# Patient Record
Sex: FEMALE | Race: WHITE | NOT HISPANIC OR LATINO | Employment: OTHER | URBAN - METROPOLITAN AREA
[De-identification: names, ages, dates, MRNs, and addresses within clinical notes are randomized per-mention and may not be internally consistent; named-entity substitution may affect disease eponyms.]

---

## 2017-05-10 ENCOUNTER — CONVERSION ENCOUNTER (OUTPATIENT)
Dept: GENERAL RADIOLOGY | Facility: HOSPITAL | Age: 42
End: 2017-05-10

## 2018-03-15 ENCOUNTER — OFFICE VISIT CONVERTED (OUTPATIENT)
Dept: NEUROSURGERY | Facility: CLINIC | Age: 43
End: 2018-03-15
Attending: PHYSICIAN ASSISTANT

## 2018-05-17 ENCOUNTER — OFFICE VISIT CONVERTED (OUTPATIENT)
Dept: NEUROSURGERY | Facility: CLINIC | Age: 43
End: 2018-05-17
Attending: PHYSICIAN ASSISTANT

## 2018-06-26 ENCOUNTER — OFFICE VISIT CONVERTED (OUTPATIENT)
Dept: ORTHOPEDIC SURGERY | Facility: CLINIC | Age: 43
End: 2018-06-26
Attending: ORTHOPAEDIC SURGERY

## 2018-08-30 ENCOUNTER — OFFICE VISIT CONVERTED (OUTPATIENT)
Dept: NEUROSURGERY | Facility: CLINIC | Age: 43
End: 2018-08-30
Attending: PHYSICIAN ASSISTANT

## 2018-09-18 ENCOUNTER — OFFICE VISIT CONVERTED (OUTPATIENT)
Dept: NEUROSURGERY | Facility: CLINIC | Age: 43
End: 2018-09-18
Attending: PHYSICIAN ASSISTANT

## 2018-12-11 ENCOUNTER — OFFICE VISIT CONVERTED (OUTPATIENT)
Dept: NEUROSURGERY | Facility: CLINIC | Age: 43
End: 2018-12-11
Attending: PHYSICIAN ASSISTANT

## 2019-01-31 ENCOUNTER — HOSPITAL ENCOUNTER (OUTPATIENT)
Dept: PHYSICAL THERAPY | Facility: CLINIC | Age: 44
Setting detail: RECURRING SERIES
Discharge: HOME | End: 2019-02-27
Attending: NEUROLOGICAL SURGERY

## 2020-03-24 ENCOUNTER — OFFICE VISIT (OUTPATIENT)
Dept: GASTROENTEROLOGY | Facility: CLINIC | Age: 45
End: 2020-03-24

## 2020-03-24 VITALS
DIASTOLIC BLOOD PRESSURE: 82 MMHG | WEIGHT: 196 LBS | BODY MASS INDEX: 30.76 KG/M2 | SYSTOLIC BLOOD PRESSURE: 126 MMHG | TEMPERATURE: 98.3 F | OXYGEN SATURATION: 92 % | HEART RATE: 78 BPM | HEIGHT: 67 IN

## 2020-03-24 DIAGNOSIS — Z94.89 HISTORY OF PANCREATIC ISLET CELL TRANSPLANTATION: ICD-10-CM

## 2020-03-24 DIAGNOSIS — K21.9 GASTROESOPHAGEAL REFLUX DISEASE, ESOPHAGITIS PRESENCE NOT SPECIFIED: ICD-10-CM

## 2020-03-24 DIAGNOSIS — K86.1 CHRONIC PANCREATITIS, UNSPECIFIED PANCREATITIS TYPE (HCC): ICD-10-CM

## 2020-03-24 DIAGNOSIS — K31.84 GASTROPARESIS: ICD-10-CM

## 2020-03-24 DIAGNOSIS — K50.919 CROHN'S DISEASE WITH COMPLICATION, UNSPECIFIED GASTROINTESTINAL TRACT LOCATION (HCC): Primary | ICD-10-CM

## 2020-03-24 PROCEDURE — 99214 OFFICE O/P EST MOD 30 MIN: CPT | Performed by: INTERNAL MEDICINE

## 2020-03-24 RX ORDER — BLOOD SUGAR DIAGNOSTIC
STRIP MISCELLANEOUS
COMMUNITY
Start: 2020-01-07 | End: 2021-08-24

## 2020-03-24 RX ORDER — ONDANSETRON 4 MG/1
4 TABLET, ORALLY DISINTEGRATING ORAL EVERY 8 HOURS PRN
COMMUNITY
Start: 2020-01-24 | End: 2022-04-26

## 2020-03-24 RX ORDER — FLURBIPROFEN SODIUM 0.3 MG/ML
SOLUTION/ DROPS OPHTHALMIC
COMMUNITY
Start: 2020-01-08 | End: 2021-08-24

## 2020-03-24 RX ORDER — FLUTICASONE PROPIONATE 50 MCG
SPRAY, SUSPENSION (ML) NASAL
COMMUNITY
Start: 2019-04-25 | End: 2022-04-26

## 2020-03-24 RX ORDER — PROMETHAZINE HYDROCHLORIDE 25 MG/1
TABLET ORAL
COMMUNITY
Start: 2019-04-16 | End: 2020-12-08

## 2020-03-24 RX ORDER — CETIRIZINE HYDROCHLORIDE 10 MG/1
TABLET ORAL
COMMUNITY
Start: 2019-04-25 | End: 2022-05-17 | Stop reason: SDUPTHER

## 2020-03-24 RX ORDER — CHLORTHALIDONE 25 MG/1
25 TABLET ORAL DAILY
COMMUNITY
Start: 2019-04-18

## 2020-03-24 RX ORDER — LISINOPRIL 10 MG/1
TABLET ORAL
COMMUNITY
Start: 2019-04-18

## 2020-03-24 RX ORDER — MISOPROSTOL 200 UG/1
TABLET ORAL
COMMUNITY
Start: 2020-01-22 | End: 2020-09-21

## 2020-03-24 RX ORDER — PANTOPRAZOLE SODIUM 40 MG/1
40 TABLET, DELAYED RELEASE ORAL 2 TIMES DAILY
Qty: 60 TABLET | Refills: 3 | Status: SHIPPED | OUTPATIENT
Start: 2020-03-24 | End: 2020-08-03 | Stop reason: SDUPTHER

## 2020-03-24 RX ORDER — IBUPROFEN 600 MG/1
TABLET ORAL
COMMUNITY
Start: 2020-01-07 | End: 2021-09-25

## 2020-03-24 RX ORDER — DOCUSATE SODIUM 100 MG/1
CAPSULE, LIQUID FILLED ORAL
COMMUNITY
Start: 2019-04-16

## 2020-03-24 RX ORDER — HYDROCORTISONE 10 MG/1
10 TABLET ORAL 2 TIMES DAILY
COMMUNITY
Start: 2020-01-08 | End: 2021-08-24

## 2020-03-24 RX ORDER — METOCLOPRAMIDE 10 MG/1
TABLET ORAL
COMMUNITY
Start: 2019-04-25 | End: 2020-09-01 | Stop reason: SDUPTHER

## 2020-03-24 RX ORDER — PRAVASTATIN SODIUM 40 MG
TABLET ORAL
COMMUNITY
Start: 2019-04-25 | End: 2022-04-26

## 2020-03-24 RX ORDER — LEVOTHYROXINE SODIUM 112 UG/1
112 TABLET ORAL DAILY
COMMUNITY
Start: 2019-05-01 | End: 2020-09-21 | Stop reason: DRUGHIGH

## 2020-03-24 RX ORDER — DULOXETINE 40 MG/1
40 CAPSULE, DELAYED RELEASE ORAL 2 TIMES DAILY
COMMUNITY
Start: 2020-01-24 | End: 2021-08-24

## 2020-03-24 RX ORDER — GABAPENTIN 300 MG/1
300 CAPSULE ORAL 3 TIMES DAILY
COMMUNITY
Start: 2020-01-24 | End: 2020-09-21

## 2020-03-24 RX ORDER — LANCETS 33 GAUGE
EACH MISCELLANEOUS
COMMUNITY
Start: 2020-02-09 | End: 2021-08-24

## 2020-03-24 RX ORDER — INSULIN GLARGINE 100 [IU]/ML
INJECTION, SOLUTION SUBCUTANEOUS
COMMUNITY
Start: 2020-03-10 | End: 2021-08-24

## 2020-03-24 RX ORDER — PANCRELIPASE 24000; 76000; 120000 [USP'U]/1; [USP'U]/1; [USP'U]/1
36000 CAPSULE, DELAYED RELEASE PELLETS ORAL 3 TIMES DAILY
COMMUNITY
Start: 2020-01-07 | End: 2020-03-24 | Stop reason: DRUGHIGH

## 2020-03-24 RX ORDER — DICYCLOMINE HYDROCHLORIDE 10 MG/1
CAPSULE ORAL
COMMUNITY
Start: 2020-01-07 | End: 2021-08-24 | Stop reason: SDUPTHER

## 2020-03-24 RX ORDER — PANTOPRAZOLE SODIUM 40 MG/1
TABLET, DELAYED RELEASE ORAL
COMMUNITY
Start: 2019-04-10 | End: 2020-03-24 | Stop reason: SDUPTHER

## 2020-03-24 NOTE — PATIENT INSTRUCTIONS
1. Continue Humira for Crohns disease. For further evaluation we will check your Humira levels as well as for any presence of antibody formation.    2. We will also check a CBC, CMP, and CRP.    3. Continue Creon 36,000 unit capsules 3 with meals and 2 with snacks. Refills sent to your pharmacy.     4. Continue pantoprazole 40 mg daily for GERD.     5. Plan for next office follow up in 2 months for reassessment of symptoms.

## 2020-03-24 NOTE — PROGRESS NOTES
Crohn's Disease and Med Management      HPI  Patient here for follow-up.  She has a history of Crohn's disease, gastroparesis, pancreas divisum and status post total pancreatectomy with islet cell transplant..  She most recently had an upper GI series showing stricturing in the proximal duodenum at the presumptive level of the duodenal jejunal junction.  There did appear to be gastric stasis associated with this.     She had a colonoscopy in April 2019 that showed a 3 mm ascending colon polyp and internal hemorrhoids but no evidence of active Crohn's disease.    She is here today for follow-up.  She is doing quite well at this point postoperatively.  She is about 3 months postop.  She is still taking insulin despite her islet cell transplant but hopes to be able to discontinue this soon.  She has not taken her Humira for about 6 months as this was on hold during her surgical timeframe.  She is having about 3-6 loose bowel movements a day.  No blood in the stool.  No weight loss.    She has had some abdominal pain which has lingered since her surgery but she considers it mild.  Pain is generally in the upper abdominal region and is nonradiating, dull, intermittent, and sometimes worse when she eats.  She has no associated nausea or vomiting.    Review of Systems   Constitutional: Negative for appetite change, chills, diaphoresis, fatigue, fever and unexpected weight change.   HENT: Negative for dental problem, ear pain, mouth sores, rhinorrhea, sore throat and voice change.    Eyes: Negative for pain, redness and visual disturbance.   Respiratory: Negative for cough, chest tightness and wheezing.    Cardiovascular: Negative for chest pain, palpitations and leg swelling.   Endocrine: Negative for cold intolerance, heat intolerance, polydipsia, polyphagia and polyuria.   Genitourinary: Negative for dysuria, frequency, hematuria and urgency.   Musculoskeletal: Negative for arthralgias, back pain, joint swelling,  myalgias and neck pain.   Skin: Negative for rash.   Allergic/Immunologic: Positive for immunocompromised state. Negative for environmental allergies and food allergies.   Neurological: Negative for dizziness, seizures, weakness, numbness and headaches.   Hematological: Bruises/bleeds easily.   Psychiatric/Behavioral: Positive for sleep disturbance. The patient is nervous/anxious.         I have reviewed and confirmed the accuracy of the ROS as documented by the APRN Andrea Han MD     Problem List:  There is no problem list on file for this patient.      Medical History:    Past Medical History:   Diagnosis Date   • Anemia    • Crohn's disease (CMS/HCC)    • Diabetes mellitus (CMS/HCC)    • GERD (gastroesophageal reflux disease)    • Hypertension         Social History:    Social History     Socioeconomic History   • Marital status: Legally      Spouse name: Not on file   • Number of children: Not on file   • Years of education: Not on file   • Highest education level: Not on file   Tobacco Use   • Smoking status: Never Smoker   • Smokeless tobacco: Never Used   Substance and Sexual Activity   • Alcohol use: Never     Frequency: Never   • Drug use: Never   • Sexual activity: Defer       Family History:   Family History   Problem Relation Age of Onset   • Cancer Mother    • Heart disease Father    • Colon cancer Maternal Grandfather    • Alcohol abuse Paternal Grandfather    • Heart disease Paternal Grandfather        Surgical History:   Past Surgical History:   Procedure Laterality Date   • ABDOMINAL SURGERY     • CHOLECYSTECTOMY     • COLONOSCOPY     • HYSTERECTOMY     • PANCREAS SURGERY  12/04/2019   • UPPER GASTROINTESTINAL ENDOSCOPY           Current Outpatient Medications:   •  ADMELOG 100 UNIT/ML injection, , Disp: , Rfl:   •  B-D UF III MINI PEN NEEDLES 31G X 5 MM misc, USE AS DIRECTED TO TEST BLOOD SUGAR 8 TIMES DAILY, Disp: , Rfl:   •  cetirizine (zyrTEC) 10 MG tablet, TAKE ONE TABLET BY  MOUTH ONCE DAILY AT BEDTIME, Disp: , Rfl:   •  chlorthalidone (HYGROTON) 25 MG tablet, Take 25 mg by mouth Daily., Disp: , Rfl:   •  dicyclomine (BENTYL) 10 MG capsule, TAKE ONE CAPSULE BY MOUTH QID, Disp: , Rfl:   •  docusate sodium (DOK) 100 MG capsule, TAKE ONE CAPSULE BY MOUTH TWICE DAILY, Disp: , Rfl:   •  DULoxetine HCl 40 MG capsule delayed-release particles, Take 40 mg by mouth 2 (Two) Times a Day., Disp: , Rfl:   •  fluticasone (FLONASE) 50 MCG/ACT nasal spray, instill 2 sprays in each nostril every day AS DIRECTED, Disp: , Rfl:   •  gabapentin (NEURONTIN) 300 MG capsule, Take 300 mg by mouth 3 (Three) Times a Day., Disp: , Rfl:   •  GLUCAGON EMERGENCY 1 MG injection, INJECT 1MG INTRAMUSCULARLY ONE TIME AS NEEDED FOR HYPOGLYCEMIA, Disp: , Rfl:   •  hydrocortisone (CORTEF) 10 MG tablet, Take 10 mg by mouth 2 (Two) Times a Day., Disp: , Rfl:   •  Insulin Glargine (BASAGLAR KWIKPEN) 100 UNIT/ML injection pen, Inject 10 units qam and 14 pm, Disp: , Rfl:   •  Lancets (ONETOUCH DELICA PLUS [id number]) misc, CHECK 6 TIMES DAILY WHEN SYMPTOMATIC OF LOW BLOOD SUGAR, Disp: , Rfl:   •  levothyroxine (SYNTHROID, LEVOTHROID) 112 MCG tablet, Take 112 mcg by mouth Daily., Disp: , Rfl:   •  linaclotide (Linzess) 145 MCG capsule capsule, TAKE ONE CAPSULE BY MOUTH IN THE MORNING 30 MINUTES BEFORE A MEAL, Disp: , Rfl:   •  lisinopril (PRINIVIL,ZESTRIL) 10 MG tablet, TAKE ONE TABLET BY MOUTH ONCE DAILY, Disp: , Rfl:   •  metoclopramide (REGLAN) 10 MG tablet, TAKE ONE TABLET BY MOUTH FOUR TIMES DAILY, Disp: , Rfl:   •  metoprolol tartrate (LOPRESSOR) 25 MG tablet, Take 25 mg by mouth 2 (Two) Times a Day., Disp: , Rfl:   •  ondansetron ODT (ZOFRAN-ODT) 4 MG disintegrating tablet, , Disp: , Rfl:   •  ONETOUCH VERIO test strip, USE TO CHECK BLOOD SUGAR EVERY 4 HOURS, Disp: , Rfl:   •  pantoprazole (PROTONIX) 40 MG EC tablet, TAKE ONE TABLET BY MOUTH TWICE DAILY, Disp: , Rfl:   •  pravastatin (PRAVACHOL) 40 MG tablet, TAKE ONE  TABLET BY MOUTH EVERY NIGHT AT BEDTIME, Disp: , Rfl:   •  promethazine (PHENERGAN) 25 MG tablet, TAKE ONE TABLET BY MOUTH THREE TIMES DAILY AS NEEDED FOR NAUSEA, Disp: , Rfl:   •  miSOPROStol (CYTOTEC) 200 MCG tablet, TK 1 T PO QID, Disp: , Rfl:     Allergies:   Allergies   Allergen Reactions   • Sulfa Antibiotics Hives   • Tramadol Anaphylaxis   • Codeine GI Intolerance   • Morphine GI Intolerance   • Vancomycin Hives        The following portions of the patient's history were reviewed and updated as appropriate: allergies, current medications, past family history, past medical history, past social history, past surgical history and problem list.    Vitals:    03/24/20 1424   BP: 126/82   Pulse: 78   Temp: 98.3 °F (36.8 °C)   SpO2: 92%         03/24/20  1424   Weight: 88.9 kg (196 lb)     Body mass index is 30.7 kg/m².      Physical Exam   Abdominal: Soft. Normal appearance and bowel sounds are normal. She exhibits no distension, no pulsatile midline mass and no mass. There is no tenderness. There is no rigidity, no rebound and no guarding.   Vitals reviewed.       Assessment/ Plan  Kayla was seen today for crohn's disease and med management.    Diagnoses and all orders for this visit:    Crohn's disease with complication, unspecified gastrointestinal tract location (CMS/HCC)  -     CBC & Differential  -     Comprehensive Metabolic Panel  -     C-reactive Protein  -     Adalimumab+Ab (Serial Monitor)    Chronic pancreatitis, unspecified pancreatitis type (CMS/HCC)  -     CBC & Differential  -     Comprehensive Metabolic Panel  -     C-reactive Protein  -     Adalimumab+Ab (Serial Monitor)    History of pancreatic islet cell transplantation  -     CBC & Differential  -     Comprehensive Metabolic Panel  -     C-reactive Protein  -     Adalimumab+Ab (Serial Monitor)    Gastroparesis  -     CBC & Differential  -     Comprehensive Metabolic Panel  -     C-reactive Protein  -     Adalimumab+Ab (Serial  Monitor)    Gastroesophageal reflux disease, esophagitis presence not specified         No follow-ups on file.    Documentation by Mariela WILLIS acting as a scribe in the following sections (ROS, Assessment, Plan) for the undersigned provider.     Discussion:  Patient has a history of stable Crohn's disease and is still having some ongoing chronic issues with this and diarrhea.  She has been off Humira so we will go ahead and check her Humira antibodies before reinitiating biologic dosing.  We will go and check a CBC, CMP, and C-reactive protein as well as the Humira antibodies.    Patient is status post islet cell transplant and she is awaiting return of the islet cell function.  We will go ahead and refill her Creon for pancreatic enzyme replacement.    She also has chronic, stable reflux and will need a refill on pantoprazole.  She did have a postsurgical anastomotic ulcer and will need high-dose proton pump inhibitors.    We will follow-up here in 2 months.

## 2020-03-30 LAB
ADALIMUMAB AB SERPL-MCNC: <25 NG/ML
ADALIMUMAB SERPL-MCNC: <0.6 UG/ML
ALBUMIN SERPL-MCNC: 3.8 G/DL (ref 3.5–5.2)
ALBUMIN/GLOB SERPL: 1 G/DL
ALP SERPL-CCNC: 181 U/L (ref 39–117)
ALT SERPL-CCNC: 35 U/L (ref 1–33)
AST SERPL-CCNC: 46 U/L (ref 1–32)
BASOPHILS # BLD AUTO: 0.03 10*3/MM3 (ref 0–0.2)
BASOPHILS NFR BLD AUTO: 0.3 % (ref 0–1.5)
BILIRUB SERPL-MCNC: 0.2 MG/DL (ref 0.2–1.2)
BUN SERPL-MCNC: 12 MG/DL (ref 6–20)
BUN/CREAT SERPL: 16.9 (ref 7–25)
CALCIUM SERPL-MCNC: 9.7 MG/DL (ref 8.6–10.5)
CHLORIDE SERPL-SCNC: 100 MMOL/L (ref 98–107)
CO2 SERPL-SCNC: 24.7 MMOL/L (ref 22–29)
CREAT SERPL-MCNC: 0.71 MG/DL (ref 0.57–1)
CRP SERPL-MCNC: 1.06 MG/DL (ref 0–0.5)
EOSINOPHIL # BLD AUTO: 0.32 10*3/MM3 (ref 0–0.4)
EOSINOPHIL NFR BLD AUTO: 3.6 % (ref 0.3–6.2)
ERYTHROCYTE [DISTWIDTH] IN BLOOD BY AUTOMATED COUNT: 20.7 % (ref 12.3–15.4)
GLOBULIN SER CALC-MCNC: 3.9 GM/DL
GLUCOSE SERPL-MCNC: 189 MG/DL (ref 65–99)
HCT VFR BLD AUTO: 31.8 % (ref 34–46.6)
HGB BLD-MCNC: 9.5 G/DL (ref 12–15.9)
IMM GRANULOCYTES # BLD AUTO: 0.03 10*3/MM3 (ref 0–0.05)
IMM GRANULOCYTES NFR BLD AUTO: 0.3 % (ref 0–0.5)
LYMPHOCYTES # BLD AUTO: 2.37 10*3/MM3 (ref 0.7–3.1)
LYMPHOCYTES NFR BLD AUTO: 26.8 % (ref 19.6–45.3)
Lab: NORMAL
MCH RBC QN AUTO: 23.1 PG (ref 26.6–33)
MCHC RBC AUTO-ENTMCNC: 29.9 G/DL (ref 31.5–35.7)
MCV RBC AUTO: 77.4 FL (ref 79–97)
MONOCYTES # BLD AUTO: 1.13 10*3/MM3 (ref 0.1–0.9)
MONOCYTES NFR BLD AUTO: 12.8 % (ref 5–12)
NEUTROPHILS # BLD AUTO: 4.97 10*3/MM3 (ref 1.7–7)
NEUTROPHILS NFR BLD AUTO: 56.2 % (ref 42.7–76)
NRBC BLD AUTO-RTO: 0.1 /100 WBC (ref 0–0.2)
PLATELET # BLD AUTO: 683 10*3/MM3 (ref 140–450)
POTASSIUM SERPL-SCNC: 4.8 MMOL/L (ref 3.5–5.2)
PROT SERPL-MCNC: 7.7 G/DL (ref 6–8.5)
RBC # BLD AUTO: 4.11 10*6/MM3 (ref 3.77–5.28)
SODIUM SERPL-SCNC: 139 MMOL/L (ref 136–145)
WBC # BLD AUTO: 8.85 10*3/MM3 (ref 3.4–10.8)

## 2020-03-31 DIAGNOSIS — Z79.899 HIGH RISK MEDICATION USE: ICD-10-CM

## 2020-03-31 DIAGNOSIS — K50.919 CROHN'S DISEASE WITH COMPLICATION, UNSPECIFIED GASTROINTESTINAL TRACT LOCATION (HCC): Primary | ICD-10-CM

## 2020-04-02 ENCOUNTER — TELEPHONE (OUTPATIENT)
Dept: GASTROENTEROLOGY | Facility: CLINIC | Age: 45
End: 2020-04-02

## 2020-04-02 NOTE — TELEPHONE ENCOUNTER
Spoke with pt, states that since Sunday been having nausea, vomiting, abdominal pain and spasms, chills. Pt states that she is just all around not feeling well. States that she believes she may be dyhydrated but is afraid to go to the ER due to the COVID-19.     Please Advise.

## 2020-04-02 NOTE — TELEPHONE ENCOUNTER
Reviewed with Dr. Han.    Contacted patient.    She is having chills but unsure if she is having a fever as she does not have a thermometer.    She is having persistent nausea and vomiting despite regular use of promethazine oral and suppositories.  She feels dehydrated.    She has abdominal pain and is overall feeling poorly.    She is unable to tolerate liquid intake without vomiting.    Offered broad-spectrum antibiotics and antiemetics however patient feels that she needs to go to the emergency room.    She is going to proceed to [facility] ER and contact us with an update.    Michael

## 2020-04-15 ENCOUNTER — HOSPITAL ENCOUNTER (OUTPATIENT)
Dept: LAB | Facility: HOSPITAL | Age: 45
Discharge: HOME | End: 2020-04-15

## 2020-04-15 LAB
25(OH)D3 SERPL-MCNC: 37.1 NG/ML (ref 30–100)
ALBUMIN SERPL-MCNC: 3.9 G/DL (ref 3.5–5)
ALBUMIN/GLOB SERPL: 1 {RATIO} (ref 1.4–2.6)
ALP SERPL-CCNC: 177 U/L (ref 42–98)
ALT SERPL-CCNC: 38 U/L (ref 10–40)
ANION GAP SERPL CALC-SCNC: 20 MMOL/L (ref 8–19)
AST SERPL-CCNC: 54 U/L (ref 15–50)
BILIRUB SERPL-MCNC: 0.18 MG/DL (ref 0.2–1.3)
BUN SERPL-MCNC: 15 MG/DL (ref 5–25)
BUN/CREAT SERPL: 16 {RATIO} (ref 6–20)
CALCIUM SERPL-MCNC: 9 MG/DL (ref 8.7–10.4)
CHLORIDE SERPL-SCNC: 100 MMOL/L (ref 99–111)
CONV CO2: 22 MMOL/L (ref 22–32)
CONV TOTAL PROTEIN: 8 G/DL (ref 6.3–8.2)
CREAT UR-MCNC: 0.95 MG/DL (ref 0.5–0.9)
GFR SERPLBLD BASED ON 1.73 SQ M-ARVRAT: >60 ML/MIN/{1.73_M2}
GLOBULIN UR ELPH-MCNC: 4.1 G/DL (ref 2–3.5)
GLUCOSE SERPL-MCNC: 147 MG/DL (ref 65–99)
OSMOLALITY SERPL CALC.SUM OF ELEC: 290 MOSM/KG (ref 273–304)
POTASSIUM SERPL-SCNC: 4 MMOL/L (ref 3.5–5.3)
SODIUM SERPL-SCNC: 138 MMOL/L (ref 135–147)
TSH SERPL-ACNC: 7.36 M[IU]/L (ref 0.27–4.2)

## 2020-04-17 ENCOUNTER — DOCUMENTATION (OUTPATIENT)
Dept: GASTROENTEROLOGY | Facility: CLINIC | Age: 45
End: 2020-04-17

## 2020-04-30 ENCOUNTER — RESULTS ENCOUNTER (OUTPATIENT)
Dept: GASTROENTEROLOGY | Facility: CLINIC | Age: 45
End: 2020-04-30

## 2020-04-30 DIAGNOSIS — Z79.899 HIGH RISK MEDICATION USE: ICD-10-CM

## 2020-04-30 DIAGNOSIS — K50.919 CROHN'S DISEASE WITH COMPLICATION, UNSPECIFIED GASTROINTESTINAL TRACT LOCATION (HCC): ICD-10-CM

## 2020-06-23 ENCOUNTER — TELEPHONE (OUTPATIENT)
Dept: GASTROENTEROLOGY | Facility: CLINIC | Age: 45
End: 2020-06-23

## 2020-07-22 ENCOUNTER — OFFICE VISIT (OUTPATIENT)
Dept: GASTROENTEROLOGY | Facility: CLINIC | Age: 45
End: 2020-07-22

## 2020-07-22 VITALS
RESPIRATION RATE: 16 BRPM | WEIGHT: 185.4 LBS | OXYGEN SATURATION: 98 % | TEMPERATURE: 97.3 F | DIASTOLIC BLOOD PRESSURE: 80 MMHG | HEIGHT: 67 IN | BODY MASS INDEX: 29.1 KG/M2 | HEART RATE: 68 BPM | SYSTOLIC BLOOD PRESSURE: 120 MMHG

## 2020-07-22 DIAGNOSIS — Z90.410 HISTORY OF PANCREATECTOMY: ICD-10-CM

## 2020-07-22 DIAGNOSIS — R74.8 ELEVATED LIVER ENZYMES: ICD-10-CM

## 2020-07-22 DIAGNOSIS — K50.919 CROHN'S DISEASE WITH COMPLICATION, UNSPECIFIED GASTROINTESTINAL TRACT LOCATION (HCC): Primary | ICD-10-CM

## 2020-07-22 DIAGNOSIS — K31.84 GASTROPARESIS: ICD-10-CM

## 2020-07-22 DIAGNOSIS — K75.81 NASH (NONALCOHOLIC STEATOHEPATITIS): ICD-10-CM

## 2020-07-22 DIAGNOSIS — Z79.899 HIGH RISK MEDICATION USE: ICD-10-CM

## 2020-07-22 DIAGNOSIS — R93.5 ABNORMAL ABDOMINAL CT SCAN: ICD-10-CM

## 2020-07-22 DIAGNOSIS — R10.11 ABDOMINAL PAIN, RIGHT UPPER QUADRANT: ICD-10-CM

## 2020-07-22 PROCEDURE — 99214 OFFICE O/P EST MOD 30 MIN: CPT | Performed by: NURSE PRACTITIONER

## 2020-07-22 NOTE — PROGRESS NOTES
Follow-up      HPI  25-year-old female presents the office today for follow-up.  She has a history of Crohn's disease, Hashimoto's thyroiditis, gastroparesis, and pancreas divisum status post total pancreatectomy and splenectomy with islet cell transplant.    She reports going to the ER at [facility] on 7/8/2020 with fever, chills, shortness of breath, nausea and vomiting, and  abdominal pain.  CT scan of the abdomen pelvis was performed and demonstrated diffuse fatty infiltration of the liver, consistent with her diagnosis of Mart.  It also demonstrated focal areas of mixed density in the left upper quadrant mesentery and right lower quadrant omentum suspected to represent areas of fat infarction worsening solid tissue in these areas may represent an ongoing inflammatory reaction or fibrosis.  She was also noted to have gas in the anterior abdominal wall, it was likely secondary to injection of medication.  She was tested for COVID-19 and was found to be negative.  Lab work demonstrated an elevated WBC near 30,000-lab report from [facility] not available for review.     She was transferred to [facility] for further evaluation.  She was COVID-19 tested a second time, which was also negative.  WBC count was noted to be 26.68.  Liver enzymes were elevated with alk phos at 198, AST at 82, and ALT at 61. Surgery was consulted, with no surgical intervention indicated.  Blood cultures were negative for growth at 3 days.  Patient was started on IV Zosyn.     She did not undergo any repeat imaging during her 5-day stay at [facility].  Was improved and she was discharged home on Augmentin, which she completed on Saturday.  Patient reports being told that she infarcted part of her liver; however, upon review of her records this does not seem to coincide.  Most recent lab work performed on 7/21/2020 showed an elevated WBC at 15.1, and continued elevated platelet count of 649.  The patient does have a port in  "her right chest.  Liver enzymes remained elevated with alkaline phosphatase of 195, AST of 96, and ALT of 59.  She denies having any abdominal pain at this time, but states she is tender if pressure is applied to her right upper quadrant.  Liver biopsy on 5/16/2019 demonstrated steatohepatitis with pericentral, periportal, and early bridging fibrosis stage 2-3 out of 3.    She continues Protonix 40 mg twice daily for management of heartburn and reflux.  Symptoms are well controlled with this dosing.  If she skips a dose of medication she reports that she will experience significant heartburn.  She does experience nausea and vomiting intermittently secondary to gastroparesis.  She currently takes Reglan 10 mg anywhere from 2-4 times a day depending upon her symptoms.  She has reported a \"jittery\" type of feeling off/on as well as \"tremors\" and is aware of the potential side effects of tardive dyskinesia with use of Reglan.  As her pancreatectomy islet cell transplant and splenectomy was performed on 12/4/2019, she states that her transplant surgeon Dr. Riddle would like to avoid any type of gastric stimulator placement until she has been postop for 1 year.  She has not yet tried domperidone.  She reports having had an EKG done this year at [facility].    She has a history of Crohn's disease and is currently being managed on Humira injections every 2 weeks.  She reports that her BMs consist of soft stool.  Bowel movements can range anywhere from 0-10, depending upon the foods she eats.  She uses dicyclomine daily.  At this time, she feels that her Crohn's disease is very well managed.  Her last colonoscopy was performed on 4/24/2019 and revealed one 3 mm polyp in the ascending colon, identified as a tubular adenoma.  No evidence of active Crohn's disease noted on colonoscopy or pathology reports.  She is in recall for her next colonoscopy April 2024.  Occasionally she reports spasms that occur across her abdomen " that come and go and occur out of the blue.  She states that if she stands up to stretch that the spasms seem to go away.    Sh reports continuing insulin therapy for management of glucose status post pancreatectomy.  She takes Creon pancreatic enzymes daily.    Review of Systems   Constitutional: Negative for appetite change, chills, diaphoresis, fatigue, fever and unexpected weight change.   HENT: Negative for dental problem, ear pain, mouth sores, rhinorrhea, sore throat and voice change.    Eyes: Negative for pain, redness and visual disturbance.   Respiratory: Negative for cough, chest tightness and wheezing.    Cardiovascular: Negative for chest pain, palpitations and leg swelling.   Endocrine: Negative for cold intolerance, heat intolerance, polydipsia, polyphagia and polyuria.   Genitourinary: Negative for dysuria, frequency, hematuria and urgency.   Musculoskeletal: Positive for arthralgias and back pain. Negative for joint swelling, myalgias and neck pain.   Skin: Negative for rash.   Allergic/Immunologic: Positive for environmental allergies, food allergies and immunocompromised state.   Neurological: Negative for dizziness, seizures, weakness, numbness and headaches.   Hematological: Bruises/bleeds easily.   Psychiatric/Behavioral: Negative for sleep disturbance. The patient is not nervous/anxious.           Problem List:  There is no problem list on file for this patient.      Medical History:    Past Medical History:   Diagnosis Date   • Anemia    • Crohn's disease (CMS/[facility])    • Diabetes mellitus (CMS/[facility])    • GERD (gastroesophageal reflux disease)    • Hypertension         Social History:    Social History     Socioeconomic History   • Marital status: Legally      Spouse name: Not on file   • Number of children: Not on file   • Years of education: Not on file   • Highest education level: Not on file   Tobacco Use   • Smoking status: Never Smoker   • Smokeless tobacco: Never Used   Substance  and Sexual Activity   • Alcohol use: Never     Frequency: Never   • Drug use: Never   • Sexual activity: Defer       Family History:   Family History   Problem Relation Age of Onset   • Cancer Mother    • Heart disease Father    • Colon cancer Maternal Grandfather    • Alcohol abuse Paternal Grandfather    • Heart disease Paternal Grandfather        Surgical History:   Past Surgical History:   Procedure Laterality Date   • ABDOMINAL SURGERY     • CHOLECYSTECTOMY     • COLONOSCOPY     • HYSTERECTOMY     • PANCREAS SURGERY  12/04/2019   • UPPER GASTROINTESTINAL ENDOSCOPY           Current Outpatient Medications:   •  Adalimumab (Humira Pen) 40 MG/0.4ML Pen-injector Kit, Inject 40 mg under the skin into the appropriate area as directed Every 14 (Fourteen) Days., Disp: , Rfl:   •  ADMELOG 100 UNIT/ML injection, , Disp: , Rfl:   •  B-D UF III MINI PEN NEEDLES 31G X 5 MM misc, USE AS DIRECTED TO TEST BLOOD SUGAR 8 TIMES DAILY, Disp: , Rfl:   •  cetirizine (zyrTEC) 10 MG tablet, TAKE ONE TABLET BY MOUTH ONCE DAILY AT BEDTIME, Disp: , Rfl:   •  chlorthalidone (HYGROTON) 25 MG tablet, Take 25 mg by mouth Daily., Disp: , Rfl:   •  dicyclomine (BENTYL) 10 MG capsule, TAKE ONE CAPSULE BY MOUTH QID, Disp: , Rfl:   •  docusate sodium (DOK) 100 MG capsule, TAKE ONE CAPSULE BY MOUTH TWICE DAILY, Disp: , Rfl:   •  DULoxetine HCl 40 MG capsule delayed-release particles, Take 40 mg by mouth 2 (Two) Times a Day., Disp: , Rfl:   •  fluticasone (FLONASE) 50 MCG/ACT nasal spray, instill 2 sprays in each nostril every day AS DIRECTED, Disp: , Rfl:   •  GLUCAGON EMERGENCY 1 MG injection, INJECT 1MG INTRAMUSCULARLY ONE TIME AS NEEDED FOR HYPOGLYCEMIA, Disp: , Rfl:   •  hydrocortisone (CORTEF) 10 MG tablet, Take 10 mg by mouth 2 (Two) Times a Day., Disp: , Rfl:   •  Insulin Glargine (BASAGLAR KWIKPEN) 100 UNIT/ML injection pen, Inject 10 units qam and 14 pm, Disp: , Rfl:   •  Lancets (ONETOUCH DELICA PLUS [id number]) misc, CHECK 6 TIMES DAILY  WHEN SYMPTOMATIC OF LOW BLOOD SUGAR, Disp: , Rfl:   •  levothyroxine (SYNTHROID, LEVOTHROID) 112 MCG tablet, Take 112 mcg by mouth Daily., Disp: , Rfl:   •  lisinopril (PRINIVIL,ZESTRIL) 10 MG tablet, TAKE ONE TABLET BY MOUTH ONCE DAILY, Disp: , Rfl:   •  metoclopramide (REGLAN) 10 MG tablet, TAKE ONE TABLET BY MOUTH FOUR TIMES DAILY, Disp: , Rfl:   •  metoprolol tartrate (LOPRESSOR) 25 MG tablet, Take 25 mg by mouth 2 (Two) Times a Day., Disp: , Rfl:   •  ondansetron ODT (ZOFRAN-ODT) 4 MG disintegrating tablet, , Disp: , Rfl:   •  ONETOUCH VERIO test strip, USE TO CHECK BLOOD SUGAR EVERY 4 HOURS, Disp: , Rfl:   •  Pancrelipase, Lip-Prot-Amyl, (Creon) 20828 units capsule delayed-release particles, Take 3 tabs with meals and 2 with snacks, Disp: 1080 capsule, Rfl: 3  •  pantoprazole (PROTONIX) 40 MG EC tablet, Take 1 tablet by mouth 2 (Two) Times a Day., Disp: 60 tablet, Rfl: 3  •  pravastatin (PRAVACHOL) 40 MG tablet, TAKE ONE TABLET BY MOUTH EVERY NIGHT AT BEDTIME, Disp: , Rfl:   •  promethazine (PHENERGAN) 25 MG tablet, TAKE ONE TABLET BY MOUTH THREE TIMES DAILY AS NEEDED FOR NAUSEA, Disp: , Rfl:   •  gabapentin (NEURONTIN) 300 MG capsule, Take 300 mg by mouth 3 (Three) Times a Day., Disp: , Rfl:   •  linaclotide (Linzess) 145 MCG capsule capsule, TAKE ONE CAPSULE BY MOUTH IN THE MORNING 30 MINUTES BEFORE A MEAL, Disp: , Rfl:   •  miSOPROStol (CYTOTEC) 200 MCG tablet, TK 1 T PO QID, Disp: , Rfl:     Allergies:   Allergies   Allergen Reactions   • Sulfa Antibiotics Hives   • Tramadol Anaphylaxis   • Codeine GI Intolerance   • Morphine GI Intolerance   • Vancomycin Hives        The following portions of the patient's history were reviewed and updated as appropriate: allergies, current medications, past family history, past medical history, past social history, past surgical history and problem list.    Vitals:    07/22/20 1233   BP: 120/80   Pulse: 68   Resp: 16   Temp: 97.3 °F (36.3 °C)   SpO2: 98%       Physical  Exam    Assessment/ Plan  Kayla was seen today for follow-up.    Diagnoses and all orders for this visit:    Crohn's disease with complication, unspecified gastrointestinal tract location (CMS/HCC)    Abnormal abdominal CT scan  -     Cancel: Duplex Portal Hepatic Complete CAR; Future  -     CT Angiogram Abdomen Pelvis With & Without Contrast; Future    Abdominal pain, right upper quadrant  -     Cancel: Duplex Portal Hepatic Complete CAR; Future  -     CT Angiogram Abdomen Pelvis With & Without Contrast; Future    History of pancreatectomy    High risk medication use    WELDON (nonalcoholic steatohepatitis)  -     Cancel: Duplex Portal Hepatic Complete CAR; Future    Elevated liver enzymes  -     Cancel: Duplex Portal Hepatic Complete CAR; Future  -     CT Angiogram Abdomen Pelvis With & Without Contrast; Future    Gastroparesis         Return for follow up pending imaging findings.    1. For further evaluation of abnormal CT scan findings, we will order a CTA of the abdomen and pelvis.    2. For WELDON, continue weight loss efforts and maintenance of blood glucose control.     3. For GERD, continue pantoprazole 40 mg twice daily.    4. For gastroparesis, continue Reglan 10 mg 2-4 x daily before meals. Will obtain your previous EKG from [facility] for review to assess QT and QTc interval to determine if eligible for use of domperidone.     5. For Crohns disease, continue Humira injections every 2 weeks.     6. We will review your hospital records with Dr. Han in conjunction with results from your upcoming CTA of abdomen and pelvis for further recommendations.    7. Additional recommendations pending outcome of CTA.    Discussion:  Most recent hospitalization records were received and reviewed.  APRN also reviewed patient's CT scan of abdomen and pelvis from [facility].  APRN discussed the patient's case with radiology (Dr. Luque), and a CTA of abdomen and pelvis would be reasonable for  further evaluation of her symptoms as no follow up imaging was performed during her hospitalization. Per Dr. Luque, portal vein was patent on review of CT scan today. There is no mention of liver infarction on her CT scan (as previously reported by the patient),     Discussed management of WELDON with patient.  She has lost 100 pounds thus far and is trying to work hard to maintain her diet, given her new diagnosis of diabetes following pancreatectomy with ICT.      For Crohn's disease, continue treatment with Humira.    For gastroparesis, we will obtain patient's previous EKG report to assess both her QT and QTc interval to determine if she is a candidate for domperidone therapy for gastroparesis, as this would be a safer option than continued use of Reglan.  APRN reviewed potential side effect of tardive dyskinesia with long term, high dose use of Reglan with the patient, and she is in agreement and would like to switch to Domperidone if EKG is within normal limits.     Next office follow up to be determined based upon CTA findings and patient symptoms.  Patient verbalized understanding of above plan of care and is in agreement.  All questions answered and support provided.

## 2020-07-22 NOTE — PATIENT INSTRUCTIONS
1. For further evaluation of abnormal findings on CT scan, we will order a CTA.     2. For WLEDON, continue weight loss efforts and maintenance of blood glucose control.     3. For GERD, continue pantoprazole 40 mg twice daily.    4. For gastroparesis, continue Reglan 10 mg 2-4 x daily before meals. Will obtain your previous EKG from [facility] for review to assess QT and QTc interval to determine if eligible for use of domperidone.     5. For Crohns disease, continue Humira injections every 2 weeks.  You may also continue to use dicyclomine as prescribed.    6. We will review your hospital records with Dr. Han in conjunction with results from your upcoming CTA of the abdomen and pelvis.    7. Additional recommendations pending outcome of CTA abdomen and pelvis.

## 2020-08-03 ENCOUNTER — TELEPHONE (OUTPATIENT)
Dept: GASTROENTEROLOGY | Facility: CLINIC | Age: 45
End: 2020-08-03

## 2020-08-03 RX ORDER — PANTOPRAZOLE SODIUM 40 MG/1
40 TABLET, DELAYED RELEASE ORAL 2 TIMES DAILY
Qty: 60 TABLET | Refills: 3 | Status: SHIPPED | OUTPATIENT
Start: 2020-08-03 | End: 2022-10-05 | Stop reason: SDUPTHER

## 2020-08-20 ENCOUNTER — OFFICE VISIT (OUTPATIENT)
Dept: GASTROENTEROLOGY | Facility: CLINIC | Age: 45
End: 2020-08-20

## 2020-08-20 VITALS
WEIGHT: 184 LBS | HEART RATE: 80 BPM | HEIGHT: 67 IN | DIASTOLIC BLOOD PRESSURE: 72 MMHG | OXYGEN SATURATION: 99 % | TEMPERATURE: 98.2 F | BODY MASS INDEX: 28.88 KG/M2 | SYSTOLIC BLOOD PRESSURE: 114 MMHG

## 2020-08-20 DIAGNOSIS — R10.84 DIFFUSE ABDOMINAL PAIN: Primary | ICD-10-CM

## 2020-08-20 DIAGNOSIS — K31.84 GASTROPARESIS: ICD-10-CM

## 2020-08-20 DIAGNOSIS — Z79.899 HIGH RISK MEDICATION USE: ICD-10-CM

## 2020-08-20 DIAGNOSIS — K21.9 GASTROESOPHAGEAL REFLUX DISEASE, ESOPHAGITIS PRESENCE NOT SPECIFIED: ICD-10-CM

## 2020-08-20 DIAGNOSIS — K75.81 NASH (NONALCOHOLIC STEATOHEPATITIS): ICD-10-CM

## 2020-08-20 DIAGNOSIS — K50.919 CROHN'S DISEASE WITH COMPLICATION, UNSPECIFIED GASTROINTESTINAL TRACT LOCATION (HCC): ICD-10-CM

## 2020-08-20 PROCEDURE — 99214 OFFICE O/P EST MOD 30 MIN: CPT | Performed by: NURSE PRACTITIONER

## 2020-08-20 NOTE — PATIENT INSTRUCTIONS
1.  For GERD, continue pantoprazole 40 mg twice daily.    2.  For gastroparesis, we will obtain your most recent EKG from [facility] to assess your QT interval.  If QT and QTc intervals are within normal limits, we will plan to start you on domperidone for management.  You may continue Reglan 10 mg up to 3 times daily before meals for now, we recommend the lowest possible dose to help manage symptoms.  Should you start to experience any type of neurological side effects including jaw gnashing, facial tics, and repetitive facial movements please discontinue your medication.    3.  We will obtain your hospitalization records from [facility] for our review and discuss with Dr. Han and make further recommendations at that time.    4.  Discontinue MiraLAX.    5.  Continue Humira injections every other week for management of Crohn's disease.    6.  Next office follow-up to be determined based upon plan of care and recommendations per Dr. Han.    7.  For Mart, continue to avoid high fat,/high sugar foods.

## 2020-08-20 NOTE — PROGRESS NOTES
Follow-up; Abdominal Pain; Nausea; and Vomiting      HPI  45-year-old female presents the office today for follow-up.  Last seen in office on 7/22/2020.  She has a history of Crohn's disease, Hashimoto's thyroiditis, Mart, elevated liver enzymes gastroparesis, GERD, pancreas divisum status post total pancreatectomy with islet cell transplant and splenectomy.    GERD is currently being managed with Protonix 40 mg twice daily with good control.  She also has a history of gastroparesis and takes Reglan 10 mg anywhere from 2-4 times daily depending upon symptoms.  He continues to experience nausea on a daily basis.  Her last episode of emesis was last week.  She has to utilize Phenergan and Zofran on a fairly regular basis for management of symptoms.  Long discussions have been held in the past with patient regarding long-term use and potential side effects of Reglan use including tardive dyskinesia.  She reports that she is very interested in trying therapy with domperidone and states that she had her most recent EKG within the [facility] network in either January or February.  She states that previous discussion regarding placement of a gastric stimulator has taken place, but at this time she would like to defer this type of treatment.  She states that she has already been placed on the referral list with  GI motility and is awaiting an appointment.    She underwent pancreatectomy with islet cell transplant and splenectomy on 12/4/2019 with Dr. Riddle.  She reports that her blood sugar management waxes and wanes, but overall she is maintaining glucose control with levels at 200 or less on average.    She has a history of Mart and has lost 100 pounds thus far.  She continues to maintain lifestyle changes and is committed to weight loss.    She has a history of Crohn's disease and is currently receiving Humira injections every 2 weeks.  Most recent colonoscopy was performed on 4/24/2019 demonstrating 1 3 mm polyp in the  ascending colon which was identified as a tubular adenoma.  No evidence of active Crohn's disease was noted on her pathology report or her actual colonoscopy.  Next colonoscopy is due April 2024.    She reports having had a total of 3 hospital admissions to [facility] over the month of July for recurrent abdominal pain.  Most recently she was discharged approximately 1 week ago.  Records are not available for our review, but we will request.  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  Most recent CT scan of the abdomen and pelvis performed on 7/8/2020 at [facility] demonstrated diffuse fatty liver consistent with her diagnosis of Mart as well as focal areas of mixed density in the left upper quadrant mesentery and right lower quadrant omentum.  These areas were suspicious for possible fat infarction and the solid tissue in these areas could be representative of an ongoing inflammatory reaction or fibrosis.  The patient was transferred to [facility] for further care and surgery was consulted.  Patient stated that no surgical intervention was indicated.  She presented with fever, chills, shortness of breath, nausea and vomiting, and abdominal pain during this admission.  Her WBC count was almost at 30,000.  She was given IV antibiotics and discharged home on antibiotics.    The patient expresses concerns that she could have some necrotic tissue in her abdomen secondary to the fat infarction noted on CT scan and questions whether or not she needs a surgical referral for a second opinion.  She continues to experience a lot of pressure in her abdomen that can be sharp at times.  The only relief she can gain is when she lies down or curls up into a fetal position.  She is using dicyclomine, but states that it does not help her discomfort.  She denies any worsening factors for the pain.  She also reports accompanying nausea, vomiting,  and bloating.  During her last hospital admission she was recommended to take MiraLAX 1 capful 1-2 times daily, but found that this was causing a profound number of bowel movements, and was unsure as to why this was even prescribed as her stools generally tend to be on the more loose.    Review of Systems   Constitutional: Positive for appetite change. Negative for chills, diaphoresis, fatigue, fever and unexpected weight change.   HENT: Negative for dental problem, ear pain, mouth sores, rhinorrhea, sore throat and voice change.    Eyes: Negative for pain, redness and visual disturbance.   Respiratory: Negative for cough, chest tightness and wheezing.    Cardiovascular: Negative for chest pain, palpitations and leg swelling.   Endocrine: Negative for cold intolerance, heat intolerance, polydipsia, polyphagia and polyuria.   Genitourinary: Negative for dysuria, frequency, hematuria and urgency.   Musculoskeletal: Positive for back pain. Negative for arthralgias, joint swelling, myalgias and neck pain.   Skin: Negative for rash.   Allergic/Immunologic: Positive for environmental allergies and immunocompromised state. Negative for food allergies.   Neurological: Negative for dizziness, seizures, weakness, numbness and headaches.   Hematological: Bruises/bleeds easily.   Psychiatric/Behavioral: Negative for sleep disturbance. The patient is not nervous/anxious.             Problem List:  There is no problem list on file for this patient.      Medical History:    Past Medical History:   Diagnosis Date   • Anemia    • Crohn's disease (CMS/[facility])    • Diabetes mellitus (CMS/[facility])    • GERD (gastroesophageal reflux disease)    • Hypertension         Social History:    Social History     Socioeconomic History   • Marital status: Legally      Spouse name: Not on file   • Number of children: Not on file   • Years of education: Not on file   • Highest education level: Not on file   Tobacco Use   • Smoking status: Never  Smoker   • Smokeless tobacco: Never Used   Substance and Sexual Activity   • Alcohol use: Never     Frequency: Never   • Drug use: Never   • Sexual activity: Defer       Family History:   Family History   Problem Relation Age of Onset   • Cancer Mother    • Heart disease Father    • Colon cancer Maternal Grandfather    • Alcohol abuse Paternal Grandfather    • Heart disease Paternal Grandfather        Surgical History:   Past Surgical History:   Procedure Laterality Date   • ABDOMINAL SURGERY     • CHOLECYSTECTOMY     • COLONOSCOPY     • HYSTERECTOMY     • PANCREAS SURGERY  12/04/2019   • UPPER GASTROINTESTINAL ENDOSCOPY           Current Outpatient Medications:   •  Adalimumab (Humira Pen) 40 MG/0.4ML Pen-injector Kit, Inject 40 mg under the skin into the appropriate area as directed Every 14 (Fourteen) Days., Disp: , Rfl:   •  ADMELOG 100 UNIT/ML injection, , Disp: , Rfl:   •  B-D UF III MINI PEN NEEDLES 31G X 5 MM misc, USE AS DIRECTED TO TEST BLOOD SUGAR 8 TIMES DAILY, Disp: , Rfl:   •  cetirizine (zyrTEC) 10 MG tablet, TAKE ONE TABLET BY MOUTH ONCE DAILY AT BEDTIME, Disp: , Rfl:   •  chlorthalidone (HYGROTON) 25 MG tablet, Take 25 mg by mouth Daily., Disp: , Rfl:   •  dicyclomine (BENTYL) 10 MG capsule, TAKE ONE CAPSULE BY MOUTH QID, Disp: , Rfl:   •  docusate sodium (DOK) 100 MG capsule, TAKE ONE CAPSULE BY MOUTH TWICE DAILY, Disp: , Rfl:   •  DULoxetine HCl 40 MG capsule delayed-release particles, Take 40 mg by mouth 2 (Two) Times a Day., Disp: , Rfl:   •  fluticasone (FLONASE) 50 MCG/ACT nasal spray, instill 2 sprays in each nostril every day AS DIRECTED, Disp: , Rfl:   •  gabapentin (NEURONTIN) 300 MG capsule, Take 300 mg by mouth 3 (Three) Times a Day., Disp: , Rfl:   •  GLUCAGON EMERGENCY 1 MG injection, INJECT 1MG INTRAMUSCULARLY ONE TIME AS NEEDED FOR HYPOGLYCEMIA, Disp: , Rfl:   •  hydrocortisone (CORTEF) 10 MG tablet, Take 10 mg by mouth 2 (Two) Times a Day., Disp: , Rfl:   •  Insulin Glargine  (CORDELLAGLANUJA BANEGASPEN) 100 UNIT/ML injection pen, Inject 10 units qam and 14 pm, Disp: , Rfl:   •  Lancets (ONETOUCH DELICA PLUS LDZEAE03H) misc, CHECK 6 TIMES DAILY WHEN SYMPTOMATIC OF LOW BLOOD SUGAR, Disp: , Rfl:   •  levothyroxine (SYNTHROID, LEVOTHROID) 112 MCG tablet, Take 112 mcg by mouth Daily., Disp: , Rfl:   •  linaclotide (Linzess) 145 MCG capsule capsule, TAKE ONE CAPSULE BY MOUTH IN THE MORNING 30 MINUTES BEFORE A MEAL, Disp: , Rfl:   •  lisinopril (PRINIVIL,ZESTRIL) 10 MG tablet, TAKE ONE TABLET BY MOUTH ONCE DAILY, Disp: , Rfl:   •  metoclopramide (REGLAN) 10 MG tablet, TAKE ONE TABLET BY MOUTH FOUR TIMES DAILY, Disp: , Rfl:   •  metoprolol tartrate (LOPRESSOR) 25 MG tablet, Take 25 mg by mouth 2 (Two) Times a Day., Disp: , Rfl:   •  miSOPROStol (CYTOTEC) 200 MCG tablet, TK 1 T PO QID, Disp: , Rfl:   •  ondansetron ODT (ZOFRAN-ODT) 4 MG disintegrating tablet, , Disp: , Rfl:   •  ONETOUCH VERIO test strip, USE TO CHECK BLOOD SUGAR EVERY 4 HOURS, Disp: , Rfl:   •  Pancrelipase, Lip-Prot-Amyl, (Creon) 52357 units capsule delayed-release particles, Take 3 tabs with meals and 2 with snacks, Disp: 1080 capsule, Rfl: 3  •  pantoprazole (PROTONIX) 40 MG EC tablet, Take 1 tablet by mouth 2 (Two) Times a Day., Disp: 60 tablet, Rfl: 3  •  pravastatin (PRAVACHOL) 40 MG tablet, TAKE ONE TABLET BY MOUTH EVERY NIGHT AT BEDTIME, Disp: , Rfl:   •  promethazine (PHENERGAN) 25 MG tablet, TAKE ONE TABLET BY MOUTH THREE TIMES DAILY AS NEEDED FOR NAUSEA, Disp: , Rfl:     Allergies:   Allergies   Allergen Reactions   • Sulfa Antibiotics Hives   • Tramadol Anaphylaxis   • Codeine GI Intolerance   • Morphine GI Intolerance   • Vancomycin Hives        The following portions of the patient's history were reviewed and updated as appropriate: allergies, current medications, past family history, past medical history, past social history, past surgical history and problem list.    Vitals:    08/20/20 1045   BP: 114/72   Pulse: 80    Temp: 98.2 °F (36.8 °C)   SpO2: 99%       Physical Exam   Constitutional: She is oriented to person, place, and time. She appears well-developed and well-nourished.   Pulmonary/Chest: Effort normal. No respiratory distress.   Abdominal: Soft. Bowel sounds are normal. She exhibits no distension and no mass. There is tenderness. There is no guarding.   Mild abdominal tenderness noted diffusely across the abdomen.   Musculoskeletal: Normal range of motion.   Neurological: She is alert and oriented to person, place, and time.   Skin: Skin is warm and dry.   Psychiatric: She has a normal mood and affect. Her behavior is normal. Judgment and thought content normal.   Vitals reviewed.      Assessment/ Plan  Kayla was seen today for follow-up, abdominal pain, nausea and vomiting.    Diagnoses and all orders for this visit:    Diffuse abdominal pain    Crohn's disease with complication, unspecified gastrointestinal tract location (CMS/HCC)    High risk medication use    Gastroesophageal reflux disease, esophagitis presence not specified    WELDON (nonalcoholic steatohepatitis)    Gastroparesis         Return in about 4 weeks (around 9/17/2020) for pending test results and symptoms.    1.  For GERD, continue pantoprazole 40 mg twice daily.    2.  For gastroparesis, we will obtain your most recent EKG from [facility] to assess your QT interval.  If QT and QTc intervals are within normal limits, we will plan to start you on domperidone for management.  You may continue Reglan 10 mg up to 3 times daily before meals for now, we recommend the lowest possible dose to help manage symptoms.  Should you start to experience any type of neurological side effects including jaw gnashing, facial tics, and repetitive facial movements please discontinue your medication.    3.  We will obtain your hospitalization records from [facility] for our review and discuss with Dr. Han and make further recommendations at that time.    4.   Discontinue MiraLAX.    5.  Continue Humira injections every other week for management of Crohn's disease.    6.  Next office follow-up to be determined based upon plan of care and recommendations per Dr. Han.    7.  For Mart, continue to avoid high fat,/high sugar foods.    Discussion:  We will request the patient's most recent hospitalization records from [facility] for our review and I will plan to review with Dr. Higuera once available.  Patient may need referral to general surgery for second opinion due to possible fat infarction near the omentum.  We will need her most recent CT for review as well.    For ongoing management of GERD, we will have the patient continue pantoprazole 40 mg twice daily.  For gastroparesis, patient to continue Reglan at lowest possible dose to help manage symptoms.  We will request her most recent EKG at [facility] for our review and if it is determined that her QT/QTC intervals are normal, could consider use of domperidone for gastroparesis.    For Crohn's management, patient to continue Humira.  For Mart, patient to continue lifestyle modifications and exercise when able; however at this time due to her abdominal pain this is not reasonable.  Next office follow-up to be determined based upon plan of care as determined after review of most recent hospitalization records.  Patient verbalized understanding of above.

## 2020-08-21 ENCOUNTER — TELEPHONE (OUTPATIENT)
Dept: GASTROENTEROLOGY | Facility: CLINIC | Age: 45
End: 2020-08-21

## 2020-08-21 NOTE — TELEPHONE ENCOUNTER
----- Message from LAZARO Frost sent at 8/20/2020  4:36 PM EDT -----  This is just a reminder to obtain the patient's most recent hospitalization records from [facility].  Any lab work and imaging will need to be obtained.  I would also like to see a copy of her admission report as well as her discharge summary.  She did undergo a CT scan of the abdomen and pelvis, and may have also undergone a CTA-both of which we will need for review.    Additionally, I need her most recent EKG that was performed at [facility].  She feels that this was done around January February 2020.  I need to assess her QT and QTc interval to see if she is a candidate for domperidone therapy.  Thank you.  Discussed with patient we will need to order an EKG.  [facility] didn't have record of an EKG as of 11/2019.  She will get the EKG done at [facility]. madhu

## 2020-08-21 NOTE — TELEPHONE ENCOUNTER
Notified patient she needs to get an EKG.  [facility] looked all the way back to 11/2019 and didn't find an EKG.  She will get the EKG done at [facility].  Order faced.  Patient needs the EKG prior to possibly starting Domperidone. pk

## 2020-08-21 NOTE — ADDENDUM NOTE
Addended by: JON GENTILE on: 8/21/2020 09:30 AM     Modules accepted: Level of Service, SmartSet

## 2020-08-24 ENCOUNTER — HOSPITAL ENCOUNTER (OUTPATIENT)
Dept: OTHER | Facility: HOSPITAL | Age: 45
Discharge: HOME | End: 2020-08-24

## 2020-08-27 ENCOUNTER — TELEPHONE (OUTPATIENT)
Dept: GASTROENTEROLOGY | Facility: CLINIC | Age: 45
End: 2020-08-27

## 2020-08-27 DIAGNOSIS — R93.5 ABNORMAL ABDOMINAL CT SCAN: ICD-10-CM

## 2020-08-27 DIAGNOSIS — R10.84 GENERALIZED ABDOMINAL PAIN: Primary | ICD-10-CM

## 2020-08-27 NOTE — TELEPHONE ENCOUNTER
LAZARO returned call to patient.  Reviewed CT scan results with Dr. Han.  He will place general surgery consult to Dr. Thompson for further evaluation of abnormal CT scan with multiple omental fat infarctions to see if he has any recommendations, as the patient continues to experience abdominal pain.  She states for certain that this discomfort is not secondary to Crohn's disease.  She denies having diarrhea and states that the discomfort is much different than what she has experienced with Crohn's in the past.    In regards to management of her gastroparesis with domperidone, APRN to review most recent EKG, which patient states was performed on 8/24/2020 at [facility] to assess QTc interval to see if patient is a candidate for domperidone therapy.  Patient verbalized understanding of above and would like to proceed with surgical consult.  Consult order placed.Mariam WILLIS

## 2020-08-27 NOTE — TELEPHONE ENCOUNTER
Patient called and left a voicemail that we were referring her to a surgeon and she has not heard about scheduling this. Also she was asking about Domperidone instead of Reglan. Please advise.

## 2020-08-28 NOTE — TELEPHONE ENCOUNTER
"EKG reviewed. Appears patient has had a prior infarct. LAZARO placed call to patient, but was unable to leave a VM as the VM \"had not been set up\" according to the recording that played. Will re-attempt to contact patient next week to discuss her EKG. Patient may require repeat GES, as hers is 8 years old. Mariam WILLIS  "

## 2020-09-01 RX ORDER — METOCLOPRAMIDE 10 MG/1
10 TABLET ORAL
Qty: 120 TABLET | Refills: 1 | Status: SHIPPED | OUTPATIENT
Start: 2020-09-01 | End: 2020-11-30 | Stop reason: SDUPTHER

## 2020-09-04 ENCOUNTER — TELEPHONE (OUTPATIENT)
Dept: GASTROENTEROLOGY | Facility: CLINIC | Age: 45
End: 2020-09-04

## 2020-09-04 DIAGNOSIS — R74.8 ELEVATED LIVER ENZYMES: ICD-10-CM

## 2020-09-04 DIAGNOSIS — R93.5 ABNORMAL ABDOMINAL CT SCAN: ICD-10-CM

## 2020-09-04 DIAGNOSIS — R10.11 ABDOMINAL PAIN, RIGHT UPPER QUADRANT: ICD-10-CM

## 2020-09-04 NOTE — TELEPHONE ENCOUNTER
Any is calling to inform you she looked for pts EGD rec from [phone] and have no rec of patient having the procedure done. If you have any question or concerns any can be reached at   [phone]

## 2020-09-21 ENCOUNTER — OFFICE VISIT (OUTPATIENT)
Dept: SURGERY | Facility: CLINIC | Age: 45
End: 2020-09-21

## 2020-09-21 VITALS — BODY MASS INDEX: 28.56 KG/M2 | HEIGHT: 67 IN | WEIGHT: 182 LBS

## 2020-09-21 DIAGNOSIS — R10.11 RIGHT UPPER QUADRANT ABDOMINAL PAIN: Primary | ICD-10-CM

## 2020-09-21 DIAGNOSIS — R10.31 RIGHT LOWER QUADRANT ABDOMINAL PAIN: ICD-10-CM

## 2020-09-21 PROCEDURE — 99243 OFF/OP CNSLTJ NEW/EST LOW 30: CPT | Performed by: SURGERY

## 2020-09-21 RX ORDER — LAMOTRIGINE 25 MG/1
25 TABLET ORAL DAILY
COMMUNITY
End: 2021-08-24

## 2020-09-21 RX ORDER — HYDROXYZINE 50 MG/1
50 TABLET, FILM COATED ORAL 3 TIMES DAILY PRN
COMMUNITY
End: 2021-08-24

## 2020-09-21 RX ORDER — SUMATRIPTAN 25 MG/1
25 TABLET, FILM COATED ORAL
COMMUNITY
Start: 2020-09-08 | End: 2021-12-02

## 2020-09-21 RX ORDER — LEVOTHYROXINE SODIUM 0.12 MG/1
125 TABLET ORAL DAILY
COMMUNITY
Start: 2020-09-01

## 2020-09-22 NOTE — PROGRESS NOTES
SUMMARY (A/P):    45-year-old lady with chronic abdominal pain related to chronic pancreatitis.  She is now status post pancreatectomy but continues to have intermittent moderately severe right upper quadrant and right-sided abdominal pain.  She has stable areas of omental infarction near the greater curvature of her stomach and in the right upper quadrant on CT scan.  I think it is very unlikely that these areas are the source of her ongoing pain.  I think the risks associated with surgical intervention here would far outweigh the possibility of any potential benefit.  I have little to offer her at this point.  She indicated that she is not pleased with the care she has received on her recent admissions at [facility] and I told her that I or members of my group would be seeing her if she was admitted at [facility].      CC:    Abdominal pain    HPI:    45-year-old lady who states that she underwent total pancreatectomy for chronic pancreatitis in December 2019.  She reports persistent intermittent at times moderately severe right upper quadrant abdominal pain and right-sided abdominal pain associated with bloating, nausea and vomiting since that procedure.  It is important to note that these were the same symptoms that she was experiencing from her chronic pancreatitis that led to that procedure as well.  Severity of symptoms has led to multiple admissions at [facility].    RADIOLOGY/ENDOSCOPY:    • CT angiogram abdomen and pelvis [facility] 7/25/2020: Postsurgical changes of pancreatectomy and splenectomy with multifocal areas of fat and omental infarction similar to prior exam.  I reviewed the images and concur that the areas of fat infarction are along the greater curvature of the stomach.  The areas of fat infarction at the omentum in the right upper quadrant are fairly unremarkable.  No other acute abnormality is noted.    PHYSICAL EXAM:   • Constitutional: Well-developed well-nourished,  no acute distress  • Vital signs: Weight 182 pounds, height 67 inches, BMI 28.5  • Eyes: Conjunctiva normal, sclera nonicteric  • ENMT: Hearing grossly normal, oral mucosa moist  • Neck: Supple, no palpable mass, trachea midline  • Respiratory: Clear to auscultation, normal inspiratory effort  • Cardiovascular: Regular rate, no murmur, no carotid bruit, no peripheral edema, no jugular venous distention  • Gastrointestinal: Soft, nontender, no palpable mass, no hepatosplenomegaly, negative for hernia, bowel sounds normal  • Lymphatics (palpable nodes):  cervical-negative, axillary-negative  • Skin:  Warm, dry, no rash on visualized skin surfaces  • Musculoskeletal: Symmetric strength, normal gait  • Psychiatric: Alert and oriented ×3, normal affect     ALLERGIES:   • Sulfa-hives  • Tramadol-anaphylaxis  • Codeine-GI intolerance  • Morphine-GI intolerance  • Vancomycin-hives    MEDICATIONS:   • Reviewed, in epic.  Of note, she is on Protonix, Creon, Reglan, Bentyl, Zofran    PMH:    • Anxiety/depression  • Arthritis  • Crohn's disease  • Diabetes  • Gastroesophageal reflux disease  • Hypertension  • Hyperlipidemia  • Hypothyroidism  • Chronic pancreatitis    PSH:    • Distal pancreatectomy 12/2019  • Colonoscopy 4/24/2019  • EGD 4/24/2019  • Cholecystectomy  • Hysterectomy    FAMILY HISTORY:    • Maternal grandfather with colon cancer  • Mother with breast cancer    SOCIAL HISTORY:   • Denies tobacco use  • Denies alcohol use    ROS:    Influenza Like Illness: no fever, no  cough, no  sore throat, no  body aches, no loss of sense of taste or smell, no known exposure to person with Covid-19.  All other systems reviewed and negative other than presenting complaints.    DAVID CHI M.D.

## 2020-10-07 ENCOUNTER — HOSPITAL ENCOUNTER (OUTPATIENT)
Dept: GENERAL RADIOLOGY | Facility: HOSPITAL | Age: 45
Discharge: HOME | End: 2020-10-07
Attending: OBSTETRICS & GYNECOLOGY

## 2020-10-13 ENCOUNTER — OFFICE VISIT CONVERTED (OUTPATIENT)
Dept: CARDIOLOGY | Facility: CLINIC | Age: 45
End: 2020-10-13
Attending: SPECIALIST

## 2020-12-02 NOTE — TELEPHONE ENCOUNTER
Dr Rodriguez is patients cardiologist, she states she has been cleared to start Domperidone by him, will call and obtain records

## 2020-12-08 RX ORDER — METOCLOPRAMIDE 10 MG/1
10 TABLET ORAL
Qty: 120 TABLET | Refills: 1 | Status: SHIPPED | OUTPATIENT
Start: 2020-12-08 | End: 2021-05-06

## 2020-12-09 ENCOUNTER — TELEPHONE (OUTPATIENT)
Dept: GASTROENTEROLOGY | Facility: CLINIC | Age: 45
End: 2020-12-09

## 2020-12-09 NOTE — TELEPHONE ENCOUNTER
----- Message from LAZARO Frost sent at 12/8/2020  4:07 PM EST -----  Hi Alexandra,Patient has a significant history of gastroparesis and has previously taken Reglan.  Cardiology has cleared her to start domperidone, and I have written a prescription accordingly.  She will need to discontinue use of Reglan at least 1 week before starting domperidone.  Please contact the patient and let her know that cardiac clearance was obtained.  She will need to schedule a follow-up visit with me 4 weeks after initiation of domperidone for me to reassess her symptoms.  I will deliver the prescription to you.  Please let me know if you have any additional questions.  Thank you. KENDAL WILLIS  ----- Message -----  From: Kerry Carrasco RegSched Rep  Sent: 12/8/2020   3:45 PM EST  To: LAZARO Frost    This is a Cardiac Clearance for Domperidone  ----- Message -----  From: Kathryn Clarke RegSched Rep  Sent: 12/8/2020   3:39 PM EST  To: [facility] Referral Pool    CLEARANCE SCANNED IN CHART

## 2021-01-19 ENCOUNTER — TELEPHONE (OUTPATIENT)
Dept: GASTROENTEROLOGY | Facility: CLINIC | Age: 46
End: 2021-01-19

## 2021-02-02 ENCOUNTER — TELEPHONE (OUTPATIENT)
Dept: GASTROENTEROLOGY | Facility: CLINIC | Age: 46
End: 2021-02-02

## 2021-02-02 NOTE — TELEPHONE ENCOUNTER
Patient called and would like to speak to you about her student loan debt. States that if she gets a form signed by an md her loans can be forgiven. States that you know about her disability. Please advise.

## 2021-03-10 ENCOUNTER — TELEPHONE (OUTPATIENT)
Dept: GASTROENTEROLOGY | Facility: CLINIC | Age: 46
End: 2021-03-10

## 2021-04-17 ENCOUNTER — HOSPITAL ENCOUNTER (OUTPATIENT)
Dept: URGENT CARE | Facility: CLINIC | Age: 46
Discharge: HOME | End: 2021-04-17
Attending: FAMILY MEDICINE

## 2021-04-30 RX ORDER — ADALIMUMAB 40MG/0.4ML
KIT SUBCUTANEOUS
Qty: 2 EACH | Refills: 2 | Status: SHIPPED | OUTPATIENT
Start: 2021-04-30 | End: 2021-08-05

## 2021-05-05 NOTE — PROGRESS NOTES
Chief Complaint   Patient presents with   • Follow-up     Crohn's disease, Mart, gastroparesis, GERD, constipation         History of Present Illness  45-year old female presents today for follow up. She has a history of Crohn's disease, Mart, elevated liver enzymes, gastroparesis, GERD, pancreas divisum status post total pancreatectomy with islet cell transplant and splenectomy.  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  No surgical intervention was indicated and she was treated with antibiotics.  She was last seen in office on 8/20/2020.    For GERD she continues pantoprazole 40 mg twice daily.  Occasionally she will experience some reflux at night, but this does not occur frequently.  Approximately once per month she will have reflux, up into the back of her mouth and nose.  She does not sleep with the head of the bed elevated.    She has a history of gastroparesis and previously took Reglan anywhere from 2-4 times per day depending upon her symptoms.  She has since been started on domperidone and states that symptoms are much better controlled..  Most recent EKG was performed on 8/24/2020 revealing a normal QTc interval.  She still continues to have nausea, but denies having any further emesis.    She also has a history of Crohn's disease which is managed with Humira injections every 2 weeks.  Last colonoscopy was performed on 4/29/2019 revealing one tubular adenoma at 3 mm in the ascending colon.  She is due for her next colonoscopy April 2024.  She reports that her bowel movements vary and there are days where she may have no bowel movement and then other days where she may have 3-4.  She does continue dicyclomine 10 mg 4 times daily which helps to keep her bowel movements regulated.  She denies any abdominal pain, melena, or hematochezia.    She is status post pancreatectomy with islet cell transplant and splenectomy on 12/4/2019 with Dr. Riddle.  She does report good glucose control.  She  reports an increase in gas and belching.  She reports abdominal bloating, flatulence, and belching.  She has questions as to if she should increase her Creon dosing.  She is currently taking three 36,000 unit capsules with meals and 2 with snacks.    She has a history of Weldon and continues efforts towards weight loss.  As of August 2020 she had lost 100 pounds thus far.  He continues to lose weight and is now at 175 pounds.  She reports that her goal weight is 150 pounds.      You have chosen to receive care through a telephone visit.   Do you consent to use a telephone visit for your medical care today? Yes    Review of Systems   Constitutional: Negative for unexpected weight change.   HENT: Positive for trouble swallowing.    Cardiovascular: Negative for chest pain and palpitations.   Gastrointestinal: Positive for abdominal distention and nausea. Negative for abdominal pain, anal bleeding, blood in stool, constipation, diarrhea, rectal pain and vomiting.         Result Review :      ENDOSCOPY, INT (09/08/2019)  ENDOSCOPY, INT (05/16/2019)  SCANNED - COLONOSCOPY (04/24/2019)  SCANNED - IMAGING (08/05/2020)  SCANNED - IMAGING (07/26/2020)  SCANNED - IMAGING (02/10/2012)  SCANNED - CARDIOLOGY (08/24/2020)  SCANNED - LABS (08/06/2020)  Office Visit with Mariela Gr APRN (08/20/2020)    Assessment and Plan    Diagnoses and all orders for this visit:    1. Crohn's disease without complication, unspecified gastrointestinal tract location (CMS/HCC) (Primary)    2. High risk medication use    3. Gastroesophageal reflux disease, unspecified whether esophagitis present    4. History of pancreatectomy    5. WELDON (nonalcoholic steatohepatitis)    6. Irritable bowel syndrome with diarrhea    7. Excessive gas  Comments:  New symptom         This visit has been rescheduled as a phone visit to comply with patient safety concerns in accordance with CDC recommendations. Total time of discussion was 25 minutes.    Follow Up    Return in about 3 months (around 8/6/2021).    Patient Instructions   1.  For GERD, continue pantoprazole 40 mg twice daily.  We recommend you take this medication 1 hour before your first and last meals of the day.  For GERD, we recommend avoiding eating 3-4 hours before bedtime, eating smaller more frequent meals, and avoiding any known food triggers including spicy foods, tomatoes and tomato-based sauces, chocolate, coffee/tea, citrus fruits, carbonated  beverages and alcohol.  May also benefit from elevating the head of your bed on 2 inch wooden blocks as discussed today during your telephone visit.    2.  For gastroparesis, continue domperidone 10 mg tablets, take 1 tablet 3 times daily before meals.  You may increase your dosing and take 1 tablet before bedtime if you feel that this is effective.    3.  For abdominal gas, belching, and flatulence continue your daily probiotic.  We also recommend the addition of IBgard.  This is a medical food available for purchase over-the-counter at your local grocery or pharmacy.  Please follow package instructions for use.    4.  Continue Creon as you are status post pancreatectomy with islet cell transplant.  We would recommend that you continue to take three 36,000 unit capsules with meals and 2 with snacks.  If you find that the addition of IBgard does not seem to improve your gas, you may increase your Creon to 4 capsules with meals and 3 with snacks.    5.  You may continue to use dicyclomine up to 4 times daily to help regulate bowel habits.    6.  You will be due for your annual EKG in August 2021 for ongoing monitoring of high risk medication use with domperidone.  We will plan to place order for this at your next office visit.    7.  For high risk medication use with Humira, we will check a CBC and hepatic function panel.  We will also check an iron panel, ferritin, vitamin B12, folate level, and vitamin D3 level.  Lab requisitions will be mailed to your home so  that she can have your lab work performed at [facility].  We will contact you with results once available.  If you have not heard back from our office in 1 week, please give us a call so that we can obtain your lab work.    8.  For Crohn's disease continue Humira injections every 2 weeks.    9.  For Mart, continue efforts towards weight loss.  Great job with the weight that you have lost thus far.    10.  Next office follow-up is scheduled for 8/24/2021 at 10:45 AM.  Should your symptoms worsen or fail to improve, please contact our office and we will schedule you an earlier appointment.     Discussion:    We will continue PPI twice daily dosing for GERD and have the patient change the timing of her second dose to an hour before her evening meal.  Antireflux precautions were recommended.  We will have her continue domperidone for management of gastroparesis and have encouraged her to try 1/4 pill before bedtime to see if this better helps to manage her nausea.    Patient to continue Creon as she is status post pancreatectomy with islet cell transplant.  She is to continue daily probiotic and we have recommended IBgard to help with excessive gas.  Should IBgard proved to be ineffective, patient was encouraged to increase her Creon to 4 capsules with meals and 3 capsules with snacks.    For Crohn's disease patient to continue use of Humira every 2 weeks.  Also to continue dicyclomine 4 times daily as needed.  We will obtain some ongoing routine lab work for high risk medication use as well as check some vitamins and her iron levels due to her history of Crohn's disease of the small and large intestine.  We will contact patient with results once available.  We will plan for telephone follow-up in 3 months for reassessment of symptoms, or sooner should symptoms worsen or fail to improve.  Patient verbalized understand above plan of care and is in agreement.  All questions answered and support  provided.        EMR Dragon/Transcription Disclaimer:  Much of this encounter note is an electronic transcription/translation of spoken language to printed text.  The electronic translation of spoken language may permit erroneous or at times nonsensical words or phrases to be inadvertently transcribed; although I have reviewed the note for such errors, some may still exist.

## 2021-05-06 ENCOUNTER — OFFICE VISIT (OUTPATIENT)
Dept: GASTROENTEROLOGY | Facility: CLINIC | Age: 46
End: 2021-05-06

## 2021-05-06 VITALS — HEIGHT: 67 IN | WEIGHT: 178 LBS | BODY MASS INDEX: 27.94 KG/M2

## 2021-05-06 DIAGNOSIS — Z90.410 HISTORY OF PANCREATECTOMY: Chronic | ICD-10-CM

## 2021-05-06 DIAGNOSIS — Z79.899 HIGH RISK MEDICATION USE: Chronic | ICD-10-CM

## 2021-05-06 DIAGNOSIS — K58.0 IRRITABLE BOWEL SYNDROME WITH DIARRHEA: ICD-10-CM

## 2021-05-06 DIAGNOSIS — R14.3 EXCESSIVE GAS: ICD-10-CM

## 2021-05-06 DIAGNOSIS — K21.9 GASTROESOPHAGEAL REFLUX DISEASE, UNSPECIFIED WHETHER ESOPHAGITIS PRESENT: Chronic | ICD-10-CM

## 2021-05-06 DIAGNOSIS — K75.81 NASH (NONALCOHOLIC STEATOHEPATITIS): Chronic | ICD-10-CM

## 2021-05-06 DIAGNOSIS — K50.90 CROHN'S DISEASE WITHOUT COMPLICATION, UNSPECIFIED GASTROINTESTINAL TRACT LOCATION (HCC): Primary | Chronic | ICD-10-CM

## 2021-05-06 PROCEDURE — 99443 PR PHYS/QHP TELEPHONE EVALUATION 21-30 MIN: CPT | Performed by: NURSE PRACTITIONER

## 2021-05-06 RX ORDER — GABAPENTIN 300 MG/1
300 CAPSULE ORAL 3 TIMES DAILY
COMMUNITY
Start: 2021-04-09 | End: 2022-10-05

## 2021-05-06 RX ORDER — DIAZEPAM 10 MG/1
10 TABLET ORAL 3 TIMES DAILY PRN
COMMUNITY
Start: 2021-04-20 | End: 2022-10-05

## 2021-05-06 RX ORDER — TRAZODONE HYDROCHLORIDE 100 MG/1
100 TABLET ORAL
COMMUNITY
Start: 2021-04-20 | End: 2021-08-24

## 2021-05-06 RX ORDER — ESCITALOPRAM OXALATE 20 MG/1
20 TABLET ORAL DAILY
COMMUNITY
Start: 2021-04-20

## 2021-05-06 RX ORDER — PROPRANOLOL HCL 60 MG
60 CAPSULE, EXTENDED RELEASE 24HR ORAL DAILY
COMMUNITY
Start: 2021-04-22 | End: 2022-10-05

## 2021-05-06 RX ORDER — PHENTERMINE HYDROCHLORIDE 37.5 MG/1
37.5 TABLET ORAL ONCE
COMMUNITY
Start: 2021-03-26 | End: 2022-04-26

## 2021-05-06 NOTE — PATIENT INSTRUCTIONS
1.  For GERD, continue pantoprazole 40 mg twice daily.  We recommend you take this medication 1 hour before your first and last meals of the day.  For GERD, we recommend avoiding eating 3-4 hours before bedtime, eating smaller more frequent meals, and avoiding any known food triggers including spicy foods, tomatoes and tomato-based sauces, chocolate, coffee/tea, citrus fruits, carbonated  beverages and alcohol.  May also benefit from elevating the head of your bed on 2 inch wooden blocks as discussed today during your telephone visit.    2.  For gastroparesis, continue domperidone 10 mg tablets, take 1 tablet 3 times daily before meals.  You may increase your dosing and take 1 tablet before bedtime if you feel that this is effective.    3.  For abdominal gas, belching, and flatulence continue your daily probiotic.  We also recommend the addition of IBgard.  This is a medical food available for purchase over-the-counter at your local grocery or pharmacy.  Please follow package instructions for use.    4.  Continue Creon as you are status post pancreatectomy with islet cell transplant.  We would recommend that you continue to take three 36,000 unit capsules with meals and 2 with snacks.  If you find that the addition of IBgard does not seem to improve your gas, you may increase your Creon to 4 capsules with meals and 3 with snacks.    5.  You may continue to use dicyclomine up to 4 times daily to help regulate bowel habits.    6.  You will be due for your annual EKG in August 2021 for ongoing monitoring of high risk medication use with domperidone.  We will plan to place order for this at your next office visit.    7.  For high risk medication use with Humira, we will check a CBC and hepatic function panel.  We will also check an iron panel, ferritin, vitamin B12, folate level, and vitamin D3 level.  Lab requisitions will be mailed to your home so that she can have your lab work performed at [facility].   We will contact you with results once available.  If you have not heard back from our office in 1 week, please give us a call so that we can obtain your lab work.    8.  For Crohn's disease continue Humira injections every 2 weeks.    9.  For Mart, continue efforts towards weight loss.  Great job with the weight that you have lost thus far.    10.  Next office follow-up is scheduled for 8/24/2021 at 10:45 AM.  Should your symptoms worsen or fail to improve, please contact our office and we will schedule you an earlier appointment.

## 2021-05-10 ENCOUNTER — TELEPHONE (OUTPATIENT)
Dept: GASTROENTEROLOGY | Facility: CLINIC | Age: 46
End: 2021-05-10

## 2021-05-10 NOTE — H&P
History and Physical      Patient Name: Kayla Gan   Patient ID: [id number]   Sex: Female   YOB: 1975    Primary Care Provider: Justin WILLIS   Referring Provider: Justin WILLIS    Visit Date: October 13, 2020    Provider: Kristian Rodriguez MD   Location: [facility]   Location Address: [address] [place] [address], [address]   [place]  [id number]   Location Phone: [phone]          Chief Complaint  · Abnormal EKG   · Positive risk factors      History Of Present Illness  Consult requested by: Justin WILLIS   Kayla Gan is a 45 year old /White female with no history of chest pain or shortness of breath. She has multiple GI problems. She was noticed to have an abnormal EKG. No exertional angina. No syncopal or presyncopal episodes.   PAST MEDICAL HISTORY: Hypertension; diabetes mellitus; hyperlipidemia.   FAMILY HISTORY: Positive for diabetes, hypertension and heart disease.   PSYCHOSOCIAL HISTORY: Positive for mood changes and depression. She never used alcohol or tobacco.   CURRENT MEDICATIONS: include Lisinopril 10 mg daily; Metoprolol 25 mg b.i.d.; Chlorthalidone; Pravastatin; Reglan 10 mg 4 tablets daily; Humira; insulin; Clindamycin; Hydrocortisone; Docusate; Dicyclomine; Cymbalta 40 mg daily; Lamictal 100 mg daily. The dosage and frequency of the medications were reviewed with the patient.   ALLERGIES: Ultram, Vancomycin.   CHOLESTEROL STATUS: Unknown.       Review of Systems  · Constitutional  o Admits  o : good general health lately, recent weight changes   o Denies  o : fatigue  · Eyes  o Denies  o : double vision  · HENT  o Denies  o : hearing loss or ringing, chronic sinus problem, swollen glands in neck  · Cardiovascular  o Denies  o : chest pain, palpitations (fast, fluttering, or skipping beats), swelling (feet, ankles, hands), shortness of breath while walking or lying flat  · Respiratory  o Denies  o : asthma or wheezing,  "COPD  · Gastrointestinal  o Admits  o : nausea or vomiting  o Denies  o : ulcers  · Neurologic  o Admits  o : lightheaded or dizzy, headaches  o Denies  o : stroke  · Musculoskeletal  o Admits  o : back pain  o Denies  o : joint pain  · Endocrine  o Admits  o : thyroid disease, diabetes  o Denies  o : heat or cold intolerance, excessive thirst or urination  · Heme-Lymph  o Admits  o : bleeding or bruising tendency, anemia      Vitals  Date Time BP Position Site L\R Cuff Size HR RR TEMP (F) WT  HT  BMI kg/m2 BSA m2 O2 Sat FR L/min FiO2 HC       10/13/2020 10:31 /80 Sitting    82 - R   183lbs 0oz 5'  7\" 28.66 1.98             Physical Examination  · Constitutional  o Appearance  o : Awake, alert, cooperative, pleasant.  · Eyes  o Pupils and Irises  o : Pupils equal and reacting to light and accommodation.  · Ears, Nose, Mouth and Throat  o Ears  o : Tympanic membranes are normal.  o Nose  o : Clear with no maxillary tenderness.  o Oral Cavity  o : Clear.  · Neck  o Inspection/Palpation  o : No JVD, bruits, thyromegaly, or adenopathy. Trachea midline. No axillary or cervical lymphadenopathy.  o Thyroid  o : No thyroid enlargement.   · Respiratory  o Inspection of Chest  o : No chest wall deformities, moving equal.  o Auscultation of Lungs  o : Good air entry with vesicular breath sounds.  o Percussion of Chest  o : Resonant bilaterally.   o Palpation of Chest  o : Equal movements bilaterally.  · Cardiovascular  o Heart  o :   § Auscultation of Heart  § : PMI is in the 5th intercostal space. S1 and S2 regular. No S3. No S4. No murmurs.  o Peripheral Vascular System  o :   § Extremities  § : No pedal edema. Peripheral pulses well felt. No cyanosis.  · Gastrointestinal  o Abdominal Examination  o : No organomegaly, masses, tenderness, bruits, or abnormal pulsations. Bowel sounds are normal.  · Musculoskeletal  o General  o : Muscle strength is normal with normal tone.  · Skin and Subcutaneous Tissue  o General " Inspection  o : No rash, petechiae, or suspicious skin lesions. Skin turgor is normal.     EKG was reviewed, which showed sinus rhythm, left ventricular hypertrophy, anterior wall myocardial infarction, age indeterminate.    Patient apparently had a cardiac catheterization, which was within normal limits.  I reviewed her cardiac catheterization report.           Assessment     ASSESSMENT AND PLAN:    1.  Abnormal EKG, positive risk factors:  I reviewed her cardiac catheterization report.  2.  Will do an echocardiogram to evaluate left ventricular systolic function and evaluate for left ventricular        hypertrophy.  3.  Essential hypertension controlled:  Continue Lisinopril, Metoprolol and Chlorthalidone.  Monitor her blood        pressure regularly.  Be on a low-salt diet.    Kristian Rodriguez MD, [facility]  HARVEY/kevin           This note was transcribed by Angella Sifuentes.  kevin/HARVEY  The above service was transcribed by Angella Sifuentes, and I attest to the accuracy of the note.  HARVEY               Electronically Signed by: Angella Sifuentes-, -Author on October 14, 2020 10:17:19 AM  Electronically Co-signed by: Kristian Rodriguez MD -Reviewer on October 20, 2020 12:56:19 PM

## 2021-05-10 NOTE — TELEPHONE ENCOUNTER
Patient called with complaint of bloating, gas, bleching, poor appetitite, BM that smell very foul. She has tried Protonix, gasx, and IBgard as suggested by LAZARO Soliz. So far, nothing has helped since her recent visit on 5/6/21. Patient would like to know if she should be on Cipro and Flagyl? Please advise.

## 2021-05-12 NOTE — TELEPHONE ENCOUNTER
Patient called stating that she thinks that she may have SIBO. She states that her doctor that did her pancreas surgery warned her of this. She states that she is having abdominal pain, bloating, nausea. She states that last night she didn't sleep. She states that the smell is nothing like before. Please advise.

## 2021-05-14 VITALS
HEIGHT: 67 IN | HEART RATE: 82 BPM | WEIGHT: 183 LBS | DIASTOLIC BLOOD PRESSURE: 80 MMHG | BODY MASS INDEX: 28.72 KG/M2 | SYSTOLIC BLOOD PRESSURE: 122 MMHG

## 2021-05-16 VITALS
RESPIRATION RATE: 16 BRPM | HEIGHT: 67 IN | BODY MASS INDEX: 36.73 KG/M2 | SYSTOLIC BLOOD PRESSURE: 119 MMHG | DIASTOLIC BLOOD PRESSURE: 78 MMHG | WEIGHT: 234 LBS

## 2021-05-16 VITALS — RESPIRATION RATE: 16 BRPM | HEIGHT: 67 IN | BODY MASS INDEX: 36.73 KG/M2 | WEIGHT: 234 LBS

## 2021-05-16 VITALS — HEART RATE: 88 BPM | HEIGHT: 67 IN | BODY MASS INDEX: 36.1 KG/M2 | WEIGHT: 230 LBS

## 2021-05-16 VITALS — HEART RATE: 76 BPM | HEIGHT: 67 IN | WEIGHT: 230 LBS | BODY MASS INDEX: 36.1 KG/M2

## 2021-05-16 VITALS
SYSTOLIC BLOOD PRESSURE: 105 MMHG | DIASTOLIC BLOOD PRESSURE: 65 MMHG | WEIGHT: 230 LBS | HEART RATE: 76 BPM | BODY MASS INDEX: 36.1 KG/M2 | HEIGHT: 67 IN

## 2021-06-15 ENCOUNTER — TRANSCRIBE ORDERS (OUTPATIENT)
Dept: ADMINISTRATIVE | Facility: HOSPITAL | Age: 46
End: 2021-06-15

## 2021-06-15 ENCOUNTER — LAB (OUTPATIENT)
Dept: LAB | Facility: HOSPITAL | Age: 46
End: 2021-06-15

## 2021-06-15 DIAGNOSIS — E89.1 HYPOINSULINEMIA FOLLOWING COMPLETE OR PARTIAL PANCREATECTOMY: ICD-10-CM

## 2021-06-15 DIAGNOSIS — Z94.89: ICD-10-CM

## 2021-06-15 DIAGNOSIS — E03.9 ACQUIRED HYPOTHYROIDISM: ICD-10-CM

## 2021-06-15 DIAGNOSIS — E03.9 ACQUIRED HYPOTHYROIDISM: Primary | ICD-10-CM

## 2021-06-15 DIAGNOSIS — M81.0 POST-MENOPAUSAL OSTEOPOROSIS: ICD-10-CM

## 2021-06-15 LAB
25(OH)D3 SERPL-MCNC: 33.3 NG/ML (ref 30–100)
ALBUMIN SERPL-MCNC: 4.4 G/DL (ref 3.5–5.2)
ALBUMIN/GLOB SERPL: 1.3 G/DL
ALP SERPL-CCNC: 187 U/L (ref 39–117)
ALT SERPL W P-5'-P-CCNC: 25 U/L (ref 1–33)
ANION GAP SERPL CALCULATED.3IONS-SCNC: 12.3 MMOL/L (ref 5–15)
AST SERPL-CCNC: 27 U/L (ref 1–32)
BILIRUB SERPL-MCNC: 0.3 MG/DL (ref 0–1.2)
BUN SERPL-MCNC: 15 MG/DL (ref 6–20)
BUN/CREAT SERPL: 15.8 (ref 7–25)
CALCIUM SPEC-SCNC: 9.8 MG/DL (ref 8.6–10.5)
CHLORIDE SERPL-SCNC: 98 MMOL/L (ref 98–107)
CHOLEST SERPL-MCNC: 195 MG/DL (ref 0–200)
CO2 SERPL-SCNC: 25.7 MMOL/L (ref 22–29)
CREAT SERPL-MCNC: 0.95 MG/DL (ref 0.57–1)
GFR SERPL CREATININE-BSD FRML MDRD: 64 ML/MIN/1.73
GLOBULIN UR ELPH-MCNC: 3.5 GM/DL
GLUCOSE SERPL-MCNC: 112 MG/DL (ref 65–99)
HBA1C MFR BLD: 6.9 % (ref 4.8–5.6)
HDLC SERPL-MCNC: 47 MG/DL (ref 40–60)
LDLC SERPL CALC-MCNC: 118 MG/DL (ref 0–100)
LDLC/HDLC SERPL: 2.42 {RATIO}
POTASSIUM SERPL-SCNC: 4.3 MMOL/L (ref 3.5–5.2)
PROT SERPL-MCNC: 7.9 G/DL (ref 6–8.5)
SODIUM SERPL-SCNC: 136 MMOL/L (ref 136–145)
TRIGL SERPL-MCNC: 172 MG/DL (ref 0–150)
TSH SERPL DL<=0.05 MIU/L-ACNC: 2.9 UIU/ML (ref 0.27–4.2)
VLDLC SERPL-MCNC: 30 MG/DL (ref 5–40)

## 2021-06-15 PROCEDURE — 80053 COMPREHEN METABOLIC PANEL: CPT

## 2021-06-15 PROCEDURE — 84443 ASSAY THYROID STIM HORMONE: CPT

## 2021-06-15 PROCEDURE — 80061 LIPID PANEL: CPT

## 2021-06-15 PROCEDURE — 82306 VITAMIN D 25 HYDROXY: CPT

## 2021-06-15 PROCEDURE — 83036 HEMOGLOBIN GLYCOSYLATED A1C: CPT

## 2021-06-15 PROCEDURE — 36415 COLL VENOUS BLD VENIPUNCTURE: CPT

## 2021-07-06 ENCOUNTER — TRANSCRIBE ORDERS (OUTPATIENT)
Dept: ADMINISTRATIVE | Facility: HOSPITAL | Age: 46
End: 2021-07-06

## 2021-07-06 DIAGNOSIS — Q78.2 OSTEOPETROSIS: Primary | ICD-10-CM

## 2021-08-05 RX ORDER — ADALIMUMAB 40MG/0.4ML
KIT SUBCUTANEOUS
Qty: 2 EACH | Refills: 2 | Status: SHIPPED | OUTPATIENT
Start: 2021-08-05 | End: 2021-12-02

## 2021-08-06 ENCOUNTER — HOSPITAL ENCOUNTER (OUTPATIENT)
Dept: BONE DENSITY | Facility: HOSPITAL | Age: 46
Discharge: HOME | End: 2021-08-06
Admitting: INTERNAL MEDICINE

## 2021-08-06 DIAGNOSIS — Q78.2 OSTEOPETROSIS: ICD-10-CM

## 2021-08-06 PROCEDURE — 77080 DXA BONE DENSITY AXIAL: CPT

## 2021-08-07 ENCOUNTER — PATIENT MESSAGE (OUTPATIENT)
Dept: GASTROENTEROLOGY | Facility: CLINIC | Age: 46
End: 2021-08-07

## 2021-08-24 ENCOUNTER — OFFICE VISIT (OUTPATIENT)
Dept: GASTROENTEROLOGY | Facility: CLINIC | Age: 46
End: 2021-08-24

## 2021-08-24 VITALS — HEIGHT: 67 IN | BODY MASS INDEX: 29.51 KG/M2 | WEIGHT: 188 LBS

## 2021-08-24 DIAGNOSIS — Z90.410 HISTORY OF PANCREATECTOMY: Chronic | ICD-10-CM

## 2021-08-24 DIAGNOSIS — K21.9 GASTROESOPHAGEAL REFLUX DISEASE, UNSPECIFIED WHETHER ESOPHAGITIS PRESENT: Chronic | ICD-10-CM

## 2021-08-24 DIAGNOSIS — K50.90 CROHN'S DISEASE WITHOUT COMPLICATION, UNSPECIFIED GASTROINTESTINAL TRACT LOCATION (HCC): Primary | Chronic | ICD-10-CM

## 2021-08-24 DIAGNOSIS — K31.84 GASTROPARESIS: Chronic | ICD-10-CM

## 2021-08-24 DIAGNOSIS — K75.81 NASH (NONALCOHOLIC STEATOHEPATITIS): Chronic | ICD-10-CM

## 2021-08-24 PROCEDURE — 99442 PR PHYS/QHP TELEPHONE EVALUATION 11-20 MIN: CPT | Performed by: NURSE PRACTITIONER

## 2021-08-24 RX ORDER — INSULIN LISPRO 100 [IU]/ML
INJECTION, SOLUTION INTRAVENOUS; SUBCUTANEOUS
COMMUNITY

## 2021-08-24 RX ORDER — FLUTICASONE PROPIONATE 0.05 MG/G
OINTMENT TOPICAL
COMMUNITY
End: 2021-09-25

## 2021-08-24 RX ORDER — PROTAMINE SULFATE 10 MG/ML
2 INJECTION, SOLUTION INTRAVENOUS
COMMUNITY
End: 2021-12-02

## 2021-08-24 RX ORDER — MELOXICAM 15 MG/1
15 TABLET ORAL
COMMUNITY
Start: 2021-06-09 | End: 2021-12-02

## 2021-08-24 RX ORDER — BREXPIPRAZOLE 0.5 MG/1
1 TABLET ORAL DAILY
COMMUNITY
Start: 2021-08-18 | End: 2022-04-26 | Stop reason: SDUPTHER

## 2021-08-24 RX ORDER — VENLAFAXINE HYDROCHLORIDE 150 MG/1
TABLET, EXTENDED RELEASE ORAL
COMMUNITY
End: 2021-12-02

## 2021-08-24 RX ORDER — BUDESONIDE 3 MG/1
CAPSULE, COATED PELLETS ORAL
COMMUNITY
End: 2021-12-02

## 2021-08-24 RX ORDER — BUPRENORPHINE 15 UG/H
PATCH TRANSDERMAL
COMMUNITY
End: 2021-12-02

## 2021-08-24 RX ORDER — DESVENLAFAXINE SUCCINATE 50 MG/1
TABLET, EXTENDED RELEASE ORAL
COMMUNITY
End: 2021-12-02

## 2021-08-24 RX ORDER — KETOROLAC TROMETHAMINE 10 MG/1
TABLET, FILM COATED ORAL
COMMUNITY
Start: 2021-06-07 | End: 2021-12-02

## 2021-08-24 RX ORDER — DEXLANSOPRAZOLE 60 MG/1
CAPSULE, DELAYED RELEASE ORAL
COMMUNITY
End: 2021-12-02

## 2021-08-24 RX ORDER — ARIPIPRAZOLE 10 MG/1
TABLET ORAL
COMMUNITY
End: 2021-12-02

## 2021-08-24 RX ORDER — LOSARTAN POTASSIUM AND HYDROCHLOROTHIAZIDE 12.5; 1 MG/1; MG/1
TABLET ORAL
COMMUNITY
End: 2021-09-26 | Stop reason: HOSPADM

## 2021-08-24 RX ORDER — DICYCLOMINE HYDROCHLORIDE 10 MG/1
10 CAPSULE ORAL 4 TIMES DAILY PRN
Qty: 120 CAPSULE | Refills: 11 | Status: SHIPPED | OUTPATIENT
Start: 2021-08-24 | End: 2022-09-13 | Stop reason: SDUPTHER

## 2021-08-24 RX ORDER — VERAPAMIL HYDROCHLORIDE 120 MG/1
TABLET, FILM COATED ORAL
COMMUNITY
End: 2021-12-02

## 2021-08-24 RX ORDER — TRAZODONE HYDROCHLORIDE 150 MG/1
100 TABLET ORAL
COMMUNITY
Start: 2021-08-17 | End: 2022-10-05

## 2021-08-24 RX ORDER — PHENAZOPYRIDINE HYDROCHLORIDE 200 MG/1
TABLET, FILM COATED ORAL
COMMUNITY
Start: 2021-08-02 | End: 2022-07-06 | Stop reason: SDUPTHER

## 2021-08-24 RX ORDER — ALENDRONATE SODIUM 70 MG/1
TABLET ORAL
COMMUNITY
Start: 2021-08-02 | End: 2021-09-25

## 2021-08-24 RX ORDER — LAMOTRIGINE 200 MG/1
200 TABLET ORAL DAILY
COMMUNITY
Start: 2021-08-17 | End: 2022-05-17 | Stop reason: SDUPTHER

## 2021-08-24 RX ORDER — METOCLOPRAMIDE 5 MG/1
TABLET ORAL
COMMUNITY
End: 2021-12-02

## 2021-08-24 RX ORDER — HYDROCORTISONE 5 MG/1
15 TABLET ORAL 2 TIMES DAILY
COMMUNITY
Start: 2021-08-17 | End: 2022-05-17 | Stop reason: SDUPTHER

## 2021-08-24 RX ORDER — PANCRELIPASE 36000; 180000; 114000 [USP'U]/1; [USP'U]/1; [USP'U]/1
CAPSULE, DELAYED RELEASE PELLETS ORAL
Qty: 540 CAPSULE | Refills: 11 | Status: SHIPPED | OUTPATIENT
Start: 2021-08-24

## 2021-08-24 NOTE — PATIENT INSTRUCTIONS
1.  For GERD, continue pantoprazole 40 mg twice daily.  For GERD, we recommend avoiding eating 3-4 hours before bedtime, eating smaller more frequent meals, and avoiding any known food triggers including spicy foods, tomatoes and tomato-based sauces, chocolate, coffee/tea, citrus fruits, carbonated  beverages and alcohol.     2.  Due to your history of pancreatectomy with islet cell transplant, continue Creon.  You will take 3 capsules with meals and 2 with snacks.  Refills have been provided.    3.  For gastroparesis, continue domperidone 1 tablet 3 times daily before meals and 1 tablet at bedtime if needed.  You are currently due for your EKG for ongoing monitoring of high risk medication use.  Will be contacted to schedule this testing.  Once your EKG has been reviewed and if your QT/QT interval are within normal limits, a new prescription for domperidone will be faxed over to [facility].     4.  For Crohn's disease, continue Humira injections once every 2 weeks.    5.  For IBS-D and crossover SIBO symptoms, we will have you start Xifaxan 1 tablet daily.  Refills have been sent to your pharmacy.    6.  You may continue to use dicyclomine 1 tablet up to 4 times daily as needed for diarrhea, abdominal cramping, and fecal urgency.    7.  Continue daily probiotic.  You may find benefit with use of Gigathlete or its generic equivalent, available over-the-counter at your local grocery or pharmacy.    8.  For WELDON, continue efforts towards weight loss, regular exercise, and avoidance of high fat/high sugar foods.    9.  We will plan for telephone follow-up visit in 3 months for reassessment of symptoms, or sooner should symptoms worsen.  Appointment is scheduled for 11/30/2021 at 9:45 AM.

## 2021-08-24 NOTE — PROGRESS NOTES
Chief Complaint   Patient presents with   • Crohn's Disease       History of Present Illness  46-year-old female presents today for telephone follow-up.  She was last seen for follow-up on 5/6/2021.  She is a history of Crohn's disease, Mart, elevated liver enzymes, gastroparesis, GERD, pancreas divisum status post total pancreatectomy with islet cell transplant and splenectomy.  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    She continues pantoprazole 40 mg twice daily for GERD, which overall works very well to manage symptoms.  She has noted that she has to stop eating at 7 PM if she plans to go to bed between 10 and 11 PM to minimize symptoms and this is helped significantly.  She does report some significant nausea off and on which she attributes to her gastroparesis.  She currently has a referral in place to [facility] and plans to give them a call to schedule an appointment.  She continues domperidone, which she states works better than Reglan.  She is currently due for her EKG as her last EKG was performed August 2020.    She has a history of Crohn's disease and continues Humira injections every 2 weeks.  Last colonoscopy on 4/29/2019 demonstrated one tubular adenoma in the ascending colon.  Next colonoscopy due April 2024.  She has noticed a significant improvement in her bowel habits and overall GI health with use of Xifaxan.  She is on her last day of her 2-week course.  She feels that this medication makes all of the difference between her symptoms throughout her GI tract.  When she does not take Xifaxan she reports severe nausea as well as malodorous flatulence and stools.  She is currently reporting 1-2 soft/mushy stools per day, which is excellent based upon her history.  She denies any melena, hematochezia, or mucus per rectum.    She has a history of Mart and continues efforts towards weight loss.  She recently has  suffered several fractures which are currently being treated.  Her weight had been down to 179 pounds, but due to her immobility her weight increased to 195 pounds.  Since then she is now down back to 188 pounds and feels that once she is able to regain full range of motion that she will be able to continue her efforts towards weight loss.  She reports having a recent bone density scan that demonstrated borderline osteoporosis.  She reports plans to start Forteo injections.    She also has a history of pancreatectomy with islet cell transplant and splenectomy on 12/4/2019 with Dr. Riddle.  She is currently treating with Rossy.      You have chosen to receive care through a telephone visit.   Do you consent to use a telephone visit for your medical care today? Yes    Review of Systems      Result Review :      Office Visit with Mariela Gr APRN (05/06/2021)            Assessment and Plan    Diagnoses and all orders for this visit:    1. Crohn's disease without complication, unspecified gastrointestinal tract location (CMS/HCC) (Primary)    2. Gastroesophageal reflux disease, unspecified whether esophagitis present    3. Gastroparesis    4. WELDON (nonalcoholic steatohepatitis)    5. History of pancreatectomy    Other orders  -     Pancrelipase, Lip-Prot-Amyl, (Creon) 03567-166233 units capsule delayed-release particles capsule; Take 3 tabs with meals and 2 with snacks  Dispense: 540 capsule; Refill: 11  -     dicyclomine (BENTYL) 10 MG capsule; Take 1 capsule by mouth 4 (Four) Times a Day As Needed (diarrhea, fecal urgency, abdominal cramping).  Dispense: 120 capsule; Refill: 11  -     riFAXIMin (Xifaxan) 550 MG tablet; Take 1 tablet by mouth 3 (Three) Times a Day for 14 days.  Dispense: 42 tablet; Refill: 6         This visit has been rescheduled as a phone visit to comply with patient safety concerns in accordance with CDC recommendations. Total time of discussion was 22 minutes.    Patient Instructions   1.   For GERD, continue pantoprazole 40 mg twice daily.  For GERD, we recommend avoiding eating 3-4 hours before bedtime, eating smaller more frequent meals, and avoiding any known food triggers including spicy foods, tomatoes and tomato-based sauces, chocolate, coffee/tea, citrus fruits, carbonated  beverages and alcohol.     2.  Due to your history of pancreatectomy with islet cell transplant, continue Creon.  You will take 3 capsules with meals and 2 with snacks.  Refills have been provided.    3.  For gastroparesis, continue domperidone 1 tablet 3 times daily before meals and 1 tablet at bedtime if needed.  You are currently due for your EKG for ongoing monitoring of high risk medication use.  Will be contacted to schedule this testing.  Once your EKG has been reviewed and if your QT/QT interval are within normal limits, a new prescription for domperidone will be faxed over to [facility].     4.  For Crohn's disease, continue Humira injections once every 2 weeks.    5.  For IBS-D and crossover SIBO symptoms, we will have you start Xifaxan 1 tablet daily.  Refills have been sent to your pharmacy.    6.  You may continue to use dicyclomine 1 tablet up to 4 times daily as needed for diarrhea, abdominal cramping, and fecal urgency.    7.  Continue daily probiotic.  You may find benefit with use of Bizzingo or its generic equivalent, available over-the-counter at your local grocery or pharmacy.    8.  For WELDON, continue efforts towards weight loss, regular exercise, and avoidance of high fat/high sugar foods.    9.  We will plan for telephone follow-up visit in 3 months for reassessment of symptoms, or sooner should symptoms worsen.  Appointment is scheduled for 11/30/2021 at 9:45 AM.       Discussion:    We will continue pantoprazole 40 mg twice daily for GERD and have the patient continue to implement antireflux precautions.  For gastroparesis, patient to continue domperidone we will update her EKG  this month for ongoing monitoring of high risk medication use to assess her QT/QTc interval to ensure safety with med use.  For nausea, a prescription for Zofran has been refilled for the patient, which I feel is secondary to her gastroparesis.  Patient to contact  GI motility and arrange evaluation.  Referral is in place.    For Crohn's disease, patient to continue Humira injections every 2 weeks.  Next colonoscopy due April 2024.  We will obtain routine lab work for ongoing monitoring of high risk medication use at her next office visit.  Patient to continue daily probiotic.  Due to the benefits of Xifaxan seems to have had on her overall gut health, we will have her continue Xifaxan at 1 tablet daily to see if this helps to maintain her GI health.    For WELDON, patient to continue efforts towards weight loss, regular exercise, daily supplementation with milk thistle, and avoidance of high fat/high sugar foods.    Due to her history of pancreatectomy with islet cell transplant, patient to continue Creon 3 tablets with meals and 1 with snacks.  Refills have been sent to her pharmacy.  We will plan for telephone follow-up visit in 3 months for reassessment of symptoms and ongoing monitoring of her health conditions.  Patient verbalized understand of above plan of care and is in agreement.  All questions answered and support provided.  EMR Dragon/Transcription Disclaimer:  This document has been Dictated utilizing Dragon dictation.

## 2021-08-30 ENCOUNTER — TELEPHONE (OUTPATIENT)
Dept: GASTROENTEROLOGY | Facility: CLINIC | Age: 46
End: 2021-08-30

## 2021-08-30 NOTE — TELEPHONE ENCOUNTER
"Patient has 2 questions: First, she's inquiring about receiving the covid booster, stating she is immuno compromised (on Humira and has history of pancreas transplant). She said she had no adverse effects when she took the first 2 covid vaccinations. Second, she's has received several steroid injections for \"joint issues\", and has talked to her doctor about taking glucosamine. Before starting it, she wanted the advice of her GI provider due to her history of Mart liver disease. Please advise.  "

## 2021-08-30 NOTE — TELEPHONE ENCOUNTER
Regarding the Covid vaccine, for patients who are immunosuppressed, the CDC recommends they receive a third dose of the vaccine.  The third dose can be administered anytime at least 28 days after the second dose.  It is okay for her to go ahead and get the third dose as long as it has been at least 28 days after her second dose.    From a GI standpoint, it would be fine for her to try glucosamine.

## 2021-09-02 ENCOUNTER — TRANSCRIBE ORDERS (OUTPATIENT)
Dept: PHYSICAL THERAPY | Facility: CLINIC | Age: 46
End: 2021-09-02

## 2021-09-02 ENCOUNTER — TREATMENT (OUTPATIENT)
Dept: PHYSICAL THERAPY | Facility: CLINIC | Age: 46
End: 2021-09-02

## 2021-09-02 DIAGNOSIS — S63.591D COMPLEX TEAR OF TRIANGULAR FIBROCARTILAGE OF RIGHT WRIST, SUBSEQUENT ENCOUNTER: Primary | ICD-10-CM

## 2021-09-02 DIAGNOSIS — S63.591A COMPLEX TEAR OF TRIANGULAR FIBROCARTILAGE OF RIGHT WRIST, INITIAL ENCOUNTER: Primary | ICD-10-CM

## 2021-09-02 DIAGNOSIS — M25.531 RIGHT WRIST PAIN: ICD-10-CM

## 2021-09-02 DIAGNOSIS — M25.631 STIFFNESS OF RIGHT WRIST JOINT: ICD-10-CM

## 2021-09-02 PROCEDURE — 97166 OT EVAL MOD COMPLEX 45 MIN: CPT | Performed by: OCCUPATIONAL THERAPIST

## 2021-09-02 PROCEDURE — 97112 NEUROMUSCULAR REEDUCATION: CPT | Performed by: OCCUPATIONAL THERAPIST

## 2021-09-02 PROCEDURE — 97014 ELECTRIC STIMULATION THERAPY: CPT | Performed by: OCCUPATIONAL THERAPIST

## 2021-09-02 NOTE — PROGRESS NOTES
Outpatient Occupational Therapy Ortho Initial Evaluation    Patient: Kayla Gan   : 1975  Diagnosis/ICD-10 Code:  No primary diagnosis found.  Referring practitioner: Porfirio Anglin*  Date of Initial Visit: 2021  Today's Date: 2021  Patient seen for 1 sessions               Subjective Questionnaire: QuickDASH: 33      Subjective Evaluation    History of Present Illness  Date of onset: 2021  Mechanism of injury: 21  Tripped over forks of [employer] in a [employer] sale. Immobilized on and off for 2-3 weeks.  Casted for 4-6 weeks by ortho.  Then re-braced, and wasn't getting better.    Having pain in R wrist, went to surgeon and was not a surgical candidate at that time. Got a cortisone injection with some relief, Recommend 4 weeks of immobilization and 4 weeks of therapy following and reassess in 8 weeks. Currently aching       Patient Occupation: on disability   Precautions and Work Restrictions: wear braceQuality of life: good    Pain  Current pain ratin  At worst pain ratin  Quality: dull ache and throbbing  Exacerbated by: coming out of the brace, movement.  Progression: no change    Social Support  Lives in: one-story house  Lives with: young children (14 year old grandson)    Hand dominance: right    Diagnostic Tests  X-ray: abnormal  MRI studies: abnormal    Treatments  Previous treatment: immobilization and injection treatment  Current treatment: immobilization and injection treatment  Patient Goals  Patient goals for therapy: decreased pain, independence with ADLs/IADLs, increased motion and increased strength           Past Medical hx: Chron's disease, Pancrease transplant, diabetic    Objective          Active Range of Motion     Right Wrist   Wrist flexion: 40 degrees   Wrist extension: 45 degrees   Radial deviation: 15 degrees   Ulnar deviation: 15 degrees     Additional Active Range of Motion Details  Full fist, but feels stiff    Strength/Myotome  Testing     Left Wrist/Hand      (2nd hand position)   lbs: 55    Right Wrist/Hand      (2nd hand position)     Comments: Not tested secondary to pain    Swelling     Right Wrist/Hand   Circumference MCP: 20 cm  Circumference wrist: 17 cm          Assessment & Plan     Assessment  Impairments: abnormal coordination, abnormal or restricted ROM, activity intolerance, impaired physical strength, lacks appropriate home exercise program, pain with function and weight-bearing intolerance  Assessment details: Pt having stiffness and pain with AROM of R wrist and decreased functional I.   Prognosis: good  Functional Limitations: carrying objects, lifting, pulling, pushing, reaching behind back, reaching overhead and unable to perform repetitive tasks  Goals  Plan Goals: 1. The patient complains of pain in the R wrist                  LTG 1: 12 weeks:  The patient will report a pain rating of 0/10 or better in order to improve sleep quality and tolerance to performance of activities of daily living.                                  STATUS:  New                  STG 1a: 4weeks:  The patient will report a pain rating of 3/10 or better.                                   STATUS:  New  2. The patient has limited ROM of the R wrist                  LTG 2: 12 weeks:  The patient will demonstrate 140 degrees of BEASLEY of R wrist to allow the patient to lift.                                  STATUS:  New                   STG 2a: 4 weeks:  The patient will demonstrate 90 degrees R wrist  BEASLEY.                                  STATUS:  New                             3. The patient has limited strength of the R UE.                  LTG 3: 12 weeks:  The patient will demonstrate 40# in order to lifting tasks.                                  STATUS:  New                  STG 3a: 4 weeks:  The patient will demonstrate tolerance of light strengthening without adverse reaction.                                  STATUS:  New  4.  Carrying, Moving, and Handling Objects Functional Limitation                                   LTG 4: 12 weeks:  The patient will demonstrate 1-19% limitation by achieving a score of 15 on the Quick DASH.                                  STATUS:  New                  STG 4a: 4 weeks:  The patient will demonstrate 20-39% limitation by achieving a score of 25 on the Quick DASH.                                    STATUS:  New                    TREATMENT: Orthotic fabrication/fitting/management and training, Manual therapy, therapeutic exercise, home exercise instruction, and modalities as needed to include: electrical stimulation, ultrasound, moist heat, paraffin, fluidotherapy and ice.      Plan  Planned modality interventions: TENS, thermotherapy (hydrocollator packs), electrical stimulation/Russian stimulation, high voltage pulsed current (pain management) and ultrasound  Planned therapy interventions: manual therapy, neuromuscular re-education, orthotic fitting/training, motor coordination training, strengthening, stretching, therapeutic activities, soft tissue mobilization, fine motor coordination training, body mechanics training, balance/weight-bearing training, ADL retraining, functional ROM exercises, flexibility, home exercise program, IADL retraining and joint mobilization  Frequency: 2x week  Duration in weeks: 12  Treatment plan discussed with: patient        Patient is indicated for skilled occupational therapy services.    History # of Personal Factors and/or Comorbidities: MODERATE (1-2)  Examination of Body System(s): # of elements: MODERATE (3)  Clinical Presentation: STABLE   Clinical Decision Making: MODERATE     Evaluation:  Low Complexity:    0     mins  62592;  Mod Complexity:    30     mins  05259;  High Complexity:    0     mins  64318;    Timed:  Manual Therapy:    0     mins  12057;  Therapeutic Exercise:    10     mins  99000;     Neuromuscular Harley:    10    mins  74189;    Therapeutic  Activity:     0     mins  91678;     Ultrasound:     0     mins  07578;    Electrical Stimulation:    0     mins  25744;    Untimed:  Electrical Stimulation:    10     mins  36790 ( );  Fluidotherapy:  __0_ mins  71107    Timed Treatment:   20   mins   Total Treatment:     60   mins      OT SIGNATURE: MONIQUE Madden, OTR/L, CHT   DATE TREATMENT INITIATED: 9/2/2021    Initial Certification  Certification Period: 12/1/2021  I certify that the therapy services are furnished while this patient is under my care.  The services outlined above are required by this patient, and will be reviewed every 90 days.     PHYSICIAN: Porfirio Lema MD      DATE:     Please sign and return via fax to [phone]   Thank you, [facility] Occupational Therapy.

## 2021-09-08 ENCOUNTER — TREATMENT (OUTPATIENT)
Dept: PHYSICAL THERAPY | Facility: CLINIC | Age: 46
End: 2021-09-08

## 2021-09-08 DIAGNOSIS — S63.591A COMPLEX TEAR OF TRIANGULAR FIBROCARTILAGE OF RIGHT WRIST, INITIAL ENCOUNTER: Primary | ICD-10-CM

## 2021-09-08 DIAGNOSIS — S63.591D COMPLEX TEAR OF TRIANGULAR FIBROCARTILAGE OF RIGHT WRIST, SUBSEQUENT ENCOUNTER: ICD-10-CM

## 2021-09-08 DIAGNOSIS — M25.631 STIFFNESS OF RIGHT WRIST JOINT: ICD-10-CM

## 2021-09-08 DIAGNOSIS — M25.531 RIGHT WRIST PAIN: ICD-10-CM

## 2021-09-08 PROCEDURE — 97014 ELECTRIC STIMULATION THERAPY: CPT | Performed by: OCCUPATIONAL THERAPIST

## 2021-09-08 PROCEDURE — 97110 THERAPEUTIC EXERCISES: CPT | Performed by: OCCUPATIONAL THERAPIST

## 2021-09-08 NOTE — PROGRESS NOTES
Occupational Therapy Daily Treatment Note      Patient: Kayla Gan   : 1975  Referring practitioner: Porfirio Anglin*  Date of Initial Visit: Type: THERAPY  Noted: 2021  Today's Date: 2021  Patient seen for 2 sessions    ICD-10-CM ICD-9-CM   1. Complex tear of triangular fibrocartilage of right wrist, initial encounter  S63.598A 842.09   2. Right wrist pain  M25.531 719.43   3. Stiffness of right wrist joint  M25.631 719.53          Kayla Gan reports I pressure washed the house with my brace. 5/10      Objective   See Exercise, Manual, and Modality Logs for complete treatment.   Reviewed Nerve glides and use of home TENS unit    Assessment/Plan  Pt having increased ROM and functional I with R hand.  Continued pain with gripping.     Cont per POC           Timed:  Manual Therapy:    0     mins  68813;  Therapeutic Exercise:    30     mins  01193;     Neuromuscular Harley:    0    mins  03573;    Ultrasound:     0     mins  03254;    Electrical Stimulation:    0     mins  07439;    Untimed:  Electrical Stimulation:    10     mins  52672 ( );  Fluidotherapy:        0    mins  59845    Timed Treatment:   40   mins   Total Treatment:     40   mins  MONIQUE Madden, OTR/L,CHT  Occupational Therapist, Certified Hand Therapist

## 2021-09-13 ENCOUNTER — TREATMENT (OUTPATIENT)
Dept: PHYSICAL THERAPY | Facility: CLINIC | Age: 46
End: 2021-09-13

## 2021-09-13 DIAGNOSIS — M25.531 RIGHT WRIST PAIN: ICD-10-CM

## 2021-09-13 DIAGNOSIS — M25.631 STIFFNESS OF RIGHT WRIST JOINT: ICD-10-CM

## 2021-09-13 DIAGNOSIS — S63.591D COMPLEX TEAR OF TRIANGULAR FIBROCARTILAGE OF RIGHT WRIST, SUBSEQUENT ENCOUNTER: ICD-10-CM

## 2021-09-13 DIAGNOSIS — S63.591A COMPLEX TEAR OF TRIANGULAR FIBROCARTILAGE OF RIGHT WRIST, INITIAL ENCOUNTER: Primary | ICD-10-CM

## 2021-09-13 PROCEDURE — 97110 THERAPEUTIC EXERCISES: CPT | Performed by: OCCUPATIONAL THERAPIST

## 2021-09-13 PROCEDURE — 97140 MANUAL THERAPY 1/> REGIONS: CPT | Performed by: OCCUPATIONAL THERAPIST

## 2021-09-13 NOTE — PROGRESS NOTES
Occupational Therapy Daily Treatment Note      Patient: Kayla Gan   : 1975  Referring practitioner: Porfirio nAglin*  Date of Initial Visit: Type: THERAPY  Noted: 2021  Today's Date: 2021  Patient seen for 3 sessions    ICD-10-CM ICD-9-CM   1. Complex tear of triangular fibrocartilage of right wrist, initial encounter  S63.591A 842.09   2. Right wrist pain  M25.531 719.43   3. Stiffness of right wrist joint  M25.631 719.53   4. Complex tear of triangular fibrocartilage of right wrist, subsequent encounter  S63.591D V58.89          Kayla Gan reports 4/10 this date.  Pt reports I am stiff today.       Objective   See Exercise, Manual, and Modality Logs for complete treatment.   Pt tolerating stabilization well this date.  Felt fatigue after gripping, but no increase in pain.    Assessment/Plan  Discussed progression out of the brace as tolerated, beginning strengthening/stabilization, and possibility of using kinesiotape to assist with transition out of brace for support.      Cont per POC           Timed:  Manual Therapy:    10     mins  28726;  Therapeutic Exercise:    20     mins  78170;     Neuromuscular Harley:    0    mins  15003;    Ultrasound:     0     mins  29957;    Electrical Stimulation:    0     mins  05607;    Untimed:  Electrical Stimulation:    0     mins  95539 ( );  Fluidotherapy:        0    mins  67492    Timed Treatment:   30   mins   Total Treatment:     30   mins  MONIQUE Madden, OTR/L,CHT  Occupational Therapist, Certified Hand Therapist

## 2021-09-17 ENCOUNTER — TELEPHONE (OUTPATIENT)
Dept: PHYSICAL THERAPY | Facility: CLINIC | Age: 46
End: 2021-09-17

## 2021-09-21 ENCOUNTER — TREATMENT (OUTPATIENT)
Dept: PHYSICAL THERAPY | Facility: CLINIC | Age: 46
End: 2021-09-21

## 2021-09-21 DIAGNOSIS — S63.591D COMPLEX TEAR OF TRIANGULAR FIBROCARTILAGE OF RIGHT WRIST, SUBSEQUENT ENCOUNTER: ICD-10-CM

## 2021-09-21 DIAGNOSIS — M25.531 RIGHT WRIST PAIN: ICD-10-CM

## 2021-09-21 DIAGNOSIS — S63.591A COMPLEX TEAR OF TRIANGULAR FIBROCARTILAGE OF RIGHT WRIST, INITIAL ENCOUNTER: Primary | ICD-10-CM

## 2021-09-21 DIAGNOSIS — M25.631 STIFFNESS OF RIGHT WRIST JOINT: ICD-10-CM

## 2021-09-21 PROCEDURE — 97110 THERAPEUTIC EXERCISES: CPT | Performed by: OCCUPATIONAL THERAPIST

## 2021-09-24 ENCOUNTER — TREATMENT (OUTPATIENT)
Dept: PHYSICAL THERAPY | Facility: CLINIC | Age: 46
End: 2021-09-24

## 2021-09-24 DIAGNOSIS — S63.591A COMPLEX TEAR OF TRIANGULAR FIBROCARTILAGE OF RIGHT WRIST, INITIAL ENCOUNTER: Primary | ICD-10-CM

## 2021-09-24 DIAGNOSIS — M25.531 RIGHT WRIST PAIN: ICD-10-CM

## 2021-09-24 DIAGNOSIS — M25.631 STIFFNESS OF RIGHT WRIST JOINT: ICD-10-CM

## 2021-09-24 PROCEDURE — 97140 MANUAL THERAPY 1/> REGIONS: CPT | Performed by: OCCUPATIONAL THERAPIST

## 2021-09-24 PROCEDURE — 97110 THERAPEUTIC EXERCISES: CPT | Performed by: OCCUPATIONAL THERAPIST

## 2021-09-24 NOTE — PROGRESS NOTES
Occupational Therapy Daily Treatment Note      Patient: Kayla Gan   : 1975  Referring practitioner: No ref. provider found  Date of Initial Visit: Type: THERAPY  Noted: 2021  Today's Date: 2021  Patient seen for 5 sessions    ICD-10-CM ICD-9-CM   1. Complex tear of triangular fibrocartilage of right wrist, initial encounter  S63.591A 842.09   2. Right wrist pain  M25.531 719.43   3. Stiffness of right wrist joint  M25.631 719.53   4. Complex tear of triangular fibrocartilage of right wrist, subsequent encounter  S63.591D V58.89          Kayla Gan reports I really over did it this week.     Objective   See Exercise, Manual, and Modality Logs for complete treatment.       Assessment/Plan  Pt progressing with strengthening and pain mgmt.       Cont per POC           Timed:  Manual Therapy:    10     mins  83149;  Therapeutic Exercise:    20     mins  81242;     Neuromuscular Harley:    0    mins  28831;    Ultrasound:     0     mins  01203;    Electrical Stimulation:    0     mins  86347;    Untimed:  Electrical Stimulation:    0     mins  97293 ( );  Fluidotherapy:        0    mins  45447    Timed Treatment:   30   mins   Total Treatment:     30   mins  MONIQUE Madden, OTR/L,CHT  Occupational Therapist, Certified Hand Therapist

## 2021-09-25 ENCOUNTER — HOSPITAL ENCOUNTER (OUTPATIENT)
Facility: HOSPITAL | Age: 46
Setting detail: OBSERVATION
Discharge: HOME | End: 2021-09-26
Attending: EMERGENCY MEDICINE | Admitting: INTERNAL MEDICINE

## 2021-09-25 ENCOUNTER — APPOINTMENT (OUTPATIENT)
Dept: GENERAL RADIOLOGY | Facility: HOSPITAL | Age: 46
End: 2021-09-25

## 2021-09-25 DIAGNOSIS — R55 SYNCOPE AND COLLAPSE: Primary | ICD-10-CM

## 2021-09-25 DIAGNOSIS — I95.9 HYPOTENSION, UNSPECIFIED HYPOTENSION TYPE: ICD-10-CM

## 2021-09-25 DIAGNOSIS — T50.905A ADVERSE EFFECT OF DRUG, INITIAL ENCOUNTER: ICD-10-CM

## 2021-09-25 PROBLEM — E66.9 OBESITY (BMI 30-39.9): Status: ACTIVE | Noted: 2021-09-25

## 2021-09-25 PROBLEM — E03.9 HYPOTHYROIDISM (ACQUIRED): Status: ACTIVE | Noted: 2021-09-25

## 2021-09-25 PROBLEM — E11.9 TYPE 2 DIABETES MELLITUS: Status: ACTIVE | Noted: 2021-09-25

## 2021-09-25 PROBLEM — R74.01 TRANSAMINITIS: Status: ACTIVE | Noted: 2021-09-25

## 2021-09-25 PROBLEM — E27.40 ADRENAL INSUFFICIENCY (HCC): Status: ACTIVE | Noted: 2021-09-25

## 2021-09-25 PROBLEM — N17.9 ACUTE KIDNEY INJURY (HCC): Status: ACTIVE | Noted: 2021-09-25

## 2021-09-25 LAB
ALBUMIN SERPL-MCNC: 4.2 G/DL (ref 3.5–5.2)
ALBUMIN/GLOB SERPL: 1.2 G/DL
ALP SERPL-CCNC: 165 U/L (ref 39–117)
ALT SERPL W P-5'-P-CCNC: 77 U/L (ref 1–33)
ANION GAP SERPL CALCULATED.3IONS-SCNC: 17.2 MMOL/L (ref 5–15)
AST SERPL-CCNC: 77 U/L (ref 1–32)
BACTERIA UR QL AUTO: ABNORMAL /HPF
BASE EXCESS BLDV CALC-SCNC: -7.2 MMOL/L (ref -2–2)
BASOPHILS # BLD AUTO: 0.06 10*3/MM3 (ref 0–0.2)
BASOPHILS NFR BLD AUTO: 0.4 % (ref 0–1.5)
BDY SITE: ABNORMAL
BILIRUB SERPL-MCNC: 0.3 MG/DL (ref 0–1.2)
BILIRUB UR QL STRIP: NEGATIVE
BUN SERPL-MCNC: 38 MG/DL (ref 6–20)
BUN/CREAT SERPL: 17.2 (ref 7–25)
CA-I BLDA-SCNC: 1.04 MMOL/L (ref 1.13–1.32)
CALCIUM SPEC-SCNC: 9.2 MG/DL (ref 8.6–10.5)
CHLORIDE BLDV-SCNC: 102 MMOL/L (ref 98–106)
CHLORIDE SERPL-SCNC: 97 MMOL/L (ref 98–107)
CK SERPL-CCNC: 62 U/L (ref 20–180)
CLARITY UR: ABNORMAL
CO2 SERPL-SCNC: 20.8 MMOL/L (ref 22–29)
COHGB MFR BLD: 2.2 % (ref 0–1.5)
COLOR UR: YELLOW
CREAT SERPL-MCNC: 2.21 MG/DL (ref 0.57–1)
D-LACTATE SERPL-SCNC: 1.3 MMOL/L (ref 0.5–2)
DEPRECATED RDW RBC AUTO: 54.2 FL (ref 37–54)
EOSINOPHIL # BLD AUTO: 0.16 10*3/MM3 (ref 0–0.4)
EOSINOPHIL NFR BLD AUTO: 1.1 % (ref 0.3–6.2)
ERYTHROCYTE [DISTWIDTH] IN BLOOD BY AUTOMATED COUNT: 16.9 % (ref 12.3–15.4)
FHHB: 15.1 % (ref 0–5)
GAS FLOW AIRWAY: 0 LPM
GFR SERPL CREATININE-BSD FRML MDRD: 24 ML/MIN/1.73
GLOBULIN UR ELPH-MCNC: 3.4 GM/DL
GLUCOSE BLDA-MCNC: 122 MMOL/L (ref 65–99)
GLUCOSE BLDC GLUCOMTR-MCNC: 123 MG/DL (ref 70–99)
GLUCOSE BLDC GLUCOMTR-MCNC: 153 MG/DL (ref 70–99)
GLUCOSE SERPL-MCNC: 133 MG/DL (ref 65–99)
GLUCOSE UR STRIP-MCNC: NEGATIVE MG/DL
HCO3 BLDV-SCNC: 19 MMOL/L (ref 22–26)
HCT VFR BLD AUTO: 38.8 % (ref 34–46.6)
HGB BLD-MCNC: 12.4 G/DL (ref 12–15.9)
HGB BLDA-MCNC: 10.5 G/DL (ref 11.7–14.6)
HGB UR QL STRIP.AUTO: NEGATIVE
HOLD SPECIMEN: NORMAL
HOLD SPECIMEN: NORMAL
HYALINE CASTS UR QL AUTO: ABNORMAL /LPF
IMM GRANULOCYTES # BLD AUTO: 0.13 10*3/MM3 (ref 0–0.05)
IMM GRANULOCYTES NFR BLD AUTO: 0.9 % (ref 0–0.5)
INHALED O2 CONCENTRATION: 21 %
KETONES UR QL STRIP: NEGATIVE
LACTATE BLDA-SCNC: 1.48 MMOL/L (ref 0.5–2)
LEUKOCYTE ESTERASE UR QL STRIP.AUTO: ABNORMAL
LIPASE SERPL-CCNC: 6 U/L (ref 13–60)
LYMPHOCYTES # BLD AUTO: 4.24 10*3/MM3 (ref 0.7–3.1)
LYMPHOCYTES NFR BLD AUTO: 29.4 % (ref 19.6–45.3)
MAGNESIUM SERPL-MCNC: 2.6 MG/DL (ref 1.6–2.6)
MCH RBC QN AUTO: 28.1 PG (ref 26.6–33)
MCHC RBC AUTO-ENTMCNC: 32 G/DL (ref 31.5–35.7)
MCV RBC AUTO: 88 FL (ref 79–97)
METHGB BLD QL: 0.3 % (ref 0–1.5)
MODALITY: ABNORMAL
MONOCYTES # BLD AUTO: 2.51 10*3/MM3 (ref 0.1–0.9)
MONOCYTES NFR BLD AUTO: 17.4 % (ref 5–12)
NEUTROPHILS NFR BLD AUTO: 50.8 % (ref 42.7–76)
NEUTROPHILS NFR BLD AUTO: 7.3 10*3/MM3 (ref 1.7–7)
NITRITE UR QL STRIP: NEGATIVE
NOTE: ABNORMAL
NRBC BLD AUTO-RTO: 0 /100 WBC (ref 0–0.2)
OXYHGB MFR BLDV: 82.4 % (ref 94–99)
PCO2 BLDV: 40.9 MM HG (ref 41–51)
PH BLDV: 7.29 PH UNITS (ref 7.31–7.41)
PH UR STRIP.AUTO: 6.5 [PH] (ref 5–8)
PLATELET # BLD AUTO: 427 10*3/MM3 (ref 140–450)
PMV BLD AUTO: 9.9 FL (ref 6–12)
PO2 BLDV: 56 MM HG (ref 35–42)
POTASSIUM BLDV-SCNC: 3.6 MMOL/L (ref 3.5–5)
POTASSIUM SERPL-SCNC: 4.3 MMOL/L (ref 3.5–5.2)
PROT SERPL-MCNC: 7.6 G/DL (ref 6–8.5)
PROT UR QL STRIP: NEGATIVE
RBC # BLD AUTO: 4.41 10*6/MM3 (ref 3.77–5.28)
RBC # UR: ABNORMAL /HPF
REF LAB TEST METHOD: ABNORMAL
SAO2 % BLDCOV: 84.5 % (ref 95–99)
SODIUM BLDV-SCNC: 134.6 MMOL/L (ref 136–146)
SODIUM SERPL-SCNC: 135 MMOL/L (ref 136–145)
SP GR UR STRIP: 1.01 (ref 1–1.03)
SQUAMOUS #/AREA URNS HPF: ABNORMAL /HPF
TROPONIN T SERPL-MCNC: <0.01 NG/ML (ref 0–0.03)
UROBILINOGEN UR QL STRIP: ABNORMAL
WBC # BLD AUTO: 14.4 10*3/MM3 (ref 3.4–10.8)
WBC UR QL AUTO: ABNORMAL /HPF
WHOLE BLOOD HOLD SPECIMEN: NORMAL
WHOLE BLOOD HOLD SPECIMEN: NORMAL

## 2021-09-25 PROCEDURE — 93005 ELECTROCARDIOGRAM TRACING: CPT | Performed by: EMERGENCY MEDICINE

## 2021-09-25 PROCEDURE — 80053 COMPREHEN METABOLIC PANEL: CPT | Performed by: EMERGENCY MEDICINE

## 2021-09-25 PROCEDURE — 87040 BLOOD CULTURE FOR BACTERIA: CPT | Performed by: EMERGENCY MEDICINE

## 2021-09-25 PROCEDURE — 93010 ELECTROCARDIOGRAM REPORT: CPT | Performed by: INTERNAL MEDICINE

## 2021-09-25 PROCEDURE — 71045 X-RAY EXAM CHEST 1 VIEW: CPT

## 2021-09-25 PROCEDURE — 83690 ASSAY OF LIPASE: CPT | Performed by: EMERGENCY MEDICINE

## 2021-09-25 PROCEDURE — 25010000002 CEFTRIAXONE PER 250 MG: Performed by: EMERGENCY MEDICINE

## 2021-09-25 PROCEDURE — 82962 GLUCOSE BLOOD TEST: CPT

## 2021-09-25 PROCEDURE — 83735 ASSAY OF MAGNESIUM: CPT | Performed by: EMERGENCY MEDICINE

## 2021-09-25 PROCEDURE — 82550 ASSAY OF CK (CPK): CPT | Performed by: EMERGENCY MEDICINE

## 2021-09-25 PROCEDURE — 82805 BLOOD GASES W/O2 SATURATION: CPT | Performed by: EMERGENCY MEDICINE

## 2021-09-25 PROCEDURE — 81001 URINALYSIS AUTO W/SCOPE: CPT | Performed by: EMERGENCY MEDICINE

## 2021-09-25 PROCEDURE — G0378 HOSPITAL OBSERVATION PER HR: HCPCS

## 2021-09-25 PROCEDURE — 84484 ASSAY OF TROPONIN QUANT: CPT | Performed by: EMERGENCY MEDICINE

## 2021-09-25 PROCEDURE — 83605 ASSAY OF LACTIC ACID: CPT | Performed by: EMERGENCY MEDICINE

## 2021-09-25 PROCEDURE — 99284 EMERGENCY DEPT VISIT MOD MDM: CPT

## 2021-09-25 PROCEDURE — 96375 TX/PRO/DX INJ NEW DRUG ADDON: CPT

## 2021-09-25 PROCEDURE — 96365 THER/PROPH/DIAG IV INF INIT: CPT

## 2021-09-25 PROCEDURE — 85025 COMPLETE CBC W/AUTO DIFF WBC: CPT | Performed by: EMERGENCY MEDICINE

## 2021-09-25 PROCEDURE — 82820 HEMOGLOBIN-OXYGEN AFFINITY: CPT | Performed by: EMERGENCY MEDICINE

## 2021-09-25 PROCEDURE — 94799 UNLISTED PULMONARY SVC/PX: CPT

## 2021-09-25 PROCEDURE — 25010000002 METOCLOPRAMIDE PER 10 MG: Performed by: EMERGENCY MEDICINE

## 2021-09-25 PROCEDURE — 25010000002 ONDANSETRON PER 1 MG: Performed by: EMERGENCY MEDICINE

## 2021-09-25 PROCEDURE — 99220 PR INITIAL OBSERVATION CARE/DAY 70 MINUTES: CPT | Performed by: FAMILY MEDICINE

## 2021-09-25 RX ORDER — SODIUM CHLORIDE 0.9 % (FLUSH) 0.9 %
10 SYRINGE (ML) INJECTION EVERY 12 HOURS SCHEDULED
Status: DISCONTINUED | OUTPATIENT
Start: 2021-09-25 | End: 2021-09-26 | Stop reason: HOSPADM

## 2021-09-25 RX ORDER — ACETAMINOPHEN 160 MG/5ML
650 SOLUTION ORAL EVERY 4 HOURS PRN
Status: DISCONTINUED | OUTPATIENT
Start: 2021-09-25 | End: 2021-09-26 | Stop reason: HOSPADM

## 2021-09-25 RX ORDER — BISACODYL 5 MG/1
5 TABLET, DELAYED RELEASE ORAL DAILY PRN
Status: DISCONTINUED | OUTPATIENT
Start: 2021-09-25 | End: 2021-09-26 | Stop reason: HOSPADM

## 2021-09-25 RX ORDER — VERAPAMIL HYDROCHLORIDE 120 MG/1
120 TABLET, FILM COATED ORAL DAILY
Status: DISCONTINUED | OUTPATIENT
Start: 2021-09-26 | End: 2021-09-25

## 2021-09-25 RX ORDER — LAMOTRIGINE 100 MG/1
200 TABLET ORAL DAILY
Status: DISCONTINUED | OUTPATIENT
Start: 2021-09-26 | End: 2021-09-26 | Stop reason: HOSPADM

## 2021-09-25 RX ORDER — CEFTRIAXONE SODIUM 1 G/50ML
1 INJECTION, SOLUTION INTRAVENOUS ONCE
Status: COMPLETED | OUTPATIENT
Start: 2021-09-25 | End: 2021-09-25

## 2021-09-25 RX ORDER — METOCLOPRAMIDE HYDROCHLORIDE 5 MG/ML
10 INJECTION INTRAMUSCULAR; INTRAVENOUS ONCE
Status: COMPLETED | OUTPATIENT
Start: 2021-09-25 | End: 2021-09-25

## 2021-09-25 RX ORDER — NICOTINE POLACRILEX 4 MG
15 LOZENGE BUCCAL
Status: DISCONTINUED | OUTPATIENT
Start: 2021-09-25 | End: 2021-09-26 | Stop reason: HOSPADM

## 2021-09-25 RX ORDER — ONDANSETRON 2 MG/ML
4 INJECTION INTRAMUSCULAR; INTRAVENOUS EVERY 6 HOURS PRN
Status: DISCONTINUED | OUTPATIENT
Start: 2021-09-25 | End: 2021-09-26 | Stop reason: HOSPADM

## 2021-09-25 RX ORDER — ACETAMINOPHEN 650 MG/1
650 SUPPOSITORY RECTAL EVERY 4 HOURS PRN
Status: DISCONTINUED | OUTPATIENT
Start: 2021-09-25 | End: 2021-09-26 | Stop reason: HOSPADM

## 2021-09-25 RX ORDER — PANTOPRAZOLE SODIUM 40 MG/1
40 TABLET, DELAYED RELEASE ORAL
Status: DISCONTINUED | OUTPATIENT
Start: 2021-09-26 | End: 2021-09-26 | Stop reason: HOSPADM

## 2021-09-25 RX ORDER — ONDANSETRON 2 MG/ML
4 INJECTION INTRAMUSCULAR; INTRAVENOUS ONCE
Status: COMPLETED | OUTPATIENT
Start: 2021-09-25 | End: 2021-09-25

## 2021-09-25 RX ORDER — ARIPIPRAZOLE 5 MG/1
5 TABLET ORAL DAILY
Status: DISCONTINUED | OUTPATIENT
Start: 2021-09-26 | End: 2021-09-26 | Stop reason: HOSPADM

## 2021-09-25 RX ORDER — BISACODYL 10 MG
10 SUPPOSITORY, RECTAL RECTAL DAILY PRN
Status: DISCONTINUED | OUTPATIENT
Start: 2021-09-25 | End: 2021-09-26 | Stop reason: HOSPADM

## 2021-09-25 RX ORDER — SODIUM CHLORIDE 0.9 % (FLUSH) 0.9 %
10 SYRINGE (ML) INJECTION AS NEEDED
Status: DISCONTINUED | OUTPATIENT
Start: 2021-09-25 | End: 2021-09-25

## 2021-09-25 RX ORDER — CHOLECALCIFEROL (VITAMIN D3) 125 MCG
5 CAPSULE ORAL NIGHTLY PRN
Status: DISCONTINUED | OUTPATIENT
Start: 2021-09-25 | End: 2021-09-26 | Stop reason: HOSPADM

## 2021-09-25 RX ORDER — AMOXICILLIN 250 MG
2 CAPSULE ORAL 2 TIMES DAILY
Status: DISCONTINUED | OUTPATIENT
Start: 2021-09-25 | End: 2021-09-26 | Stop reason: HOSPADM

## 2021-09-25 RX ORDER — TRAZODONE HYDROCHLORIDE 50 MG/1
150 TABLET ORAL
Status: DISCONTINUED | OUTPATIENT
Start: 2021-09-26 | End: 2021-09-26 | Stop reason: HOSPADM

## 2021-09-25 RX ORDER — SODIUM CHLORIDE 0.9 % (FLUSH) 0.9 %
10 SYRINGE (ML) INJECTION AS NEEDED
Status: DISCONTINUED | OUTPATIENT
Start: 2021-09-25 | End: 2021-09-26 | Stop reason: HOSPADM

## 2021-09-25 RX ORDER — ONDANSETRON 4 MG/1
4 TABLET, FILM COATED ORAL EVERY 6 HOURS PRN
Status: DISCONTINUED | OUTPATIENT
Start: 2021-09-25 | End: 2021-09-26 | Stop reason: HOSPADM

## 2021-09-25 RX ORDER — CALCIUM CARBONATE 200(500)MG
2 TABLET,CHEWABLE ORAL 2 TIMES DAILY PRN
Status: DISCONTINUED | OUTPATIENT
Start: 2021-09-25 | End: 2021-09-26 | Stop reason: HOSPADM

## 2021-09-25 RX ORDER — POLYETHYLENE GLYCOL 3350 17 G/17G
17 POWDER, FOR SOLUTION ORAL DAILY PRN
Status: DISCONTINUED | OUTPATIENT
Start: 2021-09-25 | End: 2021-09-26 | Stop reason: HOSPADM

## 2021-09-25 RX ORDER — DEXTROSE MONOHYDRATE 100 MG/ML
25 INJECTION, SOLUTION INTRAVENOUS
Status: DISCONTINUED | OUTPATIENT
Start: 2021-09-25 | End: 2021-09-26 | Stop reason: HOSPADM

## 2021-09-25 RX ORDER — VERAPAMIL HYDROCHLORIDE 120 MG/1
120 CAPSULE, EXTENDED RELEASE ORAL DAILY
Status: DISCONTINUED | OUTPATIENT
Start: 2021-09-26 | End: 2021-09-26 | Stop reason: HOSPADM

## 2021-09-25 RX ORDER — SODIUM CHLORIDE, SODIUM LACTATE, POTASSIUM CHLORIDE, CALCIUM CHLORIDE 600; 310; 30; 20 MG/100ML; MG/100ML; MG/100ML; MG/100ML
100 INJECTION, SOLUTION INTRAVENOUS CONTINUOUS
Status: ACTIVE | OUTPATIENT
Start: 2021-09-25 | End: 2021-09-26

## 2021-09-25 RX ORDER — ACETAMINOPHEN 325 MG/1
650 TABLET ORAL EVERY 4 HOURS PRN
Status: DISCONTINUED | OUTPATIENT
Start: 2021-09-25 | End: 2021-09-26 | Stop reason: HOSPADM

## 2021-09-25 RX ADMIN — SODIUM CHLORIDE, POTASSIUM CHLORIDE, SODIUM LACTATE AND CALCIUM CHLORIDE 100 ML/HR: 600; 310; 30; 20 INJECTION, SOLUTION INTRAVENOUS at 22:40

## 2021-09-25 RX ADMIN — SODIUM CHLORIDE, PRESERVATIVE FREE 10 ML: 5 INJECTION INTRAVENOUS at 22:42

## 2021-09-25 RX ADMIN — CEFTRIAXONE SODIUM 1 G: 1 INJECTION, SOLUTION INTRAVENOUS at 20:26

## 2021-09-25 RX ADMIN — METOCLOPRAMIDE 10 MG: 5 INJECTION, SOLUTION INTRAMUSCULAR; INTRAVENOUS at 20:07

## 2021-09-25 RX ADMIN — Medication: at 22:41

## 2021-09-25 RX ADMIN — SODIUM CHLORIDE 2000 ML: 9 INJECTION, SOLUTION INTRAVENOUS at 17:34

## 2021-09-25 RX ADMIN — ONDANSETRON 4 MG: 2 INJECTION INTRAMUSCULAR; INTRAVENOUS at 19:25

## 2021-09-25 RX ADMIN — SODIUM CHLORIDE, POTASSIUM CHLORIDE, SODIUM LACTATE AND CALCIUM CHLORIDE 1000 ML: 600; 310; 30; 20 INJECTION, SOLUTION INTRAVENOUS at 22:39

## 2021-09-25 RX ADMIN — SODIUM CHLORIDE 2000 ML: 9 INJECTION, SOLUTION INTRAVENOUS at 16:53

## 2021-09-25 NOTE — ED PROVIDER NOTES
Time: 4:26 PM EDT  Arrived by: private vehicle  Chief Complaint: weakness  History provided by: patient/family  History is limited by: N/A    History of Present Illness:  Patient is a 46 y.o. year old female that presents to the emergency department with weakness. Pt daughter is at bedside giving history. Pt daughter states that pt was walking around and she passed out. Pt started getting injections 2 weeks ago for osteoporosis. Pt denies any injury. Pt has had this happen with other injection. Pt also c/o nausea.       History provided by:  Patient and relative   used: No    Weakness - Generalized  Severity:  Moderate  Onset quality:  Gradual  Duration:  1 day  Timing:  Constant  Progression:  Unchanged  Chronicity:  New  Relieved by:  None tried  Worsened by:  Nothing  Ineffective treatments:  None tried  Associated symptoms: nausea    Associated symptoms: no chest pain, no cough, no diarrhea, no dysuria, no fever, no frequency, no seizures, no shortness of breath and no vomiting    Nausea:     Severity:  Mild    Onset quality:  Gradual    Duration:  1 day    Timing:  Constant    Progression:  Unchanged  Vomiting  The primary symptoms include nausea. Primary symptoms do not include fever, vomiting, diarrhea, dysuria or rash.   The illness does not include chills or back pain.   Nausea  The primary symptoms include nausea. Primary symptoms do not include fever, vomiting, diarrhea, dysuria or rash.   The illness does not include chills or back pain.           Similar Symptoms Previously: yes  Recently seen: yes      Patient Care Team  Primary Care Provider: Justin Daniels APRN      Past Medical History:     Allergies   Allergen Reactions   • Sulfa Antibiotics Hives   • Tramadol Anaphylaxis   • Codeine GI Intolerance and Nausea And Vomiting   • Morphine GI Intolerance     Other reaction(s): Chest Pain  increases billiary pressure and causes chest pain   • Nifedipine Provider Review Needed   •  Vancomycin Hives     Past Medical History:   Diagnosis Date   • Adrenal insufficiency (CMS/HCC)    • Anemia    • Anxiety    • Arthritis    • Colon polyp    • Crohn's disease (CMS/HCC)    • Depression    • Diabetes mellitus (CMS/HCC)    • GERD (gastroesophageal reflux disease)    • History of MRSA infection    • Hyperlipidemia    • Hypertension    • Hypothyroidism    • Liver disease    • Migraines    • Pancreatitis      Past Surgical History:   Procedure Laterality Date   • ABSCESS DRAINAGE  12/24/2019    Fluoroscopically guided abscess drainage, [facility]   • BACK SURGERY      L4 L5   • CHOLECYSTECTOMY N/A    • COLONOSCOPY  04/24/2019    NBIH, 3 mm polyp   • DILATATION AND CURETTAGE     • ENDOSCOPY N/A 04/24/2019    Normal   • ENTEROSCOPY SMALL BOWEL     • ERCP  2019   • GASTROJEJUNOSTOMY W/ JEJUNOSTOMY TUBE      removed   • HYSTERECTOMY     • PANCREATECTOMY  12/2019    TPIAT   • PELVIC FLOOR REPAIR     • SINUS SURGERY     • SPLENECTOMY     • TONSILLECTOMY     • VENOUS ACCESS DEVICE (PORT) INSERTION       Family History   Problem Relation Age of Onset   • Breast cancer Mother    • Colon polyps Mother    • Heart disease Father    • Colon cancer Maternal Grandfather    • Alcohol abuse Paternal Grandfather    • Heart disease Paternal Grandfather        Home Medications:  Prior to Admission medications    Medication Sig Start Date End Date Taking? Authorizing Provider   alendronate (FOSAMAX) 70 MG tablet TAKE one tablet by MOUTH every WEEK WITH WATER AND DO not lay down for30 minutes AFTER taking. 8/2/21   Pollo Ray MD   ARIPiprazole (ABILIFY) 10 MG tablet Abilify   5 mg    Pollo Ray MD   ascorbic acid (VITAMIN C) 100 MG tablet Vitamin C 100 mg oral tablet take 1 tablet by oral route daily   Suspended    Pollo Ray MD   Budesonide (ENTOCORT EC) 3 MG 24 hr capsule budesonide 3 mg oral capsule,delayed,extend.release take 1 capsules (3 mg) by oral route three times daily   Suspended     Pollo Ray MD   Buprenorphine 15 MCG/HR patch weekly Butrans 15 mcg/hour transdermal patch   Apply 1 patch by transdermal route.    Pollo Ray MD   cetirizine (zyrTEC) 10 MG tablet TAKE ONE TABLET BY MOUTH ONCE DAILY AT BEDTIME 4/25/19   Pollo Ray MD   chlorthalidone (HYGROTON) 25 MG tablet Take 25 mg by mouth Daily. 4/18/19   Pollo Ray MD   choline fenofibrate (TRILIPIX) 45 MG capsule fenofibric acid (choline) 45 mg capsule,delayed release    Pollo Ray MD   Coenzyme Q10 10 MG capsule Co Q-10 10 mg oral capsule take 1 capsule by oral route daily   Suspended    Pollo Ray MD   desvenlafaxine (Pristiq) 50 MG 24 hr tablet Pristiq 50 mg oral tablet extended release 24 hr take 1 tablet (50 mg) by oral route once daily   Suspended    Pollo Ray MD   dexlansoprazole (Dexilant) 60 MG capsule Dexilant 60 mg capsule, delayed release    Pollo Ray MD   diazePAM (VALIUM) 10 MG tablet Take 10 mg by mouth 3 (Three) Times a Day As Needed. 4/20/21   Pollo Ray MD   dicyclomine (BENTYL) 10 MG capsule Take 1 capsule by mouth 4 (Four) Times a Day As Needed (diarrhea, fecal urgency, abdominal cramping). 8/24/21   Mariela Gr APRN   docusate sodium (DOK) 100 MG capsule TAKE ONE CAPSULE BY MOUTH TWICE DAILY 4/16/19   Pollo Ray MD   escitalopram (LEXAPRO) 20 MG tablet Take 20 mg by mouth Daily. 4/20/21   Pollo Ray MD   fluticasone (CUTIVATE) 0.005 % ointment fluticasone propionate    Pollo Ray MD   fluticasone (FLONASE) 50 MCG/ACT nasal spray instill 2 sprays in each nostril every day AS DIRECTED 4/25/19   Pollo Ray MD   gabapentin (NEURONTIN) 300 MG capsule Take 300 mg by mouth 3 (Three) Times a Day. 4/9/21   Pollo Ray MD   GLUCAGON EMERGENCY 1 MG injection INJECT 1MG INTRAMUSCULARLY ONE TIME AS NEEDED FOR HYPOGLYCEMIA 1/7/20   Pollo Ray MD   Humira Pen 40  MG/0.4ML Pen-injector Kit INJECT 1 PEN UNDER THE SKIN EVERY 14 DAYS. 8/5/21   Mariela Gr APRN   hydrocortisone (CORTEF) 5 MG tablet Take 15 mg by mouth 2 (Two) Times a Day. 8/17/21   Pollo Ray MD   Insulin Lispro (HumaLOG) 100 UNIT/ML solution cartridge     Pollo Ray MD   ketorolac (TORADOL) 10 MG tablet TAKE 1 TABLET BY MOUTH EVERY 6 HOURS FOR 4 DAYS 6/7/21   Pollo Ray MD   lamoTRIgine (LaMICtal) 200 MG tablet Take 200 mg by mouth Daily. 8/17/21   Pollo Ray MD   levothyroxine (SYNTHROID, LEVOTHROID) 125 MCG tablet Take 125 mcg by mouth Daily. 9/1/20   Pollo Ray MD   lisinopril (PRINIVIL,ZESTRIL) 10 MG tablet TAKE ONE TABLET BY MOUTH ONCE DAILY 4/18/19   Pollo Ray MD   losartan-hydrochlorothiazide (HYZAAR) 100-12.5 MG per tablet losartan-hydrochlorothiazide 100-12.5 mg oral tablet take 1 tablet by oral route once daily   Active    Pollo Ray MD   meloxicam (MOBIC) 15 MG tablet Take 15 mg by mouth. 6/9/21   Pollo Ray MD   metoclopramide (REGLAN) 5 MG tablet metoclopramide HCl 5 mg oral tablet take 1 tablet (5 mg) by oral route once daily   Suspended    Pollo Ray MD   NON FORMULARY Domperidone 10 mg TID    Pollo Ray MD   ondansetron ODT (ZOFRAN-ODT) 4 MG disintegrating tablet Place 4 mg on the tongue Every 8 (Eight) Hours As Needed. 1/24/20   Pollo Ray MD   Pancrelipase, Lip-Prot-Amyl, (Creon) 64974-478256 units capsule delayed-release particles capsule Take 3 tabs with meals and 2 with snacks 8/24/21   Mariela Gr APRN   pantoprazole (PROTONIX) 40 MG EC tablet Take 1 tablet by mouth 2 (Two) Times a Day. 8/3/20   Nathen Patrizia GLAZARO   phenazopyridine (PYRIDIUM) 200 MG tablet TAKE ONE TABLET BY MOUTH THREE TIMES DAILY AFTER MEALS FOR 2 DAYS 8/2/21   Pollo Ray MD   phentermine (ADIPEX-P) 37.5 MG tablet Take 37.5 mg by mouth 1 (One) Time. 3/26/21   Provider,  MD Pollo   pravastatin (PRAVACHOL) 40 MG tablet TAKE ONE TABLET BY MOUTH EVERY NIGHT AT BEDTIME 4/25/19   Pollo Ray MD   Probiotic Product (ALIGN PO)     Pollo Ray MD   propranolol LA (INDERAL LA) 60 MG 24 hr capsule Take 60 mg by mouth Daily. 4/22/21   Pollo Ray MD   protamine 10 MG/ML injection 2 mL.    Pollo Ray MD   Rexulti 0.5 MG tablet Take 1 tablet by mouth Daily. 8/18/21   Pollo Ray MD   SUMAtriptan (IMITREX) 25 MG tablet TAKE ONE TABLET BY MOUTH ONCE FOR SEVERE headache MAY REPEAT DOSE IN 2 hours IF still present 9/8/20   Pollo Ray MD   traZODone (DESYREL) 150 MG tablet Take 150 mg by mouth Daily With Breakfast. 8/17/21   Pollo Ray MD   venlafaxine (EFFEXOR) 150 MG tablet sustained-release 24 hour 24 hr tablet venlafaxine 150 mg oral tablet extended release 24hr take 1 tablet (150 mg) by oral route once daily in the morning at the same time each day with food   Active    Pollo Ray MD   verapamil (CALAN) 120 MG tablet verapamil 120 mg oral tablet take 1 tablet by oral route daily   Suspended    Pollo Ray MD        Social History:   PT  reports that she has never smoked. She has never used smokeless tobacco. She reports that she does not drink alcohol and does not use drugs.    Record Review:  I have reviewed the patient's records in [facility].     Review of Systems  Review of Systems   Constitutional: Negative for chills and fever.   HENT: Negative for nosebleeds.    Eyes: Negative for redness.   Respiratory: Negative for cough and shortness of breath.    Cardiovascular: Negative for chest pain.   Gastrointestinal: Positive for nausea. Negative for diarrhea and vomiting.   Genitourinary: Negative for dysuria and frequency.   Musculoskeletal: Negative for back pain and neck pain.   Skin: Negative for rash.   Neurological: Positive for syncope and weakness. Negative for seizures.        Physical  "Exam  /70   Pulse 65   Temp 98 °F (36.7 °C) (Oral)   Resp 16   Ht 170.2 cm (67\")   Wt 92.6 kg (204 lb 2.3 oz)   SpO2 99%   BMI 31.97 kg/m²     Physical Exam  Vitals and nursing note reviewed.   Constitutional:       General: She is not in acute distress.     Appearance: Normal appearance. She is not toxic-appearing.   HENT:      Head: Normocephalic and atraumatic.      Nose: Nose normal.      Mouth/Throat:      Mouth: Mucous membranes are moist.   Eyes:      General: No scleral icterus.  Cardiovascular:      Rate and Rhythm: Normal rate and regular rhythm.      Heart sounds: Normal heart sounds. No murmur heard.     Pulmonary:      Effort: No respiratory distress.      Breath sounds: Normal breath sounds.   Abdominal:      General: Bowel sounds are normal.      Palpations: Abdomen is soft.      Tenderness: There is no abdominal tenderness. There is no guarding or rebound.      Comments: Vertical midline healed incision. Insulin pump located in LUQ.    Musculoskeletal:         General: No tenderness. Normal range of motion.      Cervical back: Normal range of motion and neck supple. No tenderness.      Right lower leg: No edema.      Left lower leg: No edema.   Skin:     General: Skin is warm and dry.      Findings: No rash.   Neurological:      General: No focal deficit present.      Mental Status: She is oriented to person, place, and time. She is lethargic.      Sensory: Sensation is intact.      Motor: Motor function is intact.   Psychiatric:         Behavior: Behavior normal.                  ED Course  /70   Pulse 65   Temp 98 °F (36.7 °C) (Oral)   Resp 16   Ht 170.2 cm (67\")   Wt 92.6 kg (204 lb 2.3 oz)   SpO2 99%   BMI 31.97 kg/m²   Results for orders placed or performed during the hospital encounter of 09/25/21   Comprehensive Metabolic Panel    Specimen: Blood   Result Value Ref Range    Glucose 133 (H) 65 - 99 mg/dL    BUN 38 (H) 6 - 20 mg/dL    Creatinine 2.21 (H) 0.57 - 1.00 " mg/dL    Sodium 135 (L) 136 - 145 mmol/L    Potassium 4.3 3.5 - 5.2 mmol/L    Chloride 97 (L) 98 - 107 mmol/L    CO2 20.8 (L) 22.0 - 29.0 mmol/L    Calcium 9.2 8.6 - 10.5 mg/dL    Total Protein 7.6 6.0 - 8.5 g/dL    Albumin 4.20 3.50 - 5.20 g/dL    ALT (SGPT) 77 (H) 1 - 33 U/L    AST (SGOT) 77 (H) 1 - 32 U/L    Alkaline Phosphatase 165 (H) 39 - 117 U/L    Total Bilirubin 0.3 0.0 - 1.2 mg/dL    eGFR Non African Amer 24 (L) >60 mL/min/1.73    Globulin 3.4 gm/dL    A/G Ratio 1.2 g/dL    BUN/Creatinine Ratio 17.2 7.0 - 25.0    Anion Gap 17.2 (H) 5.0 - 15.0 mmol/L   Lipase    Specimen: Blood   Result Value Ref Range    Lipase 6 (L) 13 - 60 U/L   Urinalysis With Microscopic If Indicated (No Culture) - Urine, Clean Catch    Specimen: Urine, Clean Catch   Result Value Ref Range    Color, UA Yellow Yellow, Straw    Appearance, UA Cloudy (A) Clear    pH, UA 6.5 5.0 - 8.0    Specific Gravity, UA 1.009 1.005 - 1.030    Glucose, UA Negative Negative    Ketones, UA Negative Negative    Bilirubin, UA Negative Negative    Blood, UA Negative Negative    Protein, UA Negative Negative    Leuk Esterase, UA Trace (A) Negative    Nitrite, UA Negative Negative    Urobilinogen, UA 0.2 E.U./dL 0.2 - 1.0 E.U./dL   CBC Auto Differential    Specimen: Blood   Result Value Ref Range    WBC 14.40 (H) 3.40 - 10.80 10*3/mm3    RBC 4.41 3.77 - 5.28 10*6/mm3    Hemoglobin 12.4 12.0 - 15.9 g/dL    Hematocrit 38.8 34.0 - 46.6 %    MCV 88.0 79.0 - 97.0 fL    MCH 28.1 26.6 - 33.0 pg    MCHC 32.0 31.5 - 35.7 g/dL    RDW 16.9 (H) 12.3 - 15.4 %    RDW-SD 54.2 (H) 37.0 - 54.0 fl    MPV 9.9 6.0 - 12.0 fL    Platelets 427 140 - 450 10*3/mm3    Neutrophil % 50.8 42.7 - 76.0 %    Lymphocyte % 29.4 19.6 - 45.3 %    Monocyte % 17.4 (H) 5.0 - 12.0 %    Eosinophil % 1.1 0.3 - 6.2 %    Basophil % 0.4 0.0 - 1.5 %    Immature Grans % 0.9 (H) 0.0 - 0.5 %    Neutrophils, Absolute 7.30 (H) 1.70 - 7.00 10*3/mm3    Lymphocytes, Absolute 4.24 (H) 0.70 - 3.10 10*3/mm3     Monocytes, Absolute 2.51 (H) 0.10 - 0.90 10*3/mm3    Eosinophils, Absolute 0.16 0.00 - 0.40 10*3/mm3    Basophils, Absolute 0.06 0.00 - 0.20 10*3/mm3    Immature Grans, Absolute 0.13 (H) 0.00 - 0.05 10*3/mm3    nRBC 0.0 0.0 - 0.2 /100 WBC   Troponin    Specimen: Blood   Result Value Ref Range    Troponin T <0.010 0.000 - 0.030 ng/mL   Magnesium    Specimen: Blood   Result Value Ref Range    Magnesium 2.6 1.6 - 2.6 mg/dL   CK    Specimen: Blood   Result Value Ref Range    Creatine Kinase 62 20 - 180 U/L   VBG with Co-Ox and Electrolytes    Specimen: Venous Blood   Result Value Ref Range    pH, Venous 7.285 (C) 7.310 - 7.410 pH Units    pCO2, Venous 40.9 (L) 41.0 - 51.0 mm Hg    pO2, Venous 56.0 (H) 35.0 - 42.0 mm Hg    HCO3, Venous 19.0 (L) 22.0 - 26.0 mmol/L    Base Excess, Venous -7.2 (L) -2.0 - 2.0 mmol/L    O2 Saturation, Venous 84.5 (L) 95.0 - 99.0 %    Hemoglobin, Blood Gas 10.5 (L) 11.7 - 14.6 g/dL    Carboxyhemoglobin 2.2 (H) 0 - 1.5 %    Methemoglobin 0.30 0.00 - 1.50 %    Oxyhemoglobin 82.4 (L) 94 - 99 %    FHHB 15.1 (H) 0.0 - 5.0 %    Note      Site Venous     Modality N/A     FIO2 21 %    Flow Rate 0 lpm    Sodium, Venous 134.6 (L) 136 - 146 mmol/L    Potassium, Venous 3.6 3.5 - 5.0 mmol/L    Ionized Calcium, Arterial 1.04 (L) 1.13 - 1.32 mmol/L    Chloride, Venous  102 98 - 106 mmol/L    Glucose, Arterial 122 (H) 65 - 99 mmol/L    Lactate, Arterial 1.48 0.5 - 2 mmol/L   Urinalysis, Microscopic Only - Urine, Clean Catch    Specimen: Urine, Clean Catch   Result Value Ref Range    RBC, UA 6-12 (A) None Seen /HPF    WBC, UA 6-12 (A) None Seen /HPF    Bacteria, UA None Seen None Seen /HPF    Squamous Epithelial Cells, UA 3-6 (A) None Seen, 0-2 /HPF    Hyaline Casts, UA 7-12 None Seen /LPF    Methodology Automated Microscopy    Lactic Acid, Plasma    Specimen: Blood   Result Value Ref Range    Lactate 1.3 0.5 - 2.0 mmol/L   CBC Auto Differential    Specimen: Blood   Result Value Ref Range    WBC 13.82 (H) 3.40  - 10.80 10*3/mm3    RBC 3.72 (L) 3.77 - 5.28 10*6/mm3    Hemoglobin 10.4 (L) 12.0 - 15.9 g/dL    Hematocrit 33.2 (L) 34.0 - 46.6 %    MCV 89.2 79.0 - 97.0 fL    MCH 28.0 26.6 - 33.0 pg    MCHC 31.3 (L) 31.5 - 35.7 g/dL    RDW 17.2 (H) 12.3 - 15.4 %    RDW-SD 56.3 (H) 37.0 - 54.0 fl    MPV 10.2 6.0 - 12.0 fL    Platelets 401 140 - 450 10*3/mm3    Neutrophil % 33.1 (L) 42.7 - 76.0 %    Lymphocyte % 46.0 (H) 19.6 - 45.3 %    Monocyte % 17.7 (H) 5.0 - 12.0 %    Eosinophil % 2.3 0.3 - 6.2 %    Basophil % 0.4 0.0 - 1.5 %    Immature Grans % 0.5 0.0 - 0.5 %    Neutrophils, Absolute 4.57 1.70 - 7.00 10*3/mm3    Lymphocytes, Absolute 6.36 (H) 0.70 - 3.10 10*3/mm3    Monocytes, Absolute 2.44 (H) 0.10 - 0.90 10*3/mm3    Eosinophils, Absolute 0.32 0.00 - 0.40 10*3/mm3    Basophils, Absolute 0.06 0.00 - 0.20 10*3/mm3    Immature Grans, Absolute 0.07 (H) 0.00 - 0.05 10*3/mm3    nRBC 0.0 0.0 - 0.2 /100 WBC   Comprehensive Metabolic Panel    Specimen: Blood   Result Value Ref Range    Glucose 94 65 - 99 mg/dL    BUN 21 (H) 6 - 20 mg/dL    Creatinine 0.77 0.57 - 1.00 mg/dL    Sodium 142 136 - 145 mmol/L    Potassium 4.0 3.5 - 5.2 mmol/L    Chloride 109 (H) 98 - 107 mmol/L    CO2 23.4 22.0 - 29.0 mmol/L    Calcium 9.0 8.6 - 10.5 mg/dL    Total Protein 6.5 6.0 - 8.5 g/dL    Albumin 3.70 3.50 - 5.20 g/dL    ALT (SGPT) 61 (H) 1 - 33 U/L    AST (SGOT) 46 (H) 1 - 32 U/L    Alkaline Phosphatase 142 (H) 39 - 117 U/L    Total Bilirubin <0.2 0.0 - 1.2 mg/dL    eGFR Non African Amer 81 >60 mL/min/1.73    Globulin 2.8 gm/dL    A/G Ratio 1.3 g/dL    BUN/Creatinine Ratio 27.3 (H) 7.0 - 25.0    Anion Gap 9.6 5.0 - 15.0 mmol/L   POC Glucose Once    Specimen: Blood   Result Value Ref Range    Glucose 123 (H) 70 - 99 mg/dL   POC Glucose Once    Specimen: Blood   Result Value Ref Range    Glucose 153 (H) 70 - 99 mg/dL   ECG 12 Lead   Result Value Ref Range    QT Interval 404 ms   Green Top (Gel)   Result Value Ref Range    Extra Tube Hold for add-ons.     Lavender Top   Result Value Ref Range    Extra Tube hold for add-on    Gold Top - SST   Result Value Ref Range    Extra Tube Hold for add-ons.    Light Blue Top   Result Value Ref Range    Extra Tube hold for add-on      Medications   pantoprazole (PROTONIX) EC tablet 40 mg (40 mg Oral Given 9/26/21 0836)   lamoTRIgine (LaMICtal) tablet 200 mg (200 mg Oral Given 9/26/21 0835)   traZODone (DESYREL) tablet 150 mg (150 mg Oral Given 9/26/21 0835)   ARIPiprazole (ABILIFY) tablet 5 mg (5 mg Oral Given 9/26/21 0836)   sodium chloride 0.9 % flush 10 mL (10 mL Intravenous Given 9/26/21 0837)   sodium chloride 0.9 % flush 10 mL (has no administration in time range)   lactated ringers infusion (100 mL/hr Intravenous New Bag 9/25/21 2240)   acetaminophen (TYLENOL) tablet 650 mg (has no administration in time range)     Or   acetaminophen (TYLENOL) 160 MG/5ML solution 650 mg (has no administration in time range)     Or   acetaminophen (TYLENOL) suppository 650 mg (has no administration in time range)   calcium carbonate (TUMS) chewable tablet 500 mg (200 mg elemental) (has no administration in time range)   sennosides-docusate (PERICOLACE) 8.6-50 MG per tablet 2 tablet (2 tablets Oral Given 9/26/21 0836)     And   polyethylene glycol (MIRALAX) packet 17 g (has no administration in time range)     And   bisacodyl (DULCOLAX) EC tablet 5 mg (has no administration in time range)     And   bisacodyl (DULCOLAX) suppository 10 mg (has no administration in time range)   ondansetron (ZOFRAN) tablet 4 mg ( Oral Not Given:  See Alt 9/26/21 0236)     Or   ondansetron (ZOFRAN) injection 4 mg (4 mg Intravenous Given 9/26/21 0236)   melatonin tablet 5 mg (has no administration in time range)   Pharmacy to Dose enoxaparin (LOVENOX) (has no administration in time range)   insulin patient supplied pump ( Subcutaneous Self Administered Via Pump 9/25/21 2241)   dextrose (GLUTOSE) oral gel 15 g (has no administration in time range)   dextrose 10  % infusion (has no administration in time range)   glucagon (human recombinant) (GLUCAGEN DIAGNOSTIC) injection 1 mg (has no administration in time range)   enoxaparin (LOVENOX) syringe 40 mg (40 mg Subcutaneous Given 9/26/21 0837)   verapamil ER (VERELAN) 24 hr capsule 120 mg (120 mg Oral Not Given 9/26/21 0840)   promethazine (PHENERGAN) tablet 6.25 mg (6.25 mg Oral Given 9/26/21 0611)   hydrocortisone (CORTEF) tablet 15 mg (15 mg Oral Given 9/26/21 0836)   sodium chloride 0.9 % bolus 2,000 mL (0 mL Intravenous Stopped 9/25/21 1730)   sodium chloride 0.9 % bolus 2,000 mL (0 mL Intravenous Stopped 9/25/21 1958)   ondansetron (ZOFRAN) injection 4 mg (4 mg Intravenous Given 9/25/21 1925)   metoclopramide (REGLAN) injection 10 mg (10 mg Intravenous Given 9/25/21 2007)   cefTRIAXone (ROCEPHIN) IVPB 1 g (0 g Intravenous Stopped 9/25/21 2135)   lactated ringers bolus 1,000 mL (1,000 mL Intravenous New Bag 9/25/21 2239)   prochlorperazine (COMPAZINE) injection 5 mg (5 mg Intravenous Given 9/26/21 0336)     XR Chest 1 View    Result Date: 9/25/2021  Narrative: PROCEDURE: XR CHEST 1 VW  COMPARISON: [facility], , CHEST AP/PA 1 VIEW, 7/08/2020, 16:06.  INDICATIONS: WEAKNESS SINCE TODAY  FINDINGS:  There is a right-sided Teasfb-[id number]-Wozp catheter with the tip in the superior vena cava.  The heart is enlarged.  The pulmonary vascular markings are normal.  The lungs are clear.  CONCLUSION: No active disease       KEVIN CAMPOS MD       Electronically Signed and Approved By: KEVIN CAMPOS MD on 9/25/2021 at 17:26               Procedures/EKGs:  Procedures    EKG:    Rhythm: sinus   Rate: 74  Axis: normal  Intervals: LAD  ST Segment: normal    EKG Comparison: no old    Interpreted by me          Medical Decision Making:                     MDM  Number of Diagnoses or Management Options     Amount and/or Complexity of Data Reviewed  Clinical lab tests: reviewed  Tests in the radiology section of CPT®: reviewed  Tests in  the medicine section of CPT®: reviewed  Decide to obtain previous medical records or to obtain history from someone other than the patient: yes  Obtain history from someone other than the patient: yes  Review and summarize past medical records: yes  Discuss the patient with other providers: yes  Independent visualization of images, tracings, or specimens: yes    Patient Progress  Patient progress: improved       Final diagnoses:   Syncope and collapse   Hypotension, unspecified hypotension type   Adverse effect of drug, initial encounter        Disposition:  ED Disposition     ED Disposition Condition Comment    Decision to Admit  Level of Care: Telemetry [5]   Diagnosis: Hypotension [023838]   Admitting Physician: VICENTA MARX [[id number]]   Attending Physician: VICENTA MARX [[id number]]            Documentation assistance provided by Mariela Chiu acting as scribe for Joao Skinner DO. Information recorded by the scribe was done at my direction and has been verified and validated by me.        Mariela Chiu  09/25/21 1635       Mariela Chiu  09/25/21 1646       Mariela Chiu  09/25/21 1646       Mariela Chiu  09/25/21 1647       Mariela Chiu  09/25/21 1709       Joao Skinner DO  09/26/21 1049

## 2021-09-25 NOTE — ED NOTES
FAMILY STATES PATIENT HAS BEGAN A NEW MEDICATION AND HAS HAD EPISODES SIMILAR TO TODAY AFTER TAKING MEDICATION.      Brianda Han RN  09/25/21 [id number]

## 2021-09-25 NOTE — ED NOTES
PATIENT STARTED FORTEO AROUND 2 WEEKS AGO. REPORTS THAT SYMPTOMS STARTED SHORTLY THEREAFTER.     Michell Ortiz RN  09/25/21 1948

## 2021-09-26 VITALS
WEIGHT: 204.15 LBS | HEIGHT: 67 IN | SYSTOLIC BLOOD PRESSURE: 119 MMHG | TEMPERATURE: 98 F | OXYGEN SATURATION: 99 % | RESPIRATION RATE: 16 BRPM | BODY MASS INDEX: 32.04 KG/M2 | DIASTOLIC BLOOD PRESSURE: 69 MMHG | HEART RATE: 77 BPM

## 2021-09-26 LAB
ALBUMIN SERPL-MCNC: 3.7 G/DL (ref 3.5–5.2)
ALBUMIN/GLOB SERPL: 1.3 G/DL
ALP SERPL-CCNC: 142 U/L (ref 39–117)
ALT SERPL W P-5'-P-CCNC: 61 U/L (ref 1–33)
ANION GAP SERPL CALCULATED.3IONS-SCNC: 9.6 MMOL/L (ref 5–15)
AST SERPL-CCNC: 46 U/L (ref 1–32)
BASOPHILS # BLD AUTO: 0.06 10*3/MM3 (ref 0–0.2)
BASOPHILS NFR BLD AUTO: 0.4 % (ref 0–1.5)
BILIRUB SERPL-MCNC: <0.2 MG/DL (ref 0–1.2)
BUN SERPL-MCNC: 21 MG/DL (ref 6–20)
BUN/CREAT SERPL: 27.3 (ref 7–25)
CALCIUM SPEC-SCNC: 9 MG/DL (ref 8.6–10.5)
CHLORIDE SERPL-SCNC: 109 MMOL/L (ref 98–107)
CO2 SERPL-SCNC: 23.4 MMOL/L (ref 22–29)
CREAT SERPL-MCNC: 0.77 MG/DL (ref 0.57–1)
DEPRECATED RDW RBC AUTO: 56.3 FL (ref 37–54)
EOSINOPHIL # BLD AUTO: 0.32 10*3/MM3 (ref 0–0.4)
EOSINOPHIL NFR BLD AUTO: 2.3 % (ref 0.3–6.2)
ERYTHROCYTE [DISTWIDTH] IN BLOOD BY AUTOMATED COUNT: 17.2 % (ref 12.3–15.4)
GFR SERPL CREATININE-BSD FRML MDRD: 81 ML/MIN/1.73
GLOBULIN UR ELPH-MCNC: 2.8 GM/DL
GLUCOSE SERPL-MCNC: 94 MG/DL (ref 65–99)
HCT VFR BLD AUTO: 33.2 % (ref 34–46.6)
HGB BLD-MCNC: 10.4 G/DL (ref 12–15.9)
IMM GRANULOCYTES # BLD AUTO: 0.07 10*3/MM3 (ref 0–0.05)
IMM GRANULOCYTES NFR BLD AUTO: 0.5 % (ref 0–0.5)
LYMPHOCYTES # BLD AUTO: 6.36 10*3/MM3 (ref 0.7–3.1)
LYMPHOCYTES NFR BLD AUTO: 46 % (ref 19.6–45.3)
MCH RBC QN AUTO: 28 PG (ref 26.6–33)
MCHC RBC AUTO-ENTMCNC: 31.3 G/DL (ref 31.5–35.7)
MCV RBC AUTO: 89.2 FL (ref 79–97)
MONOCYTES # BLD AUTO: 2.44 10*3/MM3 (ref 0.1–0.9)
MONOCYTES NFR BLD AUTO: 17.7 % (ref 5–12)
NEUTROPHILS NFR BLD AUTO: 33.1 % (ref 42.7–76)
NEUTROPHILS NFR BLD AUTO: 4.57 10*3/MM3 (ref 1.7–7)
NRBC BLD AUTO-RTO: 0 /100 WBC (ref 0–0.2)
PLATELET # BLD AUTO: 401 10*3/MM3 (ref 140–450)
PMV BLD AUTO: 10.2 FL (ref 6–12)
POTASSIUM SERPL-SCNC: 4 MMOL/L (ref 3.5–5.2)
PROT SERPL-MCNC: 6.5 G/DL (ref 6–8.5)
RBC # BLD AUTO: 3.72 10*6/MM3 (ref 3.77–5.28)
SODIUM SERPL-SCNC: 142 MMOL/L (ref 136–145)
WBC # BLD AUTO: 13.82 10*3/MM3 (ref 3.4–10.8)

## 2021-09-26 PROCEDURE — 94760 N-INVAS EAR/PLS OXIMETRY 1: CPT

## 2021-09-26 PROCEDURE — G0378 HOSPITAL OBSERVATION PER HR: HCPCS

## 2021-09-26 PROCEDURE — 85025 COMPLETE CBC W/AUTO DIFF WBC: CPT | Performed by: FAMILY MEDICINE

## 2021-09-26 PROCEDURE — 63710000001 PROMETHAZINE PER 12.5 MG: Performed by: PHYSICIAN ASSISTANT

## 2021-09-26 PROCEDURE — 94799 UNLISTED PULMONARY SVC/PX: CPT

## 2021-09-26 PROCEDURE — 25010000002 ONDANSETRON PER 1 MG: Performed by: FAMILY MEDICINE

## 2021-09-26 PROCEDURE — 96372 THER/PROPH/DIAG INJ SC/IM: CPT

## 2021-09-26 PROCEDURE — 25010000002 ENOXAPARIN PER 10 MG: Performed by: FAMILY MEDICINE

## 2021-09-26 PROCEDURE — 96375 TX/PRO/DX INJ NEW DRUG ADDON: CPT

## 2021-09-26 PROCEDURE — 25010000002 HEPARIN LOCK FLUSH PER 10 UNITS: Performed by: INTERNAL MEDICINE

## 2021-09-26 PROCEDURE — 96376 TX/PRO/DX INJ SAME DRUG ADON: CPT

## 2021-09-26 PROCEDURE — 25010000002 PROCHLORPERAZINE 10 MG/2ML SOLUTION: Performed by: PHYSICIAN ASSISTANT

## 2021-09-26 PROCEDURE — 80053 COMPREHEN METABOLIC PANEL: CPT | Performed by: FAMILY MEDICINE

## 2021-09-26 PROCEDURE — 99217 PR OBSERVATION CARE DISCHARGE MANAGEMENT: CPT | Performed by: INTERNAL MEDICINE

## 2021-09-26 RX ORDER — HYDROCORTISONE 10 MG/1
15 TABLET ORAL 2 TIMES DAILY WITH MEALS
Status: DISCONTINUED | OUTPATIENT
Start: 2021-09-26 | End: 2021-09-26 | Stop reason: HOSPADM

## 2021-09-26 RX ORDER — HEPARIN SODIUM (PORCINE) LOCK FLUSH IV SOLN 100 UNIT/ML 100 UNIT/ML
SOLUTION INTRAVENOUS
Status: DISCONTINUED
Start: 2021-09-26 | End: 2021-09-26 | Stop reason: HOSPADM

## 2021-09-26 RX ORDER — HEPARIN SODIUM (PORCINE) LOCK FLUSH IV SOLN 100 UNIT/ML 100 UNIT/ML
200 SOLUTION INTRAVENOUS ONCE
Status: COMPLETED | OUTPATIENT
Start: 2021-09-26 | End: 2021-09-26

## 2021-09-26 RX ORDER — PROCHLORPERAZINE EDISYLATE 5 MG/ML
5 INJECTION INTRAMUSCULAR; INTRAVENOUS ONCE
Status: COMPLETED | OUTPATIENT
Start: 2021-09-26 | End: 2021-09-26

## 2021-09-26 RX ORDER — PROMETHAZINE HYDROCHLORIDE 12.5 MG/1
6.25 TABLET ORAL EVERY 4 HOURS PRN
Status: DISCONTINUED | OUTPATIENT
Start: 2021-09-26 | End: 2021-09-26 | Stop reason: HOSPADM

## 2021-09-26 RX ADMIN — DOCUSATE SODIUM 50MG AND SENNOSIDES 8.6MG 2 TABLET: 8.6; 5 TABLET, FILM COATED ORAL at 08:36

## 2021-09-26 RX ADMIN — ENOXAPARIN SODIUM 40 MG: 40 INJECTION SUBCUTANEOUS at 08:37

## 2021-09-26 RX ADMIN — HEPARIN SODIUM (PORCINE) LOCK FLUSH IV SOLN 100 UNIT/ML 200 UNITS: 100 SOLUTION at 16:28

## 2021-09-26 RX ADMIN — HYDROCORTISONE 15 MG: 10 TABLET ORAL at 08:36

## 2021-09-26 RX ADMIN — PANTOPRAZOLE SODIUM 40 MG: 40 TABLET, DELAYED RELEASE ORAL at 08:36

## 2021-09-26 RX ADMIN — SODIUM CHLORIDE, PRESERVATIVE FREE 10 ML: 5 INJECTION INTRAVENOUS at 08:37

## 2021-09-26 RX ADMIN — TRAZODONE HYDROCHLORIDE 150 MG: 50 TABLET ORAL at 08:35

## 2021-09-26 RX ADMIN — ARIPIPRAZOLE 5 MG: 5 TABLET ORAL at 08:36

## 2021-09-26 RX ADMIN — PROCHLORPERAZINE EDISYLATE 5 MG: 5 INJECTION INTRAMUSCULAR; INTRAVENOUS at 03:36

## 2021-09-26 RX ADMIN — LAMOTRIGINE 200 MG: 100 TABLET ORAL at 08:35

## 2021-09-26 RX ADMIN — PROMETHAZINE HYDROCHLORIDE 6.25 MG: 12.5 TABLET ORAL at 06:11

## 2021-09-26 RX ADMIN — ONDANSETRON 4 MG: 2 INJECTION INTRAMUSCULAR; INTRAVENOUS at 02:36

## 2021-09-26 NOTE — DISCHARGE SUMMARY
[facility]             HOSPITALIST  DISCHARGE SUMMARY    Patient Name: Kayla Gan  : 1975  MRN: [id number]    Date of Admission: 2021  Date of Discharge:  2021  Primary Care Physician: Justin Daniels APRN    Consults     Date and Time Order Name Status Description    2021  7:19 PM Hospitalist (on-call MD unless specified) Completed           Active and Resolved Hospital Problems:  Active Hospital Problems    Diagnosis POA   • Transaminitis [R74.01] Yes   • Obesity (BMI 30-39.9) [E66.9] Yes   • Type 2 diabetes mellitus (CMS/HCC) [E11.9] Yes   • Hypothyroidism (acquired) [E03.9] Yes   • Adrenal insufficiency (HCC) [E27.40] Yes      Resolved Hospital Problems    Diagnosis POA   • **Symptomatic hypotension [I95.9] Yes   • Acute kidney injury (HCC) [N17.9] Yes   • Hypotension [I95.9] Yes       Hospital Course     Hospital Course:  Kayla Gan is a 46 y.o. female which complex medical history pertinent for hypertension, adrenal insufficiency and osteoporosis who had been recently started on Forteo. She presented with dizziness and syncope which occurred in relation to adjustment in Forteo. She was hypotensive and found have acute kidney injury. Infectious workup was negative. Orthostatic positive. Blood pressure medications were help and Forteo was discontinued. Received multiple liters of fluids. Creatinine normalized and repeat orthostatics were normal. No further syncope or presyncopal symptoms. Educated on blood pressure monitoring at home and medications changes. Recommended short term follow up with PCP. Discharged home in stable condition      Day of Discharge     Vital Signs:  Temp:  [97.9 °F (36.6 °C)-98 °F (36.7 °C)] 98 °F (36.7 °C)  Heart Rate:  [65-85] 77  Resp:  [16-18] 16  BP: ()/(34-78) 119/69  Physical Exam:   GENERAL: The patient is well developed and nontoxic.  HEENT: Nonicteric sclerae, PERRLA, EOMI. Oropharynx clear. Moist mucous membranes.    NECK: Supple, no masses or JVD, trachea midline  CHEST: Chest wall is nontender.  HEART: Regular rate and rhythm without murmurs.  LUNGS: Equal air entry, clear to auscultation bilaterally.  ABDOMEN: Soft, positive bowel sounds, nontender, nondistended  SKIN: No rash, no excessive bruising, open wounds   NEUROLOGIC: Cranial nerves II-XII intact without motor/sensory deficit.        Discharge Details        Discharge Medications      Changes to Medications      Instructions Start Date   Pancrelipase (Lip-Prot-Amyl) 47663-968350 units capsule delayed-release particles capsule  Commonly known as: CREON  What changed: Another medication with the same name was changed. Make sure you understand how and when to take each.   72,000 units of lipase, Oral, Take As Directed, Take  with snacks       Creon 12817-443703 units capsule delayed-release particles capsule  Generic drug: Pancrelipase (Lip-Prot-Amyl)  What changed:   how much to take  how to take this  when to take this   Take 3 tabs with meals and 2 with snacks      Humira Pen 40 MG/0.4ML Pen-injector Kit  Generic drug: Adalimumab  What changed: See the new instructions.   INJECT 1 PEN UNDER THE SKIN EVERY 14 DAYS.         Continue These Medications      Instructions Start Date   ALIGN PO   No dose, route, or frequency recorded.      ARIPiprazole 10 MG tablet  Commonly known as: ABILIFY   Abilify   5 mg      ascorbic acid 100 MG tablet  Commonly known as: VITAMIN C   Vitamin C 100 mg oral tablet take 1 tablet by oral route daily   Suspended      Budesonide 3 MG 24 hr capsule  Commonly known as: ENTOCORT EC   budesonide 3 mg oral capsule,delayed,extend.release take 1 capsules (3 mg) by oral route three times daily   Suspended      Buprenorphine 15 MCG/HR patch weekly   Butrans 15 mcg/hour transdermal patch   Apply 1 patch by transdermal route.      cetirizine 10 MG tablet  Commonly known as: zyrTEC   TAKE ONE TABLET BY MOUTH ONCE DAILY AT BEDTIME      chlorthalidone  25 MG tablet  Commonly known as: HYGROTON   25 mg, Oral, Daily      choline fenofibrate 45 MG capsule  Commonly known as: TRILIPIX   fenofibric acid (choline) 45 mg capsule,delayed release      Coenzyme Q10 10 MG capsule   Co Q-10 10 mg oral capsule take 1 capsule by oral route daily   Suspended      Dexilant 60 MG capsule  Generic drug: dexlansoprazole   Dexilant 60 mg capsule, delayed release      diazePAM 10 MG tablet  Commonly known as: VALIUM   10 mg, Oral, 3 Times Daily PRN      dicyclomine 10 MG capsule  Commonly known as: BENTYL   10 mg, Oral, 4 Times Daily PRN       MG capsule  Generic drug: docusate sodium   TAKE ONE CAPSULE BY MOUTH TWICE DAILY      escitalopram 20 MG tablet  Commonly known as: LEXAPRO   20 mg, Oral, Daily      fluticasone 50 MCG/ACT nasal spray  Commonly known as: FLONASE   instill 2 sprays in each nostril every day AS DIRECTED      gabapentin 300 MG capsule  Commonly known as: NEURONTIN   300 mg, Oral, 3 Times Daily      HumaLOG 100 UNIT/ML solution cartridge  Generic drug: Insulin Lispro   No dose, route, or frequency recorded.      hydrocortisone 5 MG tablet  Commonly known as: CORTEF   15 mg, Oral, 2 Times Daily      ketorolac 10 MG tablet  Commonly known as: TORADOL   TAKE 1 TABLET BY MOUTH EVERY 6 HOURS FOR 4 DAYS      lamoTRIgine 200 MG tablet  Commonly known as: LaMICtal   200 mg, Oral, Daily      levothyroxine 125 MCG tablet  Commonly known as: SYNTHROID, LEVOTHROID   125 mcg, Oral, Daily      lisinopril 10 MG tablet  Commonly known as: PRINIVIL,ZESTRIL   TAKE ONE TABLET BY MOUTH ONCE DAILY      meloxicam 15 MG tablet  Commonly known as: MOBIC   15 mg, Oral      metoclopramide 5 MG tablet  Commonly known as: REGLAN   metoclopramide HCl 5 mg oral tablet take 1 tablet (5 mg) by oral route once daily   Suspended      NON FORMULARY   Domperidone 10 mg TID       ondansetron ODT 4 MG disintegrating tablet  Commonly known as: ZOFRAN-ODT   4 mg, Translingual, Every 8 Hours PRN       pantoprazole 40 MG EC tablet  Commonly known as: PROTONIX   40 mg, Oral, 2 Times Daily      phenazopyridine 200 MG tablet  Commonly known as: PYRIDIUM   TAKE ONE TABLET BY MOUTH THREE TIMES DAILY AFTER MEALS FOR 2 DAYS      phentermine 37.5 MG tablet  Commonly known as: ADIPEX-P   37.5 mg, Oral, Once      pravastatin 40 MG tablet  Commonly known as: PRAVACHOL   TAKE ONE TABLET BY MOUTH EVERY NIGHT AT BEDTIME      Pristiq 50 MG 24 hr tablet  Generic drug: desvenlafaxine   Pristiq 50 mg oral tablet extended release 24 hr take 1 tablet (50 mg) by oral route once daily   Suspended      propranolol LA 60 MG 24 hr capsule  Commonly known as: INDERAL LA   60 mg, Oral, Daily      protamine 10 MG/ML injection   2 mL      Rexulti 0.5 MG tablet  Generic drug: Brexpiprazole   1 tablet, Oral, Daily      SUMAtriptan 25 MG tablet  Commonly known as: IMITREX   25 mg, Oral, Every 2 Hours PRN      traZODone 150 MG tablet  Commonly known as: DESYREL   150 mg, Oral, Daily With Breakfast      venlafaxine 150 MG tablet sustained-release 24 hour 24 hr tablet  Commonly known as: EFFEXOR   venlafaxine 150 mg oral tablet extended release 24hr take 1 tablet (150 mg) by oral route once daily in the morning at the same time each day with food   Active      verapamil 120 MG tablet  Commonly known as: CALAN   verapamil 120 mg oral tablet take 1 tablet by oral route daily   Suspended         Stop These Medications    FORTEO SC     losartan-hydrochlorothiazide 100-12.5 MG per tablet  Commonly known as: HYZAAR            Allergies   Allergen Reactions   • Sulfa Antibiotics Hives   • Tramadol Anaphylaxis   • Codeine GI Intolerance and Nausea And Vomiting   • Morphine GI Intolerance     Other reaction(s): Chest Pain  increases billiary pressure and causes chest pain   • Nifedipine Provider Review Needed   • Vancomycin Hives       Discharge Disposition:  Home or Self Care    Diet:  Hospital:  Diet Order   Procedures   • Diet Regular       Discharge  Activity:   Activity Instructions     Activity as Tolerated            CODE STATUS:  Code Status and Medical Interventions:   Ordered at: 09/25/21 2019     Level Of Support Discussed With:    Patient     Code Status:    CPR     Medical Interventions (Level of Support Prior to Arrest):    Full         Future Appointments   Date Time Provider Department Center   9/28/2021  9:30 AM Olinda, Tia, OT [facility]   9/30/2021  9:30 AM Olinda, Tia, OT [facility]   10/4/2021  8:30 AM Olinda, Tia, OT [facility]   10/6/2021  8:30 AM Olinda, Tia, OT [facility]   10/12/2021  7:30 AM Olinda, Tia, OT [facility]   10/14/2021  7:30 AM Olinda, Tia, OT [facility]   10/19/2021  8:30 AM Olinda, Tia, OT [facility]   10/21/2021  8:30 AM Olinda, Tia, OT [facility]   10/26/2021  8:30 AM Olinda, Tia, OT [facility]   10/28/2021  8:30 AM Olinda, Tia, OT [facility]   11/2/2021  8:30 AM Olinda, Tia, OT [facility]   11/4/2021  8:30 AM Olinda, Tia, OT [facility]   11/30/2021  9:45 AM Mariela Gr APRN [facility]       Additional Instructions for the Follow-ups that You Need to Schedule     Discharge Follow-up with PCP   As directed       Currently Documented PCP:    Justin Daniels APRN    PCP Phone Number:    [phone]     Follow Up Details: within 3 days               Pertinent  and/or Most Recent Results     PROCEDURES:   NONE    IMAGING:  XR Chest 1 View    Result Date: 9/25/2021  PROCEDURE: XR CHEST 1 VW  COMPARISON: [facility], , CHEST AP/PA 1 VIEW, 7/08/2020, 16:06.  INDICATIONS: WEAKNESS SINCE TODAY  FINDINGS:  There is a right-sided Nzeqvv-S-Cudm catheter with the tip in the superior vena cava.  The heart is enlarged.  The pulmonary vascular markings are normal.  The lungs are clear.  CONCLUSION: No active disease       KEVIN CAMPOS MD       Electronically Signed and Approved By: KEVIN CAMPOS MD on 9/25/2021 at  17:26               LAB RESULTS:      Lab 09/26/21 0530 09/25/21 2025 09/25/21 1723 09/25/21  1631   WBC 13.82*  --   --  14.40*   HEMOGLOBIN 10.4*  --   --  12.4   HEMATOCRIT 33.2*  --   --  38.8   PLATELETS 401  --   --  427   NEUTROS ABS 4.57  --   --  7.30*   IMMATURE GRANS (ABS) 0.07*  --   --  0.13*   LYMPHS ABS 6.36*  --   --  4.24*   MONOS ABS 2.44*  --   --  2.51*   EOS ABS 0.32  --   --  0.16   MCV 89.2  --   --  88.0   LACTATE  --  1.3  --   --    LACTATE, ARTERIAL  --   --  1.48  --          Lab 09/26/21 0530 09/25/21 1723 09/25/21  1631   SODIUM 142  --  135*   SODIUM, VENOUS  --  134.6*  --    POTASSIUM 4.0  --  4.3   POTASSIUM, VENOUS  --  3.6  --    CHLORIDE 109*  --  97*   CHLORIDE, VENOUS  --  102  --    CO2 23.4  --  20.8*   ANION GAP 9.6  --  17.2*   BUN 21*  --  38*   CREATININE 0.77  --  2.21*   GLUCOSE 94  --  133*   GLUCOSE, ARTERIAL  --  122*  --    CALCIUM 9.0  --  9.2   IONIZED CALCIUM  --  1.04*  --    MAGNESIUM  --   --  2.6         Lab 09/26/21 0530 09/25/21  1631   TOTAL PROTEIN 6.5 7.6   ALBUMIN 3.70 4.20   GLOBULIN 2.8 3.4   ALT (SGPT) 61* 77*   AST (SGOT) 46* 77*   BILIRUBIN <0.2 0.3   ALK PHOS 142* 165*   LIPASE  --  6*         Lab 09/25/21  1631   TROPONIN T <0.010                 Lab 09/25/21  1723   FIO2 21   CARBOXYHEMOGLOBIN 2.2*     Brief Urine Lab Results  (Last result in the past 365 days)      Color   Clarity   Blood   Leuk Est   Nitrite   Protein   CREAT   Urine HCG        09/25/21 1834 Yellow Cloudy Negative Trace Negative Negative             Microbiology Results (last 10 days)     ** No results found for the last 240 hours. **                           Labs Pending at Discharge:  Pending Labs     Order Current Status    Blood Culture - Blood, Arm, Left In process    Blood Culture - Blood, Arm, Left In process            Time spent on Discharge including face to face service:  >30 minutes    Electronically signed by Tavo Driver DO, 09/26/21, 3:28 PM EDT.

## 2021-09-26 NOTE — PLAN OF CARE
Goal Outcome Evaluation:  Plan of Care Reviewed With: patient    Patient is being discharged. No complaints of dizzness while ambulating. R chest deaccessed per protocol with heparin. Family with patient when she discharged     Kaylie Jones RN          Progress: improving

## 2021-09-26 NOTE — H&P
History and Physical   Kayla Gan , :  1975, MRN:  [id number]    Chief complaint: Dizziness, nausea, vomiting    Assessment/Plan       Symptomatic hypotension    Acute kidney injury (HCC)    Transaminitis    Obesity (BMI 30-39.9)    Type 2 diabetes mellitus (CMS/HCC)    Hypothyroidism (acquired)    Adrenal insufficiency (HCC)      • Symptomatic hypotension: The patient reports she started on Forteo recently, and since taking the medication she began having dizziness, orthostasis.  She reports she spoke with her primary care, who changed her dosing to bedtime to try and help minimize symptoms.  She presented to the emergency department with a blood pressure 59/36, slowed improved with IV fluids.  She has received a total of 4 L, with improvement of her blood pressure to 103/78.  Patient is still feeling poorly.  Will admit to the hospital for observation, continue on IV fluids overnight, encourage oral intake.  Discontinue Forteo.    • Acute kidney injury: Patient has baseline normal creatinine, 0.95.  Today she presents with an elevated BUN at 38, creatinine at 2.21.  Patient is dehydrated, hypotensive on arrival.  She was given 4 L IV fluids.  We will continue on IV fluids overnight.  Monitor ins and outs.    • Insulin-dependent diabetes mellitus: The patient is on an insulin pump at home due to history of pancreatectomy for pancreatic divisum.  Her blood sugar was well controlled on arrival at 133.  She prefers to continue on her home insulin pump.  We will continue while hospitalized.    • Transaminitis: Patient's alkaline phosphatase, AST, ALT are all elevated.  Likely secondary to hypotension.  Repeat labs in the morning, no concern for obstruction at this time.    • Obesity: BMI of 33.16.  Recommend improve diet and activity resolution of acute illness.    • Hypothyroidism: Resume home levothyroxine once reconciled.    • Adrenal insufficiency: Resume home steroid once reconciled.    • Clinical  course will dictate further medical management.    DVT Prophylaxis: Lovenox  Code Status: Full    Reviewed patients labs and imaging, and discussed with patient and nurse at bedside.    Patient risk level:  Patient comorbidities and risk factors make patient a poor candidate for outpatient management due to concerns of deterioration.    Total time spent on admission: 70 minutes    History of Present illness     Kayla Gan is a 46 y.o. old female patient who presents with dizziness, syncope, nausea, vomiting.  The patient has a history of obesity, adrenal insufficiency, Crohn's disease, diabetes mellitus, gastroesophageal reflux disease, hypertension, chronic pain syndrome, recurrent pancreatitis with divisum status post pancreatectomy, hypothyroidism.    The patient reports that she began feeling sick overnight.  She reports dizziness, multiple episodes of syncope, nausea, vomiting.  The patient reports mild abdominal pain.  She states that she was started on Forteo recently for osteoporosis.  She states that after each dose she has been having episodes of dizziness, feeling poorly.  She states she notified her provider who changed her dosing from daytime to nighttime to help control her symptoms.  However, the patient states she took her dose of Forteo last night and began having dizziness, multiple episodes of syncope, nausea, vomiting.  She states that her symptoms have continued throughout the day, and due to feeling poorly she presented to the emergency department.  She denies fevers or chills.  She denies cough.  She denies shortness of breath,    ED course: On arrival to the emergency department the patient was mildly confused, with a blood pressure of 59/36.  Other vital signs were normal.  Blood work showed a white blood cell count of 14,500, bicarb of 20.  BUN was elevated at 38, creatinine at 2.21.  Transaminases were also elevated.  The patient was given 4 L of IV fluids with improvement in her  symptoms.  Her lactate was normal at 1.3.  Chest x-ray was negative, urinalysis was contaminated.  Given the patient's severe hypotension and symptoms, the hospitalist service was asked admit the patient for further evaluation and management.    Old records were reviewed which revealed last admission to the hospital was in August 2019 for pancreatitis.    Review of Systems     General:  Denies fevers, chills, weight loss/gain or night sweats.  HEENT: Denies dysphagia, hearing changes, rhinorrhea, visual changes or nasal congestion.  Respiratory: Denies cough, dyspnea, sputum changes, wheezing or hemoptysis.  Denies pleuritic chest pain.  Cardiovascular: Denies chest pain, palpitations, dyspnea on exertion or orthopnea. Denies chest pressure. Denies lower extremity edema.  Gastrointestinal: Reports mild epigastric abdominal pain with nausea, vomiting.  Denies diarrhea.  Denies bright red blood per rectum, melena or cramping.  Genitourinary: Denies dysuria, frequency or hesitancy. Denies urgency or hematuria.  Musculoskeletal: Denies joint effusions, joint tenderness, joint stiffness or myalgias.  Endocrine: Denies heat or cold intolerance.  Reports fatigue. Reports well controlled blood sugar.   Hematologic: Denies bleeding, bruising or swollen glands.  Psychiatric: Denies anxiety or depression.  Neurologic: Reports dizziness states.  Denies confusion, headaches, numbness or visual changes.  Skin: Denies rash, edema or open wounds.    Past Medical/Surgical/Social/Family History/Allergies     Past Medical History:   Diagnosis Date   • Adrenal insufficiency (CMS/HCC)    • Anemia    • Anxiety    • Arthritis    • Colon polyp    • Crohn's disease (CMS/HCC)    • Depression    • Diabetes mellitus (CMS/HCC)    • GERD (gastroesophageal reflux disease)    • History of MRSA infection    • Hyperlipidemia    • Hypertension    • Hypothyroidism    • Liver disease    • Migraines    • Pancreatitis        Past Surgical History:    Procedure Laterality Date   • ABSCESS DRAINAGE  12/24/2019    Fluoroscopically guided abscess drainage, [facility]   • BACK SURGERY      L4 L5   • CHOLECYSTECTOMY N/A    • COLONOSCOPY  04/24/2019    NBIH, 3 mm polyp   • DILATATION AND CURETTAGE     • ENDOSCOPY N/A 04/24/2019    Normal   • ENTEROSCOPY SMALL BOWEL     • ERCP  2019   • GASTROJEJUNOSTOMY W/ JEJUNOSTOMY TUBE      removed   • HYSTERECTOMY     • PANCREATECTOMY  12/2019    TPIAT   • PELVIC FLOOR REPAIR     • SINUS SURGERY     • SPLENECTOMY     • TONSILLECTOMY     • VENOUS ACCESS DEVICE (PORT) INSERTION         Social History     Socioeconomic History   • Marital status:      Spouse name: Not on file   • Number of children: Not on file   • Years of education: Not on file   • Highest education level: Not on file   Tobacco Use   • Smoking status: Never Smoker   • Smokeless tobacco: Never Used   Vaping Use   • Vaping Use: Never used   Substance and Sexual Activity   • Alcohol use: Never   • Drug use: Never   • Sexual activity: Defer       Family History   Problem Relation Age of Onset   • Breast cancer Mother    • Colon polyps Mother    • Heart disease Father    • Colon cancer Maternal Grandfather    • Alcohol abuse Paternal Grandfather    • Heart disease Paternal Grandfather        Allergies   Allergen Reactions   • Sulfa Antibiotics Hives   • Tramadol Anaphylaxis   • Nifedipine Provider Review Needed   • Vancomycin Hives       The above past medical history, past surgical history, social history, family history allergies and home medications were personally reviewed by me today and corrected as needed  Home Medications   (Not in a hospital admission)    Physical Exam   Vital signs:  Temperature Blood Pressure Heart Rate Resp Rate SpO2   Temp: 97.9 °F (36.6 °C) BP: 103/78 Heart Rate: 66 Resp: 18 SpO2: 99 %     HEENT: Head is atraumatic, extraocular movements are intact. Oropharynx is normal.  Neck: Supple, no cervical  lymphadenopathy.  Cardiovascular: Regular rate and rhythm. No murmurs, gallops or rubs. No peripheral edema.  Lungs: Clear to auscultation bilaterally.  No diminished breath sounds, rales, crackles or wheezes.  Abdomen: Normal bowel sounds in all 4 quadrants. No rebound, guarding.  Mild epigastric abdominal tenderness to palpation.    Chest: Normal inspection. Equal rise and fall. No retractions noted.  Constitutional: Chronically ill-appearing, appears older than stated age.  Lymphatic: No cervical lymphadenopathy.  Extremities: 5/5 upper and lower extremity strength. Normal range of motion.  No clubbing, cyanosis or edema.  Neurological: Alert and oriented x3. No numbness or tingling.  Psychological: Normal mood and affect. Well kempt. Normal eye contact.  Skin: No rash, cellulitis, swelling or bruising.    Labs     Results from last 7 days   Lab Units 09/25/21  1631   WBC 10*3/mm3 14.40*   HEMOGLOBIN g/dL 12.4   HEMATOCRIT % 38.8   PLATELETS 10*3/mm3 427     Results from last 7 days   Lab Units 09/25/21  1723 09/25/21  1631   SODIUM mmol/L  --  135*   SODIUM, VENOUS mmol/L 134.6*  --    POTASSIUM mmol/L  --  4.3   POTASSIUM, VENOUS mmol/L 3.6  --    CHLORIDE mmol/L  --  97*   CHLORIDE, VENOUS mmol/L 102  --    CO2 mmol/L  --  20.8*   BUN mg/dL  --  38*   CREATININE mg/dL  --  2.21*   CALCIUM mg/dL  --  9.2     Results from last 7 days   Lab Units 09/25/21  1631   ALT (SGPT) U/L 77*   AST (SGOT) U/L 77*   ALK PHOS U/L 165*   BILIRUBIN mg/dL 0.3                   Invalid input(s): CPK, MASSCKMB        I reviewed patient's labs from today and also previous labs while reviewing old records   Imaging and Other procedures     Chest X ray: My independent assessment showed no infiltrates or effusions  EKG: My independent evaluation showed heart rate 74, normal sinus rhythm, no ST -T changes    I reviewed patient's imaging and other testing from today and also in the past including but not limited to imaging, previous  imaging, EKG, previous EKGs, echocardiogram, and other procedures if any and previously done by reviewing old records  Current Medications   Scheduled Meds:cefTRIAXone, 1 g, Intravenous, Once      Continuous Infusions:     [unfilled]  09/25/21  20:11 EDT

## 2021-09-27 ENCOUNTER — READMISSION MANAGEMENT (OUTPATIENT)
Dept: CALL CENTER | Facility: HOSPITAL | Age: 46
End: 2021-09-27

## 2021-09-27 LAB — QT INTERVAL: 404 MS

## 2021-09-27 NOTE — OUTREACH NOTE
Prep Survey      Responses   [facility] patient discharged from?  [facility]   Is LACE score < 7 ?  Yes   Emergency Room discharge w/ pulse ox?  No   Eligibility  Not Eligible   What are the reasons patient is not eligible?  Other   Does the patient have one of the following disease processes/diagnoses(primary or secondary)?  Other   Prep survey completed?  Yes          Joan Fritz RN

## 2021-09-28 ENCOUNTER — TREATMENT (OUTPATIENT)
Dept: PHYSICAL THERAPY | Facility: CLINIC | Age: 46
End: 2021-09-28

## 2021-09-28 DIAGNOSIS — M25.531 RIGHT WRIST PAIN: ICD-10-CM

## 2021-09-28 DIAGNOSIS — S63.591D COMPLEX TEAR OF TRIANGULAR FIBROCARTILAGE OF RIGHT WRIST, SUBSEQUENT ENCOUNTER: ICD-10-CM

## 2021-09-28 DIAGNOSIS — S63.591A COMPLEX TEAR OF TRIANGULAR FIBROCARTILAGE OF RIGHT WRIST, INITIAL ENCOUNTER: Primary | ICD-10-CM

## 2021-09-28 DIAGNOSIS — M25.631 STIFFNESS OF RIGHT WRIST JOINT: ICD-10-CM

## 2021-09-28 PROBLEM — N17.9 ACUTE KIDNEY INJURY (HCC): Status: RESOLVED | Noted: 2021-09-25 | Resolved: 2021-09-28

## 2021-09-28 PROBLEM — I95.9 SYMPTOMATIC HYPOTENSION: Status: RESOLVED | Noted: 2021-09-25 | Resolved: 2021-09-28

## 2021-09-28 PROBLEM — I95.9 HYPOTENSION: Status: RESOLVED | Noted: 2021-09-25 | Resolved: 2021-09-28

## 2021-09-28 PROCEDURE — 97140 MANUAL THERAPY 1/> REGIONS: CPT | Performed by: OCCUPATIONAL THERAPIST

## 2021-09-28 PROCEDURE — 97110 THERAPEUTIC EXERCISES: CPT | Performed by: OCCUPATIONAL THERAPIST

## 2021-09-28 NOTE — PROGRESS NOTES
Occupational Therapy Daily Treatment Note      Patient: Kayla Gan   : 1975  Referring practitioner: No ref. provider found  Date of Initial Visit: Type: THERAPY  Noted: 2021  Today's Date: 2021  Patient seen for 6 sessions    ICD-10-CM ICD-9-CM   1. Complex tear of triangular fibrocartilage of right wrist, initial encounter  S63.591A 842.09   2. Right wrist pain  M25.531 719.43   3. Stiffness of right wrist joint  M25.631 719.53   4. Complex tear of triangular fibrocartilage of right wrist, subsequent encounter  S63.591D V58.89          Kayla Gan reports I was in the hospital all weekend from non wrist related issues, not feeling well.  Pt reports her wrist is worse.      Objective   See Exercise, Manual, and Modality Logs for complete treatment.       Assessment/Plan  Pt progressing with tolerance for WB and strengthening      Cont per POC           Timed:  Manual Therapy:    10     mins  44266;  Therapeutic Exercise:    20     mins  19473;     Neuromuscular Harley:    0    mins  07224;    Ultrasound:     0     mins  95056;    Electrical Stimulation:    0     mins  15906;    Untimed:  Electrical Stimulation:    0     mins  11326 ( );  Fluidotherapy:        0    mins  75900    Timed Treatment:   30   mins   Total Treatment:     30   mins  MONIQUE Madden, LEAR/L,CHT  Occupational Therapist, Certified Hand Therapist

## 2021-09-30 ENCOUNTER — TELEPHONE (OUTPATIENT)
Dept: PHYSICAL THERAPY | Facility: CLINIC | Age: 46
End: 2021-09-30

## 2021-09-30 LAB
BACTERIA SPEC AEROBE CULT: NORMAL
BACTERIA SPEC AEROBE CULT: NORMAL

## 2021-10-04 ENCOUNTER — TREATMENT (OUTPATIENT)
Dept: PHYSICAL THERAPY | Facility: CLINIC | Age: 46
End: 2021-10-04

## 2021-10-04 DIAGNOSIS — M25.531 RIGHT WRIST PAIN: ICD-10-CM

## 2021-10-04 DIAGNOSIS — S63.591A COMPLEX TEAR OF TRIANGULAR FIBROCARTILAGE OF RIGHT WRIST, INITIAL ENCOUNTER: Primary | ICD-10-CM

## 2021-10-04 DIAGNOSIS — M25.631 STIFFNESS OF RIGHT WRIST JOINT: ICD-10-CM

## 2021-10-04 DIAGNOSIS — S63.591D COMPLEX TEAR OF TRIANGULAR FIBROCARTILAGE OF RIGHT WRIST, SUBSEQUENT ENCOUNTER: ICD-10-CM

## 2021-10-04 PROCEDURE — 97110 THERAPEUTIC EXERCISES: CPT | Performed by: OCCUPATIONAL THERAPIST

## 2021-10-04 NOTE — PROGRESS NOTES
Occupational Therapy Daily Treatment Note      Patient: Kayla Gan   : 1975  Referring practitioner: No ref. provider found  Date of Initial Visit: Type: THERAPY  Noted: 2021  Today's Date: 10/4/2021  Patient seen for 7 sessions    ICD-10-CM ICD-9-CM   1. Complex tear of triangular fibrocartilage of right wrist, initial encounter  S63.591A 842.09   2. Right wrist pain  M25.531 719.43   3. Stiffness of right wrist joint  M25.631 719.53   4. Complex tear of triangular fibrocartilage of right wrist, subsequent encounter  S63.591D V58.89          Kayla Gan reports weaning out of the brace, but it is getting sore after a couple of hours. Discussed being out of brace 1 hour at a time, and then progressing a little more.    Objective   See Exercise, Manual, and Modality Logs for complete treatment.   kinesiotape     Assessment/Plan    Pt progressing with AROM and strength.  Tolerance out of brace is improving.   Cont per POC           Timed:  Manual Therapy:    0     mins  52509;  Therapeutic Exercise:    30     mins  91000;     Neuromuscular Harley:    0    mins  83948;    Ultrasound:     0     mins  52828;    Electrical Stimulation:    0     mins  03006;    Untimed:  Electrical Stimulation:    0     mins  55507 ( );  Fluidotherapy:        0    mins  76974    Timed Treatment:   30   mins   Total Treatment:     30   mins  MONIQUE Madden, OTR/L,CHT  Occupational Therapist, Certified Hand Therapist

## 2021-10-06 ENCOUNTER — TREATMENT (OUTPATIENT)
Dept: PHYSICAL THERAPY | Facility: CLINIC | Age: 46
End: 2021-10-06

## 2021-10-06 DIAGNOSIS — M25.631 STIFFNESS OF RIGHT WRIST JOINT: ICD-10-CM

## 2021-10-06 DIAGNOSIS — M25.531 RIGHT WRIST PAIN: ICD-10-CM

## 2021-10-06 DIAGNOSIS — S63.591D COMPLEX TEAR OF TRIANGULAR FIBROCARTILAGE OF RIGHT WRIST, SUBSEQUENT ENCOUNTER: ICD-10-CM

## 2021-10-06 DIAGNOSIS — S63.591A COMPLEX TEAR OF TRIANGULAR FIBROCARTILAGE OF RIGHT WRIST, INITIAL ENCOUNTER: Primary | ICD-10-CM

## 2021-10-06 PROCEDURE — 97140 MANUAL THERAPY 1/> REGIONS: CPT | Performed by: OCCUPATIONAL THERAPIST

## 2021-10-06 PROCEDURE — 97110 THERAPEUTIC EXERCISES: CPT | Performed by: OCCUPATIONAL THERAPIST

## 2021-10-06 NOTE — PROGRESS NOTES
Re-Assessment / Re-Certification      Patient: Kayla Gan   : 1975  Diagnosis/ICD-10 Code:  Complex tear of triangular fibrocartilage of right wrist, initial encounter [S63.591A]  Referring practitioner: No ref. provider found  Date of Initial Visit: Type: THERAPY  Noted: 2021  Today's Date: 10/6/2021  Patient seen for 8 sessions      Subjective:   Kayla Gan reports: I am not feeling great overall, my wrist is sore today.  The tape did help it is 4/10  Subjective Questionnaire: QuickDASH: 35  Clinical Progress: improved, patient will benefit from skilled Occupational therapy to progress with pain mgmt, AROM, functional use of R UE and strength to perform ADL and IADL tasks.   Home Program Compliance: Yes, patient is also participating in skilled OT sessions and progressing towards all goals.   Treatment has included: therapeutic exercise, neuromuscular re-education, manual therapy and therapeutic activity      Objective   Active Range of Motion     Right Wrist   Wrist flexion: 75 degrees   Wrist extension: 55 degrees   Radial deviation: 25 degrees   Ulnar deviation: 20 degrees     Strength/Myotome Testing     Right Wrist/Hand      (2nd hand position)   lbs: 50       Visit Diagnoses:    ICD-10-CM ICD-9-CM   1. Complex tear of triangular fibrocartilage of right wrist, initial encounter  S63.591A 842.09   2. Right wrist pain  M25.531 719.43   3. Complex tear of triangular fibrocartilage of right wrist, subsequent encounter  S63.591D V58.89   4. Stiffness of right wrist joint  M25.631 719.53       Progress toward previous goals: Partially Met    Plan Goals: 1. The patient complains of pain in the R wrist                  LTG 1: 12 weeks:  The patient will report a pain rating of 0/10 or better in order to improve sleep quality and tolerance to performance of activities of daily living.                                  STATUS:  NOT MET                  STG 1a: 4weeks:  The patient will report a  pain rating of 3/10 or better.                                   STATUS:  NOT MET  2. The patient has limited ROM of the R wrist                  LTG 2: 12 weeks:  The patient will demonstrate 140 degrees of BEASLEY of R wrist to allow the patient to lift.                                  STATUS:  NOT MET                  STG 2a: 4 weeks:  The patient will demonstrate 90 degrees R wrist  BEASLEY.                                  STATUS:  MET                           3. The patient has limited strength of the R UE.                  LTG 3: 12 weeks:  The patient will demonstrate 40# in order to lifting tasks.                                  STATUS:  MET                  STG 3a: 4 weeks:  The patient will demonstrate tolerance of light strengthening without adverse reaction.                                  STATUS:  MET  4. Carrying, Moving, and Handling Objects Functional Limitation                                   LTG 4: 12 weeks:  The patient will demonstrate 1-19% limitation by achieving a score of 15 on the Quick DASH.                                  STATUS:  NOT MET                  STG 4a: 4 weeks:  The patient will demonstrate 20-39% limitation by achieving a score of 25 on the Quick DASH.                                    STATUS:  NOT MET      Recommendations: Continue as planned  Timeframe: 2 months  Prognosis to achieve goals: good, has gained AROM, pain mgmt, strength, and functional independence with R UE.  Continues to need to wear brace intermittently for support.  Will continue to benefit from OT services to increase wrist stabilization and pain mgmt.     OT Signature: Tia Prakash, OTD, OTR/L, CHT      Based upon review of the patient's progress and continued therapy plan, it is my medical opinion that Kayla Gan should continue occupational therapy treatment at [facility]  [address] [facility] [address]  [place] [address] [phone].    Signature:  __________________________________    Timed:  Manual Therapy:    10     mins  82220;  Therapeutic Exercise:    30     mins  86042;     Neuromuscular Harley:    0    mins  22253;    Ultrasound:     0     mins  03420;    Electrical Stimulation:    0     mins  17118;    Untimed:  Electrical Stimulation:    0     mins  53009 ( );  Fluidotherapy:     0     mins  72975    Timed Treatment:   40   mins   Total Treatment:     40   mins

## 2021-10-12 ENCOUNTER — TREATMENT (OUTPATIENT)
Dept: PHYSICAL THERAPY | Facility: CLINIC | Age: 46
End: 2021-10-12

## 2021-10-12 ENCOUNTER — TRANSCRIBE ORDERS (OUTPATIENT)
Dept: PHYSICAL THERAPY | Facility: CLINIC | Age: 46
End: 2021-10-12

## 2021-10-12 DIAGNOSIS — M25.531 RIGHT WRIST PAIN: ICD-10-CM

## 2021-10-12 DIAGNOSIS — S63.591A COMPLEX TEAR OF TRIANGULAR FIBROCARTILAGE OF RIGHT WRIST, INITIAL ENCOUNTER: Primary | ICD-10-CM

## 2021-10-12 PROCEDURE — 97110 THERAPEUTIC EXERCISES: CPT | Performed by: OCCUPATIONAL THERAPIST

## 2021-10-12 PROCEDURE — 97140 MANUAL THERAPY 1/> REGIONS: CPT | Performed by: OCCUPATIONAL THERAPIST

## 2021-10-12 NOTE — PROGRESS NOTES
Occupational Therapy Daily Treatment Note      Patient: Kayla Gan   : 1975  Referring practitioner: No ref. provider found  Date of Initial Visit: Type: THERAPY  Noted: 2021  Today's Date: 10/12/2021  Patient seen for 9 sessions    ICD-10-CM ICD-9-CM   1. Complex tear of triangular fibrocartilage of right wrist, initial encounter  S63.594W 842.09   2. Right wrist pain  M25.531 719.43          Kayla Gan reports the doctor wants her out of the brace.     Objective   See Exercise, Manual, and Modality Logs for complete treatment.   kinesiotape applied to ulnar side of wrist to support TFCC with space correction.       Assessment/Plan  Pt progressing with strengthening.  Tolerating increased time out of splint.   Cont per POC           Timed:  Manual Therapy:    10     mins  59580;  Therapeutic Exercise:    30     mins  54620;     Neuromuscular Harley:    0    mins  71295;    Ultrasound:     0     mins  15748;    Electrical Stimulation:    0     mins  05912;    Untimed:  Electrical Stimulation:    0     mins  48413 ( );  Fluidotherapy:        0    mins  45562    Timed Treatment:   40   mins   Total Treatment:     40   mins  MONIQUE Madden, OTR/L,CHT  Occupational Therapist, Certified Hand Therapist

## 2021-10-14 ENCOUNTER — TREATMENT (OUTPATIENT)
Dept: PHYSICAL THERAPY | Facility: CLINIC | Age: 46
End: 2021-10-14

## 2021-10-14 DIAGNOSIS — M25.531 RIGHT WRIST PAIN: ICD-10-CM

## 2021-10-14 DIAGNOSIS — M25.631 STIFFNESS OF RIGHT WRIST JOINT: ICD-10-CM

## 2021-10-14 DIAGNOSIS — S63.591A COMPLEX TEAR OF TRIANGULAR FIBROCARTILAGE OF RIGHT WRIST, INITIAL ENCOUNTER: Primary | ICD-10-CM

## 2021-10-14 PROCEDURE — 97140 MANUAL THERAPY 1/> REGIONS: CPT | Performed by: OCCUPATIONAL THERAPIST

## 2021-10-14 PROCEDURE — 97110 THERAPEUTIC EXERCISES: CPT | Performed by: OCCUPATIONAL THERAPIST

## 2021-10-14 NOTE — PROGRESS NOTES
Occupational Therapy Daily Treatment Note      Patient: Kayla Gan   : 1975  Referring practitioner: No ref. provider found  Date of Initial Visit: Type: THERAPY  Noted: 2021  Today's Date: 10/14/2021  Patient seen for 10 sessions    ICD-10-CM ICD-9-CM   1. Complex tear of triangular fibrocartilage of right wrist, initial encounter  S63.591A 842.09   2. Right wrist pain  M25.531 719.43   3. Stiffness of right wrist joint  M25.281 719.53          Kayla Gan reports it is about the same today, but not wearing the brace much at all      Objective   See Exercise, Manual, and Modality Logs for complete treatment.       Assessment/Plan  Pt progressing out of brace. Pt progressing with tolerance to strengthening tasks.   Cont per POC           Timed:  Manual Therapy:    10     mins  39795;  Therapeutic Exercise:    20     mins  49328;     Neuromuscular Harley:    0    mins  80543;    Ultrasound:     0     mins  31876;    Electrical Stimulation:    0     mins  80533;    Untimed:  Electrical Stimulation:    0     mins  62238 ( );  Fluidotherapy:        0    mins  12556    Timed Treatment:   30   mins   Total Treatment:     30   mins  MONIQUE Madden, OTR/L,CHT  Occupational Therapist, Certified Hand Therapist

## 2021-10-21 ENCOUNTER — TREATMENT (OUTPATIENT)
Dept: PHYSICAL THERAPY | Facility: CLINIC | Age: 46
End: 2021-10-21

## 2021-10-21 DIAGNOSIS — S63.591D COMPLEX TEAR OF TRIANGULAR FIBROCARTILAGE OF RIGHT WRIST, SUBSEQUENT ENCOUNTER: ICD-10-CM

## 2021-10-21 DIAGNOSIS — M25.531 RIGHT WRIST PAIN: ICD-10-CM

## 2021-10-21 DIAGNOSIS — M25.631 STIFFNESS OF RIGHT WRIST JOINT: ICD-10-CM

## 2021-10-21 DIAGNOSIS — S63.591A COMPLEX TEAR OF TRIANGULAR FIBROCARTILAGE OF RIGHT WRIST, INITIAL ENCOUNTER: Primary | ICD-10-CM

## 2021-10-21 PROCEDURE — 97140 MANUAL THERAPY 1/> REGIONS: CPT | Performed by: OCCUPATIONAL THERAPIST

## 2021-10-21 PROCEDURE — 97530 THERAPEUTIC ACTIVITIES: CPT | Performed by: OCCUPATIONAL THERAPIST

## 2021-10-21 NOTE — PROGRESS NOTES
Occupational Therapy Daily Treatment Note      Patient: Kayla Gan   : 1975  Referring practitioner: Porfirio Anglin*  Date of Initial Visit: Type: THERAPY  Noted: 2021  Today's Date: 10/21/2021  Patient seen for 11 sessions    ICD-10-CM ICD-9-CM   1. Complex tear of triangular fibrocartilage of right wrist, initial encounter  S63.591A 842.09   2. Right wrist pain  M25.531 719.43   3. Stiffness of right wrist joint  M25.631 719.53   4. Complex tear of triangular fibrocartilage of right wrist, subsequent encounter  S63.591D V58.89          Kayla Gan reports I lifted boxes yesterday and I am really sore today.      Objective   See Exercise, Manual, and Modality Logs for complete treatment.   Pt progressed with functional tasks to increase I with household and work tasks including lift, gripping, pushing, pulling    Assessment/Plan  Pt progressing with strengthening.  Tolerating ROM and functional tasks better after Manual.    Cont per POC           Timed:  Manual Therapy:    10     mins  92754;  Therapeutic Exercise:    10     mins  68294;  Therapeutic Activity:    10     mins  44868;     Neuromuscular Harley:    0    mins  50456;    Ultrasound:     0     mins  07509;    Electrical Stimulation:    0     mins  74735;    Untimed:  Electrical Stimulation:    0     mins  68745 ( );  Fluidotherapy:        0    mins  05499  Paraffin:                          0    mins  95272    Timed Treatment:   30   mins   Total Treatment:     30   mins    OT SIGNATURE: MONIQUE Madden, OTR/L, CHT     Electronically signed    [place] LICENSE: [id number]

## 2021-10-26 ENCOUNTER — TREATMENT (OUTPATIENT)
Dept: PHYSICAL THERAPY | Facility: CLINIC | Age: 46
End: 2021-10-26

## 2021-10-26 DIAGNOSIS — M25.631 STIFFNESS OF RIGHT WRIST JOINT: ICD-10-CM

## 2021-10-26 DIAGNOSIS — S63.591D COMPLEX TEAR OF TRIANGULAR FIBROCARTILAGE OF RIGHT WRIST, SUBSEQUENT ENCOUNTER: Primary | ICD-10-CM

## 2021-10-26 DIAGNOSIS — M25.531 RIGHT WRIST PAIN: ICD-10-CM

## 2021-10-26 PROCEDURE — 97110 THERAPEUTIC EXERCISES: CPT | Performed by: OCCUPATIONAL THERAPIST

## 2021-10-26 PROCEDURE — 97140 MANUAL THERAPY 1/> REGIONS: CPT | Performed by: OCCUPATIONAL THERAPIST

## 2021-10-26 NOTE — PROGRESS NOTES
Occupational Therapy Daily Treatment Note      Patient: Kayla Gan   : 1975  Referring practitioner: Porfirio Anglin*  Date of Initial Visit: Type: THERAPY  Noted: 2021  Today's Date: 10/26/2021  Patient seen for 12 sessions    ICD-10-CM ICD-9-CM   1. Complex tear of triangular fibrocartilage of right wrist, subsequent encounter  S63.591D V58.89   2. Right wrist pain  M25.531 719.43   3. Stiffness of right wrist joint  M25.631 719.53          Kayla Gan reports I am aching today, I think because of the weather. 3-4/10 pain    Objective   See Exercise, Manual, and Modality Logs for complete treatment.       Assessment/Plan  Pt progressing with strengthening and tolerance for WB.       Cont per POC           Timed:  Manual Therapy:    10     mins  64500;  Therapeutic Exercise:    10     mins  30675;  Therapeutic Activity:    10     mins  73916;     Neuromuscular Harley:    0    mins  60981;    Ultrasound:     0     mins  27581;    Electrical Stimulation:    0     mins  80218;    Untimed:  Electrical Stimulation:    0     mins  67493 ( );  Fluidotherapy:        0    mins  09025  Paraffin:                          0    mins  50027    Timed Treatment:   30   mins   Total Treatment:     30   mins    OT SIGNATURE: MONIQUE Madden, OTR/L, CHT     Electronically signed    [place] LICENSE: [id number]

## 2021-10-28 ENCOUNTER — TREATMENT (OUTPATIENT)
Dept: PHYSICAL THERAPY | Facility: CLINIC | Age: 46
End: 2021-10-28

## 2021-10-28 DIAGNOSIS — S63.591D COMPLEX TEAR OF TRIANGULAR FIBROCARTILAGE OF RIGHT WRIST, SUBSEQUENT ENCOUNTER: ICD-10-CM

## 2021-10-28 DIAGNOSIS — M25.631 STIFFNESS OF RIGHT WRIST JOINT: ICD-10-CM

## 2021-10-28 DIAGNOSIS — S63.591A COMPLEX TEAR OF TRIANGULAR FIBROCARTILAGE OF RIGHT WRIST, INITIAL ENCOUNTER: ICD-10-CM

## 2021-10-28 DIAGNOSIS — M25.531 RIGHT WRIST PAIN: Primary | ICD-10-CM

## 2021-10-28 PROCEDURE — 97110 THERAPEUTIC EXERCISES: CPT | Performed by: OCCUPATIONAL THERAPIST

## 2021-10-28 PROCEDURE — 97140 MANUAL THERAPY 1/> REGIONS: CPT | Performed by: OCCUPATIONAL THERAPIST

## 2021-10-28 NOTE — PROGRESS NOTES
Occupational Therapy Daily Treatment Note      Patient: Kayla Gan   : 1975  Referring practitioner: Porfirio Anglin*  Date of Initial Visit: Type: THERAPY  Noted: 2021  Today's Date: 10/28/2021  Patient seen for 13 sessions    ICD-10-CM ICD-9-CM   1. Right wrist pain  M25.531 719.43   2. Stiffness of right wrist joint  M25.631 719.53   3. Complex tear of triangular fibrocartilage of right wrist, subsequent encounter  S63.591D V58.89   4. Complex tear of triangular fibrocartilage of right wrist, initial encounter  S63.591A 842.09          Kayla Gan reports I am feeling some better.    Objective   See Exercise, Manual, and Modality Logs for complete treatment.       Assessment/Plan  Pt progressing with strengthening and pain control.      Cont per POC           Timed:  Manual Therapy:    10     mins  53673;  Therapeutic Exercise:    10     mins  76753;  Therapeutic Activity:    10     mins  34359;     Neuromuscular Harley:    0    mins  91928;    Ultrasound:     0     mins  85155;    Electrical Stimulation:    0     mins  96692;    Untimed:  Electrical Stimulation:    0     mins  40101 ( );  Fluidotherapy:        0    mins  55375  Paraffin:                          0    mins  11421    Timed Treatment:   30   mins   Total Treatment:     30   mins    OT SIGNATURE: MONIQUE Madden, LEAR/L, CHT     Electronically signed    [place] LICENSE: [id number]

## 2021-11-02 ENCOUNTER — TREATMENT (OUTPATIENT)
Dept: PHYSICAL THERAPY | Facility: CLINIC | Age: 46
End: 2021-11-02

## 2021-11-02 DIAGNOSIS — M25.531 RIGHT WRIST PAIN: Primary | ICD-10-CM

## 2021-11-02 DIAGNOSIS — S63.591D COMPLEX TEAR OF TRIANGULAR FIBROCARTILAGE OF RIGHT WRIST, SUBSEQUENT ENCOUNTER: ICD-10-CM

## 2021-11-02 DIAGNOSIS — M25.631 STIFFNESS OF RIGHT WRIST JOINT: ICD-10-CM

## 2021-11-02 PROCEDURE — 97530 THERAPEUTIC ACTIVITIES: CPT | Performed by: OCCUPATIONAL THERAPIST

## 2021-11-02 PROCEDURE — 97110 THERAPEUTIC EXERCISES: CPT | Performed by: OCCUPATIONAL THERAPIST

## 2021-11-02 NOTE — PROGRESS NOTES
Re-Assessment / Re-Certification      Patient: Kayla Gan   : 1975  Diagnosis/ICD-10 Code:  Right wrist pain [M25.531]  Referring practitioner: Porfirio Anglin*  Date of Initial Visit: Type: THERAPY  Noted: 2021  Today's Date: 2021  Patient seen for 14 sessions      Subjective:   Kayla Gan reports: No pain today.  I am feeling better.  Subjective Questionnaire: QuickDASH: 20  Clinical Progress: improved  Home Program Compliance: Yes  Treatment has included: therapeutic exercise, neuromuscular re-education, manual therapy, therapeutic activity and electrical stimulation    Subjective   Objective          Active Range of Motion     Right Wrist   Wrist flexion: 65 degrees   Wrist extension: 55 degrees   Radial deviation: 30 degrees   Ulnar deviation: 25 degrees     Additional Active Range of Motion Details  Supination 75  Pronation 90    Strength/Myotome Testing     Right Wrist/Hand      (2nd hand position)   lbs: 55      Assessment/Plan    Visit Diagnoses:    ICD-10-CM ICD-9-CM   1. Right wrist pain  M25.531 719.43   2. Stiffness of right wrist joint  M25.631 719.53   3. Complex tear of triangular fibrocartilage of right wrist, subsequent encounter  S63.591D V58.89       Progress toward previous goals: Partially Met    Plan Goals: 1. The patient complains of pain in the R wrist                  LTG 1: 12 weeks:  The patient will report a pain rating of 0/10 or better in order to improve sleep quality and tolerance to performance of activities of daily living.                                  STATUS:   MET                  STG 1a: 4weeks:  The patient will report a pain rating of 3/10 or better.                                   STATUS:  MET  2. The patient has limited ROM of the R wrist                  LTG 2: 12 weeks:  The patient will demonstrate 140 degrees of BEASLEY of R wrist to allow the patient to lift.                                  STATUS:  NOT MET                  STG  2a: 4 weeks:  The patient will demonstrate 90 degrees R wrist  BEASLEY.                                  STATUS:  MET                           3. The patient has limited strength of the R UE.                  LTG 3: 12 weeks:  The patient will demonstrate 40# in order to lifting tasks.                                  STATUS:  MET                  STG 3a: 4 weeks:  The patient will demonstrate tolerance of light strengthening without adverse reaction.                                  STATUS:  MET  4. Carrying, Moving, and Handling Objects Functional Limitation                                   LTG 4: 12 weeks:  The patient will demonstrate 1-19% limitation by achieving a score of 15 on the Quick DASH.                                  STATUS:  NOT MET                  STG 4a: 4 weeks:  The patient will demonstrate 20-39% limitation by achieving a score of 25 on the Quick DASH.                                    STATUS:  MET                      Recommendations: Continue as planned  Timeframe: 1 month  Prognosis to achieve goals: good      OT SIGNATURE: MONIQUE Madden, OTR/L, CHT     Electronically signed    [place] LICENSE: [id number]       Timed:  Manual Therapy:    10     mins  37427;  Therapeutic Exercise:    10     mins  08343;  Therapeutic Activity:    10     mins  86067;     Neuromuscular Harley:    0    mins  96378;    Ultrasound:     0     mins  18414;    Electrical Stimulation:    0     mins  38702;    Untimed:  Electrical Stimulation:    0     mins  33050 ( );  Fluidotherapy:        0    mins  74786  Paraffin:                          0    mins  54018    Timed Treatment:   30   mins   Total Treatment:     30   mins

## 2021-11-04 ENCOUNTER — TREATMENT (OUTPATIENT)
Dept: PHYSICAL THERAPY | Facility: CLINIC | Age: 46
End: 2021-11-04

## 2021-11-04 DIAGNOSIS — M25.531 RIGHT WRIST PAIN: Primary | ICD-10-CM

## 2021-11-04 DIAGNOSIS — S63.591D COMPLEX TEAR OF TRIANGULAR FIBROCARTILAGE OF RIGHT WRIST, SUBSEQUENT ENCOUNTER: ICD-10-CM

## 2021-11-04 DIAGNOSIS — M25.631 STIFFNESS OF RIGHT WRIST JOINT: ICD-10-CM

## 2021-11-04 PROCEDURE — 97140 MANUAL THERAPY 1/> REGIONS: CPT | Performed by: OCCUPATIONAL THERAPIST

## 2021-11-04 PROCEDURE — 97110 THERAPEUTIC EXERCISES: CPT | Performed by: OCCUPATIONAL THERAPIST

## 2021-11-04 NOTE — PROGRESS NOTES
Occupational Therapy Daily Treatment Note      Patient: Kayla Gan   : 1975  Referring practitioner: Porfirio Anglin*  Date of Initial Visit: Type: THERAPY  Noted: 2021  Today's Date: 2021  Patient seen for 15 sessions    ICD-10-CM ICD-9-CM   1. Right wrist pain  M25.531 719.43   2. Stiffness of right wrist joint  M25.631 719.53   3. Complex tear of triangular fibrocartilage of right wrist, subsequent encounter  S63.591D V58.89          Kayla Gan reports I am achy and sore. 4/10 today.  I am working on getting everything ready for HEP.         Objective   See Exercise, Manual, and Modality Logs for complete treatment.       Assessment/Plan  Pt progressing with strengthening and tolerance.       Cont per POC           Timed:  Manual Therapy:    10     mins  58265;  Therapeutic Exercise:    10     mins  59033;  Therapeutic Activity:    0     mins  25644;     Neuromuscular Harley:    10    mins  02397;    Ultrasound:     0     mins  36865;    Electrical Stimulation:    0     mins  00130;    Untimed:  Electrical Stimulation:    0     mins  06741 ( );  Fluidotherapy:        0    mins  72879  Paraffin:                          0    mins  26192    Timed Treatment:   30   mins   Total Treatment:     30   mins    OT SIGNATURE: MONIQUE Madden, LEAR/L, CHT     Electronically signed    [place] LICENSE: [id number]

## 2021-11-11 ENCOUNTER — TRANSCRIBE ORDERS (OUTPATIENT)
Dept: ADMINISTRATIVE | Facility: HOSPITAL | Age: 46
End: 2021-11-11

## 2021-11-11 DIAGNOSIS — Z12.31 VISIT FOR SCREENING MAMMOGRAM: Primary | ICD-10-CM

## 2021-12-02 ENCOUNTER — OFFICE VISIT (OUTPATIENT)
Dept: GASTROENTEROLOGY | Facility: CLINIC | Age: 46
End: 2021-12-02

## 2021-12-02 DIAGNOSIS — K75.81 NASH (NONALCOHOLIC STEATOHEPATITIS): ICD-10-CM

## 2021-12-02 DIAGNOSIS — K21.9 GASTROESOPHAGEAL REFLUX DISEASE, UNSPECIFIED WHETHER ESOPHAGITIS PRESENT: ICD-10-CM

## 2021-12-02 DIAGNOSIS — K50.919 CROHN'S DISEASE WITH COMPLICATION, UNSPECIFIED GASTROINTESTINAL TRACT LOCATION (HCC): Primary | ICD-10-CM

## 2021-12-02 DIAGNOSIS — K31.84 GASTROPARESIS: Primary | ICD-10-CM

## 2021-12-02 DIAGNOSIS — Z79.899 HIGH RISK MEDICATION USE: ICD-10-CM

## 2021-12-02 DIAGNOSIS — R11.2 NAUSEA AND VOMITING, INTRACTABILITY OF VOMITING NOT SPECIFIED, UNSPECIFIED VOMITING TYPE: ICD-10-CM

## 2021-12-02 DIAGNOSIS — K31.84 GASTROPARESIS: ICD-10-CM

## 2021-12-02 DIAGNOSIS — Z90.410 HISTORY OF PANCREATECTOMY: ICD-10-CM

## 2021-12-02 PROCEDURE — 99442 PR PHYS/QHP TELEPHONE EVALUATION 11-20 MIN: CPT | Performed by: NURSE PRACTITIONER

## 2021-12-02 RX ORDER — SUMATRIPTAN 25 MG/1
TABLET, FILM COATED ORAL
COMMUNITY
Start: 2021-09-14 | End: 2023-01-24 | Stop reason: SDUPTHER

## 2021-12-02 RX ORDER — CA/D3/MAG OX/ZINC/COP/MANG/BOR 600 MG-800
TABLET,CHEWABLE ORAL
COMMUNITY

## 2021-12-02 RX ORDER — PROMETHAZINE HYDROCHLORIDE 25 MG/1
TABLET ORAL
COMMUNITY

## 2021-12-02 RX ORDER — ADALIMUMAB 20MG/0.4ML
KIT SUBCUTANEOUS
COMMUNITY
End: 2022-04-26 | Stop reason: DRUGHIGH

## 2021-12-02 RX ORDER — POLYETHYLENE GLYCOL 3350 17 G/17G
POWDER, FOR SOLUTION ORAL
COMMUNITY

## 2021-12-02 RX ORDER — BACLOFEN 10 MG/1
TABLET ORAL
COMMUNITY
Start: 2021-11-19 | End: 2022-10-05

## 2021-12-02 RX ORDER — DICLOFENAC SODIUM 75 MG/1
75 TABLET, DELAYED RELEASE ORAL 2 TIMES DAILY
COMMUNITY
Start: 2021-10-21 | End: 2022-04-26 | Stop reason: SDUPTHER

## 2021-12-02 RX ORDER — ROMOSOZUMAB-AQQG 105 MG/1.17ML
INJECTION, SOLUTION SUBCUTANEOUS
COMMUNITY
Start: 2021-09-28 | End: 2022-10-05

## 2021-12-02 NOTE — PROGRESS NOTES
Chief Complaint   Patient presents with   • Crohn's Disease   • Heartburn   • corea         History of Present Illness  46-year-old female presents today for telephone follow-up.  She was last seen via telephone visit on 8/24/2021.  She has a history of Crohn's disease, Corea, elevated liver enzymes, gastroparesis, GERD, pancreas divisum status post total pancreatectomy with islet cell transplant and splenectomy.  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    For GERD she continues pantoprazole 40 mg twice daily and avoids eating after 7 PM to minimize symptoms.  In regards to her gastroparesis, she has been referred to  GI motility and continues domperidone for management.  However, she has been out of her domperidone since October due to issues with the shipping.  She is still awaiting an appointment with  GI motility.  Currently since she is out of her domperidone she has been experiencing more nausea and vomiting of which she will alternate Zofran and Phenergan.  She denies any dysphagia.    She has a history of Crohn's disease and treats with Humira injections every 2 weeks.  Colonoscopy 4/29/2019 revealed colon polyps, consistent with a tubular adenoma.  Next colonoscopy will be due April 2024.  She has used Xifaxan intermittently to help with crossover IBS-D symptoms in the past with good results.  She reports that her bowel habits are well controlled and reports having an average of a soft stool daily.  She continues stool softeners twice daily and 2 capfuls of MiraLAX daily.  She denies any melena or hematochezia.    She continues efforts of weight loss for management of Corea.  She is currently taking phentermine.  She denies having any heart palpitations.  She is making efforts to lose the weight that she gained when she experienced her orthopedic injuries.    She has a history of pancreatectomy with islet cell transplant and splenectomy on  12/4/2019 with Dr. Riddle and currently treats with Creon pancreatic enzymes, taking 3 capsules with meals and 2 with snacks.    You have chosen to receive care through a telephone visit.   Do you consent to use a telephone visit for your medical care today? Yes    Review of Systems   Constitutional: Negative for fatigue, fever and unexpected weight change.   HENT: Negative for trouble swallowing.    Cardiovascular: Negative for chest pain.   Gastrointestinal: Positive for nausea and vomiting. Negative for abdominal pain.         Result Review :      Office Visit with Mariela Gr APRN (08/24/2021)  ENDOSCOPY, INT (09/08/2019)  SCANNED - COLONOSCOPY (04/24/2019)  SCANNED PATHOLOGY (05/16/2019)  SCANNED PATHOLOGY (04/24/2019)  DEXA Bone Density Axial (08/06/2021 16:17)  ECG 12 Lead (09/25/2021 17:00)  Comprehensive Metabolic Panel (09/26/2021 05:30)    Assessment and Plan    Diagnoses and all orders for this visit:    1. Crohn's disease with complication, unspecified gastrointestinal tract location (HCC) (Primary)    2. High risk medication use    3. History of pancreatectomy    4. WELDON (nonalcoholic steatohepatitis)    5. Gastroesophageal reflux disease, unspecified whether esophagitis present    6. Gastroparesis    7. Nausea and vomiting, intractability of vomiting not specified, unspecified vomiting type         This visit has been rescheduled as a phone visit to comply with patient safety concerns in accordance with CDC recommendations. Total time of discussion was 15 minutes.      Patient Instructions   1.  For GERD, continue pantoprazole 40 mg twice daily.    2.  For nausea and vomiting secondary to gastroparesis, you may continue to alternate Phenergan and Zofran as prescribed.  We recommend the lowest possible doses of this medication to help manage symptoms.    3.  Once you have received your domperidone, we recommend that you discontinue use of phentermine due to potential risks of QT  prolongation.    4.  For Crohn's disease, continue Humira injections every 2 weeks.    5.  Due to your history of pancreatectomy, continue pancreatic enzymes, 3 with meals and 2 with snacks.    6.  We will make an effort to contact  GI motility to see if we can get you scheduled for an initial consultation.    7.  We will plan for telephone follow-up visit in 6 months on 6/2/2022 at 9:15 AM for reassessment of symptoms, or sooner should symptoms worsen.     Discussion:    Patient to continue current treatment regimen for GERD and gastroparesis.  She may continue to alternate Phenergan and Zofran.  We have strongly encouraged her to discontinue use of phentermine, due to concern for possible QT prolongation when used in conjunction with domperidone.  Patient verbalized understanding and stated that she would discontinue the phentermine prior to reinitiation of domperidone.    For Crohn's disease, patient to continue Humira injections.  Most recent CMP demonstrated elevated liver enzymes, but this was also due to a side effect from Forteo injections that she was receiving.  She has since been taken off of the Forteo.  We will plan for telephone follow-up visit on 6/2/2022 at 9:15 AM for reassessment of symptoms, or sooner should symptoms worsen or fail to improve.  Patient verbalized understand above plan of care is in agreement.  All questions answered and support provided.    EMR Dragon/Transcription Disclaimer:  This document has been Dictated utilizing Dragon dictation.

## 2021-12-02 NOTE — PATIENT INSTRUCTIONS
1.  For GERD, continue pantoprazole 40 mg twice daily.    2.  For nausea and vomiting secondary to gastroparesis, you may continue to alternate Phenergan and Zofran as prescribed.  We recommend the lowest possible doses of this medication to help manage symptoms.    3.  Once you have received your domperidone, we recommend that you discontinue use of phentermine due to potential risks of QT prolongation.    4.  For Crohn's disease, continue Humira injections every 2 weeks.    5.  Due to your history of pancreatectomy, continue pancreatic enzymes, 3 with meals and 2 with snacks.    6.  We will make an effort to contact  GI motility to see if we can get you scheduled for an initial consultation.    7.  We will plan for telephone follow-up visit in 6 months on 6/2/2022 at 9:15 AM for reassessment of symptoms, or sooner should symptoms worsen.

## 2021-12-06 ENCOUNTER — DOCUMENTATION (OUTPATIENT)
Dept: PHYSICAL THERAPY | Facility: CLINIC | Age: 46
End: 2021-12-06

## 2021-12-06 DIAGNOSIS — M25.631 STIFFNESS OF RIGHT WRIST JOINT: ICD-10-CM

## 2021-12-06 DIAGNOSIS — M25.531 RIGHT WRIST PAIN: Primary | ICD-10-CM

## 2021-12-06 DIAGNOSIS — S63.591D COMPLEX TEAR OF TRIANGULAR FIBROCARTILAGE OF RIGHT WRIST, SUBSEQUENT ENCOUNTER: ICD-10-CM

## 2021-12-06 NOTE — PROGRESS NOTES
Discharge Summary  Discharge Summary from Occupational Therapy Report    Patient Information  Kayla Gan  1975    Dates OT visit: 9/2/21-11/4/21  Number of Visits: 15     Discharge Status of Patient: See MD Note dated 11/4/21    Goals: Partially Met    Visit Diagnoses:  No diagnosis found.    Discharge Plan: D/C to HEP    Comments Discussions for transition to HEP with patient, she was gaining confidence in ability to continue program on her own at last attended visit.    Date of Discharge 12/6/21        MONIQUE Madden, OTR/L, CHT  Occupational Therapist, Certified Hand therapist    Electronically Signed   [place] LICENSE: [id number]

## 2021-12-14 ENCOUNTER — APPOINTMENT (OUTPATIENT)
Dept: MAMMOGRAPHY | Facility: HOSPITAL | Age: 46
End: 2021-12-14

## 2021-12-15 ENCOUNTER — APPOINTMENT (OUTPATIENT)
Dept: MAMMOGRAPHY | Facility: HOSPITAL | Age: 46
End: 2021-12-15

## 2021-12-16 ENCOUNTER — APPOINTMENT (OUTPATIENT)
Dept: MAMMOGRAPHY | Facility: HOSPITAL | Age: 46
End: 2021-12-16

## 2021-12-16 RX ORDER — ADALIMUMAB 40MG/0.4ML
KIT SUBCUTANEOUS
Qty: 2 EACH | Refills: 2 | Status: SHIPPED | OUTPATIENT
Start: 2021-12-16 | End: 2022-04-12

## 2021-12-31 ENCOUNTER — PATIENT MESSAGE (OUTPATIENT)
Dept: GASTROENTEROLOGY | Facility: CLINIC | Age: 46
End: 2021-12-31

## 2022-01-03 ENCOUNTER — TELEPHONE (OUTPATIENT)
Dept: GASTROENTEROLOGY | Facility: CLINIC | Age: 47
End: 2022-01-03

## 2022-02-08 ENCOUNTER — APPOINTMENT (OUTPATIENT)
Dept: MAMMOGRAPHY | Facility: HOSPITAL | Age: 47
End: 2022-02-08

## 2022-02-14 ENCOUNTER — PATIENT MESSAGE (OUTPATIENT)
Dept: GASTROENTEROLOGY | Facility: CLINIC | Age: 47
End: 2022-02-14

## 2022-02-14 NOTE — TELEPHONE ENCOUNTER
From: Kayla Gan  To: LAZARO Frost  Sent: 2/14/2022 2:17 PM EST  Subject: Xifaxan     Hello. I am needing a new prescription for Xifaxan 3 times daily sent to [facility] pharmacy please

## 2022-03-01 ENCOUNTER — TRANSCRIBE ORDERS (OUTPATIENT)
Dept: ADMINISTRATIVE | Facility: HOSPITAL | Age: 47
End: 2022-03-01

## 2022-03-01 DIAGNOSIS — Z45.2 ENCOUNTER FOR CARE RELATED TO PORT-A-CATH: Primary | ICD-10-CM

## 2022-03-08 ENCOUNTER — HOSPITAL ENCOUNTER (OUTPATIENT)
Dept: INTERVENTIONAL RADIOLOGY/VASCULAR | Facility: HOSPITAL | Age: 47
Discharge: HOME | End: 2022-03-08
Admitting: INTERNAL MEDICINE

## 2022-03-08 DIAGNOSIS — Z45.2 ENCOUNTER FOR CARE RELATED TO PORT-A-CATH: ICD-10-CM

## 2022-03-08 PROCEDURE — 0 IOPAMIDOL PER 1 ML: Performed by: INTERNAL MEDICINE

## 2022-03-08 PROCEDURE — 36598 INJ W/FLUOR EVAL CV DEVICE: CPT

## 2022-03-08 RX ORDER — HEPARIN SODIUM (PORCINE) LOCK FLUSH IV SOLN 100 UNIT/ML 100 UNIT/ML
SOLUTION INTRAVENOUS
Status: DISPENSED
Start: 2022-03-08 | End: 2022-03-08

## 2022-03-08 RX ADMIN — IOPAMIDOL 50 ML: 755 INJECTION, SOLUTION INTRAVENOUS at 08:30

## 2022-03-09 ENCOUNTER — TELEPHONE (OUTPATIENT)
Dept: GASTROENTEROLOGY | Facility: CLINIC | Age: 47
End: 2022-03-09

## 2022-03-10 DIAGNOSIS — K75.81 NASH (NONALCOHOLIC STEATOHEPATITIS): Primary | ICD-10-CM

## 2022-03-10 DIAGNOSIS — R74.8 ELEVATED LIVER ENZYMES: ICD-10-CM

## 2022-03-18 ENCOUNTER — HOSPITAL ENCOUNTER (OUTPATIENT)
Dept: MAMMOGRAPHY | Facility: HOSPITAL | Age: 47
Discharge: HOME | End: 2022-03-18
Admitting: INTERNAL MEDICINE

## 2022-03-18 DIAGNOSIS — Z12.31 VISIT FOR SCREENING MAMMOGRAM: ICD-10-CM

## 2022-03-18 PROCEDURE — 77067 SCR MAMMO BI INCL CAD: CPT

## 2022-03-18 PROCEDURE — 77063 BREAST TOMOSYNTHESIS BI: CPT

## 2022-03-31 ENCOUNTER — PATIENT MESSAGE (OUTPATIENT)
Dept: GASTROENTEROLOGY | Facility: CLINIC | Age: 47
End: 2022-03-31

## 2022-04-05 ENCOUNTER — TELEPHONE (OUTPATIENT)
Dept: GASTROENTEROLOGY | Facility: CLINIC | Age: 47
End: 2022-04-05

## 2022-04-12 RX ORDER — ADALIMUMAB 40MG/0.4ML
KIT SUBCUTANEOUS
Qty: 2 EACH | Refills: 2 | Status: SHIPPED | OUTPATIENT
Start: 2022-04-12 | End: 2022-09-01

## 2022-04-19 DIAGNOSIS — K75.81 NASH (NONALCOHOLIC STEATOHEPATITIS): ICD-10-CM

## 2022-04-19 DIAGNOSIS — R74.8 ELEVATED LIVER ENZYMES: ICD-10-CM

## 2022-04-21 ENCOUNTER — OFFICE VISIT (OUTPATIENT)
Dept: GASTROENTEROLOGY | Facility: CLINIC | Age: 47
End: 2022-04-21

## 2022-04-21 DIAGNOSIS — Z79.899 HIGH RISK MEDICATION USE: Chronic | ICD-10-CM

## 2022-04-21 DIAGNOSIS — R10.11 RIGHT UPPER QUADRANT ABDOMINAL PAIN: ICD-10-CM

## 2022-04-21 DIAGNOSIS — K76.0 FATTY LIVER: ICD-10-CM

## 2022-04-21 DIAGNOSIS — R74.8 ELEVATED LIVER ENZYMES: ICD-10-CM

## 2022-04-21 DIAGNOSIS — K21.9 GASTROESOPHAGEAL REFLUX DISEASE, UNSPECIFIED WHETHER ESOPHAGITIS PRESENT: Chronic | ICD-10-CM

## 2022-04-21 DIAGNOSIS — K50.90 CROHN'S DISEASE WITHOUT COMPLICATION, UNSPECIFIED GASTROINTESTINAL TRACT LOCATION: Primary | Chronic | ICD-10-CM

## 2022-04-21 DIAGNOSIS — K31.84 GASTROPARESIS: Chronic | ICD-10-CM

## 2022-04-21 DIAGNOSIS — K58.0 IRRITABLE BOWEL SYNDROME WITH DIARRHEA: Chronic | ICD-10-CM

## 2022-04-21 PROCEDURE — 99443 PR PHYS/QHP TELEPHONE EVALUATION 21-30 MIN: CPT | Performed by: NURSE PRACTITIONER

## 2022-04-21 RX ORDER — BREXPIPRAZOLE 1 MG/1
1 TABLET ORAL DAILY
COMMUNITY
Start: 2022-03-11 | End: 2022-05-17 | Stop reason: SDUPTHER

## 2022-04-21 RX ORDER — HYDROXYZINE HYDROCHLORIDE 25 MG/1
25 TABLET, FILM COATED ORAL EVERY 8 HOURS PRN
COMMUNITY
Start: 2022-03-17 | End: 2022-05-17 | Stop reason: SDUPTHER

## 2022-04-21 RX ORDER — BREXPIPRAZOLE 1 MG/1
1 TABLET ORAL DAILY
COMMUNITY
Start: 2021-12-10

## 2022-04-21 RX ORDER — OLANZAPINE 5 MG/1
5 TABLET ORAL DAILY
COMMUNITY
Start: 2022-03-08 | End: 2022-04-26

## 2022-04-21 NOTE — PATIENT INSTRUCTIONS
1.  For GERD, continue metoprolol 40 mg twice daily.  We also recommend that she implement antireflux precautions.    2.  For gastroparesis, continue treatment with domperidone 1 tablet 3-4 times daily as prescribed by  GI motility.  Please be sure to keep your upcoming appointment with  GI motility for placement of your temporary gastric stimulator May 2022.    3.  For management of Crohn's disease, continue Humira injections every 2 weeks.    4.  For WELDON, we recommend the addition of milk thistle.  Milk thistle is available over-the-counter at your local grocery or pharmacy and helps to provide antioxidant support to the liver.  We recommend 1 capsule daily.    5.  For further evaluation of right upper quadrant abdominal discomfort in the setting of Weldon with F4 fibrosis and moderate to severe liver fat, we will check a regular liver ultrasound.  You will be contacted to schedule this testing and we will contact you with results once available.    6.  We we will plan for a phone follow-up visit in 6 months on 10/27/2022 at 9:15 AM, or sooner should symptoms worsen.

## 2022-04-21 NOTE — PROGRESS NOTES
No chief complaint on file.          History of Present Illness  26-year-old female presents today for telephone follow-up.  Her mother was also present today during her telephone follow-up visit and had several questions during the visit.  She was last seen on 12/2/2021.  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    She continues pantoprazole 40 mg twice daily for management of GERD as well as implementing antireflux precautions.  She continues domperidone for management of gastroparesis and is treated to  GI motility.  She will use Zofran and Phenergan intermittently for management of nausea.  She reports experiencing nausea on a daily basis and experiences approximately 2 episodes of emesis per month.  She reports plans for placement of a temporary gastric stimulator May 2022.  She continues domperidone 3-4 times daily, which is managed by  GI motility.    She receives Humira injections every 2 weeks for management of Crohn's disease.  Last colonoscopy was performed on 4/24/2019 and she is currently due for her colonoscopy at this time.  She reports that her bowel habits can vary anywhere from no bowel movement in a given day to 6 bowel movements per day.  Overall, she is satisfied with her bowel pattern habits.  She denies any melena or hematochezia.    She continues continues weight loss efforts for management of Mart.  She had been taking phentermine, but has since discontinued use.  Most recent FibroScan performed on 3/30/2022 revealed a fibrosis score of F4 with moderate to severe liver fat.  He does report experiencing a dull ache in her right upper quadrant that can radiate into her back and has concerns over her liver.  Most recent CMP performed on  1/18/2022 through [facility] revealed an elevated alkaline phosphatase at 187, elevated AST at 40 and elevated ALT at 64.    You have chosen to receive care through a telephone visit.   Do you consent to use a telephone visit for your medical care today? Yes    Review of Systems   Constitutional: Negative for fever and unexpected weight change.   HENT: Negative for trouble swallowing.    Cardiovascular: Negative for chest pain.   Gastrointestinal: Positive for abdominal pain, nausea and vomiting. Negative for abdominal distention, anal bleeding, blood in stool, constipation, diarrhea and rectal pain.      Result Review :      Office Visit with Mariela Gr APRN (12/02/2021)  ENDOSCOPY, INT (09/08/2019)  Comprehensive Metabolic Panel (09/26/2021 05:30)  CBC Auto Differential (09/26/2021 05:30)  SCANNED - COLONOSCOPY (04/24/2019)  US Elastography Parenchyma (03/30/2022)  Comprehensive Metabolic Panel (09/26/2021 05:30)    Assessment and Plan    Diagnoses and all orders for this visit:    1. Crohn's disease without complication, unspecified gastrointestinal tract location (HCC) (Primary)    2. High risk medication use    3. Gastroesophageal reflux disease, unspecified whether esophagitis present    4. Gastroparesis    5. Irritable bowel syndrome with diarrhea    6. Fatty liver  -     US Liver; Future    7. Elevated liver enzymes  -     US Liver; Future    8. Right upper quadrant abdominal pain  -     US Liver; Future         This visit has been rescheduled as a phone visit to comply with patient safety concerns in accordance with CDC recommendations. Total time of discussion was 25 minutes.    Patient Instructions   1.  For GERD, continue metoprolol 40 mg twice daily.  We also recommend that she implement antireflux precautions.    2.  For gastroparesis, continue treatment with domperidone 1 tablet 3-4 times daily as prescribed by  GI motility.  Please be sure to keep your upcoming appointment with  GI motility  for placement of your temporary gastric stimulator May 2022.    3.  For management of Crohn's disease, continue Humira injections every 2 weeks.    4.  For WELDON, we recommend the addition of milk thistle.  Milk thistle is available over-the-counter at your local grocery or pharmacy and helps to provide antioxidant support to the liver.  We recommend 1 capsule daily.    5.  For further evaluation of right upper quadrant abdominal discomfort in the setting of Weldon with F4 fibrosis and moderate to severe liver fat, we will check a regular liver ultrasound.  You will be contacted to schedule this testing and we will contact you with results once available.    6.  We we will plan for a phone follow-up visit in 6 months on 10/27/2022 at 9:15 AM, or sooner should symptoms worsen.     Discussion:    Patient to continue PPI twice daily for GERD.  Patient to continue domperidone for management of gastroparesis and proceed with placement of temporary gastric stimulator May 2022 as planned through  GI motility.    Results of FibroScan viewed with patient today and her mother via telephone visit.  FibroScan demonstrates F4 fibrosis with moderate to severe liver fat.  Patient was very encouraged to continue weight loss efforts and add milk thistle into her regimen.  Due to the presence of diabetes, she is unable to take vitamin E.  We will obtain a liver ultrasound for further evaluation.  Next hepatic function panel will be due July 2022.    For Crohn's disease, patient to continue management with Humira injections twice weekly.  We will plan for telephone follow-up visit in 6 weeks for reassessment of symptoms and medication refills, or sooner should symptoms worsen/recur.  Both the patient and her mother verbalized understanding of above plan of care and are in agreement.  All questions answered and support provided.    EMR Dragon/Transcription Disclaimer:  This document has been Dictated utilizing Dragon dictation.

## 2022-04-26 ENCOUNTER — OFFICE VISIT (OUTPATIENT)
Dept: UROLOGY | Facility: CLINIC | Age: 47
End: 2022-04-26

## 2022-04-26 VITALS
DIASTOLIC BLOOD PRESSURE: 80 MMHG | BODY MASS INDEX: 31.23 KG/M2 | HEIGHT: 67 IN | WEIGHT: 199 LBS | HEART RATE: 79 BPM | SYSTOLIC BLOOD PRESSURE: 114 MMHG | TEMPERATURE: 98.2 F

## 2022-04-26 DIAGNOSIS — N39.0 URINARY TRACT INFECTION WITHOUT HEMATURIA, SITE UNSPECIFIED: Primary | ICD-10-CM

## 2022-04-26 DIAGNOSIS — N81.10 CYSTOURETHROCELE: ICD-10-CM

## 2022-04-26 DIAGNOSIS — N30.10 INTERSTITIAL CYSTITIS: ICD-10-CM

## 2022-04-26 PROBLEM — D50.8 IRON DEFICIENCY ANEMIA SECONDARY TO INADEQUATE DIETARY IRON INTAKE: Status: ACTIVE | Noted: 2022-01-13

## 2022-04-26 PROBLEM — I82.409 DEEP VEIN THROMBOSIS (HCC): Status: ACTIVE | Noted: 2022-04-26

## 2022-04-26 PROBLEM — K31.84 GASTROPARESIS: Status: ACTIVE | Noted: 2022-04-26

## 2022-04-26 PROBLEM — G90.89 INTESTINAL AUTONOMIC NEUROPATHY: Status: ACTIVE | Noted: 2022-04-24

## 2022-04-26 PROBLEM — A49.02 METHICILLIN RESISTANT STAPHYLOCOCCUS AUREUS INFECTION: Status: ACTIVE | Noted: 2019-10-03

## 2022-04-26 PROBLEM — E06.3 HASHIMOTO'S THYROIDITIS: Status: ACTIVE | Noted: 2022-04-26

## 2022-04-26 PROBLEM — Z87.448 H/O: HEMATURIA: Status: ACTIVE | Noted: 2019-07-18

## 2022-04-26 PROBLEM — M51.36 DEGENERATION OF LUMBAR INTERVERTEBRAL DISC: Status: ACTIVE | Noted: 2018-11-06

## 2022-04-26 PROBLEM — G89.18 POSTOPERATIVE PAIN: Status: ACTIVE | Noted: 2017-06-16

## 2022-04-26 PROBLEM — M51.369 DEGENERATION OF LUMBAR INTERVERTEBRAL DISC: Status: ACTIVE | Noted: 2018-11-06

## 2022-04-26 PROBLEM — G47.33 OBSTRUCTIVE SLEEP APNEA SYNDROME: Status: ACTIVE | Noted: 2021-06-16

## 2022-04-26 PROBLEM — Z94.89 OTHER TRANSPLANTED ORGAN AND TISSUE STATUS: Status: ACTIVE | Noted: 2020-03-26

## 2022-04-26 PROBLEM — N39.3 STRESS INCONTINENCE, FEMALE: Status: ACTIVE | Noted: 2022-04-26

## 2022-04-26 PROBLEM — R35.0 INCREASED FREQUENCY OF URINATION: Status: ACTIVE | Noted: 2019-07-18

## 2022-04-26 PROBLEM — R73.9 HYPERGLYCEMIA: Status: ACTIVE | Noted: 2021-01-11

## 2022-04-26 PROBLEM — Q45.3 PANCREAS DIVISUM: Status: ACTIVE | Noted: 2019-04-23

## 2022-04-26 PROBLEM — K75.81 NONALCOHOLIC STEATOHEPATITIS (NASH): Status: ACTIVE | Noted: 2019-05-29

## 2022-04-26 PROBLEM — G90.8 INTESTINAL AUTONOMIC NEUROPATHY: Status: ACTIVE | Noted: 2022-04-24

## 2022-04-26 PROBLEM — K52.9 NONINFECTIOUS GASTROENTERITIS: Status: ACTIVE | Noted: 2022-04-24

## 2022-04-26 PROBLEM — K94.20 GASTROSTOMY COMPLICATION: Status: ACTIVE | Noted: 2019-10-03

## 2022-04-26 PROBLEM — K58.9 IRRITABLE BOWEL SYNDROME: Status: ACTIVE | Noted: 2019-07-16

## 2022-04-26 PROBLEM — K90.9 INTESTINAL MALABSORPTION: Status: ACTIVE | Noted: 2022-01-13

## 2022-04-26 PROBLEM — M79.10 MYALGIA: Status: ACTIVE | Noted: 2022-04-26

## 2022-04-26 PROBLEM — E89.1 POSTPANCREATECTOMY HYPERGLYCEMIA: Status: ACTIVE | Noted: 2019-12-27

## 2022-04-26 PROBLEM — M51.26 DISPLACEMENT OF LUMBAR INTERVERTEBRAL DISC WITHOUT MYELOPATHY: Status: ACTIVE | Noted: 2018-05-17

## 2022-04-26 PROBLEM — Z79.52 LONG TERM (CURRENT) USE OF SYSTEMIC STEROIDS: Status: ACTIVE | Noted: 2019-05-05

## 2022-04-26 PROBLEM — M47.817 LUMBOSACRAL SPONDYLOSIS WITHOUT MYELOPATHY: Status: ACTIVE | Noted: 2021-06-17

## 2022-04-26 PROBLEM — K50.90 CROHN'S DISEASE: Status: ACTIVE | Noted: 2022-04-26

## 2022-04-26 PROBLEM — R11.2 NAUSEA AND VOMITING: Status: ACTIVE | Noted: 2019-04-23

## 2022-04-26 PROBLEM — G43.909 MIGRAINE HEADACHE: Status: ACTIVE | Noted: 2022-04-26

## 2022-04-26 PROBLEM — M81.0 OSTEOPOROSIS: Status: ACTIVE | Noted: 2019-04-23

## 2022-04-26 LAB
BILIRUB BLD-MCNC: NEGATIVE MG/DL
CLARITY, POC: CLEAR
COLOR UR: YELLOW
EXPIRATION DATE: NORMAL
GLUCOSE UR STRIP-MCNC: NEGATIVE MG/DL
KETONES UR QL: NEGATIVE
LEUKOCYTE EST, POC: NEGATIVE
Lab: NORMAL
NITRITE UR-MCNC: NEGATIVE MG/ML
PH UR: 7 [PH] (ref 5–8)
PROT UR STRIP-MCNC: NEGATIVE MG/DL
RBC # UR STRIP: NEGATIVE /UL
SP GR UR: 1.02 (ref 1–1.03)
UROBILINOGEN UR QL: NORMAL

## 2022-04-26 PROCEDURE — 99203 OFFICE O/P NEW LOW 30 MIN: CPT | Performed by: UROLOGY

## 2022-04-26 NOTE — PROGRESS NOTES
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  She was diagnosed with interstitial cystitis and was on Elmiron which helped but because of the retinal complications of the drug patient did not use the medicine.  She has had bladder instillation which helped before.    She has been having recurrent urinary tract infection and sometimes every 2 or 3 weeks.  She has been through [facility] just recently.    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  Patient has diabetes now and is  insulin-dependent.  She has no dysuria or gross hematuria.  She has stress urinary incontinence.  Patient has low immune system.    Objective No acute distress  Vital Signs:   /80   Pulse 79   Temp 98.2 °F (36.8 °C)   Ht 170.2 cm (67\")   Wt 90.3 kg (199 lb)   BMI 31.17 kg/m²     Allergies   Allergen Reactions   • Parathyroid Hormone (Recomb) Other (See Comments)     Sent patient into kidney failure, heart stopped, in ICU for two days.     • Sulfa Antibiotics Hives   • Tramadol Anaphylaxis   • Vancomycin Hives   • Nifedipine Provider Review Needed     Rapid heart beat    • Morphine GI Intolerance     Other reaction(s): Chest Pain  increases billiary pressure and causes chest pain   • Codeine Nausea And Vomiting and GI Intolerance      Past medical history:  has a past medical history of Adrenal insufficiency (HCC), Anemia, Anxiety, Arthritis, Colon polyp, Crohn's disease (HCC), Depression, Diabetes mellitus (HCC), Gastroparesis, GERD (gastroesophageal reflux disease), History of MRSA infection, Hyperlipidemia, Hypertension, " Hypothyroidism, Liver disease, Migraines, and Pancreatitis.   Past surgical history:  has a past surgical history that includes Cholecystectomy (N/A); Hysterectomy; Venous Access Device (Port); Back surgery; Abscess drainage (12/24/2019); Dilation and curettage of uterus; Sinus surgery; Pelvic floor repair; Tonsillectomy; Pancreatectomy (12/2019); Esophagogastroduodenoscopy (N/A, 04/24/2019); ERCP (2019); Splenectomy, total; Gastrojejunostomy w/ jejunostomy tube; Small bowel enteroscopy; and Colonoscopy (04/24/2019).  Personal history: family history includes Alcohol abuse in her paternal grandfather; Anxiety disorder in her father and mother; Breast cancer in her mother; Colon polyps in her mother; Depression in her brother and father; Heart disease in her father and paternal grandfather; Hypertension in her brother; Hypothyroidism in her father and mother.  Social history:  reports that she has never smoked. She has never used smokeless tobacco. She reports that she does not drink alcohol and does not use drugs.    Review of Systems    Patient has multiple medical problems as above.    Physical Exam  Exam conducted with a chaperone present.   Constitutional:       General: She is not in acute distress.     Appearance: Normal appearance. She is obese. She is not ill-appearing or toxic-appearing.   HENT:      Head: Normocephalic and atraumatic.      Ears:      Comments: No hearing loss  Cardiovascular:      Rate and Rhythm: Normal rate and regular rhythm.      Pulses: Normal pulses.      Heart sounds: No murmur heard.  Pulmonary:      Effort: Pulmonary effort is normal.      Breath sounds: Normal breath sounds. No rhonchi or rales.   Abdominal:      Palpations: Abdomen is soft.      Tenderness: There is abdominal tenderness. There is no right CVA tenderness or left CVA tenderness.      Comments: Slight sensitivity in the suprapubic area.    Liver is enlarged and slightly tender   Genitourinary:     Exam position:  Lithotomy position.      Labia:         Right: No rash, tenderness or lesion.         Left: No rash, tenderness or lesion.       Urethra: No prolapse.      Comments: Cystourethrocele grade 2.    Absent cervix and uterus      Musculoskeletal:      Cervical back: Normal range of motion and neck supple. No rigidity or tenderness.   Lymphadenopathy:      Cervical: No cervical adenopathy.   Skin:     General: Skin is warm.      Coloration: Skin is not jaundiced.   Neurological:      General: No focal deficit present.      Mental Status: She is alert and oriented to person, place, and time.      Motor: No weakness.      Gait: Gait normal.   Psychiatric:         Mood and Affect: Mood normal.         Behavior: Behavior normal.         Thought Content: Thought content normal.         Judgment: Judgment normal.        Result Review :                 Assessment and Plan    Diagnoses and all orders for this visit:    1. Urinary tract infection without hematuria, site unspecified (Primary)  -     POC Urinalysis Dipstick, Automated    2. Interstitial cystitis    3. Cystourethrocele      I have given her diet instruction for interstitial cystitis and do cystoscopy on Thursday and start her on bladder cocktail for next week  Brief Urine Lab Results  (Last result in the past 365 days)      Color   Clarity   Blood   Leuk Est   Nitrite   Protein   CREAT   Urine HCG        04/26/22 0926 Yellow   Clear   Negative   Negative   Negative   Negative                  Follow Up   No follow-ups on file.  Patient was given instructions and counseling regarding her condition or for health maintenance advice. Please see specific information pulled into the AVS if appropriate.     Shari Parker MD

## 2022-04-28 ENCOUNTER — PROCEDURE VISIT (OUTPATIENT)
Dept: UROLOGY | Facility: CLINIC | Age: 47
End: 2022-04-28

## 2022-04-28 DIAGNOSIS — N30.10 INTERSTITIAL CYSTITIS: ICD-10-CM

## 2022-04-28 DIAGNOSIS — N39.0 RECURRENT URINARY TRACT INFECTION: Primary | ICD-10-CM

## 2022-04-28 PROCEDURE — 52000 CYSTOURETHROSCOPY: CPT | Performed by: UROLOGY

## 2022-04-28 NOTE — PROGRESS NOTES
Cystoscopy    Date/Time: 4/28/2022 10:24 AM  Performed by: Shari Parker MD  Authorized by: Shari Parker MD   Preparation: Patient was prepped and draped in the usual sterile fashion.  Local anesthesia used: yes    Anesthesia:  Local anesthesia used: yes  Local Anesthetic: topical anesthetic  Anesthetic total: 12 mL    Sedation:  Patient sedated: no        History of IC.  Recurrent UTI and suprapubic pain.      Patient was placed in lithotomy position.  Thorough scrubbing of lower abdomen and external genitalia was performed with Hibiclens.  Flexible Olympus cystoscope was inserted into the urethra.  Urethra looks fine and bladder neck is normal.  Both ureteral orifices are normal.  Bladder was checked very carefully and there is no tumor present.  Patient bladder is congested.  I filled her bladder to its capacity and she had no pain.  I emptied the urinary bladder using 60 cc syringe through the cystoscope and rechecked her and I do not see any hemorrhages in the urinary bladder.  I do not think she has a severe form of interstitial cystitis.  She tolerated her procedure very well.    Patient has cramps in the urinary bladder and recurrent urinary tract infection.  Patient has cirrhosis of liver.  Going to start her on Ellure 1 capsule daily and Gemtesa 75 mg daily.  Also given her d-mannose.  She might also benefit from lactobacillusreuterii and lactobacillus rhamnosus.    Patient is a high risk patient and we will recheck her in 3 weeks time

## 2022-05-17 ENCOUNTER — OFFICE VISIT (OUTPATIENT)
Dept: UROLOGY | Facility: CLINIC | Age: 47
End: 2022-05-17

## 2022-05-17 VITALS
DIASTOLIC BLOOD PRESSURE: 74 MMHG | HEART RATE: 67 BPM | BODY MASS INDEX: 31.71 KG/M2 | TEMPERATURE: 97.8 F | WEIGHT: 202 LBS | SYSTOLIC BLOOD PRESSURE: 110 MMHG | HEIGHT: 67 IN

## 2022-05-17 DIAGNOSIS — N39.0 RECURRENT URINARY TRACT INFECTION: Primary | ICD-10-CM

## 2022-05-17 DIAGNOSIS — N30.10 INTERSTITIAL CYSTITIS: ICD-10-CM

## 2022-05-17 LAB
BACTERIA UR QL AUTO: NORMAL /HPF
BILIRUB BLD-MCNC: NEGATIVE MG/DL
CLARITY, POC: CLEAR
COLOR UR: NORMAL
EPI CELLS #/AREA URNS HPF: NORMAL /[HPF]
GLUCOSE UR STRIP-MCNC: NEGATIVE MG/DL
KETONES UR QL: NEGATIVE
LEUKOCYTE EST, POC: NEGATIVE
NITRITE UR-MCNC: NEGATIVE MG/ML
PH UR: 6.5 [PH] (ref 5–8)
PROT UR STRIP-MCNC: NEGATIVE MG/DL
RBC # UR STRIP: NEGATIVE /UL
RBC # UR STRIP: NORMAL /HPF
RENAL EPITHELIAL, POC: 0
SP GR UR: 1.01 (ref 1–1.03)
UNIDENT CRYS URNS QL MICRO: NORMAL /HPF
UROBILINOGEN UR QL: NORMAL
WBC # UR STRIP: NORMAL /HPF

## 2022-05-17 PROCEDURE — 99213 OFFICE O/P EST LOW 20 MIN: CPT | Performed by: UROLOGY

## 2022-05-17 PROCEDURE — 87086 URINE CULTURE/COLONY COUNT: CPT | Performed by: UROLOGY

## 2022-05-17 PROCEDURE — 51700 IRRIGATION OF BLADDER: CPT | Performed by: UROLOGY

## 2022-05-17 RX ORDER — LISINOPRIL 20 MG/1
TABLET ORAL
COMMUNITY
End: 2022-05-17 | Stop reason: SDUPTHER

## 2022-05-17 RX ORDER — OLANZAPINE 5 MG/1
5 TABLET ORAL DAILY
COMMUNITY
Start: 2022-05-12 | End: 2022-05-29

## 2022-05-17 RX ORDER — HYDROXYZINE HYDROCHLORIDE 25 MG/1
TABLET, FILM COATED ORAL
COMMUNITY
Start: 2022-02-22 | End: 2022-05-29

## 2022-05-17 RX ORDER — CETIRIZINE HYDROCHLORIDE 10 MG/1
CAPSULE, LIQUID FILLED ORAL
COMMUNITY
End: 2022-05-17 | Stop reason: SDUPTHER

## 2022-05-17 RX ORDER — ONDANSETRON 4 MG/1
TABLET, ORALLY DISINTEGRATING ORAL
COMMUNITY
End: 2022-05-29

## 2022-05-17 RX ORDER — HYDROCORTISONE 5 MG/1
TABLET ORAL
COMMUNITY

## 2022-05-17 RX ORDER — INSULIN ASPART 100 [IU]/ML
INJECTION, SOLUTION INTRAVENOUS; SUBCUTANEOUS
COMMUNITY
Start: 2022-05-12

## 2022-05-17 RX ORDER — LEVOTHYROXINE SODIUM 0.12 MG/1
TABLET ORAL
COMMUNITY

## 2022-05-17 RX ORDER — LAMOTRIGINE 200 MG/1
TABLET ORAL
COMMUNITY
End: 2022-10-05

## 2022-05-17 RX ORDER — DIAZEPAM 10 MG/1
TABLET ORAL
COMMUNITY
End: 2022-10-05

## 2022-05-17 NOTE — PROGRESS NOTES
"Chief Complaint  Follow-up after recent urinary tract infection    Interstitial cystitis    Continues to have dysuria and bladder cramps    Subjective Patient is uncomfortable        Kayla Gan presents to [facility] UROLOGY  History of Present Illness    46-year-old white female who has multiple medical problems with cirrhosis of liver, diabetes mellitus, interstitial cystitis and had a recent urinary tract infection when she was traveling to [facility].  Patient was on Omnicef which he stopped but she continues to have pain after urination and suprapubic cramps.  My previous cystoscopy was clear which was done recently 3 weeks ago.    She had bradycardia with infection but today her vital signs are normal.    Objective Patient seems to be in slight discomfort  Vital Signs:   /74   Pulse 67   Temp 97.8 °F (36.6 °C)   Ht 170.2 cm (67\")   Wt 91.6 kg (202 lb)   BMI 31.64 kg/m²     Allergies   Allergen Reactions   • Parathyroid Hormone (Recomb) Other (See Comments)     Sent patient into kidney failure, heart stopped, in ICU for two days.     • Sulfa Antibiotics Hives   • Tramadol Anaphylaxis   • Vancomycin Hives   • Nifedipine Provider Review Needed     Rapid heart beat    • Morphine GI Intolerance     Other reaction(s): Chest Pain  increases billiary pressure and causes chest pain   • Codeine Nausea And Vomiting and GI Intolerance      Past medical history:  has a past medical history of Adrenal insufficiency (HCC), Anemia, Anxiety, Arthritis, Colon polyp, Crohn's disease (HCC), Depression, Diabetes mellitus (HCC), Gastroparesis, GERD (gastroesophageal reflux disease), History of MRSA infection, Hyperlipidemia, Hypertension, Hypothyroidism, Liver disease, Migraines, and Pancreatitis.   Past surgical history:  has a past surgical history that includes Cholecystectomy (N/A); Hysterectomy; Venous Access Device (Port); Back surgery; Abscess drainage (12/24/2019); Dilation and " curettage of uterus; Sinus surgery; Pelvic floor repair; Tonsillectomy; Pancreatectomy (12/2019); Esophagogastroduodenoscopy (N/A, 04/24/2019); ERCP (2019); Splenectomy, total; Gastrojejunostomy w/ jejunostomy tube; Small bowel enteroscopy; and Colonoscopy (04/24/2019).  Personal history: family history includes Alcohol abuse in her paternal grandfather; Anxiety disorder in her father and mother; Breast cancer in her mother; Colon polyps in her mother; Depression in her brother and father; Heart disease in her father and paternal grandfather; Hypertension in her brother; Hypothyroidism in her father and mother.  Social history:  reports that she has never smoked. She has never used smokeless tobacco. She reports that she does not drink alcohol and does not use drugs.    Review of Systems    No change from before    Physical Exam  Constitutional:       General: She is not in acute distress.     Appearance: She is obese. She is not ill-appearing or toxic-appearing.      Comments: Patient is in slight discomfort   HENT:      Head: Normocephalic and atraumatic.      Ears:      Comments: No hearing loss  Cardiovascular:      Rate and Rhythm: Normal rate and regular rhythm.      Pulses: Normal pulses.      Heart sounds: Normal heart sounds. No murmur heard.  Pulmonary:      Effort: Pulmonary effort is normal.      Breath sounds: Normal breath sounds. No rhonchi or rales.   Abdominal:      Palpations: Abdomen is soft. There is no mass.      Tenderness: There is right CVA tenderness and left CVA tenderness. There is no guarding.   Musculoskeletal:         General: No swelling. Normal range of motion.      Cervical back: Normal range of motion and neck supple. No rigidity or tenderness.   Lymphadenopathy:      Cervical: No cervical adenopathy.   Skin:     General: Skin is warm.      Coloration: Skin is not jaundiced.   Neurological:      General: No focal deficit present.      Mental Status: She is alert and oriented to  person, place, and time.      Motor: No weakness.      Gait: Gait normal.   Psychiatric:         Mood and Affect: Mood normal.         Behavior: Behavior normal.         Thought Content: Thought content normal.         Judgment: Judgment normal.        Result Review :                 Assessment and Plan    Diagnoses and all orders for this visit:    1. Recurrent urinary tract infection (Primary)  -     POC Urinalysis Dipstick    2. Interstitial cystitis      I am going to go ahead and do a urine culture on the patient because she continues to have cramps and dysuria.    She does not have any antibiotics at this time and is symptomatic along with cramps in the urinary bladder.  I would go ahead and inject 160 mg of gentamicin, 10,000 units of heparin and 2% Xylocaine in the urinary bladder.  We will recheck her in 10 days again and may have to repeat same solution again.    Procedure.    Patient was placed in lithotomy position and thorough scrubbing of external genitalia and lower abdomen with Hibiclens was performed.  14 Citizen of [place] catheter was inserted through the urethra and the bladder and bladder was drained.    I went ahead with instillation of 50 mg of normal saline with 160 mg gentamicin and 10,000 units of heparin along with 2% Xylocaine in the urinary bladder.  Patient is asked to keep the fluid in her for at least 30 minutes and then she can urinate.  We will recheck her in 10 days  Brief Urine Lab Results  (Last result in the past 365 days)      Color   Clarity   Blood   Leuk Est   Nitrite   Protein   CREAT   Urine HCG        05/17/22 0959 Dark Yellow   Clear   Negative   Negative   Negative   Negative                  Follow Up   No follow-ups on file.  Patient was given instructions and counseling regarding her condition or for health maintenance advice. Please see specific information pulled into the AVS if appropriate.     Shari Parker MD    cigarettes

## 2022-05-18 LAB — BACTERIA SPEC AEROBE CULT: NO GROWTH

## 2022-05-19 ENCOUNTER — APPOINTMENT (OUTPATIENT)
Dept: ULTRASOUND IMAGING | Facility: HOSPITAL | Age: 47
End: 2022-05-19

## 2022-05-23 RX ADMIN — Medication 50 MEQ: at 13:52

## 2022-05-23 RX ADMIN — HEPARIN SODIUM 10000 UNITS: 1000 INJECTION, SOLUTION INTRAVENOUS; SUBCUTANEOUS at 13:53

## 2022-05-24 RX ADMIN — GENTAMICIN SULFATE 80 MG: 40 INJECTION, SOLUTION INTRAMUSCULAR; INTRAVENOUS at 13:50

## 2022-05-25 ENCOUNTER — OFFICE VISIT (OUTPATIENT)
Dept: UROLOGY | Facility: CLINIC | Age: 47
End: 2022-05-25

## 2022-05-25 ENCOUNTER — HOSPITAL ENCOUNTER (OUTPATIENT)
Dept: ULTRASOUND IMAGING | Facility: HOSPITAL | Age: 47
Discharge: HOME | End: 2022-05-25
Admitting: NURSE PRACTITIONER

## 2022-05-25 DIAGNOSIS — K76.0 FATTY LIVER: ICD-10-CM

## 2022-05-25 DIAGNOSIS — I10 ESSENTIAL (PRIMARY) HYPERTENSION: ICD-10-CM

## 2022-05-25 DIAGNOSIS — R74.8 ELEVATED LIVER ENZYMES: Primary | ICD-10-CM

## 2022-05-25 DIAGNOSIS — N30.10 INTERSTITIAL CYSTITIS: Primary | ICD-10-CM

## 2022-05-25 DIAGNOSIS — R74.8 ELEVATED LIVER ENZYMES: ICD-10-CM

## 2022-05-25 DIAGNOSIS — K75.81 NASH (NONALCOHOLIC STEATOHEPATITIS): ICD-10-CM

## 2022-05-25 DIAGNOSIS — R10.11 RIGHT UPPER QUADRANT ABDOMINAL PAIN: ICD-10-CM

## 2022-05-25 PROCEDURE — 76705 ECHO EXAM OF ABDOMEN: CPT

## 2022-05-25 PROCEDURE — 51700 IRRIGATION OF BLADDER: CPT | Performed by: UROLOGY

## 2022-05-25 NOTE — PROGRESS NOTES
Interstitial cystitis.    Patient also has low IgG.    Patient is much better after instillation of this medicine.  No more cramps.    Procedure.    Patient was placed in lithotomy position and thorough scrubbing of lower abdomen external genitalia was performed.  Examination of the external genitalia does not reveal any inflammation or evidence of yeast infection.  I went ahead with insertion of a 12 red rubber catheter and inserted 15 mL of sodium bicarbonate +100 mg of Solu-Cortef +20,000 units of heparin +80 mg of gentamicin and 12 mL of 2% lidocaine.    Patient tolerated her procedure very well redo it in about 10 days time.    I have also recommended injection of Botox in the detrusor which is being recommended by AUA now.    Urinalysis including microscopy was normal

## 2022-05-27 RX ADMIN — HEPARIN SODIUM 10000 UNITS: 1000 INJECTION, SOLUTION INTRAVENOUS; SUBCUTANEOUS at 13:41

## 2022-05-29 PROCEDURE — 87086 URINE CULTURE/COLONY COUNT: CPT | Performed by: FAMILY MEDICINE

## 2022-05-31 ENCOUNTER — TELEPHONE (OUTPATIENT)
Dept: URGENT CARE | Facility: CLINIC | Age: 47
End: 2022-05-31

## 2022-05-31 NOTE — TELEPHONE ENCOUNTER
----- Message from LAZARO Carrillo sent at 5/31/2022 11:25 AM EDT -----  Please call the patient regarding her negative result. Urine culture shows normal efrain which is not indicative of infection. Please follow up with your primary care if symptoms persist.

## 2022-06-02 ENCOUNTER — LAB (OUTPATIENT)
Dept: LAB | Facility: HOSPITAL | Age: 47
End: 2022-06-02

## 2022-06-02 PROCEDURE — 84450 TRANSFERASE (AST) (SGOT): CPT | Performed by: NURSE PRACTITIONER

## 2022-06-02 PROCEDURE — 82465 ASSAY BLD/SERUM CHOLESTEROL: CPT | Performed by: NURSE PRACTITIONER

## 2022-06-02 PROCEDURE — 82172 ASSAY OF APOLIPOPROTEIN: CPT | Performed by: NURSE PRACTITIONER

## 2022-06-02 PROCEDURE — 36415 COLL VENOUS BLD VENIPUNCTURE: CPT | Performed by: NURSE PRACTITIONER

## 2022-06-02 PROCEDURE — 82247 BILIRUBIN TOTAL: CPT | Performed by: NURSE PRACTITIONER

## 2022-06-02 PROCEDURE — 82977 ASSAY OF GGT: CPT | Performed by: NURSE PRACTITIONER

## 2022-06-02 PROCEDURE — 84478 ASSAY OF TRIGLYCERIDES: CPT | Performed by: NURSE PRACTITIONER

## 2022-06-02 PROCEDURE — 83010 ASSAY OF HAPTOGLOBIN QUANT: CPT | Performed by: NURSE PRACTITIONER

## 2022-06-02 PROCEDURE — 84460 ALANINE AMINO (ALT) (SGPT): CPT | Performed by: NURSE PRACTITIONER

## 2022-06-02 PROCEDURE — 82947 ASSAY GLUCOSE BLOOD QUANT: CPT | Performed by: NURSE PRACTITIONER

## 2022-06-02 PROCEDURE — 83883 ASSAY NEPHELOMETRY NOT SPEC: CPT | Performed by: NURSE PRACTITIONER

## 2022-06-04 LAB
A2 MACROGLOB SERPL-MCNC: 151 MG/DL (ref 110–276)
ALT SERPL W P-5'-P-CCNC: 39 IU/L (ref 0–40)
APO A-I SERPL-MCNC: 140 MG/DL (ref 116–209)
AST SERPL W P-5'-P-CCNC: 35 IU/L (ref 0–40)
BILIRUB SERPL-MCNC: 0.2 MG/DL (ref 0–1.2)
CHOLEST SERPL-MCNC: 204 MG/DL (ref 100–199)
FIBROSIS SCORING:: ABNORMAL
FIBROSIS STAGE SERPL QL: ABNORMAL
GGT SERPL-CCNC: 85 IU/L (ref 0–60)
GLUCOSE SERPL-MCNC: 129 MG/DL (ref 65–99)
HAPTOGLOB SERPL-MCNC: 158 MG/DL (ref 42–296)
INTERPRETATIONS: (REFERENCE): ABNORMAL
LABORATORY COMMENT REPORT: ABNORMAL
LIVER FIBR SCORE SERPL CALC.FIBROSURE: 0.08 (ref 0–0.21)
NASH SCORING (REFERENCE): ABNORMAL
NECROINFLAMMATORY ACT GRADE SERPL QL: ABNORMAL
NECROINFLAMMATORY ACT SCORE SERPL: 0.5
SERVICE CMNT-IMP: ABNORMAL
STEATOSIS GRADE (REFERENCE): ABNORMAL
STEATOSIS GRADING (REFERENCE): ABNORMAL
STEATOSIS SCORE (REFERENCE): 0.83 (ref 0–0.3)
TRIGL SERPL-MCNC: 181 MG/DL (ref 0–149)

## 2022-06-06 ENCOUNTER — PROCEDURE VISIT (OUTPATIENT)
Dept: UROLOGY | Facility: CLINIC | Age: 47
End: 2022-06-06

## 2022-06-06 DIAGNOSIS — N30.10 INTERSTITIAL CYSTITIS: Primary | ICD-10-CM

## 2022-06-06 DIAGNOSIS — N39.0 URINARY TRACT INFECTION WITHOUT HEMATURIA, SITE UNSPECIFIED: ICD-10-CM

## 2022-06-06 PROCEDURE — 51700 IRRIGATION OF BLADDER: CPT | Performed by: UROLOGY

## 2022-06-06 RX ADMIN — Medication 50 MEQ: at 14:18

## 2022-06-06 RX ADMIN — GENTAMICIN SULFATE 80 MG: 40 INJECTION, SOLUTION INTRAMUSCULAR; INTRAVENOUS at 14:15

## 2022-06-06 NOTE — PROGRESS NOTES
Patient has a severe form of interstitial cystitis and is taking intravesical cocktail treatment.  She had a recent urinary tract infection with fever and chills and was treated with Bactrim but the culture was negative.  Patient has multiple medical problems.    Procedure.    Patient was placed lithotomy position.  And thorough scrubbing of lower abdomen external genitalia was performed.  12 Ur[place] red rubber catheter was inserted in the urethra and bladder was drained.  I went ahead with injection of 12 mL of 2% Xylocaine, 20,000 units of heparin, 80 mg of gentamicin and 100 mg Solu-Cortef in the urinary bladder.  We had to add 10 mL of water to dilute all the contents together.  Patient tolerated procedure well.    We are going to redo it next week.  Looking at the new AUA advice, he might give her 100 units of Botox in the detrusor muscle.  Patient is agreeable and we will see her next week

## 2022-06-07 DIAGNOSIS — K76.0 FATTY LIVER: ICD-10-CM

## 2022-06-07 DIAGNOSIS — R74.8 ELEVATED LIVER ENZYMES: Primary | ICD-10-CM

## 2022-06-07 DIAGNOSIS — K75.81 NASH (NONALCOHOLIC STEATOHEPATITIS): ICD-10-CM

## 2022-06-14 ENCOUNTER — PROCEDURE VISIT (OUTPATIENT)
Dept: UROLOGY | Facility: CLINIC | Age: 47
End: 2022-06-14

## 2022-06-14 DIAGNOSIS — N39.0 RECURRENT UTI: ICD-10-CM

## 2022-06-14 DIAGNOSIS — N30.10 INTERSTITIAL CYSTITIS: Primary | ICD-10-CM

## 2022-06-14 PROCEDURE — 51700 IRRIGATION OF BLADDER: CPT | Performed by: UROLOGY

## 2022-06-14 RX ADMIN — GENTAMICIN SULFATE 80 MG: 40 INJECTION, SOLUTION INTRAMUSCULAR; INTRAVENOUS at 14:02

## 2022-06-14 RX ADMIN — Medication 50 MEQ: at 14:08

## 2022-06-14 NOTE — PROGRESS NOTES
Preprocedure diagnosis.  Interstitial cystitis.    Recurrent urinary tract infection.    Postop diagnosis same.    Operation performed instillation of bladder cocktail in the urinary bladder.    Clinical note.  Patient has severe form of interstitial cystitis and recurrent urinary tract infection.  She is having a lot of bladder spasms and currently she is on weekly instillation of bladder cocktail.    Procedure.  Patient was placed lithotomy position and prepped with Hibiclens.  12 [place] red rubber catheter was used and we injected 100 mg of Solu-Cortef, 80 mg of gentamicin, 20,000 units of heparin, 12 mL of Xylocaine 2%.  Injection was done after patient emptied the urinary bladder but was only few mL came out from the urinary bladder.  She tolerated her procedure well and we are going to repeat the injection next week.    I have given her an option of injection of 100 units of Botox in the bladder which is indicated for IC and she is going to think about that

## 2022-06-17 RX ORDER — GENTAMICIN SULFATE 40 MG/ML
80 INJECTION, SOLUTION INTRAMUSCULAR; INTRAVENOUS ONCE
Status: COMPLETED | OUTPATIENT
Start: 2022-06-14 | End: 2022-06-14

## 2022-06-17 RX ORDER — HEPARIN SODIUM 1000 [USP'U]/ML
10000 INJECTION, SOLUTION INTRAVENOUS; SUBCUTANEOUS ONCE
Status: COMPLETED | OUTPATIENT
Start: 2022-06-14 | End: 2022-06-17

## 2022-06-17 RX ORDER — GENTAMICIN SULFATE 40 MG/ML
80 INJECTION, SOLUTION INTRAMUSCULAR; INTRAVENOUS ONCE
Status: COMPLETED | OUTPATIENT
Start: 2022-06-06 | End: 2022-06-06

## 2022-06-17 RX ORDER — HEPARIN SODIUM 1000 [USP'U]/ML
10000 INJECTION, SOLUTION INTRAVENOUS; SUBCUTANEOUS ONCE
Status: COMPLETED | OUTPATIENT
Start: 2022-06-06 | End: 2022-06-17

## 2022-06-17 RX ADMIN — HEPARIN SODIUM 10000 UNITS: 1000 INJECTION, SOLUTION INTRAVENOUS; SUBCUTANEOUS at 14:03

## 2022-06-17 RX ADMIN — HEPARIN SODIUM 10000 UNITS: 1000 INJECTION, SOLUTION INTRAVENOUS; SUBCUTANEOUS at 14:16

## 2022-06-20 RX ORDER — GENTAMICIN SULFATE 40 MG/ML
80 INJECTION, SOLUTION INTRAMUSCULAR; INTRAVENOUS ONCE
Status: COMPLETED | OUTPATIENT
Start: 2022-06-20 | End: 2022-06-20

## 2022-06-20 RX ORDER — GENTAMICIN SULFATE 40 MG/ML
80 INJECTION, SOLUTION INTRAMUSCULAR; INTRAVENOUS ONCE
Status: COMPLETED | OUTPATIENT
Start: 2022-06-20 | End: 2022-05-24

## 2022-06-20 RX ORDER — HEPARIN SODIUM 1000 [USP'U]/ML
10000 INJECTION, SOLUTION INTRAVENOUS; SUBCUTANEOUS ONCE
Status: COMPLETED | OUTPATIENT
Start: 2022-05-17 | End: 2022-07-06

## 2022-06-20 RX ORDER — HEPARIN SODIUM 1000 [USP'U]/ML
10000 INJECTION, SOLUTION INTRAVENOUS; SUBCUTANEOUS ONCE
Status: COMPLETED | OUTPATIENT
Start: 2022-06-20 | End: 2022-05-27

## 2022-06-20 RX ADMIN — GENTAMICIN SULFATE 80 MG: 40 INJECTION, SOLUTION INTRAMUSCULAR; INTRAVENOUS at 13:44

## 2022-06-20 RX ADMIN — Medication 50 MEQ: at 13:43

## 2022-07-06 ENCOUNTER — PROCEDURE VISIT (OUTPATIENT)
Dept: UROLOGY | Facility: CLINIC | Age: 47
End: 2022-07-06

## 2022-07-06 DIAGNOSIS — N32.89 BLADDER SPASMS: Primary | ICD-10-CM

## 2022-07-06 DIAGNOSIS — R10.2 SUPRAPUBIC PAIN: ICD-10-CM

## 2022-07-06 DIAGNOSIS — N30.10 INTERSTITIAL CYSTITIS: ICD-10-CM

## 2022-07-06 DIAGNOSIS — N32.89 BLADDER SPASMS: ICD-10-CM

## 2022-07-06 DIAGNOSIS — N39.0 RECURRENT UTI: Primary | ICD-10-CM

## 2022-07-06 LAB
BACTERIA UR QL AUTO: NORMAL /HPF
EPI CELLS #/AREA URNS HPF: 0 /[HPF]
RBC # UR STRIP: NORMAL /HPF
RENAL EPITHELIAL, POC: 0
UNIDENT CRYS URNS QL MICRO: NORMAL /HPF
WBC # UR STRIP: NORMAL /HPF

## 2022-07-06 PROCEDURE — 51700 IRRIGATION OF BLADDER: CPT | Performed by: UROLOGY

## 2022-07-06 PROCEDURE — 81015 MICROSCOPIC EXAM OF URINE: CPT | Performed by: UROLOGY

## 2022-07-06 PROCEDURE — 87086 URINE CULTURE/COLONY COUNT: CPT | Performed by: UROLOGY

## 2022-07-06 RX ORDER — PHENAZOPYRIDINE HYDROCHLORIDE 95 MG/1
95 TABLET ORAL 3 TIMES DAILY PRN
Qty: 21 TABLET | Refills: 0 | Status: SHIPPED | OUTPATIENT
Start: 2022-07-06 | End: 2022-07-13

## 2022-07-06 RX ADMIN — Medication 50 MEQ: at 12:50

## 2022-07-06 RX ADMIN — HEPARIN SODIUM 10000 UNITS: 1000 INJECTION, SOLUTION INTRAVENOUS; SUBCUTANEOUS at 12:47

## 2022-07-06 RX ADMIN — GENTAMICIN SULFATE 80 MG: 40 INJECTION, SOLUTION INTRAMUSCULAR; INTRAVENOUS at 12:49

## 2022-07-06 NOTE — PROGRESS NOTES
Indication.  Recurrent urinary tract infection.    Interstitial cystitis.    Suprapubic pain    Procedure.  Patient was placed in lithotomy position and thorough scrubbing of external genitalia and meatus was performed with Hibiclens.  14 Amharic red rubber catheter was inserted through the meatus and the urinary bladder.  Urine was drained.  I went ahead with injection of 80 mg of gentamicin in 40 ml of normal saline and 20,000 units of heparin, 100 mg of Solu-Cortef and 15 mL of soda bicarb in the urinary bladder.  Patient is requested to keep this antibiotic in the urine for at least 2 hours before urination.  She tolerated her procedure well.    Her microscopy of urine was clear but UA showed nitrite positive which could be false positive because of Azo patient is taking.  Will do urine culture because in about 2 weeks we are going to inject Botox in the urinary bladder for IC.

## 2022-07-07 LAB — BACTERIA SPEC AEROBE CULT: NO GROWTH

## 2022-07-12 ENCOUNTER — PROCEDURE VISIT (OUTPATIENT)
Dept: UROLOGY | Facility: CLINIC | Age: 47
End: 2022-07-12

## 2022-07-12 DIAGNOSIS — N39.0 RECURRENT UTI: ICD-10-CM

## 2022-07-12 DIAGNOSIS — N30.10 INTERSTITIAL CYSTITIS: Primary | ICD-10-CM

## 2022-07-12 PROCEDURE — 51700 IRRIGATION OF BLADDER: CPT | Performed by: UROLOGY

## 2022-07-12 RX ORDER — OXYCODONE HYDROCHLORIDE 5 MG/1
5 TABLET ORAL EVERY 8 HOURS PRN
Qty: 2 TABLET | Refills: 0 | Status: SHIPPED | OUTPATIENT
Start: 2022-07-12 | End: 2022-07-14

## 2022-07-12 RX ADMIN — Medication 50 MEQ: at 11:01

## 2022-07-12 RX ADMIN — GENTAMICIN SULFATE 80 MG: 40 INJECTION, SOLUTION INTRAMUSCULAR; INTRAVENOUS at 11:00

## 2022-07-12 NOTE — PROGRESS NOTES
Indication.  Recurrent urinary tract infection and interstitial cystitis    Procedure.  Patient was placed in lithotomy position and thorough scrubbing of external genitalia and meatus was performed with Hibiclens.  14 Persian red rubber catheter was inserted through the meatus and the urinary bladder.  Urine was drained.  I went ahead with injection of 80 mg of gentamicin, 15 mL of soda bicarb, and 12 mL of 2% Xylocaine and Solu-Cortef 100 mg in 40 ml of normal saline in the urinary bladder.  Patient requested to keep this concoction in the urine for at least 2 hours before urination.  She tolerated her procedure well.

## 2022-07-13 RX ORDER — GENTAMICIN SULFATE 40 MG/ML
80 INJECTION, SOLUTION INTRAMUSCULAR; INTRAVENOUS ONCE
Status: COMPLETED | OUTPATIENT
Start: 2022-07-13 | End: 2022-07-06

## 2022-07-20 RX ORDER — GENTAMICIN SULFATE 40 MG/ML
80 INJECTION, SOLUTION INTRAMUSCULAR; INTRAVENOUS ONCE
Status: COMPLETED | OUTPATIENT
Start: 2022-07-12 | End: 2022-07-12

## 2022-07-21 ENCOUNTER — PROCEDURE VISIT (OUTPATIENT)
Dept: UROLOGY | Facility: CLINIC | Age: 47
End: 2022-07-21

## 2022-07-21 DIAGNOSIS — N39.0 RECURRENT UTI: ICD-10-CM

## 2022-07-21 DIAGNOSIS — N30.10 INTERSTITIAL CYSTITIS: Primary | ICD-10-CM

## 2022-07-21 PROCEDURE — 52287 CYSTOSCOPY CHEMODENERVATION: CPT | Performed by: UROLOGY

## 2022-07-21 NOTE — PROGRESS NOTES
Date.  7/21/2022    Preop diagnosis.  Interstitial cystitis.    Suprapubic cramps.    Recurrent UTI.  It is resolved at this time.  Postop diagnosis same.    Operation performed.    Patient was placed lithotomy position and 2% Xylocaine was injected in the urethra.    20 mL of chilled 2% Xylocaine was injected in the bladder after inserting a catheter in the urinary bladder.  We waited for 6 minutes and then 18 French Olympus cystoscope was inserted in the urinary bladder.  Patient has lot of Pyridium in the urinary bladder and the bladder was washed.  I went ahead with injection of Botox between mucosa and detrusor muscle in 10 mL of normal saline and 100 units of Botox.  Patient had a lot of pain during the procedure.  There was no bleeding after the injection.  Scope was removed and she tolerated her procedure very well.    Both ureteral orifices are normal.  There is no bladder tumor present.    I am going to give her Oxy IR 5 to 10 mg every 4 hours as needed for pain and 6 tablets are given because of the pain.  We will recheck her in 2 weeks time.  We will give her Siegel catheters for self-catheterization in case she goes into urinary retention.

## 2022-08-30 ENCOUNTER — TRANSCRIBE ORDERS (OUTPATIENT)
Dept: ADMINISTRATIVE | Facility: HOSPITAL | Age: 47
End: 2022-08-30

## 2022-08-30 DIAGNOSIS — K75.81 NASH (NONALCOHOLIC STEATOHEPATITIS): Primary | ICD-10-CM

## 2022-09-01 RX ORDER — ADALIMUMAB 40MG/0.4ML
KIT SUBCUTANEOUS
Qty: 2 EACH | Refills: 2 | Status: SHIPPED | OUTPATIENT
Start: 2022-09-01

## 2022-09-13 RX ORDER — DICYCLOMINE HYDROCHLORIDE 10 MG/1
10 CAPSULE ORAL 4 TIMES DAILY PRN
Qty: 120 CAPSULE | Refills: 11 | Status: SHIPPED | OUTPATIENT
Start: 2022-09-13

## 2022-09-19 ENCOUNTER — HOSPITAL ENCOUNTER (OUTPATIENT)
Dept: ULTRASOUND IMAGING | Facility: HOSPITAL | Age: 47
Discharge: HOME | End: 2022-09-19
Admitting: STUDENT IN AN ORGANIZED HEALTH CARE EDUCATION/TRAINING PROGRAM

## 2022-09-19 DIAGNOSIS — K75.81 NASH (NONALCOHOLIC STEATOHEPATITIS): ICD-10-CM

## 2022-09-19 PROCEDURE — 76705 ECHO EXAM OF ABDOMEN: CPT

## 2022-10-05 ENCOUNTER — OFFICE VISIT (OUTPATIENT)
Dept: GASTROENTEROLOGY | Facility: CLINIC | Age: 47
End: 2022-10-05

## 2022-10-05 DIAGNOSIS — K75.81 NASH (NONALCOHOLIC STEATOHEPATITIS): Chronic | ICD-10-CM

## 2022-10-05 DIAGNOSIS — K50.90 CROHN'S DISEASE WITHOUT COMPLICATION, UNSPECIFIED GASTROINTESTINAL TRACT LOCATION: Primary | Chronic | ICD-10-CM

## 2022-10-05 DIAGNOSIS — K21.9 GASTROESOPHAGEAL REFLUX DISEASE, UNSPECIFIED WHETHER ESOPHAGITIS PRESENT: Chronic | ICD-10-CM

## 2022-10-05 DIAGNOSIS — Z90.410 HISTORY OF PANCREATECTOMY: Chronic | ICD-10-CM

## 2022-10-05 DIAGNOSIS — Z79.899 HIGH RISK MEDICATION USE: Chronic | ICD-10-CM

## 2022-10-05 PROCEDURE — 99443 PR PHYS/QHP TELEPHONE EVALUATION 21-30 MIN: CPT | Performed by: NURSE PRACTITIONER

## 2022-10-05 RX ORDER — NITROFURANTOIN MACROCRYSTALS 50 MG/1
CAPSULE ORAL
COMMUNITY
Start: 2022-08-10

## 2022-10-05 RX ORDER — LISINOPRIL 10 MG/1
10 TABLET ORAL
COMMUNITY
Start: 2022-05-06

## 2022-10-05 RX ORDER — IMMUNE GLOBULIN INTRAVENOUS (HUMAN) 100 MG/ML
SOLUTION INTRAVENOUS SEE ADMIN INSTRUCTIONS
COMMUNITY

## 2022-10-05 RX ORDER — MILK THISTLE 500 MG
CAPSULE ORAL SEE ADMIN INSTRUCTIONS
COMMUNITY

## 2022-10-05 RX ORDER — PANTOPRAZOLE SODIUM 40 MG/1
40 TABLET, DELAYED RELEASE ORAL 2 TIMES DAILY
Qty: 60 TABLET | Refills: 11 | Status: SHIPPED | OUTPATIENT
Start: 2022-10-05 | End: 2022-12-29 | Stop reason: SDUPTHER

## 2022-10-05 RX ORDER — PROMETHAZINE HYDROCHLORIDE 25 MG/ML
INJECTION, SOLUTION INTRAMUSCULAR; INTRAVENOUS
COMMUNITY
Start: 2022-10-04

## 2022-10-05 RX ORDER — HYDROCORTISONE 5 MG/1
15 TABLET ORAL
COMMUNITY
Start: 2022-08-03

## 2022-10-05 RX ORDER — CETIRIZINE HYDROCHLORIDE 10 MG/1
10 TABLET ORAL DAILY
COMMUNITY
Start: 2022-07-12

## 2022-10-05 RX ORDER — HEPARIN SODIUM 10000 [USP'U]/100ML
INJECTION, SOLUTION INTRAVENOUS CONTINUOUS
COMMUNITY

## 2022-10-05 RX ORDER — TRAZODONE HYDROCHLORIDE 100 MG/1
150 TABLET ORAL
COMMUNITY
Start: 2022-05-06 | End: 2022-10-05

## 2022-10-05 RX ORDER — TRAZODONE HYDROCHLORIDE 100 MG/1
100 TABLET ORAL
COMMUNITY
Start: 2022-07-12 | End: 2022-10-05

## 2022-10-05 RX ORDER — IBUPROFEN 600 MG/1
600 TABLET ORAL EVERY 8 HOURS PRN
COMMUNITY
Start: 2022-05-06

## 2022-10-05 RX ORDER — DIPHENHYDRAMINE HYDROCHLORIDE 50 MG/ML
INJECTION INTRAMUSCULAR; INTRAVENOUS
COMMUNITY
Start: 2022-09-21

## 2022-10-05 RX ORDER — INSULIN LISPRO 100 [IU]/ML
INJECTION, SOLUTION INTRAVENOUS; SUBCUTANEOUS
COMMUNITY
Start: 2022-05-06

## 2022-10-05 RX ORDER — ONDANSETRON 2 MG/ML
INJECTION INTRAMUSCULAR; INTRAVENOUS
COMMUNITY
Start: 2022-10-04

## 2022-10-05 RX ORDER — LEVOTHYROXINE SODIUM 0.12 MG/1
1 TABLET ORAL DAILY
COMMUNITY
Start: 2022-08-08

## 2022-10-05 RX ORDER — BREXPIPRAZOLE 1 MG/1
1 TABLET ORAL
COMMUNITY
Start: 2022-08-03

## 2022-10-05 RX ORDER — ESCITALOPRAM OXALATE 10 MG/1
10 TABLET ORAL
COMMUNITY
Start: 2022-05-06 | End: 2022-10-05

## 2022-10-05 RX ORDER — SENNOSIDES 8.6 MG
CAPSULE ORAL
COMMUNITY
Start: 2022-08-10

## 2022-10-05 NOTE — PATIENT INSTRUCTIONS
1.  For GERD, continue pantoprazole.    2.  For your history of pancreatectomy, continue pancreatic enzymes.    3.  For management of your gastroparesis, continue follow-up with  [facility] regarding her IVIG infusions and adjustment of your gastric stimulator.    4.  For Crohn's disease, continue Maylin injections every 2 weeks.    5.  For ongoing monitoring of high risk medication use, you are due to have your TB QuantiFERON gold and acute hepatitis panel checked.  We will mail you copies of your lab work requisitions and you can have your lab work drawn with your next set of blood work.    6.  Please be sure to keep your upcoming referral with hematologist, Dr. Jake Zhang at  hepatology for evaluation and ongoing monitoring of cirrhosis.    7.  For intermittent nausea and vomiting secondary to gastroparesis, you may continue to use Zofran and Phenergan as prescribed.    8.  We will plan for video visit on 3/8/2022 at 10-15 a.m. for reassessment of symptoms, or sooner should symptoms worsen/recur.

## 2022-10-05 NOTE — PROGRESS NOTES
Chief Complaint   Patient presents with   • Follow-up     GERD, WELDON, gastroparesis, Crohn's disease         History of Present Illness  47-year-old female presents today for telephone follow-up.  She was last seen on 4/21/2022.  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  Previous diagnoses consisted of pancreas divisum.  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  She continues Creon daily.    For GERD she treats with pantoprazole 40 mg twice daily with good control of symptoms.  Domperidone was previously used for management of gastroparesis.  However, she had her permanent gastric stimulator placed August 2022.  She states that she has undergone 1 adjustment thus far.  She reports that her symptoms were better controlled with the temporary gastric stimulator and she anticipates further adjustments..  Zofran and Phenergan are used intermittently to treat her nausea.  She is currently receiving IVIG infusions weekly through  GI motility and states that this is an indefinite treatment for now.    Crohn's disease is well managed with Humira injections every 2 weeks.  Last colonoscopy was performed on 4/24/2019.  She states that since her gastroparesis is now well managed.  She is no longer experiencing any constipation and she is now having regular bowel movements.  She denies any melena or hematochezia.  She does report a weight loss of 10 pounds since placement of her stimulator.    Her most recent FibroScan on 3/30/2022 demonstrated a fibrosis score of F4 with moderate to severe liver fat.  She does have a history of Weldon.  She underwent liver biopsy 2 weeks ago which demonstrated findings consistent with cirrhosis.  Most recent ultrasound also demonstrated cirrhosis.  Referral has been placed to Dr. Jake Zhang,  hepatology and appointment is  scheduled for February 2023.    You have chosen to receive care through a telephone visit.   Do you consent to use a telephone visit for your medical care today? Yes    Review of Systems   Constitutional: Positive for unexpected weight change. Negative for fever.   HENT: Negative for trouble swallowing.    Cardiovascular: Negative for chest pain.      Result Review :      Office Visit with Mariela Gr APRN (04/21/2022)  US Abdomen Limited (09/19/2022 10:47)  NM gastric emptying study (05/26/2022 08:09)  US Liver (05/25/2022 08:48)  SCANNED - COLONOSCOPY (04/24/2019)  ENDOSCOPY, INT (09/08/2019)  ENDOSCOPY, INT (05/16/2019)  BASIC METABOLIC PANEL (08/10/2022 08:06)  HEMOGLOBIN (08/10/2022 08:05)    Assessment and Plan    Diagnoses and all orders for this visit:    1. Crohn's disease without complication, unspecified gastrointestinal tract location (HCC) (Primary)  -     Cancel: QuantiFERON TB Gold  -     Cancel: Hepatitis Panel, Acute  -     Hepatitis Panel, Acute; Future  -     QuantiFERON TB Gold; Future    2. High risk medication use  -     Cancel: QuantiFERON TB Gold  -     Cancel: Hepatitis Panel, Acute  -     Hepatitis Panel, Acute; Future  -     QuantiFERON TB Gold; Future    3. WELDON (nonalcoholic steatohepatitis)    4. Gastroesophageal reflux disease, unspecified whether esophagitis present    5. History of pancreatectomy    Other orders  -     pantoprazole (PROTONIX) 40 MG EC tablet; Take 1 tablet by mouth 2 (Two) Times a Day.  Dispense: 60 tablet; Refill: 11         This visit has been rescheduled as a phone visit to comply with patient safety concerns in accordance with CDC recommendations. Total time of discussion was 45 minutes.    Patient Instructions   1.  For GERD, continue pantoprazole.    2.  For your history of pancreatectomy, continue pancreatic enzymes.    3.  For management of your gastroparesis, continue follow-up with [facility] regarding her IVIG infusions and adjustment of your  gastric stimulator.    4.  For Crohn's disease, continue Maylin injections every 2 weeks.    5.  For ongoing monitoring of high risk medication use, you are due to have your TB QuantiFERON gold and acute hepatitis panel checked.  We will mail you copies of your lab work requisitions and you can have your lab work drawn with your next set of blood work.    6.  Please be sure to keep your upcoming referral with hematologist, Dr. Jake Zhang at  hepatology for evaluation and ongoing monitoring of cirrhosis.    7.  For intermittent nausea and vomiting secondary to gastroparesis, you may continue to use Zofran and Phenergan as prescribed.    8.  We will plan for video visit on 3/8/2022 at 10-15 a.m. for reassessment of symptoms, or sooner should symptoms worsen/recur.     Discussion:    We will have the patient continue pantoprazole for management of GERD.  Patient to continue to follow with  GI motility for management of her gastric stimulator which was placed for gastroparesis.  She will also continue IVIG infusions through  GI motility.    For new diagnosis of cirrhosis and longstanding history of WELDON, patient has an upcoming appointment with Dr. Jake Zhang at  hepatology.    For Crohn's disease, patient to continue Humira injections every 2 weeks.  Lab work has been ordered and lab work requisitions will be mailed to the patient so that she can have lab work drawn during her next of lab work.  We will plan for video visit in 6 months for reassessment of symptoms, or sooner should symptoms worsen/recur.  Patient verbalized understanding of above plan of care and is in agreement.  All questions answered and support provided.    EMR Dragon/Transcription Disclaimer:  This document has been Dictated utilizing Dragon dictation.

## 2022-11-10 ENCOUNTER — LAB (OUTPATIENT)
Dept: LAB | Facility: HOSPITAL | Age: 47
End: 2022-11-10

## 2022-11-10 PROCEDURE — 86480 TB TEST CELL IMMUN MEASURE: CPT | Performed by: NURSE PRACTITIONER

## 2022-11-10 PROCEDURE — 80074 ACUTE HEPATITIS PANEL: CPT | Performed by: NURSE PRACTITIONER

## 2022-11-15 ENCOUNTER — TELEPHONE (OUTPATIENT)
Dept: GASTROENTEROLOGY | Facility: CLINIC | Age: 47
End: 2022-11-15

## 2022-11-15 NOTE — TELEPHONE ENCOUNTER
Received fax from [employer] requesting refill of Domperidone.  Per , patient must have an EKG prior to any refills being authorized.  Attemped to contact patient via cell phone but there is no voicemail.  Home phone does not give a name.

## 2022-11-18 DIAGNOSIS — K75.81 NASH (NONALCOHOLIC STEATOHEPATITIS): ICD-10-CM

## 2022-11-18 DIAGNOSIS — K74.60 CIRRHOSIS OF LIVER WITHOUT ASCITES, UNSPECIFIED HEPATIC CIRRHOSIS TYPE: Primary | ICD-10-CM

## 2022-11-21 ENCOUNTER — TELEPHONE (OUTPATIENT)
Dept: GASTROENTEROLOGY | Facility: CLINIC | Age: 47
End: 2022-11-21

## 2022-11-21 NOTE — TELEPHONE ENCOUNTER
----- Message from LAZARO Frost sent at 11/18/2022  2:03 PM EST -----  Regarding: FW: [facility] referral  Contact: [phone]  Hi Sherice,    Please see the patient's message below.  I will place referral to the [facility] through [employer] and she should be contacted to schedule the appointment.  Please contact the patient to let her know that I placed the referral to the [facility].  Once the appointment has been made, they will contact us for records.  Thank you.  Mariela WILLIS  ----- Message -----  From: Mariela Gr APRN  Sent: 11/17/2022   7:38 AM EST  To: LAZARO Frost  Subject: FW: [facility] referral                      ----- Message -----  From: Magi Mandujano MA  Sent: 11/16/2022   2:42 PM EST  To: LAZARO Frost  Subject: FW: [facility] referral                      ----- Message -----  From: Kayla Gan  Sent: 11/16/2022   2:12 PM EST  To: [facility]  Subject: [facility] referral                        Fatou Sierra I would like to ask you for a huge favor. Could you please call the [facility] and give them a referral for me?? I have spoken to them and they don’t accept out of state Medicaid but if they get a referral they have a team that will make a decision to see me and they will eat the cost so I won’t have to pay for it. I want to go regarding my liver cirrhosis diagnosis. They have many clinical trials and I want to get into one there and they also have more treatment options than Dr Zhang. I would ask him but he does not use my chart and is difficult to get in contact with. I’m just very scared about my prognosis and they are one of the best facilities. The phone # [phone] and they said for you to call this number to give them the information. You have always been so great with me and I genuinely appreciate all you’ve done and how much you’ve listened and helped me. I would greatly  appreciate the help.

## 2022-11-21 NOTE — TELEPHONE ENCOUNTER
Called patient to let her know jh placed a referral. Patient verbalized understanding and appreciation. richy

## 2022-11-22 DIAGNOSIS — K31.84 GASTROPARESIS: ICD-10-CM

## 2022-11-22 DIAGNOSIS — Z79.899 HIGH RISK MEDICATION USE: Primary | ICD-10-CM

## 2022-11-23 ENCOUNTER — TELEPHONE (OUTPATIENT)
Dept: GASTROENTEROLOGY | Facility: CLINIC | Age: 47
End: 2022-11-23

## 2022-11-23 NOTE — TELEPHONE ENCOUNTER
----- Message from LAZARO Frost sent at 11/22/2022  4:13 PM EST -----  Regarding: FW: Domperidone   Contact: [phone]  We need to get an updated EKG on the patient's file.  She has not had 1 in the past year, in order to receive domperidone therapy, we we will need to have her EKG updated to ensure that her QT and QTc intervals are within normal range.  I will place an order for an EKG.  She can have this done at any [facility] facility or outside facility of her choosing.  Once the EKG has been received and as long as the QT/QTc interval is normal, we can then send in a prescription for domperidone.  Please contact the patient to discuss.  Thank you.  Mariela WILLIS  ----- Message -----  From: Magi Mandujano MA  Sent: 11/22/2022  10:07 AM EST  To: LAZARO Frost  Subject: FW: Domperidone                                    ----- Message -----  From: Kayla Gan  Sent: 11/22/2022  10:03 AM EST  To: [facility]  Subject: Domperidone                                      Can you call in a new prescription for Domperidone to the [facility] pharmacy? They said they have faxed over a refill request. Thanks

## 2022-12-01 ENCOUNTER — TRANSCRIBE ORDERS (OUTPATIENT)
Dept: GASTROENTEROLOGY | Facility: CLINIC | Age: 47
End: 2022-12-01

## 2022-12-01 ENCOUNTER — HOSPITAL ENCOUNTER (OUTPATIENT)
Dept: CARDIOLOGY | Facility: HOSPITAL | Age: 47
Discharge: HOME | End: 2022-12-01
Admitting: NURSE PRACTITIONER

## 2022-12-01 DIAGNOSIS — K31.84 GASTROPARESIS: ICD-10-CM

## 2022-12-01 DIAGNOSIS — Z79.899 ENCOUNTER FOR LONG-TERM (CURRENT) USE OF OTHER MEDICATIONS: Primary | ICD-10-CM

## 2022-12-01 DIAGNOSIS — Z79.899 ENCOUNTER FOR LONG-TERM (CURRENT) USE OF OTHER MEDICATIONS: ICD-10-CM

## 2022-12-01 LAB — QT INTERVAL: 392 MS

## 2022-12-01 PROCEDURE — 93005 ELECTROCARDIOGRAM TRACING: CPT | Performed by: NURSE PRACTITIONER

## 2022-12-01 PROCEDURE — 93010 ELECTROCARDIOGRAM REPORT: CPT | Performed by: INTERNAL MEDICINE

## 2022-12-07 ENCOUNTER — TELEPHONE (OUTPATIENT)
Dept: GASTROENTEROLOGY | Facility: CLINIC | Age: 47
End: 2022-12-07

## 2022-12-07 NOTE — TELEPHONE ENCOUNTER
APRN contacted patient to review symptoms. She reports that she has experienced severe nausea and vomiting over the past couple of weeks. She is keeping some food and fluids down. She reports a weight loss over the past couple of weeks, but has not weighed herself. She feels her symptoms could be secondary to the IVIG infusions that she has receiving for her gastroparesis through [facility]. She has left a  with [facility] but has not yet heard back. She has not had any episodes of vomiting today but nausea is constant. She continues to take both Phenergan and Zofran with some relief of symptoms.  She does report accompanying abdominal pain.  She has not a CT of her abdomen pelvis since 2020.  She denies any diarrhea or change in bowel habits.  APRN advised patient to proceed to the ER for further evaluation, primarily for control of nausea and vomiting as well as prevention of dehydration.  Patient verbalized understand of above plan of care and is in agreement.  LAZARO requested that the patient send a [employer] message with an update of her symptoms in the next couple of days.  Mariela WILLIS

## 2022-12-07 NOTE — TELEPHONE ENCOUNTER
----- Message from LAZARO Frost sent at 12/7/2022  7:33 AM EST -----  Regarding: FW: Relentless nausea and vomiting   Contact: [phone]    ----- Message -----  From: Kayla Gan  Sent: 12/6/2022   4:11 PM EST  To: [facility]  Subject: Relentless nausea and vomiting                   I am in desperate need of help! I’m having severe nausea daily to the point my quality of life is rapidly deteriorating. My cure regimen is weekly IVIG, daily fluids, iv phenergan, scopolamine patch. I’m just nauseated every day and really struggling with eating due to it. I’m to the point where I want my gj tube back. That way on bad days it would still get nutrients. Please help me. Trying not to go to hospital but I’m thinking I may have to

## 2022-12-19 ENCOUNTER — TRANSCRIBE ORDERS (OUTPATIENT)
Dept: LAB | Facility: HOSPITAL | Age: 47
End: 2022-12-19

## 2022-12-19 DIAGNOSIS — K74.60 HEPATIC CIRRHOSIS, UNSPECIFIED HEPATIC CIRRHOSIS TYPE, UNSPECIFIED WHETHER ASCITES PRESENT: ICD-10-CM

## 2022-12-19 DIAGNOSIS — K75.81 NASH (NONALCOHOLIC STEATOHEPATITIS): Primary | ICD-10-CM

## 2022-12-19 DIAGNOSIS — R77.8 ELEVATED TROPONIN LEVEL: ICD-10-CM

## 2022-12-29 RX ORDER — PANTOPRAZOLE SODIUM 40 MG/1
40 TABLET, DELAYED RELEASE ORAL 2 TIMES DAILY
Qty: 60 TABLET | Refills: 11 | Status: SHIPPED | OUTPATIENT
Start: 2022-12-29

## 2023-01-03 ENCOUNTER — LAB (OUTPATIENT)
Dept: LAB | Facility: HOSPITAL | Age: 48
End: 2023-01-03
Payer: COMMERCIAL

## 2023-01-03 DIAGNOSIS — K74.60 HEPATIC CIRRHOSIS, UNSPECIFIED HEPATIC CIRRHOSIS TYPE, UNSPECIFIED WHETHER ASCITES PRESENT: ICD-10-CM

## 2023-01-03 DIAGNOSIS — K75.81 NASH (NONALCOHOLIC STEATOHEPATITIS): ICD-10-CM

## 2023-01-03 DIAGNOSIS — R77.8 ELEVATED TROPONIN LEVEL: ICD-10-CM

## 2023-01-03 LAB
DEPRECATED RDW RBC AUTO: 40.7 FL (ref 37–54)
ERYTHROCYTE [DISTWIDTH] IN BLOOD BY AUTOMATED COUNT: 11.6 % (ref 12.3–15.4)
HCT VFR BLD AUTO: 37.9 % (ref 34–46.6)
HGB BLD-MCNC: 13.1 G/DL (ref 12–15.9)
INR PPP: 1.11 (ref 0.86–1.15)
MCH RBC QN AUTO: 33 PG (ref 26.6–33)
MCHC RBC AUTO-ENTMCNC: 34.6 G/DL (ref 31.5–35.7)
MCV RBC AUTO: 95.5 FL (ref 79–97)
PLATELET # BLD AUTO: 465 10*3/MM3 (ref 140–450)
PMV BLD AUTO: 11.6 FL (ref 6–12)
PROTHROMBIN TIME: 14.5 SECONDS (ref 11.8–14.9)
RBC # BLD AUTO: 3.97 10*6/MM3 (ref 3.77–5.28)
WBC NRBC COR # BLD: 10.98 10*3/MM3 (ref 3.4–10.8)

## 2023-01-03 PROCEDURE — 36415 COLL VENOUS BLD VENIPUNCTURE: CPT

## 2023-01-03 PROCEDURE — 81256 HFE GENE: CPT

## 2023-01-03 PROCEDURE — 85610 PROTHROMBIN TIME: CPT

## 2023-01-03 PROCEDURE — 80053 COMPREHEN METABOLIC PANEL: CPT

## 2023-01-03 PROCEDURE — 85025 COMPLETE CBC W/AUTO DIFF WBC: CPT

## 2023-01-03 PROCEDURE — 85007 BL SMEAR W/DIFF WBC COUNT: CPT

## 2023-01-04 LAB
ALBUMIN SERPL-MCNC: 4.2 G/DL (ref 3.5–5.2)
ALBUMIN/GLOB SERPL: 1 G/DL
ALP SERPL-CCNC: 137 U/L (ref 39–117)
ALT SERPL W P-5'-P-CCNC: 68 U/L (ref 1–33)
ANION GAP SERPL CALCULATED.3IONS-SCNC: 10.6 MMOL/L (ref 5–15)
AST SERPL-CCNC: 70 U/L (ref 1–32)
BILIRUB SERPL-MCNC: 0.2 MG/DL (ref 0–1.2)
BUN SERPL-MCNC: 12 MG/DL (ref 6–20)
BUN/CREAT SERPL: 17.6 (ref 7–25)
CALCIUM SPEC-SCNC: 9 MG/DL (ref 8.6–10.5)
CHLORIDE SERPL-SCNC: 104 MMOL/L (ref 98–107)
CO2 SERPL-SCNC: 24.4 MMOL/L (ref 22–29)
CREAT SERPL-MCNC: 0.68 MG/DL (ref 0.57–1)
EGFRCR SERPLBLD CKD-EPI 2021: 108.3 ML/MIN/1.73
EOSINOPHIL # BLD MANUAL: 0.22 10*3/MM3 (ref 0–0.4)
EOSINOPHIL NFR BLD MANUAL: 2 % (ref 0.3–6.2)
GLOBULIN UR ELPH-MCNC: 4.2 GM/DL
GLUCOSE SERPL-MCNC: 107 MG/DL (ref 65–99)
LYMPHOCYTES # BLD MANUAL: 5.98 10*3/MM3 (ref 0.7–3.1)
LYMPHOCYTES NFR BLD MANUAL: 8.1 % (ref 5–12)
MONOCYTES # BLD: 0.89 10*3/MM3 (ref 0.1–0.9)
NEUTROPHILS # BLD AUTO: 3.89 10*3/MM3 (ref 1.7–7)
NEUTROPHILS NFR BLD MANUAL: 35.4 % (ref 42.7–76)
PLAT MORPH BLD: NORMAL
POIKILOCYTOSIS BLD QL SMEAR: ABNORMAL
POTASSIUM SERPL-SCNC: 3.9 MMOL/L (ref 3.5–5.2)
PROT SERPL-MCNC: 8.4 G/DL (ref 6–8.5)
SODIUM SERPL-SCNC: 139 MMOL/L (ref 136–145)
VARIANT LYMPHS NFR BLD MANUAL: 54.5 % (ref 19.6–45.3)
WBC MORPH BLD: NORMAL

## 2023-01-06 LAB — HFE GENE MUT ANL BLD/T: NORMAL

## 2023-01-24 ENCOUNTER — HOSPITAL ENCOUNTER (EMERGENCY)
Facility: HOSPITAL | Age: 48
Discharge: HOME | End: 2023-01-24
Attending: EMERGENCY MEDICINE | Admitting: EMERGENCY MEDICINE
Payer: COMMERCIAL

## 2023-01-24 ENCOUNTER — APPOINTMENT (OUTPATIENT)
Dept: CT IMAGING | Facility: HOSPITAL | Age: 48
End: 2023-01-24
Payer: COMMERCIAL

## 2023-01-24 VITALS
DIASTOLIC BLOOD PRESSURE: 95 MMHG | BODY MASS INDEX: 31.9 KG/M2 | HEIGHT: 67 IN | TEMPERATURE: 98.2 F | SYSTOLIC BLOOD PRESSURE: 176 MMHG | RESPIRATION RATE: 16 BRPM | HEART RATE: 73 BPM | WEIGHT: 203.26 LBS | OXYGEN SATURATION: 97 %

## 2023-01-24 DIAGNOSIS — R51.9 NONINTRACTABLE HEADACHE, UNSPECIFIED CHRONICITY PATTERN, UNSPECIFIED HEADACHE TYPE: Primary | ICD-10-CM

## 2023-01-24 LAB
ALBUMIN SERPL-MCNC: 4.1 G/DL (ref 3.5–5.2)
ALBUMIN/GLOB SERPL: 1 G/DL
ALP SERPL-CCNC: 131 U/L (ref 39–117)
ALT SERPL W P-5'-P-CCNC: 62 U/L (ref 1–33)
ANION GAP SERPL CALCULATED.3IONS-SCNC: 9.6 MMOL/L (ref 5–15)
AST SERPL-CCNC: 70 U/L (ref 1–32)
BASOPHILS # BLD AUTO: 0.06 10*3/MM3 (ref 0–0.2)
BASOPHILS NFR BLD AUTO: 0.5 % (ref 0–1.5)
BILIRUB SERPL-MCNC: 0.2 MG/DL (ref 0–1.2)
BUN SERPL-MCNC: 12 MG/DL (ref 6–20)
BUN/CREAT SERPL: 22.2 (ref 7–25)
CALCIUM SPEC-SCNC: 9.2 MG/DL (ref 8.6–10.5)
CHLORIDE SERPL-SCNC: 101 MMOL/L (ref 98–107)
CO2 SERPL-SCNC: 28.4 MMOL/L (ref 22–29)
CREAT SERPL-MCNC: 0.54 MG/DL (ref 0.57–1)
DEPRECATED RDW RBC AUTO: 47.5 FL (ref 37–54)
EGFRCR SERPLBLD CKD-EPI 2021: 114.4 ML/MIN/1.73
EOSINOPHIL # BLD AUTO: 0.31 10*3/MM3 (ref 0–0.4)
EOSINOPHIL NFR BLD AUTO: 2.8 % (ref 0.3–6.2)
ERYTHROCYTE [DISTWIDTH] IN BLOOD BY AUTOMATED COUNT: 13.2 % (ref 12.3–15.4)
GLOBULIN UR ELPH-MCNC: 4.3 GM/DL
GLUCOSE SERPL-MCNC: 110 MG/DL (ref 65–99)
HCT VFR BLD AUTO: 38.2 % (ref 34–46.6)
HGB BLD-MCNC: 13.1 G/DL (ref 12–15.9)
HOLD SPECIMEN: NORMAL
HOLD SPECIMEN: NORMAL
IMM GRANULOCYTES # BLD AUTO: 0.04 10*3/MM3 (ref 0–0.05)
IMM GRANULOCYTES NFR BLD AUTO: 0.4 % (ref 0–0.5)
LYMPHOCYTES # BLD AUTO: 5.38 10*3/MM3 (ref 0.7–3.1)
LYMPHOCYTES NFR BLD AUTO: 48.9 % (ref 19.6–45.3)
MCH RBC QN AUTO: 33.3 PG (ref 26.6–33)
MCHC RBC AUTO-ENTMCNC: 34.3 G/DL (ref 31.5–35.7)
MCV RBC AUTO: 97.2 FL (ref 79–97)
MONOCYTES # BLD AUTO: 1.44 10*3/MM3 (ref 0.1–0.9)
MONOCYTES NFR BLD AUTO: 13.1 % (ref 5–12)
NEUTROPHILS NFR BLD AUTO: 3.77 10*3/MM3 (ref 1.7–7)
NEUTROPHILS NFR BLD AUTO: 34.3 % (ref 42.7–76)
NRBC BLD AUTO-RTO: 0 /100 WBC (ref 0–0.2)
PLATELET # BLD AUTO: 417 10*3/MM3 (ref 140–450)
PMV BLD AUTO: 10.7 FL (ref 6–12)
POTASSIUM SERPL-SCNC: 4 MMOL/L (ref 3.5–5.2)
PROT SERPL-MCNC: 8.4 G/DL (ref 6–8.5)
RBC # BLD AUTO: 3.93 10*6/MM3 (ref 3.77–5.28)
SODIUM SERPL-SCNC: 139 MMOL/L (ref 136–145)
WBC NRBC COR # BLD: 11 10*3/MM3 (ref 3.4–10.8)
WHOLE BLOOD HOLD COAG: NORMAL
WHOLE BLOOD HOLD SPECIMEN: NORMAL

## 2023-01-24 PROCEDURE — 99283 EMERGENCY DEPT VISIT LOW MDM: CPT

## 2023-01-24 PROCEDURE — 96374 THER/PROPH/DIAG INJ IV PUSH: CPT

## 2023-01-24 PROCEDURE — 36415 COLL VENOUS BLD VENIPUNCTURE: CPT

## 2023-01-24 PROCEDURE — 25010000002 DIPHENHYDRAMINE PER 50 MG: Performed by: EMERGENCY MEDICINE

## 2023-01-24 PROCEDURE — 96375 TX/PRO/DX INJ NEW DRUG ADDON: CPT

## 2023-01-24 PROCEDURE — 70450 CT HEAD/BRAIN W/O DYE: CPT

## 2023-01-24 PROCEDURE — 85025 COMPLETE CBC W/AUTO DIFF WBC: CPT | Performed by: EMERGENCY MEDICINE

## 2023-01-24 PROCEDURE — 25010000002 METOCLOPRAMIDE PER 10 MG: Performed by: EMERGENCY MEDICINE

## 2023-01-24 PROCEDURE — 25010000002 KETOROLAC TROMETHAMINE PER 15 MG: Performed by: EMERGENCY MEDICINE

## 2023-01-24 PROCEDURE — 80053 COMPREHEN METABOLIC PANEL: CPT | Performed by: EMERGENCY MEDICINE

## 2023-01-24 RX ORDER — KETOROLAC TROMETHAMINE 10 MG/1
10 TABLET, FILM COATED ORAL EVERY 6 HOURS PRN
Qty: 15 TABLET | Refills: 0 | Status: SHIPPED | OUTPATIENT
Start: 2023-01-24

## 2023-01-24 RX ORDER — DIPHENHYDRAMINE HYDROCHLORIDE 50 MG/ML
12.5 INJECTION INTRAMUSCULAR; INTRAVENOUS ONCE
Status: COMPLETED | OUTPATIENT
Start: 2023-01-24 | End: 2023-01-24

## 2023-01-24 RX ORDER — METOCLOPRAMIDE HYDROCHLORIDE 5 MG/ML
10 INJECTION INTRAMUSCULAR; INTRAVENOUS ONCE
Status: COMPLETED | OUTPATIENT
Start: 2023-01-24 | End: 2023-01-24

## 2023-01-24 RX ORDER — ACETAMINOPHEN 500 MG
1000 TABLET ORAL ONCE
Status: COMPLETED | OUTPATIENT
Start: 2023-01-24 | End: 2023-01-24

## 2023-01-24 RX ORDER — SUMATRIPTAN 25 MG/1
25 TABLET, FILM COATED ORAL EVERY 4 HOURS PRN
Qty: 20 TABLET | Refills: 0 | Status: SHIPPED | OUTPATIENT
Start: 2023-01-24

## 2023-01-24 RX ORDER — KETOROLAC TROMETHAMINE 30 MG/ML
30 INJECTION, SOLUTION INTRAMUSCULAR; INTRAVENOUS ONCE
Status: COMPLETED | OUTPATIENT
Start: 2023-01-24 | End: 2023-01-24

## 2023-01-24 RX ADMIN — SODIUM CHLORIDE 1000 ML: 9 INJECTION, SOLUTION INTRAVENOUS at 10:36

## 2023-01-24 RX ADMIN — METOCLOPRAMIDE HYDROCHLORIDE 10 MG: 5 INJECTION INTRAMUSCULAR; INTRAVENOUS at 10:39

## 2023-01-24 RX ADMIN — KETOROLAC TROMETHAMINE 30 MG: 30 INJECTION, SOLUTION INTRAMUSCULAR; INTRAVENOUS at 10:39

## 2023-01-24 RX ADMIN — DIPHENHYDRAMINE HYDROCHLORIDE 12.5 MG: 50 INJECTION, SOLUTION INTRAMUSCULAR; INTRAVENOUS at 10:39

## 2023-01-24 RX ADMIN — ACETAMINOPHEN 1000 MG: 500 TABLET ORAL at 10:39

## 2023-01-24 NOTE — ED PROVIDER NOTES
Time: 9:30 AM EST  Date of encounter:  1/24/2023  Independent Historian/Clinical History and Information was obtained by:   Patient  Chief Complaint: headache     History is limited by: N/A    History of Present Illness:  Patient is a 47 y.o. year old female who presents to the emergency department for evaluation of headache. Patient states that she is receiving SCIG for autoimmune disorder and has been experiencing headache since Saturday. Patient states that she has hx of migraines but this is the worst headache is has ever experienced. Patient reports of mild blurry vision.       Headache  Pain location:  Frontal  Duration:  4 days  Context comment:  SCIG  Associated symptoms: blurred vision (mild )    Associated symptoms: no abdominal pain, no congestion, no cough, no diarrhea, no eye pain, no fever, no myalgias, no nausea, no seizures, no sore throat and no vomiting    Risk factors comment:  Hx of migraines, hypertension, hyperlipidemia       Patient Care Team  Primary Care Provider: Justin Daniels APRN    Past Medical History:     Allergies   Allergen Reactions   • Parathyroid Hormone (Recomb) Other (See Comments)     Sent patient into kidney failure, heart stopped, in ICU for two days.     • Romosozumab Hives     Other reaction(s): Hives     • Tramadol Anaphylaxis   • Vancomycin Hives   • Nifedipine Provider Review Needed     Rapid heart beat    • Morphine GI Intolerance     Other reaction(s): Chest Pain  increases billiary pressure and causes chest pain   • Codeine Nausea And Vomiting and GI Intolerance     Past Medical History:   Diagnosis Date   • Adrenal insufficiency (HCC)    • Anemia    • Anxiety    • Arthritis    • Cirrhosis (HCC)    • Colon polyp    • Crohn's disease (HCC)    • Depression    • Diabetes mellitus (HCC)    • Gastroparesis    • GERD (gastroesophageal reflux disease)    • History of MRSA infection    • Hyperlipidemia    • Hypertension    • Hypothyroidism    • Liver disease    •  Migraines    • Pancreatitis      Past Surgical History:   Procedure Laterality Date   • ABSCESS DRAINAGE  12/24/2019    Fluoroscopically guided abscess drainage, [facility]   • BACK SURGERY      L4 L5   • CHOLECYSTECTOMY N/A    • COLONOSCOPY  04/24/2019    NBIH, 3 mm polyp   • DILATATION AND CURETTAGE     • ENDOSCOPY N/A 04/24/2019    Normal   • ENTEROSCOPY SMALL BOWEL     • ERCP  2019   • GASTRIC STIMULATOR IMPLANT SURGERY     • GASTROJEJUNOSTOMY W/ JEJUNOSTOMY TUBE      removed   • HYSTERECTOMY     • PANCREATECTOMY  12/2019    TPIAT   • PELVIC FLOOR REPAIR     • SINUS SURGERY     • SPLENECTOMY     • TONSILLECTOMY     • VENOUS ACCESS DEVICE (PORT) INSERTION       Family History   Problem Relation Age of Onset   • Heart disease Father    • Hypothyroidism Father    • Anxiety disorder Father    • Depression Father    • Anxiety disorder Mother    • Hypothyroidism Mother    • Breast cancer Mother    • Colon polyps Mother    • Alcohol abuse Paternal Grandfather    • Heart disease Paternal Grandfather    • Depression Brother    • Hypertension Brother        Home Medications:  Prior to Admission medications    Medication Sig Start Date End Date Taking? Authorizing Provider   acetaminophen (TYLENOL) 650 MG 8 hr tablet   See Instructions, Tab mg Oral Q8H, 0 Refill(s) 8/10/22   Pollo Ray MD   Brexpiprazole (Rexulti) 1 MG tablet Take 1 tablet by mouth Daily. 12/10/21   Pollo Ray MD   Brexpiprazole (Rexulti) 1 MG tablet 1 mg. 8/3/22   Pollo Ray MD   Calcium Carbonate-Vit D-Min (Caltrate 600+D Plus Minerals) 600-800 MG-UNIT chewable tablet     Pollo Ray MD   cetirizine (zyrTEC) 10 MG tablet Take 10 mg by mouth Daily. 7/12/22   Pollo Ray MD   chlorthalidone (HYGROTON) 25 MG tablet Take 25 mg by mouth Daily. 4/18/19   Pollo Ray MD   cholecalciferol (VITAMIN D3) 1.25 MG (51747 UT) capsule Take 1 capsule by mouth 1 (One) Time Per Week. 1/4/22   Flor  MD Pollo   dicyclomine (BENTYL) 10 MG capsule Take 1 capsule by mouth 4 (Four) Times a Day As Needed (diarrhea, fecal urgency, abdominal cramping). 9/13/22   Mariela Gr APRN   diphenhydrAMINE (BENADRYL) 50 MG/ML injection  9/21/22   Pollo Ray MD   docusate sodium (COLACE) 100 MG capsule TAKE ONE CAPSULE BY MOUTH TWICE DAILY 4/16/19   Pollo Ray MD   escitalopram (LEXAPRO) 20 MG tablet Take 20 mg by mouth Daily. 4/20/21   Pollo Ray MD   glucagon (GLUCAGEN) 1 MG injection   mg, IntraVENous, 0 Refill(s) 8/10/22   Pollo Ray MD   heparin, porcine, 100-0.45 UNIT/ML-% infusion Infuse  into a venous catheter Continuous.    Pollo Ray MD   Humira Pen 40 MG/0.4ML Pen-injector Kit INJECT 1 PEN UNDER THE SKIN EVERY 14 DAYS. 9/1/22   Patrizia Sena APRN   hydrocortisone (CORTEF) 5 MG tablet every 8 hours    Pollo Ray MD   hydrocortisone (CORTEF) 5 MG tablet 15 mg. 8/3/22   Pollo Ray MD   ibuprofen (ADVIL,MOTRIN) 600 MG tablet 600 mg. 5/6/22   Pollo Ray MD   Immune Globulin, Human,-ifas (Panzyga) 2.5 GM/25ML solution infusion See Admin Instructions.    Pollo Ray MD   Insulin Lispro (humaLOG) 100 UNIT/ML injection   Insulin pump, SubCutaneous, Solution, Daily, on at this time, # 10 mL, 0 Refill(s) 5/6/22   Pollo Ray MD   Insulin Lispro (HumaLOG) 100 UNIT/ML solution cartridge     Pollo Ray MD   levothyroxine (SYNTHROID, LEVOTHROID) 125 MCG tablet Take 125 mcg by mouth Daily. 9/1/20   Pollo Ray MD   levothyroxine (SYNTHROID, LEVOTHROID) 125 MCG tablet 1 (one) time each day at the same time    Pollo Ray MD   levothyroxine (SYNTHROID, LEVOTHROID) 125 MCG tablet Take 1 tablet by mouth Daily. 8/8/22   Pollo Ray MD   lisinopril (PRINIVIL,ZESTRIL) 10 MG tablet TAKE ONE TABLET BY MOUTH ONCE DAILY 4/18/19   Provider, MD Pollo   lisinopril  (PRINIVIL,ZESTRIL) 10 MG tablet 10 mg. 5/6/22   Pollo Ray MD   Milk Thistle 1000 MG capsule   375, Oral, Daily, 0 Refill(s) 8/10/22   Pollo Ray MD   Milk Thistle 500 MG capsule See Admin Instructions.    Pollo Ray MD   nitrofurantoin (MACRODANTIN) 50 MG capsule   mg Cap, Oral, Daily, 0 Refill(s) 8/10/22   Pollo Ray MD   NovoLOG 100 UNIT/ML injection USE 60 UNITS PER DAY via insulin pump 5/12/22   Pollo Ray MD   ondansetron (ZOFRAN) 2 mg/mL injection  10/4/22   Pollo Ray MD   Pancrelipase, Lip-Prot-Amyl, (Creon) 18417-520859 units capsule delayed-release particles capsule Take 3 tabs with meals and 2 with snacks  Patient taking differently: Take 108,000 Units by mouth 3 (Three) Times a Day With Meals. Take 3 tabs with meals and 2 with snacks 8/24/21   Mariela Gr APRN   pantoprazole (PROTONIX) 40 MG EC tablet Take 1 tablet by mouth 2 (Two) Times a Day. 12/29/22   Mariela Gr APRN   Panzyga 20 GM/200ML solution infusion  5/23/22   Emergency, Nurse Epic, RN   polyethylene glycol (MIRALAX) 17 GM/SCOOP powder     Pollo Ray MD   promethazine (PHENERGAN) 25 MG tablet     Pollo Ray MD   promethazine (PHENERGAN) 25 MG/ML injection  10/4/22   Pollo Ray MD   sodium chloride 0.9 % solution  5/23/22   Emergency, Nurse Elder, RN   SUMAtriptan (IMITREX) 25 MG tablet TAKE ONE TABLET BY MOUTH ONCE FOR SEVERE headache MAY REPEAT DOSE IN 2 hours IF still present 9/14/21   Pollo Ray MD        Social History:   Social History     Tobacco Use   • Smoking status: Never   • Smokeless tobacco: Never   Vaping Use   • Vaping Use: Never used   Substance Use Topics   • Alcohol use: Never   • Drug use: Never         Review of Systems:  Review of Systems   Constitutional: Negative for chills and fever.   HENT: Negative for congestion, rhinorrhea and sore throat.    Eyes: Positive for blurred vision (mild ) and visual  "disturbance. Negative for pain.   Respiratory: Negative for apnea, cough, chest tightness and shortness of breath.    Cardiovascular: Negative for chest pain and palpitations.   Gastrointestinal: Negative for abdominal pain, diarrhea, nausea and vomiting.   Genitourinary: Negative for difficulty urinating and dysuria.   Musculoskeletal: Negative for joint swelling and myalgias.   Skin: Negative for color change.   Neurological: Positive for headaches. Negative for seizures.   Psychiatric/Behavioral: Negative.    All other systems reviewed and are negative.       Physical Exam:  /95 (BP Location: Left arm, Patient Position: Sitting)   Pulse 73   Temp 98.2 °F (36.8 °C) (Oral)   Resp 16   Ht 170.2 cm (67\")   Wt 92.2 kg (203 lb 4.2 oz)   SpO2 93%   BMI 31.84 kg/m²     Physical Exam  Vitals and nursing note reviewed.   Constitutional:       General: She is not in acute distress.     Appearance: Normal appearance. She is not toxic-appearing.   HENT:      Head: Normocephalic and atraumatic.      Jaw: There is normal jaw occlusion.   Eyes:      General: Lids are normal.      Extraocular Movements: Extraocular movements intact.      Conjunctiva/sclera: Conjunctivae normal.      Pupils: Pupils are equal, round, and reactive to light.   Cardiovascular:      Rate and Rhythm: Normal rate and regular rhythm.      Pulses: Normal pulses.      Heart sounds: Normal heart sounds.   Pulmonary:      Effort: Pulmonary effort is normal. No respiratory distress.      Breath sounds: Normal breath sounds. No wheezing or rhonchi.   Abdominal:      General: Abdomen is flat.      Palpations: Abdomen is soft.      Tenderness: There is no abdominal tenderness. There is no guarding or rebound.   Musculoskeletal:         General: Normal range of motion.      Cervical back: Normal range of motion and neck supple.      Right lower leg: No edema.      Left lower leg: No edema.   Skin:     General: Skin is warm and dry.   Neurological:     "  Mental Status: She is alert and oriented to person, place, and time. Mental status is at baseline.   Psychiatric:         Mood and Affect: Mood normal.                  Procedures:  Procedures      Medical Decision Making:      Comorbidities that affect care:    Diabetes, Hypertension    External Notes reviewed:    Previous Clinic Note      The following orders were placed and all results were independently analyzed by me:  Orders Placed This Encounter   Procedures   • CT Head Without Contrast   • Comprehensive Metabolic Panel   • CBC Auto Differential   • [place] Draw   • CBC & Differential   • Green Top (Gel)   • Lavender Top   • Gold Top - SST   • Light Blue Top       Medications Given in the Emergency Department:  Medications   ketorolac (TORADOL) injection 30 mg (30 mg Intravenous Given 1/24/23 1039)   acetaminophen (TYLENOL) tablet 1,000 mg (1,000 mg Oral Given 1/24/23 1039)   sodium chloride 0.9 % bolus 1,000 mL (1,000 mL Intravenous New Bag 1/24/23 1036)   metoclopramide (REGLAN) injection 10 mg (10 mg Intravenous Given 1/24/23 1039)   diphenhydrAMINE (BENADRYL) injection 12.5 mg (12.5 mg Intravenous Given 1/24/23 1039)        ED Course:         Labs:    Lab Results (last 24 hours)     Procedure Component Value Units Date/Time    CBC & Differential [[id number]]  (Abnormal) Collected: 01/24/23 1017    Specimen: Blood Updated: 01/24/23 1026    Narrative:      The following orders were created for panel order CBC & Differential.  Procedure                               Abnormality         Status                     ---------                               -----------         ------                     CBC Auto Differential[[id number]]        Abnormal            Final result                 Please view results for these tests on the individual orders.    Comprehensive Metabolic Panel [[id number]]  (Abnormal) Collected: 01/24/23 1017    Specimen: Blood Updated: 01/24/23 1042     Glucose 110 mg/dL      BUN 12 mg/dL       Creatinine 0.54 mg/dL      Sodium 139 mmol/L      Potassium 4.0 mmol/L      Chloride 101 mmol/L      CO2 28.4 mmol/L      Calcium 9.2 mg/dL      Total Protein 8.4 g/dL      Albumin 4.1 g/dL      ALT (SGPT) 62 U/L      AST (SGOT) 70 U/L      Alkaline Phosphatase 131 U/L      Total Bilirubin 0.2 mg/dL      Globulin 4.3 gm/dL      A/G Ratio 1.0 g/dL      BUN/Creatinine Ratio 22.2     Anion Gap 9.6 mmol/L      eGFR 114.4 mL/min/1.73     Narrative:      GFR Normal >60  Chronic Kidney Disease <60  Kidney Failure <15      CBC Auto Differential [[id number]]  (Abnormal) Collected: 01/24/23 1017    Specimen: Blood Updated: 01/24/23 1026     WBC 11.00 10*3/mm3      RBC 3.93 10*6/mm3      Hemoglobin 13.1 g/dL      Hematocrit 38.2 %      MCV 97.2 fL      MCH 33.3 pg      MCHC 34.3 g/dL      RDW 13.2 %      RDW-SD 47.5 fl      MPV 10.7 fL      Platelets 417 10*3/mm3      Neutrophil % 34.3 %      Lymphocyte % 48.9 %      Monocyte % 13.1 %      Eosinophil % 2.8 %      Basophil % 0.5 %      Immature Grans % 0.4 %      Neutrophils, Absolute 3.77 10*3/mm3      Lymphocytes, Absolute 5.38 10*3/mm3      Monocytes, Absolute 1.44 10*3/mm3      Eosinophils, Absolute 0.31 10*3/mm3      Basophils, Absolute 0.06 10*3/mm3      Immature Grans, Absolute 0.04 10*3/mm3      nRBC 0.0 /100 WBC            Imaging:    CT Head Without Contrast    Result Date: 1/24/2023  PROCEDURE: CT HEAD WO CONTRAST  COMPARISON:  [facility], CT, HEAD W/O CONTRAST, 9/24/2014, 10:44.  INDICATIONS: HEADACHE X4 DAYS  PROTOCOL:   Standard imaging protocol performed    RADIATION:   DLP: 61.6 mGy*cm   MA and/or KV was adjusted to minimize radiation dose.    TECHNIQUE: CT images were obtained without non-ionic intravenous contrast material.  FINDINGS:  The ventricles are normal in size, position, and configuration.  Sulci are not abnormally prominent.  No abnormal gray or white matter density is appreciated.  There is no CT evidence of acute intracranial  hemorrhage, mass, or mass effect.  The orbits have a normal appearance.  The paranasal sinuses are well aerated.       Negative CT scan of the head without IV contrast.     RAFY DE GUZMAN MD       Electronically Signed and Approved By: RAFY DE GUZMAN MD on 1/24/2023 at 11:16                 Differential Diagnosis and Discussion:    Headache: Differential diagnosis includes but is not limited to migraine, cluster headache, hypertension, tumor, subarachnoid bleeding, pseudotumor cerebri, temporal arteritis, infections, tension headache, and TMJ syndrome.    All labs were reviewed and analyzed by me.  CT scan radiology interpretation was reviewed by me.    MDM  Number of Diagnoses or Management Options  Nonintractable headache, unspecified chronicity pattern, unspecified headache type  Diagnosis management comments: The patient presented to the emergency department with a headache.  The patient´s CBC that was reviewed and interpreted by me shows no abnormalities of critical concern. Of note, there is no anemia requiring a blood transfusion and the platelet count is acceptable.  The patient´s CMP that was reviewed and interpretted by me shows no abnormalities of critical concern. Of note, the patient´s sodium and potassium are acceptable. The patient´s liver enzymes are unremarkable. The patient´s renal function (creatinine) is preserved. The patient has a normal anion gap.  CT head is negative for acute intracranial abnormalities.  The patient is now resting comfortably in feels better, is alert, talkative, interactive and in no distress after ED treatment. The patient appears well and is able to tolerate PO fluids. Repeat examination is unremarkable and benign. The patient is neurologically intact, has a normal mental status, and this ambulatory in the ED. The history, exam, diagnostic testing (if any) and the patient's current condition do not suggest meningitis, stroke, sepsis, subarachnoid hemorrhage, intracranial  bleeding, encephalitis, temporal arteritis or other significant pathology to warrant further testing, continued ED treatment, admission, neurological consultation, for other specialist evaluation at this point. The vital signs have been stable. The patient's condition is stable and appropriate for discharge. The patient will pursue further outpatient evaluation with the primary care physician or other designated or consulting physician as indicated in the discharge instructions.       Amount and/or Complexity of Data Reviewed  Clinical lab tests: reviewed  Tests in the radiology section of CPT®: reviewed  Independent visualization of images, tracings, or specimens: yes    Risk of Complications, Morbidity, and/or Mortality  Presenting problems: moderate  Management options: moderate    Patient Progress  Patient progress: improved (11:30 EST Updated patient on results and plan. Patient expressed understanding and agreement. All questions answered at this time.   Patient states that her headache went from 8/10 to 5-6/10.   12:15 EST Updated patient on results and plan. Patient expressed understanding and agreement. All questions answered at this time.   12:16 EST Updated patient on results and plan for discharge. Patient expressed understanding and agreement. All questions answered at this time.  )           Patient Care Considerations:    MRI: I considered ordering an MRI however The patient reports cessation of her headache and has a normal neurological exam and is at baseline.      Consultants/Shared Management Plan:    None    Social Determinants of Health:    Patient is independent, reliable, and has access to care.       Disposition and Care Coordination:    Discharged: I considered escalation of care by admitting this patient for observation, however the patient has improved and is suitable and  stable for discharge.    I have explained the patient´s condition, diagnoses and treatment plan based on the  information available to me at this time. I have answered questions and addressed any concerns. The patient has a good  understanding of the patient´s diagnosis, condition, and treatment plan as can be expected at this point. The vital signs have been stable. The patient´s condition is stable and appropriate for discharge from the emergency department.      The patient will pursue further outpatient evaluation with the primary care physician or other designated or consulting physician as outlined in the discharge instructions. They are agreeable to this plan of care and follow-up instructions have been explained in detail. The patient has received these instructions in written format and have expressed an understanding of the discharge instructions. The patient is aware that any significant change in condition or worsening of symptoms should prompt an immediate return to this or the closest emergency department or call to 911.  I have explained discharge medications and the need for follow up with the patient/caretakers. This was also printed in the discharge instructions. Patient was discharged with the following medications and follow up:      Medication List      New Prescriptions    ketorolac 10 MG tablet  Commonly known as: TORADOL  Take 1 tablet by mouth Every 6 (Six) Hours As Needed for Moderate Pain.        Changed    Creon 83575-475280 units capsule delayed-release particles capsule  Generic drug: Pancrelipase (Lip-Prot-Amyl)  Take 3 tabs with meals and 2 with snacks  What changed:   · how much to take  · how to take this  · when to take this     SUMAtriptan 25 MG tablet  Commonly known as: IMITREX  Take 1 tablet by mouth Every 4 (Four) Hours As Needed for Migraine. Take one tablet at onset of headache. May repeat dose one time in 2 hours if headache not relieved.  What changed: See the new instructions.           Where to Get Your Medications      These medications were sent to [facility] -  [place], [place] - [address] [address] - [phone]  - [phone] FX  [address] [place] [address]    Phone: [phone]   · ketorolac 10 MG tablet  · SUMAtriptan 25 MG tablet      Justin Daniels, APRN  [address] [facility] [place] [address]  [phone]    In 2 days         Final diagnoses:   Nonintractable headache, unspecified chronicity pattern, unspecified headache type        ED Disposition     ED Disposition   Discharge    Condition   Stable    Comment   --             This medical record created using voice recognition software.        Documentation assistance provided by Janett Cuadra acting as scribe for Fay Flood MD. Information recorded by the scribe was done at my direction and has been verified and validated by me.          Janett Cuadra  01/24/23 0957       Janett Cuadra  01/24/23 1131       Janett Cuadra  01/24/23 1216       Fay Flood MD  01/24/23 1223

## 2023-03-01 ENCOUNTER — LAB (OUTPATIENT)
Dept: LAB | Facility: HOSPITAL | Age: 48
End: 2023-03-01
Payer: COMMERCIAL

## 2023-03-01 ENCOUNTER — TRANSCRIBE ORDERS (OUTPATIENT)
Dept: LAB | Facility: HOSPITAL | Age: 48
End: 2023-03-01
Payer: COMMERCIAL

## 2023-03-01 DIAGNOSIS — K92.89 GASTRIC HYPERTONUS: ICD-10-CM

## 2023-03-01 DIAGNOSIS — Z94.89 TRANSPLANT RECIPIENT: Primary | ICD-10-CM

## 2023-03-01 DIAGNOSIS — Z94.89 TRANSPLANT RECIPIENT: ICD-10-CM

## 2023-03-01 DIAGNOSIS — K92.89 GASTRIC HYPERTONUS: Primary | ICD-10-CM

## 2023-03-01 LAB — ERYTHROCYTE [SEDIMENTATION RATE] IN BLOOD: 47 MM/HR (ref 0–20)

## 2023-03-01 PROCEDURE — 86255 FLUORESCENT ANTIBODY SCREEN: CPT

## 2023-03-01 PROCEDURE — 84681 ASSAY OF C-PEPTIDE: CPT

## 2023-03-01 PROCEDURE — 86051 AQUAPORIN-4 ANTB ELISA: CPT

## 2023-03-01 PROCEDURE — 86334 IMMUNOFIX E-PHORESIS SERUM: CPT

## 2023-03-01 PROCEDURE — 86596 VOLTAGE-GTD CA CHNL ANTB EA: CPT

## 2023-03-01 PROCEDURE — 82784 ASSAY IGA/IGD/IGG/IGM EACH: CPT

## 2023-03-01 PROCEDURE — 85652 RBC SED RATE AUTOMATED: CPT

## 2023-03-01 PROCEDURE — 80053 COMPREHEN METABOLIC PANEL: CPT

## 2023-03-01 PROCEDURE — 86341 ISLET CELL ANTIBODY: CPT

## 2023-03-01 PROCEDURE — 86337 INSULIN ANTIBODIES: CPT

## 2023-03-01 PROCEDURE — 36415 COLL VENOUS BLD VENIPUNCTURE: CPT

## 2023-03-02 LAB
ALBUMIN SERPL-MCNC: 4 G/DL (ref 3.5–5.2)
ALBUMIN/GLOB SERPL: 0.9 G/DL
ALP SERPL-CCNC: 136 U/L (ref 39–117)
ALT SERPL W P-5'-P-CCNC: 54 U/L (ref 1–33)
ANION GAP SERPL CALCULATED.3IONS-SCNC: 12 MMOL/L (ref 5–15)
AST SERPL-CCNC: 62 U/L (ref 1–32)
BILIRUB SERPL-MCNC: 0.3 MG/DL (ref 0–1.2)
BUN SERPL-MCNC: 11 MG/DL (ref 6–20)
BUN/CREAT SERPL: 15.3 (ref 7–25)
CALCIUM SPEC-SCNC: 9.4 MG/DL (ref 8.6–10.5)
CHLORIDE SERPL-SCNC: 105 MMOL/L (ref 98–107)
CO2 SERPL-SCNC: 25 MMOL/L (ref 22–29)
CREAT SERPL-MCNC: 0.72 MG/DL (ref 0.57–1)
EGFRCR SERPLBLD CKD-EPI 2021: 103.9 ML/MIN/1.73
GLOBULIN UR ELPH-MCNC: 4.4 GM/DL
GLUCOSE SERPL-MCNC: 166 MG/DL (ref 65–99)
POTASSIUM SERPL-SCNC: 4 MMOL/L (ref 3.5–5.2)
PROT SERPL-MCNC: 8.4 G/DL (ref 6–8.5)
SODIUM SERPL-SCNC: 142 MMOL/L (ref 136–145)

## 2023-03-03 LAB
C PEPTIDE SERPL-MCNC: 4.3 NG/ML (ref 1.1–4.4)
IGA SERPL-MCNC: 975 MG/DL (ref 87–352)
IGG SERPL-MCNC: 1887 MG/DL (ref 586–1602)
IGM SERPL-MCNC: 15 MG/DL (ref 26–217)
PROT PATTERN SERPL IFE-IMP: ABNORMAL

## 2023-03-07 LAB — STRIA MUS AB TITR SER IF: NEGATIVE {TITER}

## 2023-03-08 ENCOUNTER — TELEMEDICINE (OUTPATIENT)
Dept: GASTROENTEROLOGY | Facility: CLINIC | Age: 48
End: 2023-03-08
Payer: COMMERCIAL

## 2023-03-08 VITALS — BODY MASS INDEX: 29.44 KG/M2 | WEIGHT: 188 LBS

## 2023-03-08 DIAGNOSIS — R11.2 NAUSEA AND VOMITING, UNSPECIFIED VOMITING TYPE: Chronic | ICD-10-CM

## 2023-03-08 DIAGNOSIS — K74.60 CIRRHOSIS OF LIVER WITHOUT ASCITES, UNSPECIFIED HEPATIC CIRRHOSIS TYPE: Chronic | ICD-10-CM

## 2023-03-08 DIAGNOSIS — K50.90 CROHN'S DISEASE WITHOUT COMPLICATION, UNSPECIFIED GASTROINTESTINAL TRACT LOCATION: Primary | Chronic | ICD-10-CM

## 2023-03-08 DIAGNOSIS — Z79.899 HIGH RISK MEDICATION USE: Chronic | ICD-10-CM

## 2023-03-08 DIAGNOSIS — K31.84 GASTROPARESIS: Chronic | ICD-10-CM

## 2023-03-08 LAB
AMPAR1 AB SER QL IF: NEGATIVE
AMPAR2 AB SER QL IF: NEGATIVE
AMPHIPHYSIN AB SER QL: NEGATIVE
AQP4 H2O CHANNEL AB SERPL IA-ACNC: NEGATIVE
CASPR2 IGG SERPL QL IF: NEGATIVE
CV2 IGG SER-ACNC: NEGATIVE
DPPX IGG SERPL QL IF: NEGATIVE
GABABR AB SER QL IF: NEGATIVE
GAD65 IGG+IGM SER IA-SCNC: NEGATIVE NMOL/L
GLIAL NUC TYPE 1 AB SER QL IF: NEGATIVE
HU AB SER QL IF: NEGATIVE
HU2 AB SER QL IF: NEGATIVE
HU3 AB SER QL: NEGATIVE
IGLON5 IGG SER QL IF: NEGATIVE
IMP & REVIEW OF LAB RESULTS: NEGATIVE
IP3R 1 IGG SER QL IF: NEGATIVE
LGI1 IGG SERPL QL IF: NEGATIVE
MA+TA AB SER QL: NEGATIVE
MGLUR1 IGG SER QL IF: NEGATIVE
NMDAR1 AB SER QL IF: NEGATIVE
PCA-1 AB SER QL IF: NEGATIVE
PCA-2 AB SER QL IF: NEGATIVE
PCA-TR AB SER QL IF: NEGATIVE
PCA-TR AB SER QL IF: NEGATIVE
VGCC AB SER-SCNC: <1 PMOL/L (ref 0–30)
ZIC4 AB SER QL: NEGATIVE

## 2023-03-08 PROCEDURE — 99214 OFFICE O/P EST MOD 30 MIN: CPT | Performed by: NURSE PRACTITIONER

## 2023-03-08 NOTE — PATIENT INSTRUCTIONS
1.  For GERD, continue pantoprazole 40 mg twice daily.    2.  Continue follow-up with  GI motility for management of gastroparesis.    3.  You may continue to alternate Zofran, Phenergan, and Compazine for management of the nausea and vomiting secondary to gastroparesis.    4.  For Crohn's disease, continue Humira injections every 2 weeks.    5.  For constipation, continue Linzess 145 mcg daily.    6.  For IBS-D continue Xifaxan 550 mg, 1 tablet daily.    7.  For new diagnosis of cirrhosis and for participation in clinical trial, please be sure to keep your upcoming appointment with the [facility] as scheduled through  hepatology.    8.  Next of lab work and liver ultrasound will be due July 2023.  Continue low-sodium diet of 2 g/day or less.  Avoid raw shellfish.    9.  Plan for next office follow-up in 6 months for reassessment of symptoms, or sooner should symptoms worsen.

## 2023-03-08 NOTE — PROGRESS NOTES
"Chief Complaint   Patient presents with   • Follow-up     Crohn's disease, GERD, gastroparesis, nausea and vomiting, constipation, cirrhosis         History of Present Illness  47-year-old female presents today for telemedicine follow-up visit.  She was last seen on 10/5/2022.  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  She does have a history of pancreas divisum.  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  She continues Creon pancreatic enzymes daily.    GERD is currently managed with pantoprazole 40 mg twice daily. Reflux is well controlled for the most part. Occasionally she will have a day with breakthrough symptoms.     She treats with  GI motility for her gastroparesis and underwent permanent gastric stimulator placement August 2022.  She has had to have some adjustments made.  She continues IVIG infusions weekly to [facility] GI motility.  She previously has used domperidone for management but no longer utilizes this. Symptoms regarding her gastroparesis are \"terrible\". She has been switched from IVIG to Sub Q IG and symptoms have not been well controlled. She had to decrease her dosing due to side effects of nausea and vomiting. Then she developed aseptic meningitis and she received steroids, IV fluids, and migraine medication. She reports being given the diagnosis of primary immunocompromised gastroparesis. She reports poor communication with the  GI Motility clinic. She feels much better since backing off of her immunoglobulin therapy. She continues use of phenergan, compazine, and zofran alternating for management of n/v.     She continues Humira every 2 weeks for management of Crohn's disease.  Last colonoscopy was on 4/24/2019.  She reports that her constipation is currently well managed. She is taking Linzess 145 mcg daily which produces BM " daily. She also takes Xifaxan 550 mg daily which helps tremendously with crossover IBS-D and SIBO symptoms.     She has a history of Mart and underwent liver biopsy which demonstrated findings consistent with cirrhosis.  She has been referred to Dr. Jake Zhang at [facility] hepatology and was seen February 2023.  Last FibroScan on 3/30/2022 revealed a fibrosis score of F4 with moderate to severe liver fat. She is being referred to [facility] to a clinical trial. Her appointment date is March 29, 2023. She reports following with him a couple of weeks ago. She reports that the islet cell transplant into her liver following the pancreatectomy likely had a negative affect on her liver. She was very pleased with her care from  Hepatology. Most recent lab work demonstrates a MELD score of 6 based upon lab work from 2/23/2023. She reports having some confusion, but states that she discussed this with Dr. Zhang and it was not thought to be liver related. She feels that the confusion was secondary to the IVIG and has improved since her dosing has been decreased. Also too, she is unsure as to how much of a role the aseptic meningitis played in her confusion. She reports that she cannot be left by herself.     You have chosen to receive care through a telehealth visit.  Do you consent to use a video/audio connection for your medical care today? Yes    Physical Exam  Constitutional:       General: She is not in acute distress.     Appearance: She is well-developed.   Pulmonary:      Effort: No respiratory distress.   Skin:     Coloration: Skin is not pale.        Review of Systems   Constitutional: Positive for unexpected weight change. Negative for fever.   HENT: Negative for trouble swallowing.    Cardiovascular: Negative for chest pain.   Gastrointestinal: Positive for constipation, nausea and vomiting. Negative for abdominal distention, abdominal pain, anal bleeding, blood in stool, diarrhea and rectal pain.       Result Review :      Office Visit with Mariela Gr APRN (10/05/2022)  ENDOSCOPY, INT (09/08/2019)  SCANNED - COLONOSCOPY (04/24/2019)  SCANNED PATHOLOGY (04/24/2019)  CT Abdomen Pelvis With Contrast (01/01/2023 15:10)  NM gastric emptying study (05/26/2022 08:09)  NM gastric emptying study (05/19/2022 07:58)  US Liver (05/25/2022 08:48)    Assessment and Plan    Diagnoses and all orders for this visit:    1. Crohn's disease without complication, unspecified gastrointestinal tract location (HCC) (Primary)    2. High risk medication use    3. Cirrhosis of liver without ascites, unspecified hepatic cirrhosis type (HCC)    4. Gastroparesis    5. Nausea and vomiting, unspecified vomiting type           Patient Instructions   1.  For GERD, continue pantoprazole 40 mg twice daily.    2.  Continue follow-up with  GI motility for management of gastroparesis.    3.  You may continue to alternate Zofran, Phenergan, and Compazine for management of the nausea and vomiting secondary to gastroparesis.    4.  For Crohn's disease, continue Humira injections every 2 weeks.    5.  For constipation, continue Linzess 145 mcg daily.    6.  For IBS-D continue Xifaxan 550 mg, 1 tablet daily.    7.  For new diagnosis of cirrhosis and for participation in clinical trial, please be sure to keep your upcoming appointment with the [facility] as scheduled through  hepatology.    8.  Next of lab work and liver ultrasound will be due       Discussion:    Patient's GERD is well managed with pantoprazole 40 mg twice daily, which we will continue.  Patient's largest problem continues to be management of her gastroparesis.  She has had multiple adjustments made to her gastric stimulator and is currently in treatment with immunoglobulin therapy through  GI motility.  The patient does report that since decreasing her dose of IVIG and switching to a subcutaneous version of this medication, that her nausea and vomiting has improved.   Although she felt that her symptoms were much worse while on the IVIG.    Patient is scheduled for an appointment with [facility] for further evaluation of cirrhosis and participate in an ongoing study per recommendations of Dr. Jake Zhang,  hepatology.  Patient cirrhosis is very well compensated with a MELD score of 6 and next of lab work and liver ultrasound will be due July 2023.  Patient to continue low-sodium diet and avoid raw shellfish.    For Crohn's disease, patient to continue Humira injections every 2 weeks.  For constipation, patient to continue Linzess.  For crossover IBS-D and SIBO symptoms, patient to continue Xifaxan 550 mg, 1 tablet daily.  We will plan for next follow-up visit in 6 months for reassessment of symptoms, or sooner should symptoms worsen or fail to improve.  Patient did report that she would provide an update following her visit to the [facility] for continuity of care.  APRN verbalized understanding.  EMR Dragon/Transcription Disclaimer:  This document has been Dictated utilizing Dragon dictation.

## 2023-03-12 LAB — INSULIN AB SER-ACNC: <5 UU/ML

## 2023-03-21 RX ORDER — PROCHLORPERAZINE MALEATE 10 MG
10 TABLET ORAL EVERY 8 HOURS PRN
Qty: 60 TABLET | Refills: 2 | Status: SHIPPED | OUTPATIENT
Start: 2023-03-21

## 2023-04-20 ENCOUNTER — TRANSCRIBE ORDERS (OUTPATIENT)
Dept: ADMINISTRATIVE | Facility: HOSPITAL | Age: 48
End: 2023-04-20
Payer: COMMERCIAL

## 2023-04-20 DIAGNOSIS — K56.609 INTESTINAL OBSTRUCTION, UNSPECIFIED CAUSE, UNSPECIFIED WHETHER PARTIAL OR COMPLETE: Primary | ICD-10-CM

## 2023-04-25 ENCOUNTER — HOSPITAL ENCOUNTER (OUTPATIENT)
Dept: GENERAL RADIOLOGY | Facility: HOSPITAL | Age: 48
Discharge: HOME | End: 2023-04-25
Admitting: TRANSPLANT SURGERY
Payer: COMMERCIAL

## 2023-04-25 DIAGNOSIS — K56.609 INTESTINAL OBSTRUCTION, UNSPECIFIED CAUSE, UNSPECIFIED WHETHER PARTIAL OR COMPLETE: ICD-10-CM

## 2023-04-25 PROCEDURE — 74248 X-RAY SM INT F-THRU STD: CPT

## 2023-04-25 PROCEDURE — 74246 X-RAY XM UPR GI TRC 2CNTRST: CPT

## 2023-04-25 RX ADMIN — ANTACID/ANTIFLATULENT 1 PACKET: 380; 550; 10; 10 GRANULE, EFFERVESCENT ORAL at 09:40

## 2023-04-25 RX ADMIN — BARIUM SULFATE 135 ML: 980 POWDER, FOR SUSPENSION ORAL at 09:40

## 2023-04-25 RX ADMIN — ANTACID/ANTIFLATULENT 1 PACKET: 380; 550; 10; 10 GRANULE, EFFERVESCENT ORAL at 09:00

## 2023-04-25 RX ADMIN — BARIUM SULFATE 355 ML: 0.6 SUSPENSION ORAL at 09:40

## 2023-04-25 RX ADMIN — BARIUM SULFATE 355 ML: 0.6 SUSPENSION ORAL at 09:00

## 2023-06-06 RX ORDER — PROCHLORPERAZINE MALEATE 10 MG
10 TABLET ORAL EVERY 8 HOURS PRN
Qty: 60 TABLET | Refills: 2 | Status: SHIPPED | OUTPATIENT
Start: 2023-06-06

## 2023-06-15 ENCOUNTER — TELEPHONE (OUTPATIENT)
Dept: GASTROENTEROLOGY | Facility: CLINIC | Age: 48
End: 2023-06-15

## 2023-06-15 NOTE — TELEPHONE ENCOUNTER
Provider: DAVID  Caller: RUIZ    Relationship to Patient: NURSE A [facility]  Reason for Call: RUIZ WANTED TO LET DR DVAID GODINEZ  KNOW THAT THE RESULTS FROM THE FIBER SCAN WOULD NOT BE READY UNTIL 06- OR 06-

## 2023-08-07 ENCOUNTER — TELEPHONE (OUTPATIENT)
Dept: GASTROENTEROLOGY | Facility: CLINIC | Age: 48
End: 2023-08-07
Payer: MEDICARE

## 2023-08-07 NOTE — TELEPHONE ENCOUNTER
PA for Xifaxan has been denied  REASON:   We cover this drug when our criteria are met. The unmet criteria are: has tried or cannot use one of the following: lactulose, neomycin.

## 2023-08-08 NOTE — TELEPHONE ENCOUNTER
An Appeal has been submitted over the phone for urgent review for Xifaxan 550mg. 30 for 30.  Reference # [id number]  # [phone].

## 2023-08-23 ENCOUNTER — LAB (OUTPATIENT)
Dept: LAB | Facility: HOSPITAL | Age: 48
End: 2023-08-23
Payer: MEDICARE

## 2023-08-23 ENCOUNTER — TRANSCRIBE ORDERS (OUTPATIENT)
Dept: LAB | Facility: HOSPITAL | Age: 48
End: 2023-08-23
Payer: COMMERCIAL

## 2023-08-23 DIAGNOSIS — E10.649 TYPE 1 DIABETES MELLITUS WITH HYPOGLYCEMIA UNAWARENESS: ICD-10-CM

## 2023-08-23 DIAGNOSIS — E10.649 TYPE 1 DIABETES MELLITUS WITH HYPOGLYCEMIA UNAWARENESS: Primary | ICD-10-CM

## 2023-08-23 LAB — GLUCOSE P FAST SERPL-MCNC: 120 MG/DL (ref 74–106)

## 2023-08-23 PROCEDURE — 84681 ASSAY OF C-PEPTIDE: CPT

## 2023-08-23 PROCEDURE — 36415 COLL VENOUS BLD VENIPUNCTURE: CPT

## 2023-08-23 PROCEDURE — 82947 ASSAY GLUCOSE BLOOD QUANT: CPT

## 2023-08-24 LAB — C PEPTIDE SERPL-MCNC: 2.5 NG/ML (ref 1.1–4.4)

## 2023-08-31 ENCOUNTER — OFFICE VISIT (OUTPATIENT)
Dept: FAMILY MEDICINE CLINIC | Facility: CLINIC | Age: 48
End: 2023-08-31
Payer: MEDICARE

## 2023-08-31 VITALS
HEART RATE: 99 BPM | SYSTOLIC BLOOD PRESSURE: 128 MMHG | OXYGEN SATURATION: 95 % | BODY MASS INDEX: 30.92 KG/M2 | WEIGHT: 197 LBS | DIASTOLIC BLOOD PRESSURE: 84 MMHG | HEIGHT: 67 IN

## 2023-08-31 DIAGNOSIS — Z12.31 BREAST CANCER SCREENING BY MAMMOGRAM: ICD-10-CM

## 2023-08-31 DIAGNOSIS — K76.6 PORTAL HYPERTENSION: Primary | ICD-10-CM

## 2023-08-31 DIAGNOSIS — R76.8 POSITIVE GLUTAMIC ACID DECARBOXYLASE ANTIBODY: ICD-10-CM

## 2023-08-31 PROBLEM — Z80.3 FAMILY HISTORY OF BREAST CANCER: Status: ACTIVE | Noted: 2023-08-31

## 2023-08-31 PROBLEM — M46.1 INFLAMMATION OF SACROILIAC JOINT: Status: ACTIVE | Noted: 2018-12-12

## 2023-08-31 PROBLEM — E06.3 HASHIMOTO'S THYROIDITIS: Status: ACTIVE | Noted: 2022-01-11

## 2023-08-31 PROBLEM — R53.82 CHRONIC FATIGUE: Status: ACTIVE | Noted: 2019-05-05

## 2023-08-31 PROBLEM — T82.9XXA COMPLICATION ASSOCIATED WITH VASCULAR DEVICE: Status: ACTIVE | Noted: 2022-05-19

## 2023-08-31 PROBLEM — K74.60 LIVER CIRRHOSIS SECONDARY TO NASH: Status: ACTIVE | Noted: 2023-03-29

## 2023-08-31 PROBLEM — T50.905A DRUG-INDUCED OSTEOPOROSIS: Status: ACTIVE | Noted: 2019-05-05

## 2023-08-31 PROBLEM — E55.9 VITAMIN D DEFICIENCY: Status: ACTIVE | Noted: 2019-05-05

## 2023-08-31 PROBLEM — F41.1 GENERALIZED ANXIETY DISORDER: Status: ACTIVE | Noted: 2023-02-01

## 2023-08-31 PROBLEM — Q89.9 DYSMORPHISM: Status: ACTIVE | Noted: 2022-05-09

## 2023-08-31 PROBLEM — E78.5 HYPERLIPIDEMIA: Status: ACTIVE | Noted: 2023-08-31

## 2023-08-31 PROBLEM — Z45.2 ADMISSION FOR FITTING AND ADJUSTMENT OF VASCULAR CATHETER: Status: ACTIVE | Noted: 2021-08-31

## 2023-08-31 PROBLEM — S63.599A TEAR OF TRIANGULAR FIBROCARTILAGE: Status: ACTIVE | Noted: 2021-07-07

## 2023-08-31 PROBLEM — F41.9 ANXIETY: Status: ACTIVE | Noted: 2019-04-23

## 2023-08-31 PROBLEM — Z98.890 POST-OPERATIVE NAUSEA AND VOMITING: Status: ACTIVE | Noted: 2019-04-23

## 2023-08-31 PROBLEM — Z78.9 INTOLERANCE OF CONTINUOUS POSITIVE AIRWAY PRESSURE (CPAP) VENTILATION: Status: ACTIVE | Noted: 2022-08-19

## 2023-08-31 PROBLEM — K86.1 CHRONIC PANCREATITIS: Status: ACTIVE | Noted: 2019-05-29

## 2023-08-31 PROBLEM — M54.30 SCIATICA: Status: ACTIVE | Noted: 2018-05-17

## 2023-08-31 PROBLEM — K76.0 FATTY LIVER: Status: ACTIVE | Noted: 2019-04-23

## 2023-08-31 PROBLEM — G89.4 CHRONIC PAIN DISORDER: Status: ACTIVE | Noted: 2023-08-31

## 2023-08-31 PROBLEM — J45.909 ASTHMA: Status: ACTIVE | Noted: 2019-07-16

## 2023-08-31 PROBLEM — E10.9 TYPE 1 DIABETES MELLITUS WITHOUT COMPLICATION: Status: ACTIVE | Noted: 2022-01-13

## 2023-08-31 PROBLEM — M81.8 DRUG-INDUCED OSTEOPOROSIS: Status: ACTIVE | Noted: 2019-05-05

## 2023-08-31 PROBLEM — D64.9 ANEMIA: Status: ACTIVE | Noted: 2019-04-23

## 2023-08-31 PROBLEM — K75.81 LIVER CIRRHOSIS SECONDARY TO NASH: Status: ACTIVE | Noted: 2023-03-29

## 2023-08-31 PROBLEM — R79.89 ABNORMAL LIVER FUNCTION TESTS: Status: ACTIVE | Noted: 2019-04-24

## 2023-08-31 PROBLEM — J30.9 ALLERGIC RHINITIS: Status: ACTIVE | Noted: 2019-04-23

## 2023-08-31 PROBLEM — E11.43 TYPE 2 DIABETES MELLITUS WITH DIABETIC AUTONOMIC (POLY)NEUROPATHY: Status: ACTIVE | Noted: 2021-09-25

## 2023-08-31 PROBLEM — K63.89 SMALL INTESTINAL BACTERIAL OVERGROWTH (SIBO): Status: ACTIVE | Noted: 2022-01-13

## 2023-08-31 PROBLEM — K63.8219 SMALL INTESTINAL BACTERIAL OVERGROWTH (SIBO): Status: ACTIVE | Noted: 2022-01-13

## 2023-08-31 PROBLEM — E53.8 COBALAMIN DEFICIENCY: Status: ACTIVE | Noted: 2019-05-05

## 2023-08-31 PROBLEM — I10 HYPERTENSIVE DISORDER: Status: ACTIVE | Noted: 2019-04-23

## 2023-08-31 PROBLEM — Q89.01 SPLEEN ABSENT: Status: ACTIVE | Noted: 2022-01-13

## 2023-08-31 PROBLEM — M47.816 LUMBAR SPONDYLOSIS: Status: ACTIVE | Noted: 2018-12-12

## 2023-08-31 PROCEDURE — 1159F MED LIST DOCD IN RCRD: CPT | Performed by: NURSE PRACTITIONER

## 2023-08-31 PROCEDURE — 99204 OFFICE O/P NEW MOD 45 MIN: CPT | Performed by: NURSE PRACTITIONER

## 2023-08-31 PROCEDURE — 3079F DIAST BP 80-89 MM HG: CPT | Performed by: NURSE PRACTITIONER

## 2023-08-31 PROCEDURE — 1160F RVW MEDS BY RX/DR IN RCRD: CPT | Performed by: NURSE PRACTITIONER

## 2023-08-31 PROCEDURE — 3074F SYST BP LT 130 MM HG: CPT | Performed by: NURSE PRACTITIONER

## 2023-08-31 RX ORDER — PROPRANOLOL HCL 60 MG
60 CAPSULE, EXTENDED RELEASE 24HR ORAL DAILY
COMMUNITY
End: 2023-08-31 | Stop reason: SDUPTHER

## 2023-08-31 RX ORDER — PROPRANOLOL HCL 60 MG
60 CAPSULE, EXTENDED RELEASE 24HR ORAL DAILY
Qty: 90 CAPSULE | Refills: 1 | Status: SHIPPED | OUTPATIENT
Start: 2023-08-31

## 2023-08-31 RX ORDER — BACLOFEN 10 MG/1
10 TABLET ORAL 3 TIMES DAILY
COMMUNITY
Start: 2023-08-02

## 2023-08-31 RX ORDER — SUMATRIPTAN 25 MG/1
25 TABLET, FILM COATED ORAL ONCE AS NEEDED
COMMUNITY
Start: 2023-08-08

## 2023-08-31 RX ORDER — PHENAZOPYRIDINE HYDROCHLORIDE 200 MG/1
200 TABLET, FILM COATED ORAL 3 TIMES DAILY PRN
COMMUNITY
End: 2023-08-31

## 2023-08-31 RX ORDER — UBROGEPANT 100 MG/1
100 TABLET ORAL ONCE AS NEEDED
COMMUNITY

## 2023-08-31 RX ORDER — ATOGEPANT 60 MG/1
60 TABLET ORAL DAILY
COMMUNITY

## 2023-08-31 NOTE — PROGRESS NOTES
Chief Complaint  Esablish care, HTN      SUBJECTIVE  Kayla Gan presents to [facility]   Pt is here today to establish care    Pt has no questions or concerns at this time     Pt states had severe pancreatitis in 2019 and had pancrease removed with islet auto cell transplant, spleenectomy, now diabetic, liver cirrhosis stage 3 with portal HTN,  seen every 6 months by GI, Dr. 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, states that she needs a referral to rheumatology    History of Present Illness  Past Medical History:   Diagnosis Date    Adrenal insufficiency     Allergic Unsure    Foods, Ulram, Vancomycin    Anemia     Anxiety     Arthritis     Asthma Unsure    Cholelithiasis Unsure    Cirrhosis     Clotting disorder Unsure    Colon polyp     Crohn's disease     Depression     Diabetes mellitus     Gastroparesis     GERD (gastroesophageal reflux disease)     History of MRSA infection     Hyperlipidemia     Hypertension     Hypothyroidism     Irritable bowel syndrome Unsure    Liver disease     Migraines     Pancreatitis     Urinary tract infection Unsure    Interstitial Cystitis      Family History   Problem Relation Age of Onset    Heart disease Father     Hypothyroidism Father     Anxiety disorder Father     Depression Father     Anxiety disorder Mother     Hypothyroidism Mother     Breast cancer Mother     Colon  polyps Mother     Alcohol abuse Paternal Grandfather     Heart disease Paternal Grandfather     Depression Brother     Hypertension Brother       Past Surgical History:   Procedure Laterality Date    ABSCESS DRAINAGE  12/24/2019    Fluoroscopically guided abscess drainage, [facility]    ADENOIDECTOMY      BACK SURGERY      L4 L5    CHOLECYSTECTOMY N/A     COLONOSCOPY  04/24/2019    NBIH, 3 mm polyp    DILATATION AND CURETTAGE      ENDOMETRIAL ABLATION      ENDOSCOPY N/A 04/24/2019    Normal    ENTEROSCOPY SMALL BOWEL      ERCP  2019    GASTRIC STIMULATOR IMPLANT SURGERY      GASTROJEJUNOSTOMY W/ JEJUNOSTOMY TUBE      removed    HYSTERECTOMY      LYMPH NODE BIOPSY      PANCREATECTOMY  12/2019    TPIAT    PELVIC FLOOR REPAIR      SINUS SURGERY      SPLENECTOMY      TONSILLECTOMY      VENOUS ACCESS DEVICE (PORT) INSERTION          Current Outpatient Medications:     acetaminophen (TYLENOL) 650 MG 8 hr tablet,  See Instructions, Tab mg Oral Q8H, 0 Refill(s), Disp: , Rfl:     baclofen (LIORESAL) 10 MG tablet, Take 1 tablet by mouth 3 (Three) Times a Day., Disp: , Rfl:     Brexpiprazole (Rexulti) 1 MG tablet, Take 1 mg by mouth Daily., Disp: , Rfl:     cetirizine (zyrTEC) 10 MG tablet, Take 1 tablet by mouth Daily., Disp: , Rfl:     cholecalciferol (VITAMIN D3) 1.25 MG (38487 UT) capsule, Take 1 capsule by mouth 1 (One) Time Per Week., Disp: , Rfl:     diphenhydrAMINE (BENADRYL) 50 MG/ML injection, , Disp: , Rfl:     docusate sodium (COLACE) 100 MG capsule, TAKE ONE CAPSULE BY MOUTH TWICE DAILY, Disp: , Rfl:     escitalopram (LEXAPRO) 20 MG tablet, Take 1 tablet by mouth Daily., Disp: , Rfl:     glucagon (GLUCAGEN) 1 MG injection,  mg, IntraVENous, 0 Refill(s), Disp: , Rfl:     heparin, porcine, 100-0.45 UNIT/ML-% infusion, Infuse  into a venous catheter Continuous., Disp: , Rfl:     Humira Pen 40 MG/0.4ML Pen-injector Kit, INJECT 1 PEN UNDER THE SKIN EVERY 14 DAYS., Disp: 2 each, Rfl: 2    ibuprofen (ADVIL,MOTRIN)  600 MG tablet, 1 tablet Every 8 (Eight) Hours As Needed., Disp: , Rfl:     Insulin Lispro (HumaLOG) 100 UNIT/ML solution cartridge, , Disp: , Rfl:     levothyroxine (SYNTHROID, LEVOTHROID) 125 MCG tablet, Take 1 tablet by mouth Daily., Disp: , Rfl:     linaclotide (LINZESS) 145 MCG capsule capsule, Take 1 capsule by mouth Every Morning Before Breakfast., Disp: , Rfl:     lisinopril (PRINIVIL,ZESTRIL) 10 MG tablet, TAKE ONE TABLET BY MOUTH ONCE DAILY, Disp: , Rfl:     nitrofurantoin (MACRODANTIN) 50 MG capsule,  mg Cap, Oral, Daily, 0 Refill(s), Disp: , Rfl:     NovoLOG 100 UNIT/ML injection, USE 60 UNITS PER DAY via insulin pump, Disp: , Rfl:     ondansetron (ZOFRAN) 2 mg/mL injection, , Disp: , Rfl:     Pancrelipase, Lip-Prot-Amyl, (Creon) 09843-628727 units capsule delayed-release particles capsule, Take 3 tabs with meals and 2 with snacks (Patient taking differently: Take 3 capsules by mouth 3 (Three) Times a Day With Meals. Take 3 tabs with meals and 2 with snacks), Disp: 540 capsule, Rfl: 11    pantoprazole (PROTONIX) 40 MG EC tablet, Take 1 tablet by mouth 2 (Two) Times a Day., Disp: 180 tablet, Rfl: 3    polyethylene glycol (MIRALAX) 17 GM/SCOOP powder, , Disp: , Rfl:     prochlorperazine (COMPAZINE) 10 MG tablet, Take 1 tablet by mouth Every 8 (Eight) Hours As Needed for Nausea or Vomiting., Disp: 60 tablet, Rfl: 2    promethazine (PHENERGAN) 25 MG tablet, , Disp: , Rfl:     promethazine (PHENERGAN) 25 MG/ML injection, , Disp: , Rfl:     propranolol LA (INDERAL LA) 60 MG 24 hr capsule, Take 1 capsule by mouth Daily., Disp: 90 capsule, Rfl: 1    riFAXIMin (XIFAXAN) 550 MG tablet, Take 1 tablet by mouth Daily., Disp: 30 tablet, Rfl: 1    sodium chloride 0.9 % solution, , Disp: , Rfl:     SUMAtriptan (IMITREX) 25 MG tablet, Take 1 tablet by mouth 1 (One) Time As Needed., Disp: , Rfl:     hydrocortisone (CORTEF) 5 MG tablet, 3 tablets., Disp: , Rfl:     Qulipta 60 MG tablet, Take 1 tablet by mouth Daily., Disp:  ", Rfl:     Ubrelvy 100 MG tablet, Take 1 tablet by mouth 1 (One) Time As Needed., Disp: , Rfl:     Current Facility-Administered Medications:     hydrocortisone sodium succinate (Solu-CORTEF) injection 100 mg, 100 mg, Intravenous, Q6H, Shari Parker MD, 100 mg at 07/06/22 1246    hydrocortisone sodium succinate (Solu-CORTEF) injection 100 mg, 100 mg, Intravenous, Q6H, Shari Parker MD    OBJECTIVE  Vital Signs:   /84   Pulse 99   Ht 170.2 cm (67\")   Wt 89.4 kg (197 lb)   SpO2 95%   BMI 30.85 kg/mý    Estimated body mass index is 30.85 kg/mý as calculated from the following:    Height as of this encounter: 170.2 cm (67\").    Weight as of this encounter: 89.4 kg (197 lb).     Wt Readings from Last 3 Encounters:   08/31/23 89.4 kg (197 lb)   03/08/23 85.3 kg (188 lb)   01/24/23 92.2 kg (203 lb 4.2 oz)     BP Readings from Last 3 Encounters:   08/31/23 128/84   01/24/23 176/95   05/29/22 110/77       Physical Exam  Vitals reviewed.   Constitutional:       Appearance: Normal appearance. She is well-developed.   HENT:      Head: Normocephalic and atraumatic.      Right Ear: External ear normal.      Left Ear: External ear normal.   Eyes:      Conjunctiva/sclera: Conjunctivae normal.      Pupils: Pupils are equal, round, and reactive to light.   Cardiovascular:      Rate and Rhythm: Normal rate and regular rhythm.      Heart sounds: No murmur heard.    No friction rub. No gallop.   Pulmonary:      Effort: Pulmonary effort is normal.      Breath sounds: Normal breath sounds. No wheezing or rhonchi.   Skin:     General: Skin is warm and dry.   Neurological:      Mental Status: She is alert and oriented to person, place, and time.      Cranial Nerves: No cranial nerve deficit.   Psychiatric:         Mood and Affect: Mood and affect normal.         Behavior: Behavior normal.         Thought Content: Thought content normal.         Judgment: Judgment normal.        Result Review    CMP          " 1/3/2023    15:36 1/24/2023    10:17 3/1/2023    16:04   CMP   Glucose 107  110  166    BUN 12  12  11    Creatinine 0.68  0.54  0.72    EGFR 108.3  114.4  103.9    Sodium 139  139  142    Potassium 3.9  4.0  4.0    Chloride 104  101  105    Calcium 9.0  9.2  9.4    Total Protein 8.4  8.4  8.4    Albumin 4.2  4.1  4.0    Globulin 4.2  4.3  4.4    Total Bilirubin 0.2  0.2  0.3    Alkaline Phosphatase 137  131  136    AST (SGOT) 70  70  62    ALT (SGPT) 68  62  54    Albumin/Globulin Ratio 1.0  1.0  0.9    BUN/Creatinine Ratio 17.6  22.2  15.3    Anion Gap 10.6  9.6  12.0      CBC          1/3/2023    15:36 1/24/2023    10:17   CBC   WBC 10.98  11.00    RBC 3.97  3.93    Hemoglobin 13.1  13.1    Hematocrit 37.9  38.2    MCV 95.5  97.2    MCH 33.0  33.3    MCHC 34.6  34.3    RDW 11.6  13.2    Platelets 465  417                HgB          1/3/2023    15:36 1/24/2023    10:17   HGB   Hemoglobin 13.1  13.1      Lab Results   Component Value Date    EKUG15UB 33.3 06/15/2021           Lab Results   Component Value Date    CDOCJYPU28 522 04/29/2019       No Images in the past 120 days found..     The above data has been reviewed by LAZARO Hurley 08/31/2023 11:23 EDT.          Patient Care Team:  Dulce Camejo APRN as PCP - General (Nurse Practitioner)  Andrea Han MD as Consulting Physician (Gastroenterology)  Mariela Gr APRN as Nurse Practitioner (Nurse Practitioner)    BMI is >= 25 and <30. (Overweight) The following options were offered after discussion;: exercise counseling/recommendations and nutrition counseling/recommendations       ASSESSMENT & PLAN    Diagnoses and all orders for this visit:    1. Portal hypertension (Primary)  Comments:  Stable on propanolol, continue current dose, continue follow-ups with specialty  Orders:  -     propranolol LA (INDERAL LA) 60 MG 24 hr capsule; Take 1 capsule by mouth Daily.  Dispense: 90 capsule; Refill: 1    2. Positive glutamic acid  decarboxylase antibody  -     Ambulatory Referral to Rheumatology    3. Breast cancer screening by mammogram  -     Mammo Screening Digital Tomosynthesis Bilateral With CAD; Future      Tobacco Use: Low Risk     Smoking Tobacco Use: Never    Smokeless Tobacco Use: Never    Passive Exposure: Not on file       Follow Up     Return in about 6 months (around 2/29/2024), or if symptoms worsen or fail to improve.        Patient was given instructions and counseling regarding her condition or for health maintenance advice. Please see specific information pulled into the AVS if appropriate.   I have reviewed information obtained and documented by others and I have confirmed the accuracy of this documented note.    Dulce Camejo, APRN

## 2023-09-07 RX ORDER — ESCITALOPRAM OXALATE 20 MG/1
20 TABLET ORAL DAILY
Qty: 90 TABLET | Refills: 1 | Status: SHIPPED | OUTPATIENT
Start: 2023-09-07

## 2023-09-11 ENCOUNTER — TELEMEDICINE (OUTPATIENT)
Dept: FAMILY MEDICINE CLINIC | Facility: CLINIC | Age: 48
End: 2023-09-11
Payer: MEDICARE

## 2023-09-11 ENCOUNTER — TELEPHONE (OUTPATIENT)
Dept: GASTROENTEROLOGY | Facility: CLINIC | Age: 48
End: 2023-09-11
Payer: COMMERCIAL

## 2023-09-11 VITALS — BODY MASS INDEX: 30.92 KG/M2 | HEIGHT: 67 IN | WEIGHT: 197 LBS

## 2023-09-11 DIAGNOSIS — F32.A ANXIETY AND DEPRESSION: ICD-10-CM

## 2023-09-11 DIAGNOSIS — Z00.00 ENCOUNTER FOR ANNUAL WELLNESS VISIT (AWV) IN MEDICARE PATIENT: Primary | ICD-10-CM

## 2023-09-11 DIAGNOSIS — F41.9 ANXIETY AND DEPRESSION: ICD-10-CM

## 2023-09-11 PROCEDURE — 1170F FXNL STATUS ASSESSED: CPT | Performed by: NURSE PRACTITIONER

## 2023-09-11 PROCEDURE — G0439 PPPS, SUBSEQ VISIT: HCPCS | Performed by: NURSE PRACTITIONER

## 2023-09-11 RX ORDER — SCOLOPAMINE TRANSDERMAL SYSTEM 1 MG/1
1 PATCH, EXTENDED RELEASE TRANSDERMAL
COMMUNITY
Start: 2023-09-07

## 2023-09-11 RX ORDER — LANOLIN ALCOHOL/MO/W.PET/CERES
2 CREAM (GRAM) TOPICAL DAILY
COMMUNITY
Start: 2023-09-07

## 2023-09-11 NOTE — TELEPHONE ENCOUNTER
LOV 3/8/23    Chris with [facility] requested a refill for Xifaxan for patient.

## 2023-09-11 NOTE — PROGRESS NOTES
The ABCs of the Annual Wellness Visit  Subsequent Medicare Wellness Visit    Subjective      Kayla Gan is a 48 y.o. female who presents for a Subsequent Medicare Wellness Visit.    You have chosen to receive care through a telehealth visit.  Do you consent to use a video/audio connection for your medical care today? Yes     Patient being seen via FaceTime, patient is at home, provider is in office    The following portions of the patient's history were reviewed and   updated as appropriate: allergies, current medications, past family history, past medical history, past social history, past surgical history, and problem list.    Compared to one year ago, the patient feels her physical   health is the same.    Compared to one year ago, the patient feels her mental   health is the same.    Recent Hospitalizations:  She was not admitted to the hospital during the last year.       Current Medical Providers:  Patient Care Team:  Dulce Camejo APRN as PCP - General (Nurse Practitioner)  Andrea Han MD as Consulting Physician (Gastroenterology)  Mariela Gr APRN as Nurse Practitioner (Nurse Practitioner)    Outpatient Medications Prior to Visit   Medication Sig Dispense Refill    acetaminophen (TYLENOL) 650 MG 8 hr tablet   See Instructions, Tab mg Oral Q8H, 0 Refill(s)      baclofen (LIORESAL) 10 MG tablet Take 1 tablet by mouth 3 (Three) Times a Day.      Brexpiprazole (Rexulti) 1 MG tablet Take 1 mg by mouth Daily.      cetirizine (zyrTEC) 10 MG tablet Take 1 tablet by mouth Daily.      cholecalciferol (VITAMIN D3) 1.25 MG (70685 UT) capsule Take 1 capsule by mouth 1 (One) Time Per Week.      diphenhydrAMINE (BENADRYL) 50 MG/ML injection       docusate sodium (COLACE) 100 MG capsule TAKE ONE CAPSULE BY MOUTH TWICE DAILY      E-400 180 MG (400 UNIT) capsule capsule Take 2 capsules by mouth Daily.      escitalopram (LEXAPRO) 20 MG tablet Take 1 tablet by mouth Daily. 90 tablet 1    glucagon  (GLUCAGEN) 1 MG injection   mg, IntraVENous, 0 Refill(s)      heparin, porcine, 100-0.45 UNIT/ML-% infusion Infuse  into a venous catheter Continuous.      Humira Pen 40 MG/0.4ML Pen-injector Kit INJECT 1 PEN UNDER THE SKIN EVERY 14 DAYS. 2 each 2    hydrocortisone (CORTEF) 5 MG tablet 3 tablets.      ibuprofen (ADVIL,MOTRIN) 600 MG tablet 1 tablet Every 8 (Eight) Hours As Needed.      Insulin Lispro (HumaLOG) 100 UNIT/ML solution cartridge       levothyroxine (SYNTHROID, LEVOTHROID) 125 MCG tablet Take 1 tablet by mouth Daily.      linaclotide (LINZESS) 145 MCG capsule capsule Take 1 capsule by mouth Every Morning Before Breakfast.      lisinopril (PRINIVIL,ZESTRIL) 10 MG tablet TAKE ONE TABLET BY MOUTH ONCE DAILY      nitrofurantoin (MACRODANTIN) 50 MG capsule   mg Cap, Oral, Daily, 0 Refill(s)      NovoLOG 100 UNIT/ML injection USE 60 UNITS PER DAY via insulin pump      ondansetron (ZOFRAN) 2 mg/mL injection       Pancrelipase, Lip-Prot-Amyl, (Creon) 04592-433764 units capsule delayed-release particles capsule Take 3 tabs with meals and 2 with snacks (Patient taking differently: Take 3 capsules by mouth 3 (Three) Times a Day With Meals. Take 3 tabs with meals and 2 with snacks) 540 capsule 11    pantoprazole (PROTONIX) 40 MG EC tablet Take 1 tablet by mouth 2 (Two) Times a Day. 180 tablet 3    polyethylene glycol (MIRALAX) 17 GM/SCOOP powder       prochlorperazine (COMPAZINE) 10 MG tablet Take 1 tablet by mouth Every 8 (Eight) Hours As Needed for Nausea or Vomiting. 60 tablet 2    promethazine (PHENERGAN) 25 MG tablet       promethazine (PHENERGAN) 25 MG/ML injection       propranolol LA (INDERAL LA) 60 MG 24 hr capsule Take 1 capsule by mouth Daily. 90 capsule 1    Qulipta 60 MG tablet Take 1 tablet by mouth Daily.      Scopolamine 1 MG/3DAYS patch Place 1 patch on the skin as directed by provider Every 72 (Seventy-Two) Hours.      sodium chloride 0.9 % solution       SUMAtriptan (IMITREX) 25 MG tablet Take 1  tablet by mouth 1 (One) Time As Needed.      Ubrelvy 100 MG tablet Take 1 tablet by mouth 1 (One) Time As Needed.      riFAXIMin (XIFAXAN) 550 MG tablet Take 1 tablet by mouth Daily. 30 tablet 1     Facility-Administered Medications Prior to Visit   Medication Dose Route Frequency Provider Last Rate Last Admin    hydrocortisone sodium succinate (Solu-CORTEF) injection 100 mg  100 mg Intravenous Q6H Shari Parker MD   100 mg at 07/06/22 1246    hydrocortisone sodium succinate (Solu-CORTEF) injection 100 mg  100 mg Intravenous Q6H Shari Parker MD           No opioid medication identified on active medication list. I have reviewed chart for other potential  high risk medication/s and harmful drug interactions in the elderly.        Aspirin is not on active medication list.  Aspirin use is not indicated based on review of current medical condition/s. Risk of harm outweighs potential benefits.  .    Patient Active Problem List   Diagnosis    Abnormal liver function tests    Obesity with body mass index 30 or greater    Type 2 diabetes mellitus with diabetic autonomic (poly)neuropathy    Acquired hypothyroidism    Adrenal insufficiency    Hypertensive disorder    Deep venous thrombosis    Degeneration of lumbar intervertebral disc    Major depressive disorder    Displacement of lumbar intervertebral disc without myelopathy    Gastroparesis    Gastrostomy complication    H/O: hematuria    Hashimoto's thyroiditis    Hyperglycemia    Incomplete defecation    Increased frequency of urination    Intestinal autonomic neuropathy    Small intestinal bacterial overgrowth (SIBO)    Iron deficiency anemia secondary to inadequate dietary iron intake    Irritable bowel syndrome    Long term current use of systemic steroids    Migraine    Lumbosacral spondylosis without myelopathy    Methicillin resistant Staphylococcus aureus infection    Myalgia    Post-operative nausea and vomiting    Nonalcoholic steatohepatitis  "(WELDON)    Noninfectious gastroenteritis    HI (obstructive sleep apnea)    Osteoporosis    Disorder of bladder    Other transplanted organ and tissue status    Pelvic floor relaxation    Pancreas divisum    Postoperative pain    Postpancreatectomy hyperglycemia    Prolapse of anterior vaginal wall    Recurrent urinary tract infection    Female stress incontinence    Ulcerative colitis    Acute cystitis    Admission for fitting and adjustment of vascular catheter    Allergic rhinitis    Anemia    Anxiety and depression    Arthritis    Asthma    Blood coagulation disorder    Chronic fatigue    Chronic interstitial cystitis    Chronic pain disorder    Chronic pancreatitis    Cobalamin deficiency    Complication associated with vascular device    Constipation    Drug-induced osteoporosis    Dysmorphism    Fatty liver    Gastroesophageal reflux disease    Generalized anxiety disorder    Hyperlipidemia    Inflammation of sacroiliac joint    Intolerance of continuous positive airway pressure (CPAP) ventilation    Liver cirrhosis secondary to WELDON    Lumbar spondylosis    Sciatica    Seasonal allergies    Spleen absent    Tear of triangular fibrocartilage    Type 1 diabetes mellitus without complication    Urinary incontinence    Vitamin D deficiency    Portal hypertension    Family history of breast cancer     Advance Care Planning   Advance Care Planning     Advance Directive is on file.  ACP discussion was held with the patient during this visit. Patient has an advance directive in EMR which is still valid.      Objective    Vitals:    09/11/23 1330   Weight: 89.4 kg (197 lb)   Height: 170.2 cm (67\")     Estimated body mass index is 30.85 kg/m² as calculated from the following:    Height as of this encounter: 170.2 cm (67\").    Weight as of this encounter: 89.4 kg (197 lb).           Does the patient have evidence of cognitive impairment?   No            HEALTH RISK ASSESSMENT    Smoking Status:  Social History "     Tobacco Use   Smoking Status Never   Smokeless Tobacco Never     Alcohol Consumption:  Social History     Substance and Sexual Activity   Alcohol Use Never     Fall Risk Screen:    SARITHA Fall Risk Assessment was completed, and patient is at MODERATE risk for falls. Assessment completed on:2023    Depression Screenin/11/2023     1:00 PM   PHQ-2/PHQ-9 Depression Screening   Little Interest or Pleasure in Doing Things 2-->more than half the days   Feeling Down, Depressed or Hopeless 2-->more than half the days   Trouble Falling or Staying Asleep, or Sleeping Too Much 2-->more than half the days   Feeling Tired or Having Little Energy 3-->nearly every day   Poor Appetite or Overeating 2-->more than half the days   Feeling Bad about Yourself - or that You are a Failure or Have Let Yourself or Your Family Down 3-->nearly every day   Trouble Concentrating on Things, Such as Reading the Newspaper or Watching Television 3-->nearly every day   Moving or Speaking So Slowly that Other People Could Have Noticed? Or the Opposite - Being So Fidgety 0-->not at all   Thoughts that You Would be Better Off Dead or of Hurting Yourself in Some Way 0-->not at all   PHQ-9: Brief Depression Severity Measure Score 17   If You Checked Off Any Problems, How Difficult Have These Problems Made It For You to Do Your Work, Take Care of Things at Home, or Get Along with Other People? somewhat difficult       Health Habits and Functional and Cognitive Screenin/11/2023     1:00 PM   Functional & Cognitive Status   Do you have difficulty preparing food and eating? No   Do you have difficulty bathing yourself, getting dressed or grooming yourself? No   Do you have difficulty using the toilet? No   Do you have difficulty moving around from place to place? No   Current Diet Unhealthy Diet   Dental Exam Up to date   Eye Exam Up to date   Exercise (times per week) 0 times per week   Current Exercises Include No Regular Exercise    Do you need help using the phone?  No   Are you deaf or do you have serious difficulty hearing?  No   Do you need help to go to places out of walking distance? No   Do you need help shopping? Yes   Do you need help preparing meals?  Yes   Do you need help with housework?  Yes   Do you need help with laundry? Yes   Do you need help taking your medications? No   Do you need help managing money? Yes   Do you ever drive or ride in a car without wearing a seat belt? No   Have you felt unusual stress, anger or loneliness in the last month? Yes   Who do you live with? Child   If you need help, do you have trouble finding someone available to you? No   Do you have difficulty concentrating, remembering or making decisions? Yes       Age-appropriate Screening Schedule:  Refer to the list below for future screening recommendations based on patient's age, sex and/or medical conditions. Orders for these recommended tests are listed in the plan section. The patient has been provided with a written plan.    Health Maintenance   Topic Date Due    Hepatitis B (1 of 3 - 3-dose series) Never done    URINE MICROALBUMIN  Never done    ANNUAL WELLNESS VISIT  Never done    DIABETIC FOOT EXAM  Never done    LIPID PANEL  06/15/2022    Pneumococcal Vaccine 0-64 (1 - PCV) 02/29/2024 (Originally 7/22/1981)    TDAP/TD VACCINES (1 - Tdap) 02/29/2024 (Originally 7/22/1994)    INFLUENZA VACCINE  10/01/2023    HEMOGLOBIN A1C  02/24/2024    DIABETIC EYE EXAM  05/10/2024    PAP SMEAR  08/04/2024    BMI FOLLOWUP  08/31/2024    DXA SCAN  03/07/2025    COLORECTAL CANCER SCREENING  04/24/2029    HEPATITIS C SCREENING  Completed    COVID-19 Vaccine  Completed                  CMS Preventative Services Quick Reference  Risk Factors Identified During Encounter:    Depression/Dysphoria: Referral to Mental Health specialist  Fall Risk-High or Moderate: Discussed Fall Prevention in the home    The above risks/problems have been discussed with the  patient.  Pertinent information has been shared with the patient in the After Visit Summary.    Diagnoses and all orders for this visit:    1. Encounter for annual wellness visit (AWV) in Medicare patient (Primary)    2. Anxiety and depression  Assessment & Plan:  Patient reports that her depression and anxiety symptoms are stable, she has been on her current medications for a couple of years, reports that she is doing as well as can be expected given her multitude of health problems, however upon further discussion patient reports that she would be interested in seeing psychiatry to discuss potential medication adjustment to see if she can get better control of symptoms, adamantly denies any SI, referral placed    Orders:  -     Ambulatory Referral to Psychiatry        Follow Up:   Next Medicare Wellness visit to be scheduled in 1 year.      An After Visit Summary and PPPS were made available to the patient.

## 2023-09-11 NOTE — ASSESSMENT & PLAN NOTE
Patient reports that her depression and anxiety symptoms are stable, she has been on her current medications for a couple of years, reports that she is doing as well as can be expected given her multitude of health problems, however upon further discussion patient reports that she would be interested in seeing psychiatry to discuss potential medication adjustment to see if she can get better control of symptoms, adamantly denies any SI, referral placed

## 2023-09-12 ENCOUNTER — TELEMEDICINE (OUTPATIENT)
Dept: GASTROENTEROLOGY | Facility: CLINIC | Age: 48
End: 2023-09-12
Payer: MEDICARE

## 2023-09-12 VITALS — WEIGHT: 192 LBS | BODY MASS INDEX: 30.07 KG/M2

## 2023-09-12 DIAGNOSIS — K58.0 IRRITABLE BOWEL SYNDROME WITH DIARRHEA: Chronic | ICD-10-CM

## 2023-09-12 DIAGNOSIS — K31.84 GASTROPARESIS: Primary | Chronic | ICD-10-CM

## 2023-09-12 DIAGNOSIS — K21.9 GASTROESOPHAGEAL REFLUX DISEASE, UNSPECIFIED WHETHER ESOPHAGITIS PRESENT: Chronic | ICD-10-CM

## 2023-09-12 DIAGNOSIS — Z79.899 HIGH RISK MEDICATION USE: Chronic | ICD-10-CM

## 2023-09-12 DIAGNOSIS — K74.60 CIRRHOSIS OF LIVER WITHOUT ASCITES, UNSPECIFIED HEPATIC CIRRHOSIS TYPE: Chronic | ICD-10-CM

## 2023-09-12 DIAGNOSIS — K50.919 CROHN'S DISEASE WITH COMPLICATION, UNSPECIFIED GASTROINTESTINAL TRACT LOCATION: Chronic | ICD-10-CM

## 2023-09-12 DIAGNOSIS — Z12.11 COLON CANCER SCREENING: ICD-10-CM

## 2023-09-12 PROCEDURE — 99214 OFFICE O/P EST MOD 30 MIN: CPT | Performed by: NURSE PRACTITIONER

## 2023-09-12 RX ORDER — LANSOPRAZOLE 30 MG/1
30 CAPSULE, DELAYED RELEASE ORAL 2 TIMES DAILY
Qty: 180 CAPSULE | Refills: 3 | Status: SHIPPED | OUTPATIENT
Start: 2023-09-12 | End: 2024-09-11

## 2023-09-12 NOTE — PATIENT INSTRUCTIONS
1.  For worsening GERD we will discontinue pantoprazole and start you on lansoprazole 30 mg twice daily.  Recommend you take this 1 hour before your first and last meals of the day.  This prescription has been sent to your pharmacy.    2.  For gastroparesis care, continue treatment with  GI motility and Dr. Riddle.  You may continue to utilize Phenergan and Zofran as prescribed for management of nausea and vomiting.    3.  For Crohn's disease, continue Humira injections every 2 weeks.    4.  You will be due for some routine lab work October 2023 for ongoing monitoring of high risk medication use.  We will mail your lab work requisitions to your home so that she can have your labs drawn at an outside facility.    5.  You are currently due for your colonoscopy.  We will place orders accordingly.  You will receive a phone call from one of our schedulers to schedule your procedure.    6.  Continue to follow with Dr. Jake Zhang at  hepatology for ongoing management of your cirrhosis.    7.  We will plan for office follow-up in 6 months for reassessment of symptoms or sooner should your symptoms worsen or fail to improve.

## 2023-09-12 NOTE — PROGRESS NOTES
Chief Complaint   Patient presents with    Follow-up     Gastroparesis, GERD, constipation, cirrhosis, Crohn's disease         History of Present Illness  48-year-old female presents today for telemedicine follow-up.  She was last seen via telemedicine visit on 3/8/2023.  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  She continues Creon pancreatic enzymes daily.    GERD is currently managed with pantoprazole 40 mg twice daily which had been working well until the past 2 weeks. She has had reflux come out of her mouth and nose-and this can occur in an upright position. She has never tried any other PPIs.     She continues treatment with urology on telemetry for gastroparesis.  She had a permanent gastric stimulator placed August 2022 and has had to undergo some adjustments.  She has received IVIG infusions.  Previously she had used domperidone.  She reported significant gastroparesis symptoms at her last follow-up visit with our office.  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  She reports that she does feel much better since backing off of her immunoglobulin therapy. She is receiving Sub Q IVIG. She saw Dr. Riddle a couple of weeks ago. She has been referred to a rheumatologist due to Dad65 level that was low. She reports that she has been told that her body is having an autoimmune issue and she has an upcoming appt with rheumatology on October 13th or 14th. She is still having significant nausea and vomiting. She has weekly visits with a home health RN and receives IV fluids and IV phenergan and benadryl. Dr. Krishnamurthy's office is managing her gastroparesis and IV fluids.     For Crohn's disease, she continues Humira injections every 2 weeks.  Last colonoscopy was performed on 4/24/2019. She was diagnosed in 2009.  For constipation she continues Linzess 145 mcg  daily. She is having Bms daily. She had a bout of constipation a couple of weeks ago-but it has resolved.  She also continues treatment with Xifaxan 550 mg once daily which helps with her crossover IBS-D and SIBO symptoms. Xifaxan has been such a huge factor in controlling her IBS-D and SIBO.     She has a history of Mart and underwent liver biopsy demonstrating findings consistent with cirrhosis.  She follows with Dr. Zhang at  hepatology.  FibroScan on 3/30/2022 revealed a fibrosis score of F4 with moderate to severe liver fat.  She was referred to [facility] for clinical trial, but no trial was indicated. [facility] did not have any additional input in regards to management of her liver disease.   She reports that it has been felt that the islet cell transplant into her liver following her pancreatectomy has had a negative effect on her liver.  Last calculated MELD score February 2023 was 6. She follows up with Dr. Zhang on 9/13 for follow up.     You have chosen to receive care through a telehealth visit.  Do you consent to use a video/audio connection for your medical care today? Yes    Physical Exam  Constitutional:       General: She is not in acute distress.     Appearance: Normal appearance. She is well-developed. She is not ill-appearing.   Eyes:      General: No scleral icterus.  Pulmonary:      Effort: No respiratory distress.   Skin:     Coloration: Skin is not jaundiced or pale.   Neurological:      Mental Status: She is alert and oriented to person, place, and time.   Psychiatric:         Mood and Affect: Mood normal.         Behavior: Behavior normal.         Thought Content: Thought content normal.         Judgment: Judgment normal.      Result Review :      Telemedicine with Mariela Gr APRN (03/08/2023)   ENDOSCOPY, INT (09/08/2019)   SCANNED - COLONOSCOPY (04/24/2019)   EGD with [facility] (06/16/2023 08:30)   FL Upper GI With Air Contrast & SBFT (04/25/2023 16:00)   Fibroscan  (Liver Elastography) (06/06/2023 15:28)   Comprehensive Metabolic Panel (03/01/2023 16:04)   CBC & Differential (01/24/2023 10:17)   Assessment and Plan    Diagnoses and all orders for this visit:    1. Gastroparesis (Primary)    2. Crohn's disease with complication, unspecified gastrointestinal tract location  -     QuantiFERON TB Gold; Future  -     Hepatitis Panel, Acute; Future    3. Cirrhosis of liver without ascites, unspecified hepatic cirrhosis type    4. High risk medication use  -     QuantiFERON TB Gold; Future  -     Hepatitis Panel, Acute; Future    5. Gastroesophageal reflux disease, unspecified whether esophagitis present    6. Irritable bowel syndrome with diarrhea    7. Colon cancer screening    Other orders  -     lansoprazole (PREVACID) 30 MG capsule; Take 1 capsule by mouth 2 (Two) Times a Day.  Dispense: 180 capsule; Refill: 3           Patient Instructions   1.  For worsening GERD we will discontinue pantoprazole and start you on lansoprazole 30 mg twice daily.  Recommend you take this 1 hour before your first and last meals of the day.  This prescription has been sent to your pharmacy.    2.  For gastroparesis care, continue treatment with  GI motility and Dr. Riddle.  You may continue to utilize Phenergan and Zofran as prescribed for management of nausea and vomiting.    3.  For Crohn's disease, continue Humira injections every 2 weeks.    4.  You will be due for some routine lab work October 2023 for ongoing monitoring of high risk medication use.  We will mail your lab work requisitions to your home so that she can have your labs drawn at an outside facility.    5.  You are currently due for your colonoscopy.  We will place orders accordingly.  You will receive a phone call from one of our schedulers to schedule your procedure.    6.  Continue to follow with Dr. Jake Zhang at  hepatology for ongoing management of your cirrhosis.    7.  We will plan for office follow-up in 6 months for  reassessment of symptoms or sooner should your symptoms worsen or fail to improve.         Discussion:  Patient reports that her reflux has worsened to the point where she has reflux coming out of her nose and mouth even in upright position.  We will switch her from pantoprazole to lansoprazole in the hopes this will better improve her symptoms.    Patient treated with  GI motility as well as with Dr. Riddle for management of her gastroparesis.  She has a gastric stimulator in place and continues to receive subcutaneous IVIG injections.  She continues antiemetics for management of nausea and vomiting as well as IV fluids, IV Benadryl, and IV Phenergan at home via home health.    Patient cirrhosis is currently managed by Dr. Jake Zhang at  hepatology.  Patient reports an upcoming appointment later this month.  She did get referred to [facility] to see if there were potential clinical trials that she could be involved with.  However, she states that there were not any clinical trials and that there were no additional recommendations to add aside from Dr. Zhang's management.    For Crohn's disease, patient to continue Humira injections every 2 weeks as her Crohn's disease is well controlled with use.  Patient's last colonoscopy was April 2019 and she is due for colonoscopy at this time.  Orders have been placed accordingly.    Recommend office follow-up in 6 months for reassessment of symptoms, or sooner should symptoms worsen or fail to improve.  Patient verbalized understand above plan of care and is in agreement.  All questions answered and support provided.    EMR Dragon/Transcription Disclaimer:  This document has been Dictated utilizing Dragon dictation.

## 2023-09-20 ENCOUNTER — LAB (OUTPATIENT)
Dept: LAB | Facility: HOSPITAL | Age: 48
End: 2023-09-20
Payer: MEDICARE

## 2023-09-20 ENCOUNTER — TRANSCRIBE ORDERS (OUTPATIENT)
Dept: LAB | Facility: HOSPITAL | Age: 48
End: 2023-09-20
Payer: COMMERCIAL

## 2023-09-20 DIAGNOSIS — K75.81 NASH (NONALCOHOLIC STEATOHEPATITIS): ICD-10-CM

## 2023-09-20 DIAGNOSIS — K74.60 CIRRHOSIS OF LIVER WITHOUT ASCITES, UNSPECIFIED HEPATIC CIRRHOSIS TYPE: ICD-10-CM

## 2023-09-20 DIAGNOSIS — R94.5 NONSPECIFIC ABNORMAL RESULTS OF LIVER FUNCTION STUDY: ICD-10-CM

## 2023-09-20 DIAGNOSIS — K75.81 NASH (NONALCOHOLIC STEATOHEPATITIS): Primary | ICD-10-CM

## 2023-09-20 LAB
ALBUMIN SERPL-MCNC: 4.2 G/DL (ref 3.5–5.2)
ALBUMIN/GLOB SERPL: 0.9 G/DL
ALP SERPL-CCNC: 198 U/L (ref 39–117)
ALPHA-FETOPROTEIN: 4.82 NG/ML (ref 0–8.3)
ALT SERPL W P-5'-P-CCNC: 71 U/L (ref 1–33)
ANION GAP SERPL CALCULATED.3IONS-SCNC: 11 MMOL/L (ref 5–15)
AST SERPL-CCNC: 107 U/L (ref 1–32)
BASOPHILS # BLD AUTO: 0.06 10*3/MM3 (ref 0–0.2)
BASOPHILS NFR BLD AUTO: 0.5 % (ref 0–1.5)
BILIRUB SERPL-MCNC: 0.4 MG/DL (ref 0–1.2)
BUN SERPL-MCNC: 16 MG/DL (ref 6–20)
BUN/CREAT SERPL: 22.9 (ref 7–25)
CALCIUM SPEC-SCNC: 9.7 MG/DL (ref 8.6–10.5)
CHLORIDE SERPL-SCNC: 103 MMOL/L (ref 98–107)
CO2 SERPL-SCNC: 26 MMOL/L (ref 22–29)
CREAT SERPL-MCNC: 0.7 MG/DL (ref 0.57–1)
DEPRECATED RDW RBC AUTO: 46.2 FL (ref 37–54)
EGFRCR SERPLBLD CKD-EPI 2021: 106.8 ML/MIN/1.73
EOSINOPHIL # BLD AUTO: 0.22 10*3/MM3 (ref 0–0.4)
EOSINOPHIL NFR BLD AUTO: 2 % (ref 0.3–6.2)
ERYTHROCYTE [DISTWIDTH] IN BLOOD BY AUTOMATED COUNT: 13 % (ref 12.3–15.4)
GLOBULIN UR ELPH-MCNC: 4.6 GM/DL
GLUCOSE SERPL-MCNC: 96 MG/DL (ref 65–99)
HCT VFR BLD AUTO: 41 % (ref 34–46.6)
HGB BLD-MCNC: 13.6 G/DL (ref 12–15.9)
IMM GRANULOCYTES # BLD AUTO: 0.04 10*3/MM3 (ref 0–0.05)
IMM GRANULOCYTES NFR BLD AUTO: 0.4 % (ref 0–0.5)
INR PPP: 1.12 (ref 0.86–1.15)
LYMPHOCYTES # BLD AUTO: 4.91 10*3/MM3 (ref 0.7–3.1)
LYMPHOCYTES NFR BLD AUTO: 44.8 % (ref 19.6–45.3)
MCH RBC QN AUTO: 32.7 PG (ref 26.6–33)
MCHC RBC AUTO-ENTMCNC: 33.2 G/DL (ref 31.5–35.7)
MCV RBC AUTO: 98.6 FL (ref 79–97)
MONOCYTES # BLD AUTO: 0.86 10*3/MM3 (ref 0.1–0.9)
MONOCYTES NFR BLD AUTO: 7.9 % (ref 5–12)
NEUTROPHILS NFR BLD AUTO: 4.86 10*3/MM3 (ref 1.7–7)
NEUTROPHILS NFR BLD AUTO: 44.4 % (ref 42.7–76)
NRBC BLD AUTO-RTO: 0 /100 WBC (ref 0–0.2)
PLATELET # BLD AUTO: 562 10*3/MM3 (ref 140–450)
PMV BLD AUTO: 11.4 FL (ref 6–12)
POTASSIUM SERPL-SCNC: 4 MMOL/L (ref 3.5–5.2)
PROT SERPL-MCNC: 8.8 G/DL (ref 6–8.5)
PROTHROMBIN TIME: 14.5 SECONDS (ref 11.8–14.9)
RBC # BLD AUTO: 4.16 10*6/MM3 (ref 3.77–5.28)
SODIUM SERPL-SCNC: 140 MMOL/L (ref 136–145)
WBC NRBC COR # BLD: 10.95 10*3/MM3 (ref 3.4–10.8)

## 2023-09-20 PROCEDURE — 85025 COMPLETE CBC W/AUTO DIFF WBC: CPT

## 2023-09-20 PROCEDURE — 80053 COMPREHEN METABOLIC PANEL: CPT

## 2023-09-20 PROCEDURE — 85610 PROTHROMBIN TIME: CPT

## 2023-09-20 PROCEDURE — 82105 ALPHA-FETOPROTEIN SERUM: CPT

## 2023-09-20 PROCEDURE — 36415 COLL VENOUS BLD VENIPUNCTURE: CPT

## 2023-10-02 ENCOUNTER — APPOINTMENT (OUTPATIENT)
Dept: GENERAL RADIOLOGY | Facility: HOSPITAL | Age: 48
End: 2023-10-02
Payer: MEDICARE

## 2023-10-02 ENCOUNTER — HOSPITAL ENCOUNTER (EMERGENCY)
Facility: HOSPITAL | Age: 48
Discharge: HOME | End: 2023-10-02
Attending: EMERGENCY MEDICINE | Admitting: EMERGENCY MEDICINE
Payer: MEDICARE

## 2023-10-02 ENCOUNTER — APPOINTMENT (OUTPATIENT)
Dept: CT IMAGING | Facility: HOSPITAL | Age: 48
End: 2023-10-02
Payer: MEDICARE

## 2023-10-02 VITALS
OXYGEN SATURATION: 97 % | BODY MASS INDEX: 29.82 KG/M2 | RESPIRATION RATE: 20 BRPM | WEIGHT: 190 LBS | SYSTOLIC BLOOD PRESSURE: 123 MMHG | DIASTOLIC BLOOD PRESSURE: 80 MMHG | HEIGHT: 67 IN | TEMPERATURE: 99.2 F | HEART RATE: 72 BPM

## 2023-10-02 DIAGNOSIS — Z86.59 MENTAL STATUS CHANGE RESOLVED: Primary | ICD-10-CM

## 2023-10-02 LAB
ALBUMIN SERPL-MCNC: 4.1 G/DL (ref 3.5–5.2)
ALBUMIN/GLOB SERPL: 0.9 G/DL
ALP SERPL-CCNC: 176 U/L (ref 39–117)
ALT SERPL W P-5'-P-CCNC: 61 U/L (ref 1–33)
AMMONIA BLD-SCNC: 39 UMOL/L (ref 11–51)
AMPHET+METHAMPHET UR QL: NEGATIVE
ANION GAP SERPL CALCULATED.3IONS-SCNC: 11.7 MMOL/L (ref 5–15)
APAP SERPL-MCNC: <5 MCG/ML (ref 0–30)
AST SERPL-CCNC: 76 U/L (ref 1–32)
BACTERIA UR QL AUTO: ABNORMAL /HPF
BARBITURATES UR QL SCN: NEGATIVE
BASOPHILS # BLD AUTO: 0.04 10*3/MM3 (ref 0–0.2)
BASOPHILS NFR BLD AUTO: 0.4 % (ref 0–1.5)
BENZODIAZ UR QL SCN: NEGATIVE
BILIRUB SERPL-MCNC: 0.4 MG/DL (ref 0–1.2)
BILIRUB UR QL STRIP: NEGATIVE
BUN SERPL-MCNC: 17 MG/DL (ref 6–20)
BUN/CREAT SERPL: 24.6 (ref 7–25)
CALCIUM SPEC-SCNC: 9.7 MG/DL (ref 8.6–10.5)
CANNABINOIDS SERPL QL: NEGATIVE
CHLORIDE SERPL-SCNC: 104 MMOL/L (ref 98–107)
CLARITY UR: ABNORMAL
CO2 SERPL-SCNC: 23.3 MMOL/L (ref 22–29)
COCAINE UR QL: NEGATIVE
COD CRY URNS QL: ABNORMAL /HPF
COLOR UR: YELLOW
CREAT SERPL-MCNC: 0.69 MG/DL (ref 0.57–1)
DEPRECATED RDW RBC AUTO: 52.4 FL (ref 37–54)
EGFRCR SERPLBLD CKD-EPI 2021: 107.2 ML/MIN/1.73
EOSINOPHIL # BLD AUTO: 0.26 10*3/MM3 (ref 0–0.4)
EOSINOPHIL NFR BLD AUTO: 2.3 % (ref 0.3–6.2)
ERYTHROCYTE [DISTWIDTH] IN BLOOD BY AUTOMATED COUNT: 14.3 % (ref 12.3–15.4)
ETHANOL BLD-MCNC: <10 MG/DL (ref 0–10)
ETHANOL UR QL: <0.01 %
FENTANYL UR-MCNC: NEGATIVE NG/ML
GLOBULIN UR ELPH-MCNC: 4.5 GM/DL
GLUCOSE BLDC GLUCOMTR-MCNC: 126 MG/DL (ref 70–99)
GLUCOSE SERPL-MCNC: 136 MG/DL (ref 65–99)
GLUCOSE UR STRIP-MCNC: NEGATIVE MG/DL
HCT VFR BLD AUTO: 39.2 % (ref 34–46.6)
HGB BLD-MCNC: 12.8 G/DL (ref 12–15.9)
HGB UR QL STRIP.AUTO: NEGATIVE
HOLD SPECIMEN: NORMAL
HOLD SPECIMEN: NORMAL
HYALINE CASTS UR QL AUTO: ABNORMAL /LPF
IMM GRANULOCYTES # BLD AUTO: 0.03 10*3/MM3 (ref 0–0.05)
IMM GRANULOCYTES NFR BLD AUTO: 0.3 % (ref 0–0.5)
INR PPP: 1.16 (ref 0.86–1.15)
KETONES UR QL STRIP: NEGATIVE
LEUKOCYTE ESTERASE UR QL STRIP.AUTO: ABNORMAL
LYMPHOCYTES # BLD AUTO: 5.89 10*3/MM3 (ref 0.7–3.1)
LYMPHOCYTES NFR BLD AUTO: 51.9 % (ref 19.6–45.3)
MAGNESIUM SERPL-MCNC: 2.1 MG/DL (ref 1.6–2.6)
MCH RBC QN AUTO: 32.2 PG (ref 26.6–33)
MCHC RBC AUTO-ENTMCNC: 32.7 G/DL (ref 31.5–35.7)
MCV RBC AUTO: 98.5 FL (ref 79–97)
METHADONE UR QL SCN: NEGATIVE
MONOCYTES # BLD AUTO: 1.19 10*3/MM3 (ref 0.1–0.9)
MONOCYTES NFR BLD AUTO: 10.5 % (ref 5–12)
NEUTROPHILS NFR BLD AUTO: 3.94 10*3/MM3 (ref 1.7–7)
NEUTROPHILS NFR BLD AUTO: 34.6 % (ref 42.7–76)
NITRITE UR QL STRIP: NEGATIVE
NRBC BLD AUTO-RTO: 0 /100 WBC (ref 0–0.2)
OPIATES UR QL: NEGATIVE
OXYCODONE UR QL SCN: POSITIVE
PH UR STRIP.AUTO: 5.5 [PH] (ref 5–8)
PLATELET # BLD AUTO: 429 10*3/MM3 (ref 140–450)
PMV BLD AUTO: 10.8 FL (ref 6–12)
POTASSIUM SERPL-SCNC: 4.3 MMOL/L (ref 3.5–5.2)
PROT SERPL-MCNC: 8.6 G/DL (ref 6–8.5)
PROT UR QL STRIP: NEGATIVE
PROTHROMBIN TIME: 14.9 SECONDS (ref 11.8–14.9)
RBC # BLD AUTO: 3.98 10*6/MM3 (ref 3.77–5.28)
RBC # UR STRIP: ABNORMAL /HPF
REF LAB TEST METHOD: ABNORMAL
SALICYLATES SERPL-MCNC: <0.3 MG/DL
SODIUM SERPL-SCNC: 139 MMOL/L (ref 136–145)
SP GR UR STRIP: 1.01 (ref 1–1.03)
SQUAMOUS #/AREA URNS HPF: ABNORMAL /HPF
TROPONIN T SERPL HS-MCNC: 6 NG/L
UROBILINOGEN UR QL STRIP: ABNORMAL
WBC # UR STRIP: ABNORMAL /HPF
WBC NRBC COR # BLD: 11.35 10*3/MM3 (ref 3.4–10.8)
WHOLE BLOOD HOLD COAG: NORMAL
WHOLE BLOOD HOLD SPECIMEN: NORMAL

## 2023-10-02 PROCEDURE — 80307 DRUG TEST PRSMV CHEM ANLYZR: CPT | Performed by: EMERGENCY MEDICINE

## 2023-10-02 PROCEDURE — 93005 ELECTROCARDIOGRAM TRACING: CPT | Performed by: EMERGENCY MEDICINE

## 2023-10-02 PROCEDURE — 83735 ASSAY OF MAGNESIUM: CPT

## 2023-10-02 PROCEDURE — 96374 THER/PROPH/DIAG INJ IV PUSH: CPT

## 2023-10-02 PROCEDURE — 71045 X-RAY EXAM CHEST 1 VIEW: CPT

## 2023-10-02 PROCEDURE — 93005 ELECTROCARDIOGRAM TRACING: CPT

## 2023-10-02 PROCEDURE — 96375 TX/PRO/DX INJ NEW DRUG ADDON: CPT

## 2023-10-02 PROCEDURE — 80179 DRUG ASSAY SALICYLATE: CPT | Performed by: EMERGENCY MEDICINE

## 2023-10-02 PROCEDURE — 80143 DRUG ASSAY ACETAMINOPHEN: CPT | Performed by: EMERGENCY MEDICINE

## 2023-10-02 PROCEDURE — 25010000002 METOCLOPRAMIDE PER 10 MG: Performed by: EMERGENCY MEDICINE

## 2023-10-02 PROCEDURE — 99284 EMERGENCY DEPT VISIT MOD MDM: CPT

## 2023-10-02 PROCEDURE — 25810000003 SODIUM CHLORIDE 0.9 % SOLUTION: Performed by: EMERGENCY MEDICINE

## 2023-10-02 PROCEDURE — 81001 URINALYSIS AUTO W/SCOPE: CPT | Performed by: EMERGENCY MEDICINE

## 2023-10-02 PROCEDURE — 85610 PROTHROMBIN TIME: CPT | Performed by: EMERGENCY MEDICINE

## 2023-10-02 PROCEDURE — 25010000002 ONDANSETRON PER 1 MG: Performed by: EMERGENCY MEDICINE

## 2023-10-02 PROCEDURE — 70450 CT HEAD/BRAIN W/O DYE: CPT

## 2023-10-02 PROCEDURE — 82948 REAGENT STRIP/BLOOD GLUCOSE: CPT

## 2023-10-02 PROCEDURE — 82140 ASSAY OF AMMONIA: CPT

## 2023-10-02 PROCEDURE — 84484 ASSAY OF TROPONIN QUANT: CPT

## 2023-10-02 PROCEDURE — 36415 COLL VENOUS BLD VENIPUNCTURE: CPT

## 2023-10-02 PROCEDURE — 80053 COMPREHEN METABOLIC PANEL: CPT

## 2023-10-02 PROCEDURE — 85025 COMPLETE CBC W/AUTO DIFF WBC: CPT

## 2023-10-02 PROCEDURE — 82077 ASSAY SPEC XCP UR&BREATH IA: CPT | Performed by: EMERGENCY MEDICINE

## 2023-10-02 RX ORDER — SODIUM CHLORIDE 0.9 % (FLUSH) 0.9 %
10 SYRINGE (ML) INJECTION AS NEEDED
Status: DISCONTINUED | OUTPATIENT
Start: 2023-10-02 | End: 2023-10-02 | Stop reason: HOSPADM

## 2023-10-02 RX ORDER — METOCLOPRAMIDE HYDROCHLORIDE 5 MG/ML
10 INJECTION INTRAMUSCULAR; INTRAVENOUS ONCE
Status: COMPLETED | OUTPATIENT
Start: 2023-10-02 | End: 2023-10-02

## 2023-10-02 RX ORDER — ONDANSETRON 2 MG/ML
4 INJECTION INTRAMUSCULAR; INTRAVENOUS ONCE
Status: COMPLETED | OUTPATIENT
Start: 2023-10-02 | End: 2023-10-02

## 2023-10-02 RX ADMIN — Medication 10 ML: at 16:05

## 2023-10-02 RX ADMIN — ONDANSETRON 4 MG: 2 INJECTION INTRAMUSCULAR; INTRAVENOUS at 14:42

## 2023-10-02 RX ADMIN — SODIUM CHLORIDE 1000 ML: 9 INJECTION, SOLUTION INTRAVENOUS at 16:05

## 2023-10-02 RX ADMIN — METOCLOPRAMIDE HYDROCHLORIDE 10 MG: 5 INJECTION INTRAMUSCULAR; INTRAVENOUS at 16:05

## 2023-10-02 NOTE — ED PROVIDER NOTES
Time: 2:24 PM EDT  Date of encounter:  10/2/2023  Independent Historian/Clinical History and Information was obtained by:   Patient and Family    History is limited by: Altered Mental Status    Chief Complaint   Patient presents with    Altered Mental Status     Patient presents to ED with altered mental status.  Patient is hallucinating and having bizarre behavior at the time of triage.  Per patients daughter she was normal last night when she went to bed and woke up like this this morning.  Patient does not know where she is or what day or year it is.  She is able to tell me her name.  Daughter also states she had multiple falls at home.             History of Present Illness:  Patient is a 48 y.o. year old female who presents to the emergency department for evaluation of altered mental status.  Per family she was normal last night when she went to bed and when she woke up this morning she was confused.  States that she did not know where she was or remember anything about this morning.  Also reports that she was weak and not able to walk and had multiple falls.  Family now reports that she is feeling much better and she states that she does not have any current complaints at this time.  Patient is a diabetic, has gastric stimulator, gastroparesis, ilet cell transplant.  Does also have a history of hepatic encephalopathy.  Denies any fever, diarrhea.  Has chronic nausea.    HPI    Patient Care Team  Primary Care Provider: Dulce Camejo APRN    Past Medical History:     Allergies   Allergen Reactions    Parathyroid Hormone (Recomb) Other (See Comments)     Sent patient into kidney failure, heart stopped, in ICU for two days.      Romosozumab Hives     Other reaction(s): Hives    Other reaction(s): Hives   Other reaction(s): Hives    Sulfa Antibiotics Hives    Sulfate Hives    Teriparatide Anaphylaxis and Other (See Comments)     Other reaction(s): Unknown    Tramadol Anaphylaxis and Other (See Comments)      Other reaction(s): Unknown, Unknown    Vancomycin Hives and Itching     Other reaction(s): Unknown, Unknown    Nifedipine Provider Review Needed and Other (See Comments)     Rapid heart beat    Rapid heart beat    Other reaction(s): Provider Review Needed   Other reaction(s): Provider Review Needed   Rapid heart beat    Morphine GI Intolerance     Other reaction(s): Chest Pain  increases billiary pressure and causes chest pain    Codeine Nausea And Vomiting and GI Intolerance     Past Medical History:   Diagnosis Date    Adrenal insufficiency     Allergic Unsure    Foods, [place], Vancomycin    Anemia     Anxiety     Arthritis     Asthma Unsure    Cholelithiasis Unsure    Cirrhosis     Clotting disorder Unsure    Colon polyp     Crohn's disease     Depression     Diabetes mellitus     Gastroparesis     GERD (gastroesophageal reflux disease)     History of MRSA infection     Hyperlipidemia     Hypertension     Hypothyroidism     Irritable bowel syndrome Unsure    Liver disease     Migraines     Pancreatitis     Urinary tract infection Unsure    Interstitial Cystitis     Past Surgical History:   Procedure Laterality Date    ABSCESS DRAINAGE  12/24/2019    Fluoroscopically guided abscess drainage, [facility]    ADENOIDECTOMY      BACK SURGERY      L4 L5    CHOLECYSTECTOMY N/A     COLONOSCOPY  04/24/2019    NBIH, 3 mm polyp    DILATATION AND CURETTAGE      ENDOMETRIAL ABLATION      ENDOSCOPY N/A 04/24/2019    Normal    ENTEROSCOPY SMALL BOWEL      ERCP  2019    GASTRIC STIMULATOR IMPLANT SURGERY      GASTROJEJUNOSTOMY W/ JEJUNOSTOMY TUBE      removed    HYSTERECTOMY      LYMPH NODE BIOPSY      PANCREATECTOMY  12/2019    TPIAT    PELVIC FLOOR REPAIR      SINUS SURGERY      SPLENECTOMY      TONSILLECTOMY      VENOUS ACCESS DEVICE (PORT) INSERTION       Family History   Problem Relation Age of Onset    Heart disease Father     Hypothyroidism Father     Anxiety disorder Father     Depression Father     Anxiety disorder  Mother     Hypothyroidism Mother     Breast cancer Mother     Colon polyps Mother     Alcohol abuse Paternal Grandfather     Heart disease Paternal Grandfather     Depression Brother     Hypertension Brother        Home Medications:  Prior to Admission medications    Medication Sig Start Date End Date Taking? Authorizing Provider   acetaminophen (TYLENOL) 650 MG 8 hr tablet   See Instructions, Tab mg Oral Q8H, 0 Refill(s) 8/10/22   Pollo Ray MD   baclofen (LIORESAL) 10 MG tablet Take 1 tablet by mouth 3 (Three) Times a Day. 8/2/23   Pollo Ray MD   Brexpiprazole (Rexulti) 1 MG tablet Take 1 mg by mouth Daily. 12/10/21   Pollo Ray MD   cetirizine (zyrTEC) 10 MG tablet Take 1 tablet by mouth Daily. 7/12/22   Pollo Ray MD   cholecalciferol (VITAMIN D3) 1.25 MG (12689 UT) capsule Take 1 capsule by mouth 1 (One) Time Per Week. 1/4/22   Pollo Ray MD   diphenhydrAMINE (BENADRYL) 50 MG/ML injection  9/21/22   Pollo Ray MD   docusate sodium (COLACE) 100 MG capsule TAKE ONE CAPSULE BY MOUTH TWICE DAILY 4/16/19   Pollo Ray MD   E-400 180 MG (400 UNIT) capsule capsule Take 2 capsules by mouth Daily. 9/7/23   Pollo Ray MD   escitalopram (LEXAPRO) 20 MG tablet Take 1 tablet by mouth Daily. 9/7/23   Dulce Camejo APRN   glucagon (GLUCAGEN) 1 MG injection   mg, IntraVENous, 0 Refill(s) 8/10/22   Pollo Ray MD   heparin, porcine, 100-0.45 UNIT/ML-% infusion Infuse  into a venous catheter Continuous.    Pollo Ray MD   Humira Pen 40 MG/0.4ML Pen-injector Kit INJECT 1 PEN UNDER THE SKIN EVERY 14 DAYS. 9/1/22   Patrizia Sena APRN   hydrocortisone (CORTEF) 5 MG tablet 3 tablets. 8/3/22   Pollo Ray MD   ibuprofen (ADVIL,MOTRIN) 600 MG tablet 1 tablet Every 8 (Eight) Hours As Needed. 5/6/22   Pollo Ray MD   Insulin Lispro (HumaLOG) 100 UNIT/ML solution cartridge     Pollo Ray  MD   lansoprazole (PREVACID) 30 MG capsule Take 1 capsule by mouth 2 (Two) Times a Day. 9/12/23 9/11/24  Mariela Gr APRN   levothyroxine (SYNTHROID, LEVOTHROID) 125 MCG tablet Take 1 tablet by mouth Daily. 9/1/20   Pollo Ray MD   linaclotide (LINZESS) 145 MCG capsule capsule Take 1 capsule by mouth Every Morning Before Breakfast.    Pollo Ray MD   lisinopril (PRINIVIL,ZESTRIL) 10 MG tablet TAKE ONE TABLET BY MOUTH ONCE DAILY 4/18/19   Pollo Ray MD   nitrofurantoin (MACRODANTIN) 50 MG capsule   mg Cap, Oral, Daily, 0 Refill(s) 8/10/22   Pollo Ray MD   NovoLOG 100 UNIT/ML injection USE 60 UNITS PER DAY via insulin pump 5/12/22   Pollo Ray MD   ondansetron (ZOFRAN) 2 mg/mL injection  10/4/22   Pollo Ray MD   Pancrelipase, Lip-Prot-Amyl, (Creon) 27036-811455 units capsule delayed-release particles capsule Take 3 tabs with meals and 2 with snacks  Patient taking differently: Take 3 capsules by mouth 3 (Three) Times a Day With Meals. Take 3 tabs with meals and 2 with snacks 8/24/21   Mariela Gr APRN   polyethylene glycol (MIRALAX) 17 GM/SCOOP powder     Pollo Ray MD   prochlorperazine (COMPAZINE) 10 MG tablet Take 1 tablet by mouth Every 8 (Eight) Hours As Needed for Nausea or Vomiting. 6/6/23   Patrizia Sena APRN   promethazine (PHENERGAN) 25 MG tablet     Pollo Ray MD   promethazine (PHENERGAN) 25 MG/ML injection  10/4/22   Pollo Ray MD   propranolol LA (INDERAL LA) 60 MG 24 hr capsule Take 1 capsule by mouth Daily. 8/31/23   Dulce Camejo APRN   Qulipta 60 MG tablet Take 1 tablet by mouth Daily.    Pollo Ray MD   riFAXIMin (XIFAXAN) 550 MG tablet Take 1 tablet by mouth Daily. 9/11/23   Patrizia Sena APRN   Scopolamine 1 MG/3DAYS patch Place 1 patch on the skin as directed by provider Every 72 (Seventy-Two) Hours.  Patient not taking: Reported on 9/12/2023 9/7/23    "Pollo Ray MD   sodium chloride 0.9 % solution  5/23/22   Emergency, Nurse Elder, RN   SUMAtriptan (IMITREX) 25 MG tablet Take 1 tablet by mouth 1 (One) Time As Needed. 8/8/23   Pollo Ray MD   Ubrelvy 100 MG tablet Take 1 tablet by mouth 1 (One) Time As Needed.    Pollo Ray MD        Social History:   Social History     Tobacco Use    Smoking status: Never    Smokeless tobacco: Never   Vaping Use    Vaping Use: Never used   Substance Use Topics    Alcohol use: Never    Drug use: Never         Review of Systems:  Review of Systems   Psychiatric/Behavioral:  Positive for confusion. Negative for hallucinations and suicidal ideas. The patient is hyperactive.       Physical Exam:  /91 (BP Location: Right arm, Patient Position: Sitting)   Pulse 68   Temp 99.2 °F (37.3 °C) (Oral)   Resp 20   Ht 170.2 cm (67\")   Wt 86.2 kg (190 lb)   SpO2 97%   BMI 29.76 kg/m²         Physical Exam  Vitals and nursing note reviewed.   Constitutional:       Appearance: Normal appearance.   HENT:      Head: Normocephalic and atraumatic.      Mouth/Throat:      Mouth: Mucous membranes are moist.   Eyes:      General: No scleral icterus.     Pupils: Pupils are equal, round, and reactive to light.   Cardiovascular:      Rate and Rhythm: Normal rate and regular rhythm.   Pulmonary:      Effort: Pulmonary effort is normal.      Breath sounds: Normal breath sounds.   Abdominal:      General: There is no distension.      Palpations: Abdomen is soft.      Tenderness: There is no abdominal tenderness.   Musculoskeletal:         General: Normal range of motion.      Cervical back: Normal range of motion and neck supple.   Skin:     General: Skin is warm and dry.      Findings: No rash.   Neurological:      General: No focal deficit present.      Mental Status: She is alert and oriented to person, place, and time.      Cranial Nerves: No cranial nerve deficit.      Motor: No weakness.   Psychiatric:         " Mood and Affect: Mood normal.         Behavior: Behavior normal.                    Procedures:  Procedures      Medical Decision Making:      Comorbidities that affect care:    Gastroparesis, Diabetes, Hypertension    External Notes reviewed:    Reviewed [facility] GI note from 9/21/2023      The following orders were placed and all results were independently analyzed by me:  Orders Placed This Encounter   Procedures    XR Chest 1 View    CT Head Without Contrast    [place] Draw    Comprehensive Metabolic Panel    Single High Sensitivity Troponin T    Magnesium    Urinalysis With Microscopic If Indicated (No Culture) - Urine, Clean Catch    Ammonia    CBC Auto Differential    Acetaminophen Level    Ethanol    Urine Drug Screen - Urine, Clean Catch    Salicylate Level    Protime-INR    Urinalysis, Microscopic Only - Urine, Clean Catch    NPO Diet NPO Type: Strict NPO    Undress & Gown    Continuous Pulse Oximetry    Vital Signs    Orthostatic Blood Pressure    Oxygen Therapy- Nasal Cannula; Titrate 1-6 LPM Per SpO2; 90 - 95%    POC Glucose Once    POC Glucose Once    ECG 12 Lead ED Triage Standing Order; Weak / Dizzy / AMS    Insert Peripheral IV    Fall Precautions    CBC & Differential    Green Top (Gel)    Lavender Top    Gold Top - SST    Light Blue Top       Medications Given in the Emergency Department:  Medications   sodium chloride 0.9 % flush 10 mL (10 mL Intravenous Given 10/2/23 1605)   ondansetron (ZOFRAN) injection 4 mg (4 mg Intravenous Given 10/2/23 1442)   metoclopramide (REGLAN) injection 10 mg (10 mg Intravenous Given 10/2/23 1605)   sodium chloride 0.9 % bolus 1,000 mL (0 mL Intravenous Stopped 10/2/23 1915)        ED Course:    The patient was initially evaluated in the triage area where orders were placed. The patient was later dispositioned by Jake Mcneal MD.      The patient was advised to stay for completion of workup which includes but is not limited to communication of labs and  radiological results, reassessment and plan. The patient was advised that leaving prior to disposition by a provider could result in critical findings that are not communicated to the patient.     ED Course as of 10/02/23 1934   Mon Oct 02, 2023   1425 --- PROVIDER IN TRIAGE NOTE ---    The patient was evaluated by Shanika orosco in triage. Orders were placed and the patient is currently awaiting disposition.    [AJ]   1827 EKG interpreted by me:  Time: 1527  Heart rate 69  Sinus, inferior Q waves, nonspecific ST change [MA]      ED Course User Index  [AJ] Shanika Godwin PA-C  [MA] Jake Mcneal MD       Labs:    Lab Results (last 24 hours)       Procedure Component Value Units Date/Time    CBC & Differential [[id number]]  (Abnormal) Collected: 10/02/23 1433    Specimen: Blood from Arm, Right Updated: 10/02/23 1441    Narrative:      The following orders were created for panel order CBC & Differential.  Procedure                               Abnormality         Status                     ---------                               -----------         ------                     CBC Auto Differential[[id number]]        Abnormal            Final result                 Please view results for these tests on the individual orders.    Comprehensive Metabolic Panel [[id number]]  (Abnormal) Collected: 10/02/23 1433    Specimen: Blood from Arm, Right Updated: 10/02/23 1509     Glucose 136 mg/dL      BUN 17 mg/dL      Creatinine 0.69 mg/dL      Sodium 139 mmol/L      Potassium 4.3 mmol/L      Chloride 104 mmol/L      CO2 23.3 mmol/L      Calcium 9.7 mg/dL      Total Protein 8.6 g/dL      Albumin 4.1 g/dL      ALT (SGPT) 61 U/L      AST (SGOT) 76 U/L      Alkaline Phosphatase 176 U/L      Total Bilirubin 0.4 mg/dL      Globulin 4.5 gm/dL      A/G Ratio 0.9 g/dL      BUN/Creatinine Ratio 24.6     Anion Gap 11.7 mmol/L      eGFR 107.2 mL/min/1.73     Narrative:      GFR Normal >60  Chronic Kidney Disease <60  Kidney  Failure <15      Single High Sensitivity Troponin T [[id number]]  (Normal) Collected: 10/02/23 1433    Specimen: Blood from Arm, Right Updated: 10/02/23 1509     HS Troponin T 6 ng/L     Narrative:      High Sensitive Troponin T Reference Range:  <10.0 ng/L- Negative Female for AMI  <15.0 ng/L- Negative Male for AMI  >=10 - Abnormal Female indicating possible myocardial injury.  >=15 - Abnormal Male indicating possible myocardial injury.   Clinicians would have to utilize clinical acumen, EKG, Troponin, and serial changes to determine if it is an Acute Myocardial Infarction or myocardial injury due to an underlying chronic condition.         Magnesium [[id number]]  (Normal) Collected: 10/02/23 1433    Specimen: Blood from Arm, Right Updated: 10/02/23 1509     Magnesium 2.1 mg/dL     Ammonia [[id number]]  (Normal) Collected: 10/02/23 1433    Specimen: Blood from Arm, Right Updated: 10/02/23 1508     Ammonia 39 umol/L     CBC Auto Differential [[id number]]  (Abnormal) Collected: 10/02/23 1433    Specimen: Blood from Arm, Right Updated: 10/02/23 1441     WBC 11.35 10*3/mm3      RBC 3.98 10*6/mm3      Hemoglobin 12.8 g/dL      Hematocrit 39.2 %      MCV 98.5 fL      MCH 32.2 pg      MCHC 32.7 g/dL      RDW 14.3 %      RDW-SD 52.4 fl      MPV 10.8 fL      Platelets 429 10*3/mm3      Neutrophil % 34.6 %      Lymphocyte % 51.9 %      Monocyte % 10.5 %      Eosinophil % 2.3 %      Basophil % 0.4 %      Immature Grans % 0.3 %      Neutrophils, Absolute 3.94 10*3/mm3      Lymphocytes, Absolute 5.89 10*3/mm3      Monocytes, Absolute 1.19 10*3/mm3      Eosinophils, Absolute 0.26 10*3/mm3      Basophils, Absolute 0.04 10*3/mm3      Immature Grans, Absolute 0.03 10*3/mm3      nRBC 0.0 /100 WBC     Acetaminophen Level [[id number]]  (Normal) Collected: 10/02/23 1433    Specimen: Blood from Arm, Right Updated: 10/02/23 1509     Acetaminophen <5.0 mcg/mL     Ethanol [[id number]] Collected: 10/02/23 1433    Specimen: Blood from Arm, Right  Updated: 10/02/23 1509     Ethanol <10 mg/dL      Ethanol % <0.010 %     Narrative:      Ethanol (Plasma)  <10 Essentially Negative    Toxic Concentrations           mg/dL    Flushing, slowing of reflexes    Impaired visual activity         Depression of CNS              >100  Possible Coma                  >300       Salicylate Level [[id number]]  (Normal) Collected: 10/02/23 1433    Specimen: Blood from Arm, Right Updated: 10/02/23 1509     Salicylate <0.3 mg/dL     Protime-INR [[id number]]  (Abnormal) Collected: 10/02/23 1447    Specimen: Blood Updated: 10/02/23 1501     Protime 14.9 Seconds      INR 1.16    Narrative:      Suggested Therapeutic Ranges For Oral Anticoagulant Therapy:  Level of Therapy                      INR Target Range  Standard Dose                            2.0-3.0  High Dose                                2.5-3.5  Patients not receiving anticoagulant  Therapy Normal Range                     0.86-1.15    POC Glucose Once [[id number]]  (Abnormal) Collected: 10/02/23 1530    Specimen: Blood Updated: 10/02/23 1532     Glucose 126 mg/dL      Comment: Serial Number: [id number]:  [id number]       Urinalysis With Microscopic If Indicated (No Culture) - Urine, Clean Catch [[id number]]  (Abnormal) Collected: 10/02/23 1742    Specimen: Urine, Clean Catch Updated: 10/02/23 1803     Color, UA Yellow     Appearance, UA Cloudy     pH, UA 5.5     Specific Gravity, UA 1.011     Glucose, UA Negative     Ketones, UA Negative     Bilirubin, UA Negative     Blood, UA Negative     Protein, UA Negative     Leuk Esterase, UA Trace     Nitrite, UA Negative     Urobilinogen, UA 0.2 E.U./dL    Urine Drug Screen - Urine, Clean Catch [[id number]]  (Abnormal) Collected: 10/02/23 1742    Specimen: Urine, Clean Catch Updated: 10/02/23 1847     Amphet/Methamphet, Screen Negative     Barbiturates Screen, Urine Negative     Benzodiazepine Screen, Urine Negative     Cocaine Screen, Urine Negative     Opiate  Screen Negative     THC, Screen, Urine Negative     Methadone Screen, Urine Negative     Oxycodone Screen, Urine Positive     Fentanyl, Urine Negative    Narrative:      Negative Thresholds Per Drugs Screened:    Amphetamines                 500 ng/ml  Barbiturates                 200 ng/ml  Benzodiazepines              100 ng/ml  Cocaine                      300 ng/ml  Methadone                    300 ng/ml  Opiates                      300 ng/ml  Oxycodone                    100 ng/ml  THC                           50 ng/ml  Fentanyl                       5 ng/ml      The Normal Value for all drugs tested is negative. This report includes final unconfirmed screening results to be used for medical treatment purposes only. Unconfirmed results must not be used for non-medical purposes such as employment or legal testing. Clinical consideration should be applied to any drug of abuse test, particularly when unconfirmed results are used.            Urinalysis, Microscopic Only - Urine, Clean Catch [[id number]]  (Abnormal) Collected: 10/02/23 1742    Specimen: Urine, Clean Catch Updated: 10/02/23 1812     RBC, UA None Seen /HPF      WBC, UA 0-2 /HPF      Bacteria, UA None Seen /HPF      Squamous Epithelial Cells, UA 0-2 /HPF      Hyaline Casts, UA None Seen /LPF      Calcium Oxalate Crystals, UA Moderate/2+ /HPF      Methodology Manual Light Microscopy             Imaging:    CT Head Without Contrast    Result Date: 10/2/2023  PROCEDURE: CT HEAD WO CONTRAST  COMPARISON:  [facility], CT, CT HEAD WO CONTRAST, 1/24/2023, 10:57. INDICATIONS: Mental status change, unknown cause  PROTOCOL:   Standard imaging protocol performed    RADIATION:   DLP: 1144.2mGy*cm   MA and/or KV was adjusted to minimize radiation dose.     TECHNIQUE: Axial images of the head without intravenous contrast.  FINDINGS:  There is mild generalized atrophy.  The ventricles have a normal size and configuration. There is no evidence of acute  intracranial hemorrhage, mass or midline shift. No extra-axial fluid collections are identified. There are no skull fractures. The visualized paranasal sinuses and mastoid air cells are grossly clear.  IMPRESSION:  No acute intracranial abnormalities are identified.  CHIDI CRUZ MD       Electronically Signed and Approved By: CHIDI CRUZ MD on 10/02/2023 at 16:33             XR Chest 1 View    Result Date: 10/2/2023  PROCEDURE: XR CHEST 1 VW  COMPARISON: [facility], CR, XR CHEST 1 VW, 9/25/2021, 17:05.  INDICATIONS: Weak/Dizzy/AMS triage protocol  FINDINGS:  The heart remains borderline in size.  The pulmonary vascularity does not appear congested.  The lungs are well-expanded and free of infiltrates.  Right jugular Port-A-Cath device is in unchanged position.  The tip is in the right atrium.  Bony structures appear intact.        Borderline cardiomegaly, unchanged.  No active pulmonary disease is seen.  Right jugular Port-A-Cath device is in unchanged position.       RAFY DE GUZMAN MD       Electronically Signed and Approved By: RAFY DE GUZMAN MD on 10/02/2023 at 15:03                Differential Diagnosis and Discussion:      Altered Mental Status: Based on the patient's signs and symptoms, differential diagnosis includes but is not limited to meningitis, stroke, sepsis, subarachnoid hemorrhage, intracranial bleeding, encephalitis, and metabolic encephalopathy.    All labs were reviewed and interpreted by me.  All X-rays impressions were independently interpreted by me.  EKG was interpreted by me.  CT scan radiology impression was interpreted by me.    MDM     Patient is a 48-year-old female with multiple medical problems including history of hepatic encephalopathy, gastroparesis, diabetes.  Found to have altered mental status this morning.  Has resolved since she has been here.  There is no source or cause of the altered mental status at this time.  She is ambulated without issues.  Labs  are otherwise unremarkable.  Does have some oxycodone on board on the drug screen.  Did report that she took 1 yesterday.  She is currently back to her baseline at this time.  Family is with her.  Discussion admission versus home.  We will send home at this time with strong return precautions given.      Patient Care Considerations:          Consultants/Shared Management Plan:    None    Social Determinants of Health:    Patient has presented with family members who are responsible, reliable and will ensure follow up care.      Disposition and Care Coordination:    Discharged: I considered escalation of care by admitting this patient for observation, however the patient has improved and is suitable and  stable for discharge.    I have explained the patient´s condition, diagnoses and treatment plan based on the information available to me at this time. I have answered questions and addressed any concerns. The patient has a good  understanding of the patient´s diagnosis, condition, and treatment plan as can be expected at this point. The vital signs have been stable. The patient´s condition is stable and appropriate for discharge from the emergency department.      The patient will pursue further outpatient evaluation with the primary care physician or other designated or consulting physician as outlined in the discharge instructions. They are agreeable to this plan of care and follow-up instructions have been explained in detail. The patient has received these instructions in written format and have expressed an understanding of the discharge instructions. The patient is aware that any significant change in condition or worsening of symptoms should prompt an immediate return to this or the closest emergency department or call to 911.      Final diagnoses:   Mental status change resolved        ED Disposition       ED Disposition   Discharge    Condition   Stable    Comment   --               This medical record  created using voice recognition software.             Jake Mcneal MD  10/02/23 1935

## 2023-10-03 LAB
QT INTERVAL: 409 MS
QTC INTERVAL: 439 MS

## 2023-10-03 RX ORDER — NITROFURANTOIN 25; 75 MG/1; MG/1
100 CAPSULE ORAL DAILY
Qty: 30 CAPSULE | Refills: 0 | Status: SHIPPED | OUTPATIENT
Start: 2023-10-03

## 2023-10-03 NOTE — TELEPHONE ENCOUNTER
Documented in office not when pt established care on 08/31/23 that she takes this daily for UTI/IC prescribed by her previous PCP.     Li gave pt a courtesy 30 day refill due to Dulce being out of the office but pt needs to follow up with KD to ensure who will be managing going forward.       Please advise what to do going forward

## 2023-10-04 ENCOUNTER — HOSPITAL ENCOUNTER (OUTPATIENT)
Dept: MAMMOGRAPHY | Facility: HOSPITAL | Age: 48
Discharge: HOME | End: 2023-10-04
Admitting: NURSE PRACTITIONER
Payer: MEDICARE

## 2023-10-04 DIAGNOSIS — Z12.31 BREAST CANCER SCREENING BY MAMMOGRAM: ICD-10-CM

## 2023-10-04 PROCEDURE — 77067 SCR MAMMO BI INCL CAD: CPT

## 2023-10-04 PROCEDURE — 77063 BREAST TOMOSYNTHESIS BI: CPT

## 2023-10-05 RX ORDER — NITROFURANTOIN 25; 75 MG/1; MG/1
CAPSULE ORAL
Qty: 30 CAPSULE | Refills: 0 | OUTPATIENT
Start: 2023-10-05

## 2023-10-07 ENCOUNTER — HOSPITAL ENCOUNTER (EMERGENCY)
Facility: HOSPITAL | Age: 48
Discharge: HOME | End: 2023-10-07
Attending: EMERGENCY MEDICINE
Payer: MEDICARE

## 2023-10-07 ENCOUNTER — APPOINTMENT (OUTPATIENT)
Dept: GENERAL RADIOLOGY | Facility: HOSPITAL | Age: 48
End: 2023-10-07
Payer: COMMERCIAL

## 2023-10-07 VITALS
SYSTOLIC BLOOD PRESSURE: 136 MMHG | BODY MASS INDEX: 31.7 KG/M2 | HEART RATE: 68 BPM | RESPIRATION RATE: 16 BRPM | DIASTOLIC BLOOD PRESSURE: 87 MMHG | WEIGHT: 201.94 LBS | TEMPERATURE: 98.8 F | HEIGHT: 67 IN | OXYGEN SATURATION: 93 %

## 2023-10-07 DIAGNOSIS — S39.012A LUMBAR STRAIN, INITIAL ENCOUNTER: ICD-10-CM

## 2023-10-07 DIAGNOSIS — R53.1 GENERALIZED WEAKNESS: ICD-10-CM

## 2023-10-07 DIAGNOSIS — M25.551 PAIN OF RIGHT HIP: ICD-10-CM
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
## 2023-10-07 LAB
ALBUMIN SERPL-MCNC: 3.8 G/DL (ref 3.5–5.2)
ALBUMIN/GLOB SERPL: 0.9 G/DL
ALP SERPL-CCNC: 153 U/L (ref 39–117)
ALT SERPL W P-5'-P-CCNC: 37 U/L (ref 1–33)
ANION GAP SERPL CALCULATED.3IONS-SCNC: 12.3 MMOL/L (ref 5–15)
AST SERPL-CCNC: 44 U/L (ref 1–32)
BACTERIA UR QL AUTO: ABNORMAL /HPF
BASOPHILS # BLD AUTO: 0.06 10*3/MM3 (ref 0–0.2)
BASOPHILS NFR BLD AUTO: 0.5 % (ref 0–1.5)
BILIRUB SERPL-MCNC: 0.3 MG/DL (ref 0–1.2)
BILIRUB UR QL STRIP: NEGATIVE
BUN SERPL-MCNC: 10 MG/DL (ref 6–20)
BUN/CREAT SERPL: 11.4 (ref 7–25)
CALCIUM SPEC-SCNC: 9.2 MG/DL (ref 8.6–10.5)
CHLORIDE SERPL-SCNC: 104 MMOL/L (ref 98–107)
CLARITY UR: CLEAR
CO2 SERPL-SCNC: 25.7 MMOL/L (ref 22–29)
COLOR UR: YELLOW
CREAT SERPL-MCNC: 0.88 MG/DL (ref 0.57–1)
DEPRECATED RDW RBC AUTO: 50.3 FL (ref 37–54)
EGFRCR SERPLBLD CKD-EPI 2021: 81.2 ML/MIN/1.73
EOSINOPHIL # BLD AUTO: 0.36 10*3/MM3 (ref 0–0.4)
EOSINOPHIL NFR BLD AUTO: 2.9 % (ref 0.3–6.2)
ERYTHROCYTE [DISTWIDTH] IN BLOOD BY AUTOMATED COUNT: 13.8 % (ref 12.3–15.4)
GLOBULIN UR ELPH-MCNC: 4.1 GM/DL
GLUCOSE SERPL-MCNC: 99 MG/DL (ref 65–99)
GLUCOSE UR STRIP-MCNC: NEGATIVE MG/DL
HCT VFR BLD AUTO: 36.9 % (ref 34–46.6)
HGB BLD-MCNC: 12 G/DL (ref 12–15.9)
HGB UR QL STRIP.AUTO: NEGATIVE
HOLD SPECIMEN: NORMAL
HOLD SPECIMEN: NORMAL
HYALINE CASTS UR QL AUTO: ABNORMAL /LPF
IMM GRANULOCYTES # BLD AUTO: 0.03 10*3/MM3 (ref 0–0.05)
IMM GRANULOCYTES NFR BLD AUTO: 0.2 % (ref 0–0.5)
KETONES UR QL STRIP: NEGATIVE
LEUKOCYTE ESTERASE UR QL STRIP.AUTO: ABNORMAL
LYMPHOCYTES # BLD AUTO: 5.08 10*3/MM3 (ref 0.7–3.1)
LYMPHOCYTES NFR BLD AUTO: 40.8 % (ref 19.6–45.3)
MAGNESIUM SERPL-MCNC: 2.2 MG/DL (ref 1.6–2.6)
MCH RBC QN AUTO: 32 PG (ref 26.6–33)
MCHC RBC AUTO-ENTMCNC: 32.5 G/DL (ref 31.5–35.7)
MCV RBC AUTO: 98.4 FL (ref 79–97)
MONOCYTES # BLD AUTO: 1.58 10*3/MM3 (ref 0.1–0.9)
MONOCYTES NFR BLD AUTO: 12.7 % (ref 5–12)
NEUTROPHILS NFR BLD AUTO: 42.9 % (ref 42.7–76)
NEUTROPHILS NFR BLD AUTO: 5.35 10*3/MM3 (ref 1.7–7)
NITRITE UR QL STRIP: NEGATIVE
NRBC BLD AUTO-RTO: 0 /100 WBC (ref 0–0.2)
PH UR STRIP.AUTO: 6.5 [PH] (ref 5–8)
PLATELET # BLD AUTO: 404 10*3/MM3 (ref 140–450)
PMV BLD AUTO: 11 FL (ref 6–12)
POTASSIUM SERPL-SCNC: 4 MMOL/L (ref 3.5–5.2)
PROT SERPL-MCNC: 7.9 G/DL (ref 6–8.5)
PROT UR QL STRIP: NEGATIVE
QT INTERVAL: 448 MS
QTC INTERVAL: 463 MS
RBC # BLD AUTO: 3.75 10*6/MM3 (ref 3.77–5.28)
RBC # UR STRIP: ABNORMAL /HPF
REF LAB TEST METHOD: ABNORMAL
SODIUM SERPL-SCNC: 142 MMOL/L (ref 136–145)
SP GR UR STRIP: 1.01 (ref 1–1.03)
SQUAMOUS #/AREA URNS HPF: ABNORMAL /HPF
TROPONIN T SERPL HS-MCNC: 6 NG/L
UROBILINOGEN UR QL STRIP: ABNORMAL
WBC # UR STRIP: ABNORMAL /HPF
WBC NRBC COR # BLD: 12.46 10*3/MM3 (ref 3.4–10.8)
WHOLE BLOOD HOLD COAG: NORMAL
WHOLE BLOOD HOLD SPECIMEN: NORMAL

## 2023-10-07 PROCEDURE — 81001 URINALYSIS AUTO W/SCOPE: CPT

## 2023-10-07 PROCEDURE — 36415 COLL VENOUS BLD VENIPUNCTURE: CPT

## 2023-10-07 PROCEDURE — 73502 X-RAY EXAM HIP UNI 2-3 VIEWS: CPT

## 2023-10-07 PROCEDURE — 93005 ELECTROCARDIOGRAM TRACING: CPT

## 2023-10-07 PROCEDURE — 99284 EMERGENCY DEPT VISIT MOD MDM: CPT

## 2023-10-07 PROCEDURE — 80053 COMPREHEN METABOLIC PANEL: CPT

## 2023-10-07 PROCEDURE — 85025 COMPLETE CBC W/AUTO DIFF WBC: CPT

## 2023-10-07 PROCEDURE — 72100 X-RAY EXAM L-S SPINE 2/3 VWS: CPT

## 2023-10-07 PROCEDURE — 71045 X-RAY EXAM CHEST 1 VIEW: CPT

## 2023-10-07 PROCEDURE — 84484 ASSAY OF TROPONIN QUANT: CPT

## 2023-10-07 PROCEDURE — 93005 ELECTROCARDIOGRAM TRACING: CPT | Performed by: EMERGENCY MEDICINE

## 2023-10-07 PROCEDURE — 83735 ASSAY OF MAGNESIUM: CPT

## 2023-10-07 RX ORDER — SODIUM CHLORIDE 0.9 % (FLUSH) 0.9 %
10 SYRINGE (ML) INJECTION AS NEEDED
Status: DISCONTINUED | OUTPATIENT
Start: 2023-10-07 | End: 2023-10-07 | Stop reason: HOSPADM

## 2023-10-07 RX ORDER — LACTULOSE 10 G/10G
10 SOLUTION ORAL 2 TIMES DAILY
COMMUNITY
Start: 2023-09-21 | End: 2024-03-19

## 2023-10-07 NOTE — ED NOTES
Removed patient from bedpan and bedpan had overflowed onto bedsheets. Changed patients bed sheets and cleaned patient up. Discussed placing patient on a purewick after her x rays were completed. Patient resting comfortably with family at bedside at this time.

## 2023-10-07 NOTE — ED PROVIDER NOTES
Time: 6:30 AM EDT  Date of encounter:  10/7/2023  Independent Historian/Clinical History and Information was obtained by:   Patient and Family    History is limited by: N/A    Chief Complaint: Fall      History of Present Illness:  Patient is a 48 y.o. year old female who presents to the emergency department for evaluation of fall.  Patient has a history of diabetes, gastric stimulator, gastroparesis, islet cell transplant who presents with complaints of a fall.  Reports that she felt generally weak and had a fall earlier.  Complains of low back pain as well as hip pain.  Denies hitting her head.  She states that she has been having issues with hepatic encephalopathy this week and has been taking her lactulose and Xifaxan.  She states that she is just felt generally unwell over the last couple days.  Has been eating and drinking okay but states that she has not slept in a few days.  Complains of mainly low back and hip pain.  No other complaints this time.    HPI    Patient Care Team  Primary Care Provider: Dulce Camejo APRN    Past Medical History:     Allergies   Allergen Reactions    Parathyroid Hormone (Recomb) Other (See Comments)     Sent patient into kidney failure, heart stopped, in ICU for two days.      Romosozumab Hives     Other reaction(s): Hives    Other reaction(s): Hives   Other reaction(s): Hives    Sulfa Antibiotics Hives    Sulfate Hives    Teriparatide Anaphylaxis and Other (See Comments)     Other reaction(s): Unknown    Tramadol Anaphylaxis and Other (See Comments)     Other reaction(s): Unknown, Unknown    Vancomycin Hives and Itching     Other reaction(s): Unknown, Unknown    Nifedipine Provider Review Needed and Other (See Comments)     Rapid heart beat    Rapid heart beat    Other reaction(s): Provider Review Needed   Other reaction(s): Provider Review Needed   Rapid heart beat    Morphine GI Intolerance     Other reaction(s): Chest Pain  increases billiary pressure and causes chest  pain    Codeine Nausea And Vomiting and GI Intolerance     Past Medical History:   Diagnosis Date    Adrenal insufficiency     Allergic Unsure    Foods, Ulram, Vancomycin    Anemia     Anxiety     Arthritis     Asthma Unsure    Cholelithiasis Unsure    Cirrhosis     Clotting disorder Unsure    Colon polyp     Crohn's disease     Depression     Diabetes mellitus     Gastroparesis     GERD (gastroesophageal reflux disease)     History of MRSA infection     Hyperlipidemia     Hypertension     Hypothyroidism     Irritable bowel syndrome Unsure    Liver disease     Migraines     Pancreatitis     Urinary tract infection Unsure    Interstitial Cystitis     Past Surgical History:   Procedure Laterality Date    ABSCESS DRAINAGE  12/24/2019    Fluoroscopically guided abscess drainage, [facility]    ADENOIDECTOMY      BACK SURGERY      L4 L5    CHOLECYSTECTOMY N/A     COLONOSCOPY  04/24/2019    NBIH, 3 mm polyp    DILATATION AND CURETTAGE      ENDOMETRIAL ABLATION      ENDOSCOPY N/A 04/24/2019    Normal    ENTEROSCOPY SMALL BOWEL      ERCP  2019    GASTRIC STIMULATOR IMPLANT SURGERY      GASTROJEJUNOSTOMY W/ JEJUNOSTOMY TUBE      removed    HYSTERECTOMY      LYMPH NODE BIOPSY      PANCREATECTOMY  12/2019    TPIAT    PELVIC FLOOR REPAIR      SINUS SURGERY      SPLENECTOMY      TONSILLECTOMY      VENOUS ACCESS DEVICE (PORT) INSERTION       Family History   Problem Relation Age of Onset    Heart disease Father     Hypothyroidism Father     Anxiety disorder Father     Depression Father     Anxiety disorder Mother     Hypothyroidism Mother     Breast cancer Mother     Colon polyps Mother     Alcohol abuse Paternal Grandfather     Heart disease Paternal Grandfather     Depression Brother     Hypertension Brother        Home Medications:  Prior to Admission medications    Medication Sig Start Date End Date Taking? Authorizing Provider   lactulose (CEPHULAC) 10 g packet Take 1 packet by mouth 2 (Two) Times a Day. 9/21/23 3/19/24  Yes Pollo Ray MD   riFAXIMin (XIFAXAN) 550 MG tablet Take 1 tablet by mouth Every 12 (Twelve) Hours. 9/21/23 3/19/24 Yes Pollo Ray MD   acetaminophen (TYLENOL) 650 MG 8 hr tablet   See Instructions, Tab mg Oral Q8H, 0 Refill(s) 8/10/22   Pollo Ray MD   baclofen (LIORESAL) 10 MG tablet Take 1 tablet by mouth 3 (Three) Times a Day. 8/2/23   Pollo Ray MD   Brexpiprazole (Rexulti) 1 MG tablet Take 1 mg by mouth Daily. 12/10/21   Pollo Ray MD   cetirizine (zyrTEC) 10 MG tablet Take 1 tablet by mouth Daily. 7/12/22   Pollo Ray MD   cholecalciferol (VITAMIN D3) 1.25 MG (56621 UT) capsule Take 1 capsule by mouth 1 (One) Time Per Week. 1/4/22   Pollo Ray MD   diphenhydrAMINE (BENADRYL) 50 MG/ML injection  9/21/22   Pollo Ray MD   docusate sodium (COLACE) 100 MG capsule TAKE ONE CAPSULE BY MOUTH TWICE DAILY 4/16/19   Pollo Ray MD   E-400 180 MG (400 UNIT) capsule capsule Take 2 capsules by mouth Daily. 9/7/23   Pollo Ray MD   escitalopram (LEXAPRO) 20 MG tablet Take 1 tablet by mouth Daily. 9/7/23   Dulce Camejo APRN   glucagon (GLUCAGEN) 1 MG injection   mg, IntraVENous, 0 Refill(s) 8/10/22   Pollo aRy MD   heparin, porcine, 100-0.45 UNIT/ML-% infusion Infuse  into a venous catheter Continuous.    Pollo Ray MD   Humira Pen 40 MG/0.4ML Pen-injector Kit INJECT 1 PEN UNDER THE SKIN EVERY 14 DAYS. 9/1/22   Patrizia Sena APRN   hydrocortisone (CORTEF) 5 MG tablet 3 tablets. 8/3/22   Pollo Ray MD   ibuprofen (ADVIL,MOTRIN) 600 MG tablet 1 tablet Every 8 (Eight) Hours As Needed. 5/6/22   Pollo Ray MD   Insulin Lispro (HumaLOG) 100 UNIT/ML solution cartridge     Pollo Ray MD   lansoprazole (PREVACID) 30 MG capsule Take 1 capsule by mouth 2 (Two) Times a Day. 9/12/23 9/11/24  Mariela Gr APRN   levothyroxine (SYNTHROID, LEVOTHROID) 125 MCG  tablet Take 1 tablet by mouth Daily. 9/1/20   Pollo Ray MD   linaclotide (LINZESS) 145 MCG capsule capsule Take 1 capsule by mouth Every Morning Before Breakfast.    Pollo Ray MD   lisinopril (PRINIVIL,ZESTRIL) 10 MG tablet TAKE ONE TABLET BY MOUTH ONCE DAILY 4/18/19   Pollo Ray MD   nitrofurantoin, macrocrystal-monohydrate, (Macrobid) 100 MG capsule Take 1 capsule by mouth Daily. 10/3/23   Li Ibarra APRN   NovoLOG 100 UNIT/ML injection USE 60 UNITS PER DAY via insulin pump 5/12/22   Pollo Ray MD   ondansetron (ZOFRAN) 2 mg/mL injection  10/4/22   Pollo Ray MD   Pancrelipase, Lip-Prot-Amyl, (Creon) 82155-871869 units capsule delayed-release particles capsule Take 3 tabs with meals and 2 with snacks  Patient taking differently: Take 3 capsules by mouth 3 (Three) Times a Day With Meals. Take 3 tabs with meals and 2 with snacks 8/24/21   Mariela Gr APRN   polyethylene glycol (MIRALAX) 17 GM/SCOOP powder     Pollo Ray MD   prochlorperazine (COMPAZINE) 10 MG tablet Take 1 tablet by mouth Every 8 (Eight) Hours As Needed for Nausea or Vomiting. 6/6/23   Patrizia Sena APRN   promethazine (PHENERGAN) 25 MG tablet     Pollo Ray MD   promethazine (PHENERGAN) 25 MG/ML injection  10/4/22   Pollo Ray MD   propranolol LA (INDERAL LA) 60 MG 24 hr capsule Take 1 capsule by mouth Daily. 8/31/23   Dulce Camejo APRN   Qulipta 60 MG tablet Take 1 tablet by mouth Daily.    Pollo Ray MD   riFAXIMin (XIFAXAN) 550 MG tablet Take 1 tablet by mouth Daily. 9/11/23   Patrizia Sena APRN   Scopolamine 1 MG/3DAYS patch Place 1 patch on the skin as directed by provider Every 72 (Seventy-Two) Hours.  Patient not taking: Reported on 9/12/2023 9/7/23   Pollo Ray MD   sodium chloride 0.9 % solution  5/23/22   Emergency, Nurse Epic, RN   SUMAtriptan (IMITREX) 25 MG tablet Take 1 tablet by mouth 1 (One)  "Time As Needed. 8/8/23   ProviderPollo MD   Ubrelvy 100 MG tablet Take 1 tablet by mouth 1 (One) Time As Needed.    Provider, MD Pollo        Social History:   Social History     Tobacco Use    Smoking status: Never    Smokeless tobacco: Never   Vaping Use    Vaping Use: Never used   Substance Use Topics    Alcohol use: Never    Drug use: Never         Review of Systems:  Review of Systems   Musculoskeletal:  Positive for arthralgias.   Neurological:  Positive for weakness (generalized).        Physical Exam:  /87 (BP Location: Right arm, Patient Position: Sitting)   Pulse 68   Temp 98.8 °F (37.1 °C) (Oral)   Resp 16   Ht 170.2 cm (67\")   Wt 91.6 kg (201 lb 15.1 oz)   SpO2 93%   BMI 31.63 kg/m²     Physical Exam  Vitals and nursing note reviewed.   Constitutional:       Appearance: Normal appearance.   HENT:      Head: Normocephalic and atraumatic.   Eyes:      General: No scleral icterus.  Cardiovascular:      Rate and Rhythm: Normal rate and regular rhythm.   Pulmonary:      Effort: Pulmonary effort is normal.      Breath sounds: Normal breath sounds.   Abdominal:      Palpations: Abdomen is soft.      Tenderness: There is no abdominal tenderness.   Musculoskeletal:         General: Normal range of motion.      Cervical back: Normal range of motion.      Comments: No significant trauma noted.  There is some tenderness in the lumbar paraspinal muscles as well as the right hip.  There is no bruising noted.   Skin:     Findings: No rash.   Neurological:      General: No focal deficit present.      Mental Status: She is alert and oriented to person, place, and time.      Cranial Nerves: No cranial nerve deficit.      Motor: No weakness.                  Procedures:  Procedures      Medical Decision Making:      Comorbidities that affect care:    Hepatic encephalopathy, transplant, Diabetes    External Notes reviewed:    Reviewed ER note from 10/2/23,  Reviewed GI note from 9/21/2023    The " following orders were placed and all results were independently analyzed by me:  Orders Placed This Encounter   Procedures    XR Hip With or Without Pelvis 2 - 3 View Left    XR Spine Lumbar AP & Lateral    XR Chest 1 View    Blair Draw    Comprehensive Metabolic Panel    Single High Sensitivity Troponin T    Magnesium    Urinalysis With Microscopic If Indicated (No Culture) - Urine, Clean Catch    CBC Auto Differential    Urinalysis, Microscopic Only - Urine, Clean Catch    NPO Diet NPO Type: Strict NPO    Undress & Gown    Continuous Pulse Oximetry    Vital Signs    Orthostatic Blood Pressure    Oxygen Therapy- Nasal Cannula; Titrate 1-6 LPM Per SpO2; 90 - 95%    POC Glucose Once    ECG 12 Lead ED Triage Standing Order; Weak / Dizzy / AMS    Insert Peripheral IV    Fall Precautions    CBC & Differential    Green Top (Gel)    Lavender Top    Gold Top - SST    Light Blue Top       Medications Given in the Emergency Department:  Medications   sodium chloride 0.9 % flush 10 mL (has no administration in time range)   sodium chloride 0.9 % bolus 1,000 mL (1,000 mL Intravenous Not Given 10/7/23 0521)        ED Course:    ED Course as of 10/07/23 0655   Sat Oct 07, 2023   0630 EKG interpreted by me  Time: 502  Heart rate 64  Sinus, borderline LVH, borderline inferior Q waves [MA]      ED Course User Index  [MA] Jake Mcneal MD       Labs:    Lab Results (last 24 hours)       Procedure Component Value Units Date/Time    CBC & Differential [[id number]]  (Abnormal) Collected: 10/07/23 0029    Specimen: Blood Updated: 10/07/23 0244    Narrative:      The following orders were created for panel order CBC & Differential.  Procedure                               Abnormality         Status                     ---------                               -----------         ------                     CBC Auto Differential[[id number]]        Abnormal            Final result               Scan Slide[[id number]]                                                                     Please view results for these tests on the individual orders.    Comprehensive Metabolic Panel [[id number]]  (Abnormal) Collected: 10/07/23 0029    Specimen: Blood Updated: 10/07/23 0230     Glucose 99 mg/dL      BUN 10 mg/dL      Creatinine 0.88 mg/dL      Sodium 142 mmol/L      Potassium 4.0 mmol/L      Chloride 104 mmol/L      CO2 25.7 mmol/L      Calcium 9.2 mg/dL      Total Protein 7.9 g/dL      Albumin 3.8 g/dL      ALT (SGPT) 37 U/L      AST (SGOT) 44 U/L      Alkaline Phosphatase 153 U/L      Total Bilirubin 0.3 mg/dL      Globulin 4.1 gm/dL      A/G Ratio 0.9 g/dL      BUN/Creatinine Ratio 11.4     Anion Gap 12.3 mmol/L      eGFR 81.2 mL/min/1.73     Narrative:      GFR Normal >60  Chronic Kidney Disease <60  Kidney Failure <15      Single High Sensitivity Troponin T [[id number]]  (Normal) Collected: 10/07/23 0029    Specimen: Blood Updated: 10/07/23 0230     HS Troponin T 6 ng/L     Narrative:      High Sensitive Troponin T Reference Range:  <10.0 ng/L- Negative Female for AMI  <15.0 ng/L- Negative Male for AMI  >=10 - Abnormal Female indicating possible myocardial injury.  >=15 - Abnormal Male indicating possible myocardial injury.   Clinicians would have to utilize clinical acumen, EKG, Troponin, and serial changes to determine if it is an Acute Myocardial Infarction or myocardial injury due to an underlying chronic condition.         Magnesium [[id number]]  (Normal) Collected: 10/07/23 0029    Specimen: Blood Updated: 10/07/23 0228     Magnesium 2.2 mg/dL     CBC Auto Differential [[id number]]  (Abnormal) Collected: 10/07/23 0029    Specimen: Blood Updated: 10/07/23 0221     WBC 12.46 10*3/mm3      RBC 3.75 10*6/mm3      Hemoglobin 12.0 g/dL      Hematocrit 36.9 %      MCV 98.4 fL      MCH 32.0 pg      MCHC 32.5 g/dL      RDW 13.8 %      RDW-SD 50.3 fl      MPV 11.0 fL      Platelets 404 10*3/mm3      Neutrophil % 42.9 %      Lymphocyte % 40.8 %      Monocyte %  12.7 %      Eosinophil % 2.9 %      Basophil % 0.5 %      Immature Grans % 0.2 %      Neutrophils, Absolute 5.35 10*3/mm3      Lymphocytes, Absolute 5.08 10*3/mm3      Monocytes, Absolute 1.58 10*3/mm3      Eosinophils, Absolute 0.36 10*3/mm3      Basophils, Absolute 0.06 10*3/mm3      Immature Grans, Absolute 0.03 10*3/mm3      nRBC 0.0 /100 WBC     Urinalysis With Microscopic If Indicated (No Culture) - Urine, Clean Catch [[id number]]  (Abnormal) Collected: 10/07/23 0225    Specimen: Urine, Clean Catch Updated: 10/07/23 0243     Color, UA Yellow     Appearance, UA Clear     pH, UA 6.5     Specific Gravity, UA 1.010     Glucose, UA Negative     Ketones, UA Negative     Bilirubin, UA Negative     Blood, UA Negative     Protein, UA Negative     Leuk Esterase, UA Small (1+)     Nitrite, UA Negative     Urobilinogen, UA 0.2 E.U./dL    Urinalysis, Microscopic Only - Urine, Clean Catch [[id number]]  (Abnormal) Collected: 10/07/23 0225    Specimen: Urine, Clean Catch Updated: 10/07/23 0243     RBC, UA None Seen /HPF      WBC, UA 0-2 /HPF      Bacteria, UA None Seen /HPF      Squamous Epithelial Cells, UA 0-2 /HPF      Hyaline Casts, UA None Seen /LPF      Methodology Automated Microscopy             Imaging:    XR Chest 1 View    Result Date: 10/7/2023  PROCEDURE: XR CHEST 1 VW  COMPARISON: 10/2/2023.  INDICATIONS: Weak/Dizzy/AMS triage protocol.  FINDINGS:  A single AP (or PA) supine portable chest radiograph was performed.  Borderline cardiac enlargement is seen.  No acute infiltrate is appreciated.  No pleural effusion or pneumothorax is identified.  The distal tip of the right IJ central venous line is in the expected lower superior vena cava (SVC), at or near the cavoatrial junction, similar to the prior study.  There may be a single surgical clip projected over the left upper quadrant, as before.  The patient has undergone cholecystectomy, seen previously.  There is suspected chronic asymmetric elevation of the right  diaphragm.  Chronic calcified granulomatous disease may involve the chest.  Interval improvement in lung expansion is noted.  Otherwise, no significant interval change is seen since the prior study (or studies).        No acute infiltrate is appreciated.     Please note that portions of this note were completed with a voice recognition program.  EMI GERMAIN JR, MD       Electronically Signed and Approved By: EMI GERMAIN JR, MD on 10/07/2023 at 2:23              XR Hip With or Without Pelvis 2 - 3 View Left    Result Date: 10/7/2023  PROCEDURE: XR HIP W OR WO PELVIS 2-3 VIEW LEFT  COMPARISON: None.  INDICATIONS: h/o fall w/ left hip pain  FINDINGS: 3 views were obtained. No acute fracture or acute malalignment is identified. If symptoms or clinical concerns persist, consider imaging follow-up.       No acute fracture or acute malalignment is identified.    Please note that portions of this note were completed with a voice recognition program.  EMI GERMAIN JR, MD       Electronically Signed and Approved By: EMI GERMAIN JR, MD on 10/07/2023 at 2:20              XR Spine Lumbar AP & Lateral    Result Date: 10/7/2023  PROCEDURE: XR SPINE LUMBAR AP AND LATERAL  COMPARISON: 3/1/2018.  INDICATIONS: fall with lower back pain  FINDINGS: 3 (non-standing) views were obtained.  No acute fracture or acute malalignment is identified.  Moderate-to-severe degenerative changes involve the lumbar spine, especially at L4-5.  These findings have progressed since the 3/1/2018 study.  Postoperative changes involve the abdomen and pelvis.  There is a gastric stimulator device in place.  If symptoms or clinical concerns persist, consider imaging follow-up.       No acute fracture or acute malalignment is identified.     Please note that portions of this note were completed with a voice recognition program.  EIM GERMAIN JR, MD       Electronically Signed and Approved By: EMI GERMAIN JR, MD on 10/07/2023 at 2:11                  Differential Diagnosis and Discussion:    Trauma:  Differential diagnosis considered but not limited to were subarachnoid hemorrhage, intracranial bleeding, pneumothorax, cardiac contusion, lung contusion, intra-abdominal bleeding, and compartment syndrome of any extremity or other significant traumatic pathology  Weakness: Based on the patient's history, signs, and symptoms, the diffential diagnosis includes but is not limited to meningitis, stroke, sepsis, subarachnoid hemorrhage, intracranial bleeding, encephalitis, acute uti, dehydration, MS, myasthenia gravis, Guillan Bulger, migraine variant, neuromuscular disorders vertigo, electrolyte imbalance, and metabolic disorders.    All labs were reviewed and interpreted by me.  All X-rays impressions were independently interpreted by me.  EKG was interpreted by me.    MDM     Amount and/or Complexity of Data Reviewed  Clinical lab tests: reviewed  Tests in the radiology section of CPT®: reviewed  Tests in the medicine section of CPT®: reviewed  Decide to obtain previous medical records or to obtain history from someone other than the patient: yes       Patient reports that she had a fall and generalized weakness.  States that she hit her hip and complains of low back pain.  Imaging here is unremarkable.  She is alert and oriented x3 and is otherwise well-appearing.  Labs are unremarkable.  She reports that she has been having issues with hepatic encephalopathy which she has been taking her Xifaxan as well as lactulose with some improvement.  States she was just in the hospital few days ago.  I evaluated the patient at that time and she was well-appearing and appears to be at her baseline.  There is no current confusion and she is otherwise well-appearing.  She is ambulatory without difficulty.  Recommend continued supportive care at home and outpatient follow-up.        Patient Care Considerations:          Consultants/Shared Management Plan:    None    Social  Determinants of Health:    Patient has presented with family members who are responsible, reliable and will ensure follow up care.      Disposition and Care Coordination:    Discharged: The patient is suitable and stable for discharge with no need for consideration of observation or admission.    I have explained the patient´s condition, diagnoses and treatment plan based on the information available to me at this time. I have answered questions and addressed any concerns. The patient has a good  understanding of the patient´s diagnosis, condition, and treatment plan as can be expected at this point. The vital signs have been stable. The patient´s condition is stable and appropriate for discharge from the emergency department.      The patient will pursue further outpatient evaluation with the primary care physician or other designated or consulting physician as outlined in the discharge instructions. They are agreeable to this plan of care and follow-up instructions have been explained in detail. The patient has received these instructions in written format and have expressed an understanding of the discharge instructions. The patient is aware that any significant change in condition or worsening of symptoms should prompt an immediate return to this or the closest emergency department or call to 911.      Final diagnoses:   Fall, initial encounter   Lumbar strain, initial encounter   Pain of right hip   Generalized weakness        ED Disposition       ED Disposition   Discharge    Condition   Stable    Comment   --               This medical record created using voice recognition software.             Jake Mcneal MD  10/07/23 0644

## 2023-10-20 RX ORDER — LACTULOSE 10 G/10G
10 SOLUTION ORAL 2 TIMES DAILY
Qty: 60 PACKET | Refills: 5 | OUTPATIENT
Start: 2023-10-20 | End: 2024-04-17

## 2023-10-20 RX ORDER — ADALIMUMAB 40MG/0.4ML
40 KIT SUBCUTANEOUS
Qty: 2 EACH | Refills: 2 | Status: SHIPPED | OUTPATIENT
Start: 2023-10-20

## 2023-10-24 LAB
QT INTERVAL: 448 MS
QTC INTERVAL: 463 MS

## 2023-10-31 ENCOUNTER — TELEPHONE (OUTPATIENT)
Dept: FAMILY MEDICINE CLINIC | Facility: CLINIC | Age: 48
End: 2023-10-31

## 2023-10-31 NOTE — TELEPHONE ENCOUNTER
Caller: Kayla Gan    Relationship to patient: Self    Best call back number: [phone]    Patient is needing: PATIENT STATES THAT SHE HAS AN APPOINTMENT WITH [facility]O JACOBO ON 11.21.23 BUT PATIENT IS NOT SLEEPING AND IS EXHAUSTED.    PATIENT STATES SHE IS NOT EATING AND HAS NOT SLEPT IN 3 DAYS AND WOULD LIKE TO BE SEEN AS SOON AS POSSIBLE OR IF LAZARO JACOBO COULD CALL HER SOMETHING INTO THE PHARMACY TO HELP HER SLEEP UNTIL HER APPOINTMENT ON 11.21.23.    [facility] - [place], [place] - [address] - [phone]  - [phone]     PATIENT ASKS FOR A CALL BACK TO ADVISE IF NO ANSWER PATIENT DOES NOT HAVE VOICEMAIL PLEASE CALL BACK A SECOND TIME.

## 2023-11-14 RX ORDER — PANCRELIPASE 36000; 180000; 114000 [USP'U]/1; [USP'U]/1; [USP'U]/1
CAPSULE, DELAYED RELEASE PELLETS ORAL
Qty: 540 CAPSULE | Refills: 11 | Status: SHIPPED | OUTPATIENT
Start: 2023-11-14

## 2023-12-05 ENCOUNTER — TELEPHONE (OUTPATIENT)
Dept: FAMILY MEDICINE CLINIC | Facility: CLINIC | Age: 48
End: 2023-12-05

## 2023-12-05 NOTE — TELEPHONE ENCOUNTER
Caller: Kayla Gan    Relationship to patient: Self    Best call back number: [phone]     Patient is needing: PATIENT IS WANTING TO  KNOW IF [facility]ZARO JACOBO WOULD CONSIDER PRESCRIBING WEIGHT LOSS MEDICATION SPECIFICALLY SAXSENDA IS THE ONE SHE HAS HEARD MOST ABOUT. PLEASE CALL TO ADVISE.

## 2023-12-06 NOTE — TELEPHONE ENCOUNTER
Given the complexity of her various medical conditions, I would not feel comfortable prescribing any weight loss medication for patient

## 2023-12-06 NOTE — TELEPHONE ENCOUNTER
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 Pt is open to any weight loss medication     Mentinoed to pt that we can see that she is scheduled to establish with new pcp and can discuss with them. Pt stated she wasn't and that she planned to cancel. Advised if we were to move forward with any weight loss medication we would need an office visit to discuss it.

## 2023-12-07 RX ORDER — UBROGEPANT 100 MG/1
100 TABLET ORAL ONCE AS NEEDED
Qty: 30 TABLET | Refills: 0 | Status: SHIPPED | OUTPATIENT
Start: 2023-12-07

## 2023-12-07 RX ORDER — SUMATRIPTAN 25 MG/1
25 TABLET, FILM COATED ORAL ONCE AS NEEDED
Status: CANCELLED | OUTPATIENT
Start: 2023-12-07

## 2023-12-11 NOTE — PROGRESS NOTES
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 Final    RBC 10/02/2023 3.98  3.77 - 5.28 10*6/mm3 Final    Hemoglobin 10/02/2023 12.8  12.0 - 15.9 g/dL Final    Hematocrit 10/02/2023 39.2  34.0 - 46.6 % Final    MCV 10/02/2023 98.5 (H)  79.0 - 97.0 fL Final    MCH 10/02/2023 32.2  26.6 - 33.0 pg Final    MCHC 10/02/2023 32.7  31.5 - 35.7 g/dL Final    RDW 10/02/2023 14.3  12.3 - 15.4 % Final    RDW-SD 10/02/2023 52.4  37.0 - 54.0 fl Final    MPV 10/02/2023 10.8  6.0 - 12.0 fL Final    Platelets 10/02/2023 429  140 - 450 10*3/mm3 Final    Neutrophil % 10/02/2023 34.6 (L)  42.7 - 76.0 % Final    Lymphocyte % 10/02/2023 51.9 (H)  19.6 - 45.3 % Final    Monocyte % 10/02/2023 10.5  5.0 - 12.0 % Final    Eosinophil % 10/02/2023 2.3  0.3 - 6.2 % Final    Basophil % 10/02/2023 0.4  0.0 - 1.5 % Final    Immature Grans % 10/02/2023 0.3  0.0 - 0.5 % Final    Neutrophils, Absolute 10/02/2023 3.94  1.70 - 7.00 10*3/mm3 Final    Lymphocytes, Absolute 10/02/2023 5.89 (H)  0.70 - 3.10 10*3/mm3 Final    Monocytes, Absolute 10/02/2023 1.19 (H)  0.10 - 0.90 10*3/mm3 Final    Eosinophils, Absolute 10/02/2023 0.26  0.00 - 0.40 10*3/mm3 Final    Basophils, Absolute 10/02/2023 0.04  0.00 - 0.20 10*3/mm3 Final    Immature Grans, Absolute 10/02/2023 0.03  0.00 - 0.05 10*3/mm3 Final    nRBC 10/02/2023 0.0  0.0 - 0.2 /100 WBC Final    Acetaminophen  10/02/2023 <5.0  0.0 - 30.0 mcg/mL Final    Ethanol 10/02/2023 <10  0 - 10 mg/dL Final    Ethanol % 10/02/2023 <0.010  % Final    Amphet/Methamphet, Screen 10/02/2023 Negative  Negative Final    Barbiturates Screen, Urine 10/02/2023 Negative  Negative Final    Benzodiazepine Screen, Urine 10/02/2023 Negative  Negative Final    Cocaine Screen, Urine 10/02/2023 Negative  Negative Final    Opiate Screen 10/02/2023 Negative  Negative Final    THC, Screen, Urine 10/02/2023 Negative  Negative Final    Methadone Screen, Urine 10/02/2023 Negative  Negative Final    Oxycodone Screen, Urine 10/02/2023 Positive (A)  Negative Final    Fentanyl, Urine 10/02/2023 Negative  Negative Final    Salicylate 10/02/2023 <0.3  <=30.0 mg/dL Final    Protime 10/02/2023 14.9  11.8 - 14.9 Seconds Final    INR 10/02/2023 1.16 (H)  0.86 - 1.15 Final    Glucose 10/02/2023 126 (H)  70 - 99 mg/dL Final    Serial Number: [id number]:  [id number]    RBC, UA 10/02/2023 None Seen  None Seen /HPF Final    WBC, UA 10/02/2023 0-2 (A)  None Seen /HPF Final    Bacteria, UA 10/02/2023 None Seen  None Seen /HPF Final    Squamous Epithelial Cells, UA 10/02/2023 0-2  None Seen, 0-2 /HPF Final    Hyaline Casts, UA 10/02/2023 None Seen  None Seen /LPF Final    Calcium Oxalate Crystals, UA 10/02/2023 Moderate/2+  None Seen /HPF Final    Methodology 10/02/2023 Manual Light Microscopy   Final   Lab on 09/20/2023   Component Date Value Ref Range Status    ALPHA-FETOPROTEIN 09/20/2023 4.82  0 - 8.3 ng/mL Final    Glucose 09/20/2023 96  65 - 99 mg/dL Final    BUN 09/20/2023 16  6 - 20 mg/dL Final    Creatinine 09/20/2023 0.70  0.57 - 1.00 mg/dL Final    Sodium 09/20/2023 140  136 - 145 mmol/L Final    Potassium 09/20/2023 4.0  3.5 - 5.2 mmol/L Final    Chloride 09/20/2023 103  98 - 107 mmol/L Final    CO2 09/20/2023 26.0  22.0 - 29.0 mmol/L Final    Calcium 09/20/2023 9.7  8.6 - 10.5 mg/dL Final    Total Protein 09/20/2023 8.8 (H)  6.0 - 8.5 g/dL Final    Albumin  09/20/2023 4.2  3.5 - 5.2 g/dL Final    ALT (SGPT) 09/20/2023 71 (H)  1 - 33 U/L Final    AST (SGOT) 09/20/2023 107 (H)  1 - 32 U/L Final    Alkaline Phosphatase 09/20/2023 198 (H)  39 - 117 U/L Final    Total Bilirubin 09/20/2023 0.4  0.0 - 1.2 mg/dL Final    Globulin 09/20/2023 4.6  gm/dL Final    A/G Ratio 09/20/2023 0.9  g/dL Final    BUN/Creatinine Ratio 09/20/2023 22.9  7.0 - 25.0 Final    Anion Gap 09/20/2023 11.0  5.0 - 15.0 mmol/L Final    eGFR 09/20/2023 106.8  >60.0 mL/min/1.73 Final    Protime 09/20/2023 14.5  11.8 - 14.9 Seconds Final    INR 09/20/2023 1.12  0.86 - 1.15 Final    WBC 09/20/2023 10.95 (H)  3.40 - 10.80 10*3/mm3 Final    RBC 09/20/2023 4.16  3.77 - 5.28 10*6/mm3 Final    Hemoglobin 09/20/2023 13.6  12.0 - 15.9 g/dL Final    Hematocrit 09/20/2023 41.0  34.0 - 46.6 % Final    MCV 09/20/2023 98.6 (H)  79.0 - 97.0 fL Final    MCH 09/20/2023 32.7  26.6 - 33.0 pg Final    MCHC 09/20/2023 33.2  31.5 - 35.7 g/dL Final    RDW 09/20/2023 13.0  12.3 - 15.4 % Final    RDW-SD 09/20/2023 46.2  37.0 - 54.0 fl Final    MPV 09/20/2023 11.4  6.0 - 12.0 fL Final    Platelets 09/20/2023 562 (H)  140 - 450 10*3/mm3 Final    Neutrophil % 09/20/2023 44.4  42.7 - 76.0 % Final    Lymphocyte % 09/20/2023 44.8  19.6 - 45.3 % Final    Monocyte % 09/20/2023 7.9  5.0 - 12.0 % Final    Eosinophil % 09/20/2023 2.0  0.3 - 6.2 % Final    Basophil % 09/20/2023 0.5  0.0 - 1.5 % Final    Immature Grans % 09/20/2023 0.4  0.0 - 0.5 % Final    Neutrophils, Absolute 09/20/2023 4.86  1.70 - 7.00 10*3/mm3 Final    Lymphocytes, Absolute 09/20/2023 4.91 (H)  0.70 - 3.10 10*3/mm3 Final    Monocytes, Absolute 09/20/2023 0.86  0.10 - 0.90 10*3/mm3 Final    Eosinophils, Absolute 09/20/2023 0.22  0.00 - 0.40 10*3/mm3 Final    Basophils, Absolute 09/20/2023 0.06  0.00 - 0.20 10*3/mm3 Final    Immature Grans, Absolute 09/20/2023 0.04  0.00 - 0.05 10*3/mm3 Final    nRBC 09/20/2023 0.0  0.0 - 0.2 /100 WBC Final   Lab on 08/23/2023   Component  Date Value Ref Range Status    Glucose, Fasting 08/23/2023 120 (H)  74 - 106 mg/dL Final    C-Peptide 08/23/2023 2.5  1.1 - 4.4 ng/mL Final    C-Peptide reference interval is for fasting patients.   Lab on 03/01/2023   Component Date Value Ref Range Status    C-Peptide 03/01/2023 4.3  1.1 - 4.4 ng/mL Final    C-Peptide reference interval is for fasting patients.    Glucose 03/01/2023 166 (H)  65 - 99 mg/dL Final    BUN 03/01/2023 11  6 - 20 mg/dL Final    Creatinine 03/01/2023 0.72  0.57 - 1.00 mg/dL Final    Sodium 03/01/2023 142  136 - 145 mmol/L Final    Potassium 03/01/2023 4.0  3.5 - 5.2 mmol/L Final    Chloride 03/01/2023 105  98 - 107 mmol/L Final    CO2 03/01/2023 25.0  22.0 - 29.0 mmol/L Final    Calcium 03/01/2023 9.4  8.6 - 10.5 mg/dL Final    Total Protein 03/01/2023 8.4  6.0 - 8.5 g/dL Final    Albumin 03/01/2023 4.0  3.5 - 5.2 g/dL Final    ALT (SGPT) 03/01/2023 54 (H)  1 - 33 U/L Final    AST (SGOT) 03/01/2023 62 (H)  1 - 32 U/L Final    Alkaline Phosphatase 03/01/2023 136 (H)  39 - 117 U/L Final    Total Bilirubin 03/01/2023 0.3  0.0 - 1.2 mg/dL Final    Globulin 03/01/2023 4.4  gm/dL Final    A/G Ratio 03/01/2023 0.9  g/dL Final    BUN/Creatinine Ratio 03/01/2023 15.3  7.0 - 25.0 Final    Anion Gap 03/01/2023 12.0  5.0 - 15.0 mmol/L Final    eGFR 03/01/2023 103.9  >60.0 mL/min/1.73 Final    Insulin AutoAb 03/01/2023 <5.0  uU/mL Final    This test is also known as insulin autoantibody or IAA.  This test was developed and its performance characteristics  determined by [facility]. It has not been cleared or approved  by the Food and Drug Administration.  Reference Range:  <5.0        Negative  > or = 5.0  Positive    Immunofixation Result, Serum 03/01/2023 Comment   Final    No monoclonality detected.    IgG 03/01/2023 1887 (H)  586 - 1602 mg/dL Final    IgA 03/01/2023 975 (H)  87 - 352 mg/dL Final    Results confirmed on  dilution.    IgM 03/01/2023 15 (L)  26 - 217 mg/dL Final    Result confirmed on  concentration.    Sed Rate 03/01/2023 47 (H)  0 - 20 mm/hr Final    Interpretation 03/01/2023 Negative  Negative Final    A negative autoimmune neurology/paraneoplastic profile result  does not rule out all clinically relevant antibodies. If indicated,  a phenotype-specific profile (For example, Encephalopathy, dementia,  myelopathy, or axonal neuropathy) should be considered.    Anti-Hu Antibody 03/01/2023 Negative  Negative Final    Anti-Ri Antibodies* 03/01/2023 Negative  Negative Final    Antineruonal nuclear Ab Type 3 03/01/2023 Negative  Negative Final    Anti-Yo Antibodies* 03/01/2023 Negative  Negative Final    Purkinje Cell Cytop.Ab,Type 1 03/01/2023 Negative  Negative Final    Purkinje Cell Cytop.Ab,Type 1 03/01/2023 Negative  Negative Final    Amphiphysin Antibody 03/01/2023 Negative  Negative Final    CRMP-5 IGG 03/01/2023 Negative  Negative Final    AGNA-1 03/01/2023 Negative  Negative Final    DPPX Antibody 03/01/2023 Negative  Negative Final    mGluR1 Antibody 03/01/2023 Negative  Negative Final    IgLON5 Antibody 03/01/2023 Negative  Negative Final    Ma2/Ta Antibody 03/01/2023 Negative  Negative Final    Zic4 Antibody 03/01/2023 Negative  Negative Final    DNER Antibody 03/01/2023 Negative  Negative Final    ITPR1 Antibody 03/01/2023 Negative  Negative Final    AMPA-R1 Antibody 03/01/2023 Negative  Negative Final    AMPA-R2 Antibody 03/01/2023 Negative  Negative Final    TABATHA-B-R Antibody 03/01/2023 Negative  Negative Final    NMDA-R Antibody 03/01/2023 Negative  Negative Final    GAD65 Antibody 03/01/2023 Negative  Negative Final    Aquaporin 4 Antibody 03/01/2023 Negative  Negative Final    VGCC Antibody 03/01/2023 <1.0  0.0 - 30.0 pmol/L Final    CASPR2 Antibody,Cell-based IFA 03/01/2023 Negative  Negative Final    LGI1 Antibody, Cell-based IFA 03/01/2023 Negative  Negative Final    Striated Muscle Antibody 03/01/2023 Negative  Neg:<1:100 Final   Admission on 01/24/2023, Discharged on 01/24/2023    Component Date Value Ref Range Status    Glucose 01/24/2023 110 (H)  65 - 99 mg/dL Final    BUN 01/24/2023 12  6 - 20 mg/dL Final    Creatinine 01/24/2023 0.54 (L)  0.57 - 1.00 mg/dL Final    Sodium 01/24/2023 139  136 - 145 mmol/L Final    Potassium 01/24/2023 4.0  3.5 - 5.2 mmol/L Final    Chloride 01/24/2023 101  98 - 107 mmol/L Final    CO2 01/24/2023 28.4  22.0 - 29.0 mmol/L Final    Calcium 01/24/2023 9.2  8.6 - 10.5 mg/dL Final    Total Protein 01/24/2023 8.4  6.0 - 8.5 g/dL Final    Albumin 01/24/2023 4.1  3.5 - 5.2 g/dL Final    ALT (SGPT) 01/24/2023 62 (H)  1 - 33 U/L Final    AST (SGOT) 01/24/2023 70 (H)  1 - 32 U/L Final    Alkaline Phosphatase 01/24/2023 131 (H)  39 - 117 U/L Final    Total Bilirubin 01/24/2023 0.2  0.0 - 1.2 mg/dL Final    Globulin 01/24/2023 4.3  gm/dL Final    A/G Ratio 01/24/2023 1.0  g/dL Final    BUN/Creatinine Ratio 01/24/2023 22.2  7.0 - 25.0 Final    Anion Gap 01/24/2023 9.6  5.0 - 15.0 mmol/L Final    eGFR 01/24/2023 114.4  >60.0 mL/min/1.73 Final    WBC 01/24/2023 11.00 (H)  3.40 - 10.80 10*3/mm3 Final    RBC 01/24/2023 3.93  3.77 - 5.28 10*6/mm3 Final    Hemoglobin 01/24/2023 13.1  12.0 - 15.9 g/dL Final    Hematocrit 01/24/2023 38.2  34.0 - 46.6 % Final    MCV 01/24/2023 97.2 (H)  79.0 - 97.0 fL Final    MCH 01/24/2023 33.3 (H)  26.6 - 33.0 pg Final    MCHC 01/24/2023 34.3  31.5 - 35.7 g/dL Final    RDW 01/24/2023 13.2  12.3 - 15.4 % Final    RDW-SD 01/24/2023 47.5  37.0 - 54.0 fl Final    MPV 01/24/2023 10.7  6.0 - 12.0 fL Final    Platelets 01/24/2023 417  140 - 450 10*3/mm3 Final    Neutrophil % 01/24/2023 34.3 (L)  42.7 - 76.0 % Final    Lymphocyte % 01/24/2023 48.9 (H)  19.6 - 45.3 % Final    Monocyte % 01/24/2023 13.1 (H)  5.0 - 12.0 % Final    Eosinophil % 01/24/2023 2.8  0.3 - 6.2 % Final    Basophil % 01/24/2023 0.5  0.0 - 1.5 % Final    Immature Grans % 01/24/2023 0.4  0.0 - 0.5 % Final    Neutrophils, Absolute 01/24/2023 3.77  1.70 - 7.00 10*3/mm3 Final     Lymphocytes, Absolute 01/24/2023 5.38 (H)  0.70 - 3.10 10*3/mm3 Final    Monocytes, Absolute 01/24/2023 1.44 (H)  0.10 - 0.90 10*3/mm3 Final    Eosinophils, Absolute 01/24/2023 0.31  0.00 - 0.40 10*3/mm3 Final    Basophils, Absolute 01/24/2023 0.06  0.00 - 0.20 10*3/mm3 Final    Immature Grans, Absolute 01/24/2023 0.04  0.00 - 0.05 10*3/mm3 Final    nRBC 01/24/2023 0.0  0.0 - 0.2 /100 WBC Final    Extra Tube 01/24/2023 Hold for add-ons.   Final    Auto resulted.    Extra Tube 01/24/2023 hold for add-on   Final    Auto resulted    Extra Tube 01/24/2023 Hold for add-ons.   Final    Auto resulted.    Extra Tube 01/24/2023 Hold for add-ons.   Final    Auto resulted   Lab on 01/03/2023   Component Date Value Ref Range Status    Glucose 01/03/2023 107 (H)  65 - 99 mg/dL Final    BUN 01/03/2023 12  6 - 20 mg/dL Final    Creatinine 01/03/2023 0.68  0.57 - 1.00 mg/dL Final    Sodium 01/03/2023 139  136 - 145 mmol/L Final    Potassium 01/03/2023 3.9  3.5 - 5.2 mmol/L Final    Chloride 01/03/2023 104  98 - 107 mmol/L Final    CO2 01/03/2023 24.4  22.0 - 29.0 mmol/L Final    Calcium 01/03/2023 9.0  8.6 - 10.5 mg/dL Final    Total Protein 01/03/2023 8.4  6.0 - 8.5 g/dL Final    Albumin 01/03/2023 4.2  3.5 - 5.2 g/dL Final    ALT (SGPT) 01/03/2023 68 (H)  1 - 33 U/L Final    AST (SGOT) 01/03/2023 70 (H)  1 - 32 U/L Final    Alkaline Phosphatase 01/03/2023 137 (H)  39 - 117 U/L Final    Total Bilirubin 01/03/2023 0.2  0.0 - 1.2 mg/dL Final    Globulin 01/03/2023 4.2  gm/dL Final    A/G Ratio 01/03/2023 1.0  g/dL Final    BUN/Creatinine Ratio 01/03/2023 17.6  7.0 - 25.0 Final    Anion Gap 01/03/2023 10.6  5.0 - 15.0 mmol/L Final    eGFR 01/03/2023 108.3  >60.0 mL/min/1.73 Final    National Kidney Foundation and American Society of Nephrology (ASN) Task Force recommended calculation based on the Chronic Kidney Disease Epidemiology Collaboration (CKD-EPI) equation refit without adjustment for race.    Protime 01/03/2023 14.5  11.8 -  14.9 Seconds Final    INR 01/03/2023 1.11  0.86 - 1.15 Final    Hemochromatosis Gene 01/03/2023 Comment   Final    Comment: Result:  c.845G>A (p.Czj872Btn) - Detected, heterozygous  c.187C>G (p.Jpf92Ovo) - Not Detected  c.193A>T (p.Ljr48Syi) - Not Detected  Not associated with increased risk to develop clinical symptoms  of Hereditary Hemochromatosis. In symptomatic individuals, other  causes of iron overload should be evaluated. See Additional  Information and Comments.  Additional Clinical Information:  Hereditary hemochromatosis (HFE related) is an autosomal recessive  iron storage disorder. Patients may have a genetic diagnosis of  hereditary hemochromatosis and never show clinical symptoms. Clinical  symptoms typically appear between 40 to 60 years in males and after  menopause in females. Signs and symptoms may include organ damage,  primarily in the liver, risk for hepatocellular carcinoma, diabetes,  and heart disease due to iron accumulation. Life expectancy may be  decreased in individuals who develop cirrhosis. Treatment for  clinically symptomatic individuals may include therapeutic  phlebotomy.                            Liver transplant may be used to treat end stage liver  failure. For preventive care, monitoring for iron overload is  recommended for patients who are homozygous for c.845G>A  (p.Aka417Vqy) and have yet to experience clinical symptoms.  Comments:  The most common HFE variants associated with hereditary  hemochromatosis are c.845G>A (p.Jlt436Vdu), c.187C>G (p.Pla47Iqr),  c.193A>T (p.Hmf91Vsp). While patients homozygous for c.845G>A  (p.Aeg871Rzz) are the most likely to present clinical symptoms, less  than 10% develop clinically significant iron overload with tissue  and organ damage.  Genetic counseling is recommended to discuss the potential clinical  implications of positive results, as well as recommendations for  testing family members.  Genetic Coordinators are available for health  care providers to  discuss results at [phone]  Test Details:  Three variants analyzed:  c.845G>A (p.Sri663Pfh), commonly referred to as C282Y  c.187C>G (p.Gmy98Srd), commonly referred to                            as H63D  c.193A>T (p.Hya61Cox), commonly referred to as S65C  Methods/Limitations:  DNA Analysis of the HFE gene (NM_[id number]) was performed by PCR  amplification followed by restriction enzyme digestion analyses.  Results must be combined with clinical information for the most  accurate interpretation. Molecular-based testing is highly accurate,  but as in any laboratory test, diagnostic errors may occur. False  positive or false negative results may occur for reasons that  include genetic variants, blood transfusions, bone marrow  transplantation, somatic or tissue-specific mosaicism, mislabeled  samples, or erroneous representation of family relationships.  This test was developed and its performance characteristics  determined by [facility]. It has not been cleared or approved by the  Food and Drug Administration.  References:  Flash BR, Paulino PC, Corina KV, Nash LW, Kandis AS; American  Association for the Study of Liver Diseases. Diagnosis and management  of hemochromatosis:                            2011 practice guideline by the American  Association for the Study of Liver Diseases. Hepatology. 2011  Jul;54(1):328-43. doi: 10.1002/hep.70030. PMID: 08676475; PMCID:  OUN4318635.  Karla G, Bill P, Jimbo DENNY, Noe H, Nilsa O, Jose L S,  Holger I, Sukhwinder M, Manish S. [facility] best practice guidelines for the  molecular genetic diagnosis of hereditary hemochromatosis (HH). Eur  J Hum Nga. 2016 Apr;24(4):479-95. doi: 10.1038/ejhg.2015.128.  Epub 2015 Jul 8. PMID: 99208374; PMCID: NTI1536911.  Brianda Mistry, PhD, [facility]  Harley Kraus, PhD  Kalyan Kilgore, PhD, [facility]  Benito Cruz, PhD, [facility]  Zachary Suazo, PhD, [facility]  MARIE Silveira, PhD, [facility]  Ariadna Wilkerson, PhD,  [facility]  Carolyne Pittman, PhD, [facility]    WBC 01/03/2023 10.98 (H)  3.40 - 10.80 10*3/mm3 Final    RBC 01/03/2023 3.97  3.77 - 5.28 10*6/mm3 Final    Hemoglobin 01/03/2023 13.1  12.0 - 15.9 g/dL Final    Hematocrit 01/03/2023 37.9  34.0 - 46.6 % Final    MCV 01/03/2023 95.5  79.0 - 97.0 fL Final    MCH 01/03/2023 33.0  26.6 - 33.0 pg Final    MCHC 01/03/2023 34.6  31.5 - 35.7 g/dL Final    RDW 01/03/2023 11.6 (L)  12.3 - 15.4 % Final    RDW-SD 01/03/2023 40.7  37.0 - 54.0 fl Final    MPV 01/03/2023 11.6  6.0 - 12.0 fL Final    Platelets 01/03/2023 465 (H)  140 - 450 10*3/mm3 Final    Neutrophil % 01/03/2023 35.4 (L)  42.7 - 76.0 % Final    Lymphocyte % 01/03/2023 54.5 (H)  19.6 - 45.3 % Final    Monocyte % 01/03/2023 8.1  5.0 - 12.0 % Final    Eosinophil % 01/03/2023 2.0  0.3 - 6.2 % Final    Neutrophils Absolute 01/03/2023 3.89  1.70 - 7.00 10*3/mm3 Final    Lymphocytes Absolute 01/03/2023 5.98 (H)  0.70 - 3.10 10*3/mm3 Final    Monocytes Absolute 01/03/2023 0.89  0.10 - 0.90 10*3/mm3 Final    Eosinophils Absolute 01/03/2023 0.22  0.00 - 0.40 10*3/mm3 Final    Poikilocytes 01/03/2023 Slight/1+  None Seen Final    WBC Morphology 01/03/2023 Normal  Normal Final    Platelet Morphology 01/03/2023 Normal  Normal Final       EKG Results:  No orders to display       Imaging Results:  XR Chest 2 View    Result Date: 11/26/2023    No active disease is seen.  Right jugular Port-A-Cath tip is at the cavoatrial junction.     RAFY DE GUZMAN MD       Electronically Signed and Approved By: RAFY DE GUZMAN MD on 11/26/2023 at 14:42             XR Chest 2 View    Result Date: 11/15/2023  No active disease. Electronically Signed: Justin Jacobs MD 2023/11/15 at 13:41 CST Reading Location ID and State: [address] / [place] Tel [phone], Service support  [phone], Fax [phone]    XR Chest 1 View    Result Date: 10/7/2023    No acute infiltrate is appreciated.     Please note that portions of this note were completed with a voice  recognition program.  EMI GERMAIN JR, MD       Electronically Signed and Approved By: EMI GERMAIN JR, MD on 10/07/2023 at 2:23              XR Hip With or Without Pelvis 2 - 3 View Left    Result Date: 10/7/2023   No acute fracture or acute malalignment is identified.    Please note that portions of this note were completed with a voice recognition program.  EMI GERMAIN JR, MD       Electronically Signed and Approved By: EMI GERMAIN JR, MD on 10/07/2023 at 2:20              XR Spine Lumbar AP & Lateral    Result Date: 10/7/2023   No acute fracture or acute malalignment is identified.     Please note that portions of this note were completed with a voice recognition program.  EMI GERMAIN JR, MD       Electronically Signed and Approved By: EMI GERMAIN JR, MD on 10/07/2023 at 2:11              Mammo Screening Digital Tomosynthesis Bilateral With CAD    Result Date: 10/4/2023   Benign mammogram. Suggest routine mammographic screening.  RECOMMENDATION(S):  ROUTINE MAMMOGRAM AND CLINICAL EVALUATION IN 12 MONTHS.   BIRADS:  DIAGNOSTIC CATEGORY 1--NEGATIVE.   BREAST COMPOSITION: Heterogeneously dense,which may obscure small masses.  PLEASE NOTE:  A NORMAL MAMMOGRAM DOES NOT EXCLUDE THE POSSIBILITY OF BREAST CANCER. ANY CLINICALLY SUSPICIOUS PALPABLE LUMP SHOULD BE BIOPSIED.      RAFY DE GUZMAN MD       Electronically Signed and Approved By: RAFY DE GUZMAN MD on 10/04/2023 at 12:56             XR Chest 1 View    Result Date: 10/2/2023    Borderline cardiomegaly, unchanged.  No active pulmonary disease is seen.  Right jugular Port-A-Cath device is in unchanged position.       RAFY DE GUZMAN MD       Electronically Signed and Approved By: RAFY DE GUZMAN MD on 10/02/2023 at 15:03                 Assessment & Plan   Diagnoses and all orders for this visit:    1. Severe episode of recurrent major depressive disorder, without psychotic features (Primary)  -     Vortioxetine HBr (Trintellix) 5 MG tablet tablet;  Take 1 tablet by mouth Every Night for 30 days.  Dispense: 30 tablet; Refill: 1  -     Vortioxetine HBr (Trintellix) 5 MG tablet tablet; Take 1 tablet by mouth Every Night for 7 days.  Dispense: 7 tablet; Refill: 0    2. Generalized anxiety disorder    3. Panic disorder    4. Post traumatic stress disorder (PTSD)    5. Insomnia, unspecified type  -     Ambulatory Referral to Sleep Medicine  -     temazepam (RESTORIL) 7.5 MG capsule; Take 1 capsule by mouth At Night As Needed for Sleep for up to 30 days.  Dispense: 30 capsule; Refill: 1      Patient screened positive for depression based on a PHQ-9 score of 10 on 12/12/2023. Follow-up recommendations include: Prescribed antidepressant medication treatment, Suicide Risk Assessment performed, and see assessment below .    Presentation seems most consistent with MDD, RYLEY, panic disorder, PTSD, insomnia.  Discussed concerns about being on Lexapro with liver impairment.  We will taper and discontinue Lexapro.  We will start on Trintellix for management depression and overall mood.  We will refer to sleep medicine.  We will discontinue trazodone.  Will start on temazepam for sleep as needed. Pt is not an appropriate candidate for sleep meds such as trazodone, seroquel, ambien, lunesta, due to liver impairment. I extensively discussed my concerns if patient were to still have HI then this could increase her risk of respiratory depression, coma, death.  Patient would like to still proceed with this medication.  Discussed that I will not be continuing this medication if she does in fact have sleep apnea.  Patient verbalized understanding is agreeable to the plan.  Patient declines referral for psychotherapy.  Instructed patient to contact the office for any new or worsening symptoms or any other concerns.  Follow-up in 1 month.  Addressed all questions and concerns.    Visit Diagnoses:    ICD-10-CM ICD-9-CM   1. Severe episode of recurrent major depressive disorder, without  psychotic features  F33.2 296.33   2. Generalized anxiety disorder  F41.1 300.02   3. Panic disorder  F41.0 300.01   4. Post traumatic stress disorder (PTSD)  F43.10 309.81   5. Insomnia, unspecified type  G47.00 780.52       PLAN:  Safety: No acute safety concerns at this time.  Therapy: Patient declines referral  Risk Assessment: Risk of self-harm acutely is moderate.  Risk factors include anxiety disorder, mood disorder, intermittent SI (passive,no plan or intent, no present SI), and recent psychosocial stressors (pandemic). Protective factors include no family history, denies access to guns/weapons, no history of suicide attempts or self-harm in the past, minimal AODA, healthcare seeking, future orientation, willingness to engage in care.  Risk of self-harm chronically is also moderate, but could be further elevated in the event of treatment noncompliance and/or AODA.  Labs/Diagnostics Ordered:   Orders Placed This Encounter   Procedures    Ambulatory Referral to Sleep Medicine     Medications:   New Medications Ordered This Visit   Medications    Vortioxetine HBr (Trintellix) 5 MG tablet tablet     Sig: Take 1 tablet by mouth Every Night for 30 days.     Dispense:  30 tablet     Refill:  1    Vortioxetine HBr (Trintellix) 5 MG tablet tablet     Sig: Take 1 tablet by mouth Every Night for 7 days.     Dispense:  7 tablet     Refill:  0     Order Specific Question:   Lot Number?     Answer:   [id number]     Order Specific Question:   Expiration Date?     Answer:   8/1/2025     Order Specific Question:   Quantity     Answer:   7    temazepam (RESTORIL) 7.5 MG capsule     Sig: Take 1 capsule by mouth At Night As Needed for Sleep for up to 30 days.     Dispense:  30 capsule     Refill:  1       Discussed all risks, benefits, alternatives, and side effects of Vortioxetine including but not limited to GI upset (N/V/D, constipation), sexual dysfunction, abnormal dreams, dizziness, pruritus, bleeding risk, seizure risk,  activation of cem or hypomania, increased fragility fracture risk, hyponatremia, ocular effects, withdrawal syndrome following abrupt discontinuation, serotonin syndrome, and activation of suicidal ideation and behavior.  Pt educated on the need to practice safe sex while taking this med. Discussed the need for pt to immediately call the office for any new or worsening symptoms, such as worsening depression; feeling nervous or restless; suicidal thoughts or actions; or other changes changes in mood or behavior, and all other concerns. Pt educated on med compliance and the risks of suddenly stopping this medication or missing doses. Pt verbalized understanding and is agreeable to taking Vortioxetine. Addressed all questions and concerns.     Discussed all risks, benefits, alternatives, and side effects of Temazepam including but not limited to headache, drowsiness, dizziness, ataxia, dose-dependent amnesia, hyperexcitability, nervousness, hallucinations, development of dependence or tolerance, risk of complex sleep behaviors, and GI upset. Pt educated on the need to practice safe sex while taking this med. Instructed pt to avoid driving and doing all other tasks or actions after taking the med. Discussed the need for pt to immediately call the office for any new or worsening symptoms, such as complex sleep behavior, and all other concerns. Pt educated on med compliance. Pt verbalized understanding and is agreeable to taking Temazepam. Addressed all questions and concerns.       Follow up: F/u in 1 month.      TREATMENT PLAN/GOALS: Continue supportive psychotherapy efforts and medications as indicated. Treatment and medication options discussed during today's visit. Patient ackowledged and verbally consented to continue with current treatment plan and was educated on the importance of compliance with treatment and follow-up appointments.    MEDICATION ISSUES:  JOSE ARMANDO reviewed as expected.  Discussed medication  options and treatment plan of prescribed medication as well as the risks, benefits, and side effects including potential falls, possible impaired driving and metabolic adversities among others. Patient is agreeable to call the office with any worsening of symptoms or onset of side effects. Patient is agreeable to call 911 or go to the nearest ER should he/she begin having SI/HI. No medication side effects or related complaints today.            This document has been electronically signed by Kayla Graham PA-C  December 12, 2023 09:53 EST      Part of this note may be an electronic transcription/translation of spoken language to printed text using the Dragon Dictation System.

## 2023-12-12 ENCOUNTER — TELEPHONE (OUTPATIENT)
Dept: PSYCHIATRY | Facility: CLINIC | Age: 48
End: 2023-12-12

## 2023-12-12 ENCOUNTER — OFFICE VISIT (OUTPATIENT)
Dept: BEHAVIORAL HEALTH | Facility: CLINIC | Age: 48
End: 2023-12-12
Payer: MEDICARE

## 2023-12-12 ENCOUNTER — PATIENT ROUNDING (BHMG ONLY) (OUTPATIENT)
Dept: PSYCHIATRY | Facility: CLINIC | Age: 48
End: 2023-12-12
Payer: COMMERCIAL

## 2023-12-12 ENCOUNTER — LAB (OUTPATIENT)
Dept: LAB | Facility: HOSPITAL | Age: 48
End: 2023-12-12
Payer: MEDICARE

## 2023-12-12 VITALS
BODY MASS INDEX: 31.55 KG/M2 | SYSTOLIC BLOOD PRESSURE: 155 MMHG | HEART RATE: 73 BPM | WEIGHT: 201 LBS | HEIGHT: 67 IN | DIASTOLIC BLOOD PRESSURE: 104 MMHG

## 2023-12-12 DIAGNOSIS — G47.00 INSOMNIA, UNSPECIFIED TYPE: ICD-10-CM

## 2023-12-12 DIAGNOSIS — F41.0 PANIC DISORDER: ICD-10-CM

## 2023-12-12 DIAGNOSIS — F33.2 SEVERE EPISODE OF RECURRENT MAJOR DEPRESSIVE DISORDER, WITHOUT PSYCHOTIC FEATURES: Primary | ICD-10-CM

## 2023-12-12 DIAGNOSIS — F43.10 POST TRAUMATIC STRESS DISORDER (PTSD): ICD-10-CM

## 2023-12-12 DIAGNOSIS — F41.1 GENERALIZED ANXIETY DISORDER: ICD-10-CM

## 2023-12-12 PROCEDURE — 86480 TB TEST CELL IMMUN MEASURE: CPT | Performed by: NURSE PRACTITIONER

## 2023-12-12 PROCEDURE — 1159F MED LIST DOCD IN RCRD: CPT | Performed by: PHYSICIAN ASSISTANT

## 2023-12-12 PROCEDURE — 3080F DIAST BP >= 90 MM HG: CPT | Performed by: PHYSICIAN ASSISTANT

## 2023-12-12 PROCEDURE — 80074 ACUTE HEPATITIS PANEL: CPT | Performed by: NURSE PRACTITIONER

## 2023-12-12 PROCEDURE — 3077F SYST BP >= 140 MM HG: CPT | Performed by: PHYSICIAN ASSISTANT

## 2023-12-12 PROCEDURE — 1160F RVW MEDS BY RX/DR IN RCRD: CPT | Performed by: PHYSICIAN ASSISTANT

## 2023-12-12 PROCEDURE — 90792 PSYCH DIAG EVAL W/MED SRVCS: CPT | Performed by: PHYSICIAN ASSISTANT

## 2023-12-12 RX ORDER — LAMOTRIGINE 200 MG/1
TABLET ORAL
COMMUNITY
End: 2023-12-12

## 2023-12-12 RX ORDER — INSULIN PMP CART,AUT,G6/7,CNTR
EACH SUBCUTANEOUS
COMMUNITY
Start: 2023-10-02

## 2023-12-12 RX ORDER — TEMAZEPAM 7.5 MG/1
7.5 CAPSULE ORAL NIGHTLY PRN
Qty: 30 CAPSULE | Refills: 1 | Status: SHIPPED | OUTPATIENT
Start: 2023-12-12 | End: 2024-01-11

## 2023-12-12 RX ORDER — INSULIN DETEMIR 100 [IU]/ML
INJECTION, SOLUTION SUBCUTANEOUS
COMMUNITY
Start: 2023-10-24

## 2023-12-12 RX ORDER — PHENAZOPYRIDINE HYDROCHLORIDE 200 MG/1
TABLET, FILM COATED ORAL
COMMUNITY

## 2023-12-12 RX ORDER — INSULIN PMP CART,AUT,G6/7,CNTR
EACH SUBCUTANEOUS
COMMUNITY
Start: 2023-12-05

## 2023-12-12 RX ORDER — TRAZODONE HYDROCHLORIDE 50 MG/1
TABLET ORAL
COMMUNITY
Start: 2023-11-30 | End: 2023-12-12

## 2023-12-12 RX ORDER — TRAZODONE HYDROCHLORIDE 150 MG/1
TABLET ORAL
COMMUNITY
End: 2023-12-12

## 2023-12-12 RX ORDER — ATOGEPANT 30 MG/1
TABLET ORAL
COMMUNITY
Start: 2023-10-06

## 2023-12-12 RX ORDER — LACTULOSE 10 G/15ML
SOLUTION ORAL; RECTAL
COMMUNITY

## 2023-12-12 RX ORDER — DICYCLOMINE HYDROCHLORIDE 10 MG/1
CAPSULE ORAL
COMMUNITY

## 2023-12-12 NOTE — TELEPHONE ENCOUNTER
? Your request has been approved  PA Case: [id number], Status: Approved, Coverage Starts on: 1/1/2023 12:00:00 AM, Coverage Ends on: 12/31/2024 12:00:00 AM. Questions? Contact [phone].

## 2023-12-12 NOTE — PROGRESS NOTES
December 12, 2023    Hello, may I speak with Nelly Gan?    My name is YOBANY      I am  with [facility]  [facility]  [address]  [place] [address] [phone].    Before we get started may I verify your date of birth? 1975    I am calling to officially welcome you to our practice and ask about your recent visit. Is this a good time to talk? yes    Tell me about your visit with us. What things went well?  EVERYTHING WENT WELL. NELLY WAS GREAT       We're always looking for ways to make our patients' experiences even better. Do you have recommendations on ways we may improve?  no    Overall were you satisfied with your first visit to our practice? yes       I appreciate you taking the time to speak with me today. Is there anything else I can do for you? no      Thank you, and have a great day.

## 2023-12-12 NOTE — TELEPHONE ENCOUNTER
Called patient and informed them that the prior authorization for their medication had been approved by their insurance.

## 2024-01-05 ENCOUNTER — OFFICE VISIT (OUTPATIENT)
Dept: SLEEP MEDICINE | Facility: HOSPITAL | Age: 49
End: 2024-01-05
Payer: MEDICARE

## 2024-01-05 VITALS
DIASTOLIC BLOOD PRESSURE: 99 MMHG | HEART RATE: 85 BPM | WEIGHT: 195.2 LBS | SYSTOLIC BLOOD PRESSURE: 153 MMHG | HEIGHT: 67 IN | OXYGEN SATURATION: 96 % | BODY MASS INDEX: 30.64 KG/M2

## 2024-01-05 DIAGNOSIS — Z94.89 OTHER TRANSPLANTED ORGAN AND TISSUE STATUS: ICD-10-CM

## 2024-01-05 DIAGNOSIS — Z78.9 INTOLERANCE OF CONTINUOUS POSITIVE AIRWAY PRESSURE (CPAP) VENTILATION: ICD-10-CM

## 2024-01-05 DIAGNOSIS — G47.33 OSA (OBSTRUCTIVE SLEEP APNEA): Primary | ICD-10-CM

## 2024-01-05 DIAGNOSIS — I1A.0 RESISTANT HYPERTENSION: ICD-10-CM

## 2024-01-05 PROCEDURE — G0463 HOSPITAL OUTPT CLINIC VISIT: HCPCS

## 2024-01-05 RX ORDER — ATOGEPANT 60 MG/1
TABLET ORAL
COMMUNITY

## 2024-01-05 RX ORDER — LISINOPRIL 20 MG/1
TABLET ORAL
COMMUNITY

## 2024-01-05 RX ORDER — TIRZEPATIDE 2.5 MG/.5ML
INJECTION, SOLUTION SUBCUTANEOUS
COMMUNITY
Start: 2023-12-14

## 2024-01-05 NOTE — PROGRESS NOTES
Reason for Consultation: Sleep apnea        Patient Care Team:  Justin Daniels APRN as PCP - General (Family Medicine)  Andrea Han MD as Consulting Physician (Gastroenterology)  Mariela rG APRN as Nurse Practitioner (Nurse Practitioner)  Ganga Andersen MD, MPH as Consulting Physician (Sleep Medicine)      History of present illness:    Thank you for asking me to see your patient.  The patient is a 48 y.o. female who has had quite a number of health challenges including severe acute pancreatitis in 2019.  She had undergone an in lab polysomnogram on 6/27/2019 which revealed an AHI of 28.2.  Respiratory disturbance index was the same.  This was at [facility] interpreted by Dr. Calvillo..  Her titration was completed on 7/29/2019 and this looks like a CPAP pressure of 12 was appropriate with the did not describe what mask was used.  After she was subsequently admitted to the hospital and they tried CPAP she was intolerant of that.  She wonders whether that was because she was encephalopathic because she has developed hepatic encephalopathy and will likely need a liver transplant in the coming few weeks.  She is a  but her  also had sleep apnea and perhaps this contributed to his demise.  Is only 42 she tells me.  Her bedtime is usually between 9 PM and midnight she gets up between 7 and 10 AM.  She does not any longer work because she is disabled.  She complains of not feeling refreshed from sleep and takes 0-1 naps per day.  He is gained 75 pounds and lost 75 pounds in the past 5 years.  She feels sleepy during the day.  She knows she snores loudly and has morning headaches and dry mouth she gets up to go to the bathroom about 2 times per night and is a restless and fitful sleeper.  She is never used tobacco she uses no alcohol and does not use any caffeine she says.  Review of systems positive for persistent hoarseness nasal congestion fatigue dizziness anxiety depression and  fainting spells.  Frequent heartburn and abdominal discomfort.  Thirst and bleeding easily.    Garfield: 12    Data Reviewed: Reviewed the titration and diagnostic polysomnogram in addition to her medical chart and questionnaire      PMH:  Past Medical History:   Diagnosis Date    Adrenal insufficiency     Allergic Unsure    Foods, Ulram, Vancomycin    Anemia     Anxiety     Arthritis     Asthma Unsure    Cholelithiasis Unsure    Cirrhosis     Clotting disorder Unsure    Colon polyp     Crohn's disease     Depression     Diabetes mellitus     Gastroparesis     GERD (gastroesophageal reflux disease)     History of MRSA infection     Hyperlipidemia     Hypertension     Hypothyroidism     Irritable bowel syndrome Unsure    Liver disease     Migraines     Pancreatitis     Urinary tract infection Unsure    Interstitial Cystitis          Allergies:  Other, Parathyroid hormone (recomb), Romosozumab, Sulfa antibiotics, Sulfate, Teriparatide, Tramadol, Vancomycin, Nifedipine, Morphine, and Codeine     Medication Review:   Current Outpatient Medications on File Prior to Visit   Medication Sig Dispense Refill    acetone, urine, test strip As Needed.      Adalimumab (Humira Pen) 40 MG/0.4ML Pen-injector Kit Inject 40 mg under the skin into the appropriate area as directed Every 14 (Fourteen) Days. 2 each 2    Atogepant (Qulipta) 60 MG tablet       cetirizine (zyrTEC) 10 MG tablet Take 1 tablet by mouth Daily.      cholecalciferol (VITAMIN D3) 1.25 MG (52986 UT) capsule Take 1 capsule by mouth 1 (One) Time Per Week.      diphenhydrAMINE (BENADRYL) 50 MG/ML injection       docusate sodium (COLACE) 100 MG capsule TAKE ONE CAPSULE BY MOUTH TWICE DAILY      E-400 180 MG (400 UNIT) capsule capsule Take 2 capsules by mouth Daily.      Enulose 10 GM/15ML solution solution (encephalopathy) TAKE 15 ML BY MOUTH TWICE DAILY      glucagon (GLUCAGEN) 1 MG injection   mg, IntraVENous, 0 Refill(s)      glucose blood test strip Check blood  sugars four times per day when not on Dexcom. Pt needs the Easy Touch [employer] pro strips      heparin, porcine, 100-0.45 UNIT/ML-% infusion Infuse  into a venous catheter Continuous.      hydrocortisone (CORTEF) 5 MG tablet 3 tablets.      Insulin Disposable Pump (Omnipod 5 G6 Pod, Gen 5,) misc CHANGE POD EVERY 3 DAYS      Insulin Lispro (HumaLOG) 100 UNIT/ML solution cartridge       lansoprazole (PREVACID) 30 MG capsule Take 1 capsule by mouth 2 (Two) Times a Day. 180 capsule 3    levothyroxine (SYNTHROID, LEVOTHROID) 125 MCG tablet Take 1 tablet by mouth Daily.      linaclotide (LINZESS) 145 MCG capsule capsule Take 1 capsule by mouth Every Morning Before Breakfast.      lisinopril (PRINIVIL,ZESTRIL) 20 MG tablet       Mounjaro 2.5 MG/0.5ML solution pen-injector pen INJECT 2.5mg SUBCUTANEOUSLY every week      NovoLOG 100 UNIT/ML injection USE 60 UNITS PER DAY via insulin pump      ondansetron (ZOFRAN) 2 mg/mL injection       Pancrelipase, Lip-Prot-Amyl, (Creon) 29022-505568 units capsule delayed-release particles capsule Take 3 tabs with meals and 2 with snacks 540 capsule 11    phenazopyridine (PYRIDIUM) 200 MG tablet       polyethylene glycol (MIRALAX) 17 GM/SCOOP powder As Needed.      prochlorperazine (COMPAZINE) 10 MG tablet Take 1 tablet by mouth Every 8 (Eight) Hours As Needed for Nausea or Vomiting. 60 tablet 2    promethazine (PHENERGAN) 25 MG tablet       promethazine (PHENERGAN) 25 MG/ML injection       propranolol LA (INDERAL LA) 60 MG 24 hr capsule Take 1 capsule by mouth Daily. 90 capsule 1    riFAXIMin (XIFAXAN) 550 MG tablet Take 1 tablet by mouth Every 12 (Twelve) Hours. 180 tablet 3    sodium chloride 0.9 % solution       SUMAtriptan (IMITREX) 25 MG tablet Take 1 tablet by mouth 1 (One) Time As Needed.      temazepam (RESTORIL) 7.5 MG capsule Take 1 capsule by mouth At Night As Needed for Sleep for up to 30 days. 30 capsule 1    Ubrelvy 100 MG tablet Take 1 tablet by mouth 1 (One) Time As Needed  "(Migraine). 30 tablet 0    Vortioxetine HBr (Trintellix) 5 MG tablet tablet Take 1 tablet by mouth Every Night for 30 days. 30 tablet 1    benzonatate (TESSALON) 100 MG capsule Take 2 capsules by mouth 3 (Three) Times a Day As Needed for Cough. 30 capsule 0    dicyclomine (BENTYL) 10 MG capsule       insulin detemir (Levemir) 100 UNIT/ML injection Inject 36 units daily if pump fails      Insulin Disposable Pump (Omnipod 5 G6 Intro, Gen 5,) kit USE TO continuously deliver insulin      lactulose (CEPHULAC) 10 g packet Take 1 packet by mouth 2 (Two) Times a Day.      lisinopril (PRINIVIL,ZESTRIL) 10 MG tablet TAKE ONE TABLET BY MOUTH ONCE DAILY      Qulipta 30 MG tablet       Vortioxetine HBr (Trintellix) 5 MG tablet tablet Take 1 tablet by mouth Every Night for 7 days. 7 tablet 0     Current Facility-Administered Medications on File Prior to Visit   Medication Dose Route Frequency Provider Last Rate Last Admin    hydrocortisone sodium succinate (Solu-CORTEF) injection 100 mg  100 mg Intravenous Q6H Shari Parker MD   100 mg at 07/06/22 1246    hydrocortisone sodium succinate (Solu-CORTEF) injection 100 mg  100 mg Intravenous Q6H Shari Parker MD             Vital Signs:    Vitals:    01/05/24 0900   BP: 153/99   Pulse: 85   SpO2: 96%   Weight: 88.5 kg (195 lb 3.2 oz)   Height: 170.2 cm (67.01\")        Body mass index is 30.56 kg/m².  Neck Circumference: 13.5 inches      Physical Exam:    Constitutional:  Well developed 48 y.o. female that appears in no apparent distress.  Awake & oriented times 3.  Normal mood with normal recent and remote memory and normal judgement.  Eyes:  Conjunctivae normal.  Oropharynx: Moist mucous membranes without exudate and Mallampati 2 macroglossia  Neck: Trachea midline  Respiratory: Effort is not labored  Cardiovascular: Radial pulse regular  Musculoskeletal: Gait appears normal, no digital clubbing evident, no pre-tibial edema        Impression:   Encounter Diagnoses "   Name Primary?    HI (obstructive sleep apnea) Yes    Resistant hypertension     Intolerance of continuous positive airway pressure (CPAP) ventilation     Other transplanted organ and tissue status      Obesity class I patient's BMI is Body mass index is 30.56 kg/m².    Because the patient study was 3 and half years ago because she has Medicare and going to have to order another study because of her intolerance with CPAP unguinal do a split-night in lab study.    Plan:    Split-night polysomnogram.  Will try to confirm the diagnosis of sleep apnea and find therapy paradigm that she will tolerate.    The patient should practice good sleep hygiene measures.      Weight loss might be beneficial in this patient who has a Body mass index is 30.56 kg/m².      Pathophysiology of HI described to the patient.  Cardiovascular complications of untreated HI also reviewed.      The patient was cautioned about the dangers of drowsy driving.    Sujit Andersen MD  Sleep Medicine  01/05/24  10:54 EST

## 2024-02-01 DIAGNOSIS — F33.2 SEVERE EPISODE OF RECURRENT MAJOR DEPRESSIVE DISORDER, WITHOUT PSYCHOTIC FEATURES: ICD-10-CM

## 2024-02-01 DIAGNOSIS — K76.6 PORTAL HYPERTENSION: ICD-10-CM

## 2024-02-01 RX ORDER — PROPRANOLOL HCL 60 MG
60 CAPSULE, EXTENDED RELEASE 24HR ORAL DAILY
Qty: 90 CAPSULE | Refills: 1 | OUTPATIENT
Start: 2024-02-01

## 2024-02-01 NOTE — TELEPHONE ENCOUNTER
REFILL REQUEST FOR TRINTELLIX 5 MG TABLET.  Vortioxetine HBr (Trintellix) 5 MG tablet tablet (12/12/2023)     FOLLOW UP APPT-NONE IN EPIC.  PT LAST SEEN ON 12/12/2023.  PT CANCELED APPT ON 01/17/2024 AND DID NOT RESCHEDULE.    I CALLED AND SPOKE TO PT.  PT REQUESTED TO CALL OUR OFFICE BACK TO SCHEDULE FOLLOW UP APPT.

## 2024-02-05 RX ORDER — VORTIOXETINE 5 MG/1
5 TABLET, FILM COATED ORAL
Qty: 30 TABLET | Refills: 1 | OUTPATIENT
Start: 2024-02-05

## 2024-02-05 NOTE — TELEPHONE ENCOUNTER
PT(PATIENT) VERIFIED     PT(PATIENT) DECLINED TO SCHEDULE APPT WITH PROVIDER     PT(PATIENT) ENDED CALL

## 2024-02-06 ENCOUNTER — LAB (OUTPATIENT)
Dept: LAB | Facility: HOSPITAL | Age: 49
End: 2024-02-06
Payer: MEDICARE

## 2024-02-06 ENCOUNTER — TRANSCRIBE ORDERS (OUTPATIENT)
Dept: ADMINISTRATIVE | Facility: HOSPITAL | Age: 49
End: 2024-02-06
Payer: MEDICARE

## 2024-02-06 DIAGNOSIS — Z94.89 OTHER TRANSPLANTED ORGAN AND TISSUE STATUS: ICD-10-CM

## 2024-02-06 DIAGNOSIS — E10.9 TYPE 1 DIABETES MELLITUS WITHOUT COMPLICATION: ICD-10-CM

## 2024-02-06 DIAGNOSIS — K90.9 INTESTINAL MALABSORPTION, UNSPECIFIED TYPE: ICD-10-CM

## 2024-02-06 DIAGNOSIS — E10.9 TYPE 1 DIABETES MELLITUS WITHOUT COMPLICATION: Primary | ICD-10-CM

## 2024-02-06 LAB
25(OH)D3 SERPL-MCNC: 42 NG/ML (ref 30–100)
ALBUMIN SERPL-MCNC: 4.1 G/DL (ref 3.5–5.2)
ALBUMIN/GLOB SERPL: 1 G/DL
ALP SERPL-CCNC: 294 U/L (ref 39–117)
ALT SERPL W P-5'-P-CCNC: 138 U/L (ref 1–33)
ANION GAP SERPL CALCULATED.3IONS-SCNC: 12.8 MMOL/L (ref 5–15)
AST SERPL-CCNC: 129 U/L (ref 1–32)
BILIRUB SERPL-MCNC: 0.7 MG/DL (ref 0–1.2)
BUN SERPL-MCNC: 12 MG/DL (ref 6–20)
BUN/CREAT SERPL: 18.5 (ref 7–25)
CALCIUM SPEC-SCNC: 9.5 MG/DL (ref 8.6–10.5)
CHLORIDE SERPL-SCNC: 100 MMOL/L (ref 98–107)
CHOLEST SERPL-MCNC: 137 MG/DL (ref 0–200)
CO2 SERPL-SCNC: 24.2 MMOL/L (ref 22–29)
CREAT SERPL-MCNC: 0.65 MG/DL (ref 0.57–1)
EGFRCR SERPLBLD CKD-EPI 2021: 108.8 ML/MIN/1.73
GLOBULIN UR ELPH-MCNC: 4 GM/DL
GLUCOSE SERPL-MCNC: 118 MG/DL (ref 65–99)
HBA1C MFR BLD: 7.6 % (ref 4.8–5.6)
HDLC SERPL-MCNC: 18 MG/DL (ref 40–60)
LDLC SERPL CALC-MCNC: 94 MG/DL (ref 0–100)
LDLC/HDLC SERPL: 5.1 {RATIO}
POTASSIUM SERPL-SCNC: 3.8 MMOL/L (ref 3.5–5.2)
PROT SERPL-MCNC: 8.1 G/DL (ref 6–8.5)
SODIUM SERPL-SCNC: 137 MMOL/L (ref 136–145)
TRIGL SERPL-MCNC: 136 MG/DL (ref 0–150)
VLDLC SERPL-MCNC: 25 MG/DL (ref 5–40)

## 2024-02-06 PROCEDURE — 80061 LIPID PANEL: CPT

## 2024-02-06 PROCEDURE — 84590 ASSAY OF VITAMIN A: CPT

## 2024-02-06 PROCEDURE — 84681 ASSAY OF C-PEPTIDE: CPT

## 2024-02-06 PROCEDURE — 84446 ASSAY OF VITAMIN E: CPT

## 2024-02-06 PROCEDURE — 36415 COLL VENOUS BLD VENIPUNCTURE: CPT

## 2024-02-06 PROCEDURE — 84425 ASSAY OF VITAMIN B-1: CPT

## 2024-02-06 PROCEDURE — 82306 VITAMIN D 25 HYDROXY: CPT

## 2024-02-06 PROCEDURE — 83036 HEMOGLOBIN GLYCOSYLATED A1C: CPT

## 2024-02-06 PROCEDURE — 84630 ASSAY OF ZINC: CPT

## 2024-02-06 PROCEDURE — 80053 COMPREHEN METABOLIC PANEL: CPT

## 2024-02-07 LAB — C PEPTIDE SERPL-MCNC: 3.4 NG/ML (ref 1.1–4.4)

## 2024-02-12 LAB — VIT B1 BLD-SCNC: 82.6 NMOL/L (ref 66.5–200)

## 2024-02-13 LAB
A-TOCOPHEROL VIT E SERPL-MCNC: 7.7 MG/L (ref 7–25.1)
GAMMA TOCOPHEROL SERPL-MCNC: 0.7 MG/L (ref 0.5–5.5)

## 2024-02-14 LAB
VIT A SERPL-MCNC: 14.3 UG/DL (ref 20.1–62)
ZINC SERPL-MCNC: 80 UG/DL (ref 44–115)

## 2024-02-26 ENCOUNTER — APPOINTMENT (OUTPATIENT)
Dept: CT IMAGING | Facility: HOSPITAL | Age: 49
End: 2024-02-26
Payer: MEDICARE

## 2024-02-26 ENCOUNTER — HOSPITAL ENCOUNTER (OUTPATIENT)
Facility: HOSPITAL | Age: 49
Setting detail: OBSERVATION
Discharge: ANOTHER HOSPITAL | End: 2024-02-27
Attending: EMERGENCY MEDICINE | Admitting: FAMILY MEDICINE
Payer: MEDICARE

## 2024-02-26 ENCOUNTER — APPOINTMENT (OUTPATIENT)
Dept: GENERAL RADIOLOGY | Facility: HOSPITAL | Age: 49
End: 2024-02-26
Payer: MEDICARE

## 2024-02-26 DIAGNOSIS — K31.84 GASTROPARESIS: Primary | ICD-10-CM

## 2024-02-26 DIAGNOSIS — B95.2 BACTEREMIA DUE TO ENTEROCOCCUS: ICD-10-CM

## 2024-02-26 DIAGNOSIS — R78.81 BACTEREMIA DUE TO ENTEROCOCCUS: ICD-10-CM

## 2024-02-26 DIAGNOSIS — R07.9 CHEST PAIN, UNSPECIFIED TYPE: ICD-10-CM

## 2024-02-26 DIAGNOSIS — J10.1 INFLUENZA B: ICD-10-CM

## 2024-02-26 LAB
ALBUMIN SERPL-MCNC: 3.9 G/DL (ref 3.5–5.2)
ALBUMIN/GLOB SERPL: 0.9 G/DL
ALP SERPL-CCNC: 214 U/L (ref 39–117)
ALT SERPL W P-5'-P-CCNC: 66 U/L (ref 1–33)
ANION GAP SERPL CALCULATED.3IONS-SCNC: 16.3 MMOL/L (ref 5–15)
AST SERPL-CCNC: 75 U/L (ref 1–32)
BASOPHILS # BLD AUTO: 0.05 10*3/MM3 (ref 0–0.2)
BASOPHILS NFR BLD AUTO: 0.3 % (ref 0–1.5)
BILIRUB SERPL-MCNC: 0.9 MG/DL (ref 0–1.2)
BUN SERPL-MCNC: 8 MG/DL (ref 6–20)
BUN/CREAT SERPL: 11.9 (ref 7–25)
CALCIUM SPEC-SCNC: 9.5 MG/DL (ref 8.6–10.5)
CHLORIDE SERPL-SCNC: 98 MMOL/L (ref 98–107)
CO2 SERPL-SCNC: 23.7 MMOL/L (ref 22–29)
CREAT SERPL-MCNC: 0.67 MG/DL (ref 0.57–1)
D-LACTATE SERPL-SCNC: 1.5 MMOL/L (ref 0.5–2)
D-LACTATE SERPL-SCNC: 2.6 MMOL/L (ref 0.5–2)
DEPRECATED RDW RBC AUTO: 49.1 FL (ref 37–54)
EGFRCR SERPLBLD CKD-EPI 2021: 108 ML/MIN/1.73
EOSINOPHIL # BLD AUTO: 0.3 10*3/MM3 (ref 0–0.4)
EOSINOPHIL NFR BLD AUTO: 1.5 % (ref 0.3–6.2)
ERYTHROCYTE [DISTWIDTH] IN BLOOD BY AUTOMATED COUNT: 14.2 % (ref 12.3–15.4)
FLUAV SUBTYP SPEC NAA+PROBE: NOT DETECTED
FLUBV RNA ISLT QL NAA+PROBE: DETECTED
GLOBULIN UR ELPH-MCNC: 4.5 GM/DL
GLUCOSE SERPL-MCNC: 114 MG/DL (ref 65–99)
HCT VFR BLD AUTO: 40.7 % (ref 34–46.6)
HGB BLD-MCNC: 13.5 G/DL (ref 12–15.9)
HOLD SPECIMEN: NORMAL
IMM GRANULOCYTES # BLD AUTO: 0.08 10*3/MM3 (ref 0–0.05)
IMM GRANULOCYTES NFR BLD AUTO: 0.4 % (ref 0–0.5)
LIPASE SERPL-CCNC: 5 U/L (ref 13–60)
LYMPHOCYTES # BLD AUTO: 5.98 10*3/MM3 (ref 0.7–3.1)
LYMPHOCYTES NFR BLD AUTO: 30.2 % (ref 19.6–45.3)
MAGNESIUM SERPL-MCNC: 1.7 MG/DL (ref 1.6–2.6)
MCH RBC QN AUTO: 31.3 PG (ref 26.6–33)
MCHC RBC AUTO-ENTMCNC: 33.2 G/DL (ref 31.5–35.7)
MCV RBC AUTO: 94.2 FL (ref 79–97)
MONOCYTES # BLD AUTO: 1.99 10*3/MM3 (ref 0.1–0.9)
MONOCYTES NFR BLD AUTO: 10.1 % (ref 5–12)
NEUTROPHILS NFR BLD AUTO: 11.38 10*3/MM3 (ref 1.7–7)
NEUTROPHILS NFR BLD AUTO: 57.5 % (ref 42.7–76)
NRBC BLD AUTO-RTO: 0 /100 WBC (ref 0–0.2)
NT-PROBNP SERPL-MCNC: 290.7 PG/ML (ref 0–450)
PLAT MORPH BLD: NORMAL
PLATELET # BLD AUTO: 583 10*3/MM3 (ref 140–450)
PMV BLD AUTO: 11.2 FL (ref 6–12)
POTASSIUM SERPL-SCNC: 3.4 MMOL/L (ref 3.5–5.2)
PROT SERPL-MCNC: 8.4 G/DL (ref 6–8.5)
QT INTERVAL: 375 MS
QT INTERVAL: 414 MS
QTC INTERVAL: 449 MS
QTC INTERVAL: 474 MS
RBC # BLD AUTO: 4.32 10*6/MM3 (ref 3.77–5.28)
RBC MORPH BLD: NORMAL
RSV RNA NPH QL NAA+NON-PROBE: NOT DETECTED
SARS-COV-2 RNA RESP QL NAA+PROBE: NOT DETECTED
SODIUM SERPL-SCNC: 138 MMOL/L (ref 136–145)
TROPONIN T SERPL HS-MCNC: <6 NG/L
WBC MORPH BLD: NORMAL
WBC NRBC COR # BLD AUTO: 19.78 10*3/MM3 (ref 3.4–10.8)
WHOLE BLOOD HOLD COAG: NORMAL
WHOLE BLOOD HOLD COAG: NORMAL
WHOLE BLOOD HOLD SPECIMEN: NORMAL
WHOLE BLOOD HOLD SPECIMEN: NORMAL

## 2024-02-26 PROCEDURE — 25010000002 HYDROMORPHONE 1 MG/ML SOLUTION: Performed by: EMERGENCY MEDICINE

## 2024-02-26 PROCEDURE — 25010000002 METOCLOPRAMIDE PER 10 MG: Performed by: EMERGENCY MEDICINE

## 2024-02-26 PROCEDURE — 25010000002 DIPHENHYDRAMINE PER 50 MG: Performed by: EMERGENCY MEDICINE

## 2024-02-26 PROCEDURE — 96375 TX/PRO/DX INJ NEW DRUG ADDON: CPT

## 2024-02-26 PROCEDURE — 83690 ASSAY OF LIPASE: CPT | Performed by: EMERGENCY MEDICINE

## 2024-02-26 PROCEDURE — 96365 THER/PROPH/DIAG IV INF INIT: CPT

## 2024-02-26 PROCEDURE — 36415 COLL VENOUS BLD VENIPUNCTURE: CPT | Performed by: EMERGENCY MEDICINE

## 2024-02-26 PROCEDURE — 80053 COMPREHEN METABOLIC PANEL: CPT | Performed by: EMERGENCY MEDICINE

## 2024-02-26 PROCEDURE — 83880 ASSAY OF NATRIURETIC PEPTIDE: CPT | Performed by: EMERGENCY MEDICINE

## 2024-02-26 PROCEDURE — 83735 ASSAY OF MAGNESIUM: CPT | Performed by: EMERGENCY MEDICINE

## 2024-02-26 PROCEDURE — 25010000002 ONDANSETRON PER 1 MG: Performed by: EMERGENCY MEDICINE

## 2024-02-26 PROCEDURE — 25810000003 SODIUM CHLORIDE 0.9 % SOLUTION: Performed by: EMERGENCY MEDICINE

## 2024-02-26 PROCEDURE — 83605 ASSAY OF LACTIC ACID: CPT | Performed by: EMERGENCY MEDICINE

## 2024-02-26 PROCEDURE — G0378 HOSPITAL OBSERVATION PER HR: HCPCS

## 2024-02-26 PROCEDURE — 25010000002 MORPHINE PER 10 MG: Performed by: EMERGENCY MEDICINE

## 2024-02-26 PROCEDURE — 96361 HYDRATE IV INFUSION ADD-ON: CPT

## 2024-02-26 PROCEDURE — 99285 EMERGENCY DEPT VISIT HI MDM: CPT

## 2024-02-26 PROCEDURE — 87637 SARSCOV2&INF A&B&RSV AMP PRB: CPT | Performed by: EMERGENCY MEDICINE

## 2024-02-26 PROCEDURE — 84484 ASSAY OF TROPONIN QUANT: CPT | Performed by: EMERGENCY MEDICINE

## 2024-02-26 PROCEDURE — 85025 COMPLETE CBC W/AUTO DIFF WBC: CPT | Performed by: EMERGENCY MEDICINE

## 2024-02-26 PROCEDURE — 96376 TX/PRO/DX INJ SAME DRUG ADON: CPT

## 2024-02-26 PROCEDURE — 84145 PROCALCITONIN (PCT): CPT | Performed by: STUDENT IN AN ORGANIZED HEALTH CARE EDUCATION/TRAINING PROGRAM

## 2024-02-26 PROCEDURE — 71045 X-RAY EXAM CHEST 1 VIEW: CPT

## 2024-02-26 PROCEDURE — 71260 CT THORAX DX C+: CPT

## 2024-02-26 PROCEDURE — 85007 BL SMEAR W/DIFF WBC COUNT: CPT | Performed by: EMERGENCY MEDICINE

## 2024-02-26 PROCEDURE — 25010000002 DAPTOMYCIN PER 1 MG: Performed by: EMERGENCY MEDICINE

## 2024-02-26 PROCEDURE — 25510000001 IOPAMIDOL PER 1 ML: Performed by: EMERGENCY MEDICINE

## 2024-02-26 PROCEDURE — 93005 ELECTROCARDIOGRAM TRACING: CPT | Performed by: EMERGENCY MEDICINE

## 2024-02-26 PROCEDURE — 87040 BLOOD CULTURE FOR BACTERIA: CPT | Performed by: EMERGENCY MEDICINE

## 2024-02-26 RX ORDER — SODIUM CHLORIDE 9 MG/ML
100 INJECTION, SOLUTION INTRAVENOUS CONTINUOUS
Status: DISCONTINUED | OUTPATIENT
Start: 2024-02-26 | End: 2024-02-27

## 2024-02-26 RX ORDER — DIPHENHYDRAMINE HYDROCHLORIDE 50 MG/ML
25 INJECTION INTRAMUSCULAR; INTRAVENOUS ONCE
Status: COMPLETED | OUTPATIENT
Start: 2024-02-26 | End: 2024-02-26

## 2024-02-26 RX ORDER — MORPHINE SULFATE 2 MG/ML
2 INJECTION, SOLUTION INTRAMUSCULAR; INTRAVENOUS ONCE
Status: COMPLETED | OUTPATIENT
Start: 2024-02-26 | End: 2024-02-26

## 2024-02-26 RX ORDER — SODIUM CHLORIDE 0.9 % (FLUSH) 0.9 %
10 SYRINGE (ML) INJECTION AS NEEDED
Status: DISCONTINUED | OUTPATIENT
Start: 2024-02-26 | End: 2024-02-27 | Stop reason: HOSPADM

## 2024-02-26 RX ORDER — DIPHENHYDRAMINE HYDROCHLORIDE 50 MG/ML
12.5 INJECTION INTRAMUSCULAR; INTRAVENOUS ONCE
Status: COMPLETED | OUTPATIENT
Start: 2024-02-26 | End: 2024-02-26

## 2024-02-26 RX ORDER — METOCLOPRAMIDE HYDROCHLORIDE 5 MG/ML
10 INJECTION INTRAMUSCULAR; INTRAVENOUS ONCE
Status: COMPLETED | OUTPATIENT
Start: 2024-02-26 | End: 2024-02-26

## 2024-02-26 RX ORDER — TRAZODONE HYDROCHLORIDE 100 MG/1
100 TABLET ORAL NIGHTLY
COMMUNITY
Start: 2024-02-02

## 2024-02-26 RX ORDER — DAPTOMYCIN 50 MG/ML
500 INJECTION, POWDER, LYOPHILIZED, FOR SOLUTION INTRAVENOUS DAILY
COMMUNITY
Start: 2024-02-21

## 2024-02-26 RX ORDER — MORPHINE SULFATE 2 MG/ML
2 INJECTION, SOLUTION INTRAMUSCULAR; INTRAVENOUS ONCE
Status: DISCONTINUED | OUTPATIENT
Start: 2024-02-26 | End: 2024-02-27

## 2024-02-26 RX ORDER — HYDROXYZINE HYDROCHLORIDE 25 MG/1
25 TABLET, FILM COATED ORAL EVERY 6 HOURS PRN
COMMUNITY

## 2024-02-26 RX ORDER — ONDANSETRON 2 MG/ML
4 INJECTION INTRAMUSCULAR; INTRAVENOUS ONCE
Status: COMPLETED | OUTPATIENT
Start: 2024-02-26 | End: 2024-02-26

## 2024-02-26 RX ORDER — CHOLECALCIFEROL (VITAMIN D3) 125 MCG
5 CAPSULE ORAL NIGHTLY
COMMUNITY

## 2024-02-26 RX ORDER — ASPIRIN 81 MG/1
324 TABLET, CHEWABLE ORAL ONCE
Status: DISCONTINUED | OUTPATIENT
Start: 2024-02-26 | End: 2024-02-26

## 2024-02-26 RX ADMIN — IOPAMIDOL 45 ML: 755 INJECTION, SOLUTION INTRAVENOUS at 14:16

## 2024-02-26 RX ADMIN — DIPHENHYDRAMINE HYDROCHLORIDE 25 MG: 50 INJECTION, SOLUTION INTRAMUSCULAR; INTRAVENOUS at 13:58

## 2024-02-26 RX ADMIN — Medication 10 ML: at 23:30

## 2024-02-26 RX ADMIN — HYDROMORPHONE HYDROCHLORIDE 0.5 MG: 1 INJECTION, SOLUTION INTRAMUSCULAR; INTRAVENOUS; SUBCUTANEOUS at 23:30

## 2024-02-26 RX ADMIN — DIPHENHYDRAMINE HYDROCHLORIDE 12.5 MG: 50 INJECTION, SOLUTION INTRAMUSCULAR; INTRAVENOUS at 23:36

## 2024-02-26 RX ADMIN — HYDROMORPHONE HYDROCHLORIDE 0.5 MG: 1 INJECTION, SOLUTION INTRAMUSCULAR; INTRAVENOUS; SUBCUTANEOUS at 20:37

## 2024-02-26 RX ADMIN — SODIUM CHLORIDE 100 ML/HR: 9 INJECTION, SOLUTION INTRAVENOUS at 23:34

## 2024-02-26 RX ADMIN — SODIUM CHLORIDE 1000 ML: 9 INJECTION, SOLUTION INTRAVENOUS at 13:55

## 2024-02-26 RX ADMIN — HYDROMORPHONE HYDROCHLORIDE 1 MG: 1 INJECTION, SOLUTION INTRAMUSCULAR; INTRAVENOUS; SUBCUTANEOUS at 18:05

## 2024-02-26 RX ADMIN — MORPHINE SULFATE 2 MG: 2 INJECTION, SOLUTION INTRAMUSCULAR; INTRAVENOUS at 11:47

## 2024-02-26 RX ADMIN — DAPTOMYCIN 500 MG: 500 INJECTION, POWDER, LYOPHILIZED, FOR SOLUTION INTRAVENOUS at 19:06

## 2024-02-26 RX ADMIN — MORPHINE SULFATE 2 MG: 2 INJECTION, SOLUTION INTRAMUSCULAR; INTRAVENOUS at 13:56

## 2024-02-26 RX ADMIN — SODIUM CHLORIDE 1000 ML: 9 INJECTION, SOLUTION INTRAVENOUS at 20:36

## 2024-02-26 RX ADMIN — METOCLOPRAMIDE HYDROCHLORIDE 10 MG: 5 INJECTION INTRAMUSCULAR; INTRAVENOUS at 22:26

## 2024-02-26 RX ADMIN — METOCLOPRAMIDE HYDROCHLORIDE 10 MG: 5 INJECTION INTRAMUSCULAR; INTRAVENOUS at 13:58

## 2024-02-26 RX ADMIN — ONDANSETRON 4 MG: 2 INJECTION INTRAMUSCULAR; INTRAVENOUS at 11:47

## 2024-02-26 NOTE — ED PROVIDER NOTES
Time: 12:06 PM EST  Date of encounter:  2/26/2024  Independent Historian/Clinical History and Information was obtained by:   Patient    History is limited by: N/A    Chief Complaint: Chest pain, difficulty breathing      History of Present Illness:  Patient is a 48 y.o. year old female who presents to the emergency department for evaluation of chest pain and difficulty breathing today.  Patient was just discharged from [facility] 4 days ago and is currently on outpatient daptomycin for what she describes as a port infection.  The port was removed.  She describes subjective fevers and chills this morning.  She is not vomiting but is nauseated.  She states she had a normal bowel movement today.    HPI    Patient Care Team  Primary Care Provider: Justin Daniels APRN    Past Medical History:     Allergies   Allergen Reactions    Other Other (See Comments)     Sent patient into kidney failure, heart stopped, in ICU for two days.    Parathyroid Hormone (Recomb) Other (See Comments)     Sent patient into kidney failure, heart stopped, in ICU for two days.      Romosozumab Hives     Other reaction(s): Hives    Other reaction(s): Hives   Other reaction(s): Hives    Sulfa Antibiotics Hives     PT NO LONGER ALLERGIC TO THIS PER PATIENT.    Sulfate Hives    Teriparatide Anaphylaxis and Other (See Comments)     Other reaction(s): Unknown    Tramadol Anaphylaxis and Other (See Comments)     Other reaction(s): Unknown, Unknown    Vancomycin Hives and Itching     Other reaction(s): Unknown, Unknown    Nifedipine Provider Review Needed and Other (See Comments)     Rapid heart beat    Rapid heart beat    Other reaction(s): Provider Review Needed   Other reaction(s): Provider Review Needed   Rapid heart beat    Morphine GI Intolerance     Other reaction(s): Chest Pain  increases billiary pressure and causes chest pain REPORTS SHE IS NO LONGER ALLERGIC TO THIS.L    Codeine Nausea And Vomiting and GI Intolerance     Pt reports she is no  longer allergic to this     Past Medical History:   Diagnosis Date    Adrenal insufficiency     Allergic Unsure    Foods, [place], Vancomycin    Anemia     Anxiety     Arthritis     Asthma Unsure    Cholelithiasis Unsure    Cirrhosis     Clotting disorder Unsure    Colon polyp     Crohn's disease     Depression     Diabetes mellitus     Gastroparesis     GERD (gastroesophageal reflux disease)     History of MRSA infection     Hyperlipidemia     Hypertension     Hypothyroidism     Irritable bowel syndrome Unsure    Liver disease     Migraines     Pancreatitis     Urinary tract infection Unsure    Interstitial Cystitis     Past Surgical History:   Procedure Laterality Date    ABSCESS DRAINAGE  12/24/2019    Fluoroscopically guided abscess drainage, [facility]    ADENOIDECTOMY      BACK SURGERY      L4 L5    CHOLECYSTECTOMY N/A     COLONOSCOPY  04/24/2019    NBIH, 3 mm polyp    DILATATION AND CURETTAGE      ENDOMETRIAL ABLATION      ENDOSCOPY N/A 04/24/2019    Normal    ENTEROSCOPY SMALL BOWEL      ERCP  2019    GASTRIC STIMULATOR IMPLANT SURGERY      GASTROJEJUNOSTOMY W/ JEJUNOSTOMY TUBE      removed    HYSTERECTOMY      LYMPH NODE BIOPSY      PANCREATECTOMY  12/2019    TPIAT    PELVIC FLOOR REPAIR      SINUS SURGERY      SPLENECTOMY      TONSILLECTOMY      VENOUS ACCESS DEVICE (PORT) INSERTION       Family History   Problem Relation Age of Onset    Heart disease Father     Hypothyroidism Father     Anxiety disorder Father     Depression Father     Anxiety disorder Mother     Hypothyroidism Mother     Breast cancer Mother     Colon polyps Mother     Alcohol abuse Paternal Grandfather     Heart disease Paternal Grandfather     Depression Brother     Hypertension Brother        Home Medications:  Prior to Admission medications    Medication Sig Start Date End Date Taking? Authorizing Provider   acetone, urine, test strip As Needed. 10/24/23   Provider, MD Pollo   Adalimumab (Humira Pen) 40 MG/0.4ML Pen-injector  Kit Inject 40 mg under the skin into the appropriate area as directed Every 14 (Fourteen) Days. 10/20/23   Mariela Gr APRN   Atogepant (Qulipta) 60 MG tablet     Pollo Ray MD   benzonatate (TESSALON) 100 MG capsule Take 2 capsules by mouth 3 (Three) Times a Day As Needed for Cough. 11/26/23   Masood Parks PA-C   cetirizine (zyrTEC) 10 MG tablet Take 1 tablet by mouth Daily. 7/12/22   Pollo Ray MD   cholecalciferol (VITAMIN D3) 1.25 MG (75655 UT) capsule Take 1 capsule by mouth 1 (One) Time Per Week. 1/4/22   Pollo Ray MD   dicyclomine (BENTYL) 10 MG capsule     Pollo Ray MD   diphenhydrAMINE (BENADRYL) 50 MG/ML injection  9/21/22   Pollo Ray MD   docusate sodium (COLACE) 100 MG capsule TAKE ONE CAPSULE BY MOUTH TWICE DAILY 4/16/19   Pollo Ray MD   E-400 180 MG (400 UNIT) capsule capsule Take 2 capsules by mouth Daily. 9/7/23   Pollo Ray MD   Enulose 10 GM/15ML solution solution (encephalopathy) TAKE 15 ML BY MOUTH TWICE DAILY    Pollo Ray MD   glucagon (GLUCAGEN) 1 MG injection   mg, IntraVENous, 0 Refill(s) 8/10/22   Pollo Ray MD   glucose blood test strip Check blood sugars four times per day when not on Dexcom. Pt needs the Easy Touch [employer] pro strips 10/24/23 10/23/24  Pollo Ray MD   heparin, porcine, 100-0.45 UNIT/ML-% infusion Infuse  into a venous catheter Continuous.    Pollo Ray MD   hydrocortisone (CORTEF) 5 MG tablet 3 tablets. 8/3/22   Pollo Ray MD   insulin detemir (Levemir) 100 UNIT/ML injection Inject 36 units daily if pump fails 10/24/23   Pollo Ray MD   Insulin Disposable Pump (Omnipod 5 G6 Intro, Gen 5,) kit USE TO continuously deliver insulin 10/2/23   Pollo Ray MD   Insulin Disposable Pump (Omnipod 5 G6 Pod, Gen 5,) misc CHANGE POD EVERY 3 DAYS 12/5/23   Pollo Ray MD   Insulin Lispro (HumaLOG) 100  UNIT/ML solution cartridge     Pollo Ray MD   lactulose (CEPHULAC) 10 g packet Take 1 packet by mouth 2 (Two) Times a Day. 9/21/23 3/19/24  Pollo Ray MD   lansoprazole (PREVACID) 30 MG capsule Take 1 capsule by mouth 2 (Two) Times a Day. 9/12/23 9/11/24  Mariela Gr APRN   levothyroxine (SYNTHROID, LEVOTHROID) 125 MCG tablet Take 1 tablet by mouth Daily. 9/1/20   Pollo Ray MD   linaclotide (LINZESS) 145 MCG capsule capsule Take 1 capsule by mouth Every Morning Before Breakfast.    Pollo Ray MD   lisinopril (PRINIVIL,ZESTRIL) 10 MG tablet TAKE ONE TABLET BY MOUTH ONCE DAILY 4/18/19   Pollo Ray MD   lisinopril (PRINIVIL,ZESTRIL) 20 MG tablet     Pollo Ray MD   Mounjaro 2.5 MG/0.5ML solution pen-injector pen INJECT 2.5mg SUBCUTANEOUSLY every week 12/14/23   Pollo Ray MD   NovoLOG 100 UNIT/ML injection USE 60 UNITS PER DAY via insulin pump 5/12/22   Pollo Ray MD   ondansetron (ZOFRAN) 2 mg/mL injection  10/4/22   Pollo Ray MD   Pancrelipase, Lip-Prot-Amyl, (Creon) 65278-356591 units capsule delayed-release particles capsule Take 3 tabs with meals and 2 with snacks 11/14/23   Mariela Gr APRN   phenazopyridine (PYRIDIUM) 200 MG tablet     Pollo Ray MD   polyethylene glycol (MIRALAX) 17 GM/SCOOP powder As Needed.    Pollo Ray MD   prochlorperazine (COMPAZINE) 10 MG tablet Take 1 tablet by mouth Every 8 (Eight) Hours As Needed for Nausea or Vomiting. 6/6/23   Patrizia Sena APRN   promethazine (PHENERGAN) 25 MG tablet     Pollo Ray MD   promethazine (PHENERGAN) 25 MG/ML injection  10/4/22   Pollo Ray MD   propranolol LA (INDERAL LA) 60 MG 24 hr capsule Take 1 capsule by mouth Daily. 8/31/23   Dulce Camejo APRN   Qulipta 30 MG tablet  10/6/23   Provider, MD Pollo   riFAXIMin (XIFAXAN) 550 MG tablet Take 1 tablet by mouth Every 12 (Twelve)  "Hours. 10/20/23 10/19/24  Mariela Gr APRN   sodium chloride 0.9 % solution  5/23/22   Emergency, Nurse Elder, RN   SUMAtriptan (IMITREX) 25 MG tablet Take 1 tablet by mouth 1 (One) Time As Needed. 8/8/23   Provider, MD Isaias Abady 100 MG tablet Take 1 tablet by mouth 1 (One) Time As Needed (Migraine). 12/7/23   Dulce Camejo APRN   Vortioxetine HBr (Trintellix) 5 MG tablet tablet Take 1 tablet by mouth Every Night for 30 days. 12/12/23 1/11/24  Kayla Graham PA-C   Vortioxetine HBr (Trintellix) 5 MG tablet tablet Take 1 tablet by mouth Every Night for 7 days. 12/12/23 12/19/23  Kayla Graham PA-C        Social History:   Social History     Tobacco Use    Smoking status: Never    Smokeless tobacco: Never   Vaping Use    Vaping Use: Never used   Substance Use Topics    Alcohol use: Never    Drug use: Never         Review of Systems:  Review of Systems   Constitutional:  Positive for chills and fever.   HENT:  Negative for congestion, rhinorrhea and sore throat.    Eyes:  Negative for photophobia.   Respiratory:  Positive for shortness of breath. Negative for apnea, cough and chest tightness.    Cardiovascular:  Positive for chest pain. Negative for palpitations.   Gastrointestinal:  Negative for abdominal pain, diarrhea, nausea and vomiting.   Endocrine: Negative.    Genitourinary:  Negative for difficulty urinating and dysuria.   Musculoskeletal:  Negative for back pain, joint swelling and myalgias.   Skin:  Negative for color change and wound.   Allergic/Immunologic: Negative.    Neurological:  Negative for seizures and headaches.   Psychiatric/Behavioral: Negative.     All other systems reviewed and are negative.       Physical Exam:  /67   Pulse 96   Temp 99.3 °F (37.4 °C) (Oral)   Resp 28   Ht 170.2 cm (67.01\")   Wt 88.5 kg (195 lb 1.7 oz)   SpO2 95%   BMI 30.55 kg/m²     Physical Exam  Vitals and nursing note reviewed.   Constitutional:       General: She is awake.      " Appearance: Normal appearance. She is well-developed.   HENT:      Head: Normocephalic and atraumatic.      Nose: Nose normal.      Mouth/Throat:      Mouth: Mucous membranes are moist.   Eyes:      Extraocular Movements: Extraocular movements intact.      Pupils: Pupils are equal, round, and reactive to light.   Cardiovascular:      Rate and Rhythm: Normal rate and regular rhythm.      Heart sounds: Normal heart sounds.   Pulmonary:      Effort: Pulmonary effort is normal. No respiratory distress.      Breath sounds: Normal breath sounds. No wheezing, rhonchi or rales.   Abdominal:      General: Bowel sounds are normal.      Palpations: Abdomen is soft.      Tenderness: There is no abdominal tenderness. There is no guarding or rebound.      Comments: No rigidity   Musculoskeletal:         General: No tenderness. Normal range of motion.      Cervical back: Normal range of motion and neck supple.   Skin:     General: Skin is warm and dry.      Coloration: Skin is not jaundiced.   Neurological:      General: No focal deficit present.      Mental Status: She is alert. Mental status is at baseline.   Psychiatric:         Mood and Affect: Mood normal.                  Procedures:  Procedures      Medical Decision Making:      Comorbidities that affect care:    Crohn's disease, GERD, diabetes, gastric stimulator for severe gastroparesis, migraines, anxiety, pancreatitis, Mart cirrhosis    External Notes reviewed:    Previous Clinic Note: Office visit 2/8/2024 with [facility] physicians endocrinology.  Description: Diabetes associated with pancreatic disease      The following orders were placed and all results were independently analyzed by me:  Orders Placed This Encounter   Procedures    Blood Culture - Blood,    Blood Culture - Blood,    COVID-19, FLU A/B, RSV PCR 1 HR TAT - Swab, Nasopharynx    XR Chest 1 View    CT Chest With Contrast Diagnostic    [place] Draw    High Sensitivity Troponin T    Comprehensive Metabolic  Panel    Lipase    BNP    Magnesium    CBC Auto Differential    Lactic Acid, Plasma    [place] Draw    Scan Slide    STAT Lactic Acid, Reflex    NPO Diet NPO Type: Strict NPO    Undress & Gown    Undress & Gown    Continuous Pulse Oximetry    Vital Signs    Obtain Medical Records (Specify)    IP General Consult (Use specialty-specific consult if known)    Hospitalist (on-call MD unless specified)    Oxygen Therapy- Nasal Cannula; Titrate 1-6 LPM Per SpO2; 90 - 95%    Oxygen Therapy- Nasal Cannula; Titrate 1-6 LPM Per SpO2; 90 - 95%    ECG 12 Lead ED Triage Standing Order; Chest Pain    ECG 12 Lead ED Triage Standing Order; Chest Pain    Insert Peripheral IV    Insert Peripheral IV    CBC & Differential    Green Top (Gel)    Lavender Top    Gold Top - SST    Light Blue Top    Green Top (Gel)    Lavender Top    Gold Top - SST    Light Blue Top       Medications Given in the Emergency Department:  Medications   sodium chloride 0.9 % flush 10 mL (has no administration in time range)   sodium chloride 0.9 % flush 10 mL (has no administration in time range)   morphine injection 2 mg (2 mg Intravenous Not Given 2/26/24 1819)   sodium chloride 0.9 % bolus 1,000 mL (1,000 mL Intravenous New Bag 2/26/24 2036)   sodium chloride 0.9 % infusion (has no administration in time range)   morphine injection 2 mg (2 mg Intravenous Given 2/26/24 1147)   ondansetron (ZOFRAN) injection 4 mg (4 mg Intravenous Given 2/26/24 1147)   sodium chloride 0.9 % bolus 1,000 mL (1,000 mL Intravenous New Bag 2/26/24 1355)   morphine injection 2 mg (2 mg Intravenous Given 2/26/24 1356)   metoclopramide (REGLAN) injection 10 mg (10 mg Intravenous Given 2/26/24 1358)   diphenhydrAMINE (BENADRYL) injection 25 mg (25 mg Intravenous Given 2/26/24 1358)   iopamidol (ISOVUE-370) 76 % injection 100 mL (45 mL Intravenous Given 2/26/24 1416)   HYDROmorphone (DILAUDID) injection 1 mg (1 mg Intravenous Given 2/26/24 1805)   DAPTOmycin (CUBICIN) 500 mg in sodium  chloride 0.9 % 50 mL IVPB (500 mg Intravenous New Bag 2/26/24 1906)   HYDROmorphone (DILAUDID) injection 0.5 mg (0.5 mg Intravenous Given 2/26/24 2037)        ED Course:    ED Course as of 02/26/24 2050 Mon Feb 26, 2024   1210 I have personally interpreted the EKG today and it shows no evidence of any acute ischemia or heart arrhythmia. [RP]      ED Course User Index  [RP] Devin Dalton MD       Labs:    Lab Results (last 24 hours)       Procedure Component Value Units Date/Time    CBC & Differential [[id number]]  (Abnormal) Collected: 02/26/24 1131    Specimen: Blood Updated: 02/26/24 1157    Narrative:      The following orders were created for panel order CBC & Differential.  Procedure                               Abnormality         Status                     ---------                               -----------         ------                     CBC Auto Differential[[id number]]        Abnormal            Final result               Scan Slide[[id number]]                   Normal              Final result                 Please view results for these tests on the individual orders.    Lipase [[id number]]  (Abnormal) Collected: 02/26/24 1131    Specimen: Blood Updated: 02/26/24 1207     Lipase 5 U/L     BNP [[id number]]  (Normal) Collected: 02/26/24 1131    Specimen: Blood Updated: 02/26/24 1200     proBNP 290.7 pg/mL     Narrative:      This assay is used as an aid in the diagnosis of individuals suspected of having heart failure. It can be used as an aid in the diagnosis of acute decompensated heart failure (ADHF) in patients presenting with signs and symptoms of ADHF to the emergency department (ED). In addition, NT-proBNP of <300 pg/mL indicates ADHF is not likely.    Age Range Result Interpretation  NT-proBNP Concentration (pg/mL:      <50             Positive            >450                   Gray                 300-450                    Negative             <300    50-75           Positive             >900                  [place]                300-900                  Negative            <300      >75             Positive            >1800                  Gray                300-1800                  Negative            <300    CBC Auto Differential [[id number]]  (Abnormal) Collected: 02/26/24 1131    Specimen: Blood Updated: 02/26/24 1140     WBC 19.78 10*3/mm3      RBC 4.32 10*6/mm3      Hemoglobin 13.5 g/dL      Hematocrit 40.7 %      MCV 94.2 fL      MCH 31.3 pg      MCHC 33.2 g/dL      RDW 14.2 %      RDW-SD 49.1 fl      MPV 11.2 fL      Platelets 583 10*3/mm3      Neutrophil % 57.5 %      Lymphocyte % 30.2 %      Monocyte % 10.1 %      Eosinophil % 1.5 %      Basophil % 0.3 %      Immature Grans % 0.4 %      Neutrophils, Absolute 11.38 10*3/mm3      Lymphocytes, Absolute 5.98 10*3/mm3      Monocytes, Absolute 1.99 10*3/mm3      Eosinophils, Absolute 0.30 10*3/mm3      Basophils, Absolute 0.05 10*3/mm3      Immature Grans, Absolute 0.08 10*3/mm3      nRBC 0.0 /100 WBC     Lactic Acid, Plasma [[id number]]  (Abnormal) Collected: 02/26/24 1131    Specimen: Blood Updated: 02/26/24 1220     Lactate 2.6 mmol/L     Scan Slide [[id number]]  (Normal) Collected: 02/26/24 1131    Specimen: Blood Updated: 02/26/24 1157     RBC Morphology Normal     WBC Morphology Normal     Platelet Morphology Normal    Blood Culture - Blood, Arm, Left [[id number]] Collected: 02/26/24 1142    Specimen: Blood from Arm, Left Updated: 02/26/24 1142    COVID-19, FLU A/B, RSV PCR 1 HR TAT - Swab, Nasopharynx [[id number]]  (Abnormal) Collected: 02/26/24 1150    Specimen: Swab from Nasopharynx Updated: 02/26/24 1238     COVID19 Not Detected     Influenza A PCR Not Detected     Influenza B PCR Detected     RSV, PCR Not Detected    Narrative:      Fact sheet for providers: https://www.fda.gov/media/149322/download    Fact sheet for patients: https://www.fda.gov/media/455392/download    Test performed by PCR.    High Sensitivity Troponin T [[id number]]   (Normal) Collected: 02/26/24 1224    Specimen: Blood from Arm, Right Updated: 02/26/24 1256     HS Troponin T <6 ng/L     Narrative:      High Sensitive Troponin T Reference Range:  <14.0 ng/L- Negative Female for AMI  <22.0 ng/L- Negative Male for AMI  >=14 - Abnormal Female indicating possible myocardial injury.  >=22 - Abnormal Male indicating possible myocardial injury.   Clinicians would have to utilize clinical acumen, EKG, Troponin, and serial changes to determine if it is an Acute Myocardial Infarction or myocardial injury due to an underlying chronic condition.         Comprehensive Metabolic Panel [[id number]]  (Abnormal) Collected: 02/26/24 1224    Specimen: Blood from Arm, Right Updated: 02/26/24 1256     Glucose 114 mg/dL      BUN 8 mg/dL      Creatinine 0.67 mg/dL      Sodium 138 mmol/L      Potassium 3.4 mmol/L      Chloride 98 mmol/L      CO2 23.7 mmol/L      Calcium 9.5 mg/dL      Total Protein 8.4 g/dL      Albumin 3.9 g/dL      ALT (SGPT) 66 U/L      AST (SGOT) 75 U/L      Alkaline Phosphatase 214 U/L      Total Bilirubin 0.9 mg/dL      Globulin 4.5 gm/dL      A/G Ratio 0.9 g/dL      BUN/Creatinine Ratio 11.9     Anion Gap 16.3 mmol/L      eGFR 108.0 mL/min/1.73     Narrative:      GFR Normal >60  Chronic Kidney Disease <60  Kidney Failure <15      Magnesium [[id number]]  (Normal) Collected: 02/26/24 1224    Specimen: Blood from Arm, Right Updated: 02/26/24 1256     Magnesium 1.7 mg/dL     Blood Culture - Blood, Arm, Right [[id number]] Collected: 02/26/24 1224    Specimen: Blood from Arm, Right Updated: 02/26/24 1228    STAT Lactic Acid, Reflex [[id number]]  (Normal) Collected: 02/26/24 1805    Specimen: Blood Updated: 02/26/24 1823     Lactate 1.5 mmol/L              Imaging:    CT Chest With Contrast Diagnostic    Result Date: 2/26/2024  PROCEDURE: CT CHEST W CONTRAST DIAGNOSTIC  COMPARISON:  [facility], CT, CTA ABDOMEN PELVIS W/WO CONTRAST, 7/25/2020, 9:09. INDICATIONS: shortness of  breath, recent h/o sepsis  TECHNIQUE: After obtaining the patient's consent, CT images were obtained with non-ionic intravenous contrast material.   PROTOCOL:   Pulmonary embolism imaging protocol performed    RADIATION:   DLP: 149mGy*cm   Automated exposure control was utilized to minimize radiation dose. CONTRAST: 100cc Isovue 370 I.V.  FINDINGS:  There is a 2.1 x 1.7 cm soft tissue density in the subcutaneous soft tissues of right lower neck image 6 series 4. Appearance could reflect focal contusion or hematoma in the right clinical setting.  Complex cyst may have a similar appearance.  Left-sided chest port within distal innominate vein.  Negative for thoracic aortic aneurysm.  Heart size within normal limits.  No pericardial effusion.  Mild coronary atherosclerotic disease.  Normal caliber main pulmonary artery.  Within limitations of motion no suspicious filling defect to suggest a significant pulmonary embolism.   Thoracic esophagus unremarkable.  No overtly suspicious thoracic adenopathy.  Few small subcentimeter mediastinal nodes are favored reactive.  Expiratory phase of imaging with dependent pulmonary opacities likely representing atelectasis.  No infectious appearing infiltrate, effusion or pneumothorax.  No suspicious pulmonary nodule.   Hepatic steatosis and hepatomegaly.  Few prominent savage hepatic nodes stable from prior and commonly reactive in the setting of hepatic steatosis as well as surgical changes.  Cholecystectomy.  Partially imaged changes of gastric bypass with sequelae of fat necrosis along greater curvature.  Common origin celiac artery and SMA.   No axillary adenopathy.  No acute displaced fracture or aggressive bone lesion.  Chronic appearing right lateral 9th rib fracture.        1. Mild motion degradation.  Within limitations no evidence of pulmonary embolism. 2. Hypoventilation changes, without infectious appearing infiltrate. 3. Hepatomegaly and diffuse steatosis. 4. Other  ancillary findings detailed above.     MACEY RAMIREZ MD       Electronically Signed and Approved By: MACEY RAMIREZ MD on 2/26/2024 at 15:11             XR Chest 1 View    Result Date: 2/26/2024  PROCEDURE: XR CHEST 1 VW  COMPARISON: [facility] JAYLEN Whitaker, XR CHEST 2 VW, 11/26/2023, 14:53.  INDICATIONS: Chest Pain Triage Protocol  FINDINGS:  The lungs are clear bilaterally.  The cardiac and mediastinal silhouettes appear normal.  No effusion is seen.  The left jugular central venous catheter is been placed.  The tip is in the left brachial cephalic vein near its confluence with the superior vena cava.        1. No acute cardiopulmonary disease.       Jovanni Jenkins M.D.       Electronically Signed and Approved By: Jovanni Jenkins M.D. on 2/26/2024 at 11:45                Differential Diagnosis and Discussion:    Chest Pain:  Based on the patient's signs and symptoms, I considered aortic dissection, myocardial infaction, pulmonary embolism, cardiac tamponade, pericarditis, pneumothorax, musculoskeletal chest pain and other differential diagnosis as an etiology of the patient's chest pain.   Dyspnea: Differential diagnosis includes but is not limited to metabolic acidosis, neurological disorders, psychogenic, asthma, pneumothorax, upper airway obstruction, COPD, pneumonia, noncardiogenic pulmonary edema, interstitial lung disease, anemia, congestive heart failure, and pulmonary embolism    All labs were reviewed and interpreted by me.  All X-rays impressions were independently interpreted by me.  EKG was interpreted by me.  CT scan radiology impression was interpreted by me.    MDM     Amount and/or Complexity of Data Reviewed  Clinical lab tests: reviewed  Tests in the radiology section of CPT®: reviewed  Tests in the medicine section of CPT®: reviewed  Decide to obtain previous medical records or to obtain history from someone other than the patient: yes                 Patient Care  Considerations:    CT ABDOMEN AND PELVIS: I considered ordering a CT scan of the abdomen and pelvis however patient has just recently had a CT abdomen pelvis on her admission to [facility].  Her pain is very high in her epigastric region and our CT chest today did extend inferiorly past the stomach.      Consultants/Shared Management Plan:    Hospitalist: Dr. León has agreed to place an admission order in the event that [facility] has no beds.  Transfer Provider: I have discussed the case with DWAIN Valle on behalf of Dr. Santos at [facility] who agrees to accept the patient as a transfer.    Social Determinants of Health:    Patient is independent, reliable, and has access to care.       Disposition and Care Coordination:    Transferred: Through independent evaluation of the patient's history, physical, and imperical data, the patient meets criteria to be transferred to another hospital for evaluation/admission.        Final diagnoses:   Gastroparesis   Chest pain, unspecified type   Influenza B   Bacteremia due to Enterococcus        ED Disposition       ED Disposition   Transfer to Another Facility     Condition   --    Comment   --               This medical record created using voice recognition software.             Devin Dalton MD  02/26/24 2050

## 2024-02-26 NOTE — ED NOTES
Pt reports hx of cirrhosis, was supposed to be eval'd for liver transplant but was unable to go to the appt due to being in the hospital for sepsis rt port infection.

## 2024-02-26 NOTE — ED NOTES
Patient reports chest pain, abdominal pain. Patient states worsening when taking a deep breath.  Was dced from [facility] Thursday from a 2 week stay per patient.  Left chest cath  210/128 per EMS  22 LH   10 L NRB on arrival.  4mg morphine, 4mg zofran  Unremarkable 12 lead

## 2024-02-27 ENCOUNTER — APPOINTMENT (OUTPATIENT)
Dept: CT IMAGING | Facility: HOSPITAL | Age: 49
End: 2024-02-27
Payer: MEDICARE

## 2024-02-27 VITALS
DIASTOLIC BLOOD PRESSURE: 74 MMHG | HEIGHT: 61 IN | OXYGEN SATURATION: 95 % | SYSTOLIC BLOOD PRESSURE: 126 MMHG | RESPIRATION RATE: 20 BRPM | WEIGHT: 189.82 LBS | BODY MASS INDEX: 35.84 KG/M2 | HEART RATE: 96 BPM | TEMPERATURE: 101.3 F

## 2024-02-27 PROBLEM — R06.00 DYSPNEA: Status: ACTIVE | Noted: 2024-02-27

## 2024-02-27 PROBLEM — J10.1 INFLUENZA B: Status: ACTIVE | Noted: 2024-02-27

## 2024-02-27 PROBLEM — R10.13 EPIGASTRIC PAIN: Status: ACTIVE | Noted: 2024-02-27

## 2024-02-27 LAB
ALBUMIN SERPL-MCNC: 3.4 G/DL (ref 3.5–5.2)
ALP SERPL-CCNC: 180 U/L (ref 39–117)
ALT SERPL W P-5'-P-CCNC: 47 U/L (ref 1–33)
AST SERPL-CCNC: 45 U/L (ref 1–32)
BILIRUB CONJ SERPL-MCNC: 0.3 MG/DL (ref 0–0.3)
BILIRUB INDIRECT SERPL-MCNC: 0.5 MG/DL
BILIRUB SERPL-MCNC: 0.8 MG/DL (ref 0–1.2)
CALCIUM SPEC-SCNC: 9.1 MG/DL (ref 8.6–10.5)
D-LACTATE SERPL-SCNC: 1 MMOL/L (ref 0.5–2)
D-LACTATE SERPL-SCNC: 1.1 MMOL/L (ref 0.5–2)
DEPRECATED RDW RBC AUTO: 49.6 FL (ref 37–54)
ERYTHROCYTE [DISTWIDTH] IN BLOOD BY AUTOMATED COUNT: 14.2 % (ref 12.3–15.4)
GLUCOSE BLDC GLUCOMTR-MCNC: 108 MG/DL (ref 70–99)
GLUCOSE BLDC GLUCOMTR-MCNC: 112 MG/DL (ref 70–99)
GLUCOSE BLDC GLUCOMTR-MCNC: 128 MG/DL (ref 70–99)
GLUCOSE BLDC GLUCOMTR-MCNC: 130 MG/DL (ref 70–99)
HCT VFR BLD AUTO: 34.7 % (ref 34–46.6)
HGB BLD-MCNC: 11.6 G/DL (ref 12–15.9)
MAGNESIUM SERPL-MCNC: 1.9 MG/DL (ref 1.6–2.6)
MCH RBC QN AUTO: 31.9 PG (ref 26.6–33)
MCHC RBC AUTO-ENTMCNC: 33.4 G/DL (ref 31.5–35.7)
MCV RBC AUTO: 95.3 FL (ref 79–97)
PLATELET # BLD AUTO: 472 10*3/MM3 (ref 140–450)
PMV BLD AUTO: 11.2 FL (ref 6–12)
PROCALCITONIN SERPL-MCNC: 0.2 NG/ML (ref 0–0.25)
PROT SERPL-MCNC: 7.4 G/DL (ref 6–8.5)
RBC # BLD AUTO: 3.64 10*6/MM3 (ref 3.77–5.28)
WBC NRBC COR # BLD AUTO: 24.34 10*3/MM3 (ref 3.4–10.8)

## 2024-02-27 PROCEDURE — 83735 ASSAY OF MAGNESIUM: CPT | Performed by: STUDENT IN AN ORGANIZED HEALTH CARE EDUCATION/TRAINING PROGRAM

## 2024-02-27 PROCEDURE — 25010000002 HYDROMORPHONE 1 MG/ML SOLUTION: Performed by: FAMILY MEDICINE

## 2024-02-27 PROCEDURE — 96376 TX/PRO/DX INJ SAME DRUG ADON: CPT

## 2024-02-27 PROCEDURE — 36593 DECLOT VASCULAR DEVICE: CPT

## 2024-02-27 PROCEDURE — 96372 THER/PROPH/DIAG INJ SC/IM: CPT

## 2024-02-27 PROCEDURE — 82948 REAGENT STRIP/BLOOD GLUCOSE: CPT | Performed by: FAMILY MEDICINE

## 2024-02-27 PROCEDURE — G0378 HOSPITAL OBSERVATION PER HR: HCPCS

## 2024-02-27 PROCEDURE — 82310 ASSAY OF CALCIUM: CPT | Performed by: STUDENT IN AN ORGANIZED HEALTH CARE EDUCATION/TRAINING PROGRAM

## 2024-02-27 PROCEDURE — 80076 HEPATIC FUNCTION PANEL: CPT | Performed by: STUDENT IN AN ORGANIZED HEALTH CARE EDUCATION/TRAINING PROGRAM

## 2024-02-27 PROCEDURE — 82948 REAGENT STRIP/BLOOD GLUCOSE: CPT

## 2024-02-27 PROCEDURE — 25810000003 LACTATED RINGERS PER 1000 ML: Performed by: FAMILY MEDICINE

## 2024-02-27 PROCEDURE — 63710000001 PROMETHAZINE PER 12.5 MG: Performed by: PHYSICIAN ASSISTANT

## 2024-02-27 PROCEDURE — 83605 ASSAY OF LACTIC ACID: CPT | Performed by: STUDENT IN AN ORGANIZED HEALTH CARE EDUCATION/TRAINING PROGRAM

## 2024-02-27 PROCEDURE — 96375 TX/PRO/DX INJ NEW DRUG ADDON: CPT

## 2024-02-27 PROCEDURE — 25010000002 PROCHLORPERAZINE 10 MG/2ML SOLUTION: Performed by: STUDENT IN AN ORGANIZED HEALTH CARE EDUCATION/TRAINING PROGRAM

## 2024-02-27 PROCEDURE — 94761 N-INVAS EAR/PLS OXIMETRY MLT: CPT

## 2024-02-27 PROCEDURE — 85027 COMPLETE CBC AUTOMATED: CPT | Performed by: FAMILY MEDICINE

## 2024-02-27 PROCEDURE — 99236 HOSP IP/OBS SAME DATE HI 85: CPT | Performed by: STUDENT IN AN ORGANIZED HEALTH CARE EDUCATION/TRAINING PROGRAM

## 2024-02-27 PROCEDURE — 25010000002 ONDANSETRON PER 1 MG: Performed by: FAMILY MEDICINE

## 2024-02-27 PROCEDURE — 25510000001 IOPAMIDOL PER 1 ML: Performed by: STUDENT IN AN ORGANIZED HEALTH CARE EDUCATION/TRAINING PROGRAM

## 2024-02-27 PROCEDURE — 74178 CT ABD&PLV WO CNTR FLWD CNTR: CPT

## 2024-02-27 PROCEDURE — 25010000002 ALTEPLASE 2 MG RECONSTITUTED SOLUTION: Performed by: STUDENT IN AN ORGANIZED HEALTH CARE EDUCATION/TRAINING PROGRAM

## 2024-02-27 PROCEDURE — 25010000002 METOCLOPRAMIDE PER 10 MG: Performed by: FAMILY MEDICINE

## 2024-02-27 PROCEDURE — 83605 ASSAY OF LACTIC ACID: CPT | Performed by: FAMILY MEDICINE

## 2024-02-27 PROCEDURE — 96361 HYDRATE IV INFUSION ADD-ON: CPT

## 2024-02-27 PROCEDURE — 25010000002 KETOROLAC TROMETHAMINE PER 15 MG: Performed by: FAMILY MEDICINE

## 2024-02-27 PROCEDURE — 25010000002 ENOXAPARIN PER 10 MG: Performed by: FAMILY MEDICINE

## 2024-02-27 RX ORDER — ONDANSETRON 2 MG/ML
4 INJECTION INTRAMUSCULAR; INTRAVENOUS EVERY 6 HOURS PRN
Status: DISCONTINUED | OUTPATIENT
Start: 2024-02-27 | End: 2024-02-27 | Stop reason: HOSPADM

## 2024-02-27 RX ORDER — IBUPROFEN 600 MG/1
1 TABLET ORAL
Status: DISCONTINUED | OUTPATIENT
Start: 2024-02-27 | End: 2024-02-27 | Stop reason: HOSPADM

## 2024-02-27 RX ORDER — PROCHLORPERAZINE EDISYLATE 5 MG/ML
5 INJECTION INTRAMUSCULAR; INTRAVENOUS EVERY 6 HOURS PRN
Status: DISCONTINUED | OUTPATIENT
Start: 2024-02-27 | End: 2024-02-27 | Stop reason: HOSPADM

## 2024-02-27 RX ORDER — SODIUM CHLORIDE, SODIUM LACTATE, POTASSIUM CHLORIDE, CALCIUM CHLORIDE 600; 310; 30; 20 MG/100ML; MG/100ML; MG/100ML; MG/100ML
100 INJECTION, SOLUTION INTRAVENOUS CONTINUOUS
Status: ACTIVE | OUTPATIENT
Start: 2024-02-27 | End: 2024-02-27

## 2024-02-27 RX ORDER — PANTOPRAZOLE SODIUM 40 MG/10ML
40 INJECTION, POWDER, LYOPHILIZED, FOR SOLUTION INTRAVENOUS
Status: DISCONTINUED | OUTPATIENT
Start: 2024-02-27 | End: 2024-02-27 | Stop reason: HOSPADM

## 2024-02-27 RX ORDER — BISACODYL 10 MG
10 SUPPOSITORY, RECTAL RECTAL DAILY PRN
Status: DISCONTINUED | OUTPATIENT
Start: 2024-02-27 | End: 2024-02-27 | Stop reason: HOSPADM

## 2024-02-27 RX ORDER — NICOTINE POLACRILEX 4 MG
15 LOZENGE BUCCAL
Status: DISCONTINUED | OUTPATIENT
Start: 2024-02-27 | End: 2024-02-27 | Stop reason: HOSPADM

## 2024-02-27 RX ORDER — NALOXONE HCL 0.4 MG/ML
0.4 VIAL (ML) INJECTION
Status: DISCONTINUED | OUTPATIENT
Start: 2024-02-27 | End: 2024-02-27 | Stop reason: HOSPADM

## 2024-02-27 RX ORDER — DEXTROSE MONOHYDRATE 25 G/50ML
25 INJECTION, SOLUTION INTRAVENOUS
Status: DISCONTINUED | OUTPATIENT
Start: 2024-02-27 | End: 2024-02-27 | Stop reason: HOSPADM

## 2024-02-27 RX ORDER — OSELTAMIVIR PHOSPHATE 75 MG/1
75 CAPSULE ORAL EVERY 12 HOURS SCHEDULED
Status: DISCONTINUED | OUTPATIENT
Start: 2024-02-27 | End: 2024-02-27 | Stop reason: HOSPADM

## 2024-02-27 RX ORDER — AMOXICILLIN 250 MG
2 CAPSULE ORAL 2 TIMES DAILY PRN
Status: DISCONTINUED | OUTPATIENT
Start: 2024-02-27 | End: 2024-02-27 | Stop reason: HOSPADM

## 2024-02-27 RX ORDER — KETOROLAC TROMETHAMINE 30 MG/ML
15 INJECTION, SOLUTION INTRAMUSCULAR; INTRAVENOUS EVERY 6 HOURS PRN
Status: DISCONTINUED | OUTPATIENT
Start: 2024-02-27 | End: 2024-02-27 | Stop reason: HOSPADM

## 2024-02-27 RX ORDER — VITAMIN E 268 MG
800 CAPSULE ORAL DAILY
COMMUNITY

## 2024-02-27 RX ORDER — PROMETHAZINE HYDROCHLORIDE 12.5 MG/1
6.25 TABLET ORAL ONCE AS NEEDED
Status: COMPLETED | OUTPATIENT
Start: 2024-02-27 | End: 2024-02-27

## 2024-02-27 RX ORDER — SODIUM CHLORIDE 0.9 % (FLUSH) 0.9 %
10 SYRINGE (ML) INJECTION AS NEEDED
Status: DISCONTINUED | OUTPATIENT
Start: 2024-02-27 | End: 2024-02-27 | Stop reason: HOSPADM

## 2024-02-27 RX ORDER — ENOXAPARIN SODIUM 100 MG/ML
40 INJECTION SUBCUTANEOUS DAILY
Status: DISCONTINUED | OUTPATIENT
Start: 2024-02-27 | End: 2024-02-27 | Stop reason: HOSPADM

## 2024-02-27 RX ORDER — SODIUM CHLORIDE 0.9 % (FLUSH) 0.9 %
10 SYRINGE (ML) INJECTION EVERY 12 HOURS SCHEDULED
Status: DISCONTINUED | OUTPATIENT
Start: 2024-02-27 | End: 2024-02-27 | Stop reason: HOSPADM

## 2024-02-27 RX ORDER — BISACODYL 5 MG/1
5 TABLET, DELAYED RELEASE ORAL DAILY PRN
Status: DISCONTINUED | OUTPATIENT
Start: 2024-02-27 | End: 2024-02-27 | Stop reason: HOSPADM

## 2024-02-27 RX ORDER — SODIUM CHLORIDE 9 MG/ML
40 INJECTION, SOLUTION INTRAVENOUS AS NEEDED
Status: DISCONTINUED | OUTPATIENT
Start: 2024-02-27 | End: 2024-02-27 | Stop reason: HOSPADM

## 2024-02-27 RX ORDER — POLYETHYLENE GLYCOL 3350 17 G/17G
17 POWDER, FOR SOLUTION ORAL DAILY PRN
Status: DISCONTINUED | OUTPATIENT
Start: 2024-02-27 | End: 2024-02-27 | Stop reason: HOSPADM

## 2024-02-27 RX ORDER — METOCLOPRAMIDE HYDROCHLORIDE 5 MG/ML
10 INJECTION INTRAMUSCULAR; INTRAVENOUS EVERY 8 HOURS
Status: DISCONTINUED | OUTPATIENT
Start: 2024-02-27 | End: 2024-02-27

## 2024-02-27 RX ADMIN — ALTEPLASE: 2.2 INJECTION, POWDER, LYOPHILIZED, FOR SOLUTION INTRAVENOUS at 15:05

## 2024-02-27 RX ADMIN — ONDANSETRON 4 MG: 2 INJECTION INTRAMUSCULAR; INTRAVENOUS at 02:51

## 2024-02-27 RX ADMIN — KETOROLAC TROMETHAMINE 15 MG: 30 INJECTION, SOLUTION INTRAMUSCULAR; INTRAVENOUS at 12:00

## 2024-02-27 RX ADMIN — ONDANSETRON 4 MG: 2 INJECTION INTRAMUSCULAR; INTRAVENOUS at 08:12

## 2024-02-27 RX ADMIN — HYDROMORPHONE HYDROCHLORIDE 0.5 MG: 1 INJECTION, SOLUTION INTRAMUSCULAR; INTRAVENOUS; SUBCUTANEOUS at 08:13

## 2024-02-27 RX ADMIN — SODIUM CHLORIDE, POTASSIUM CHLORIDE, SODIUM LACTATE AND CALCIUM CHLORIDE 100 ML/HR: 600; 310; 30; 20 INJECTION, SOLUTION INTRAVENOUS at 03:31

## 2024-02-27 RX ADMIN — HYDROMORPHONE HYDROCHLORIDE 0.5 MG: 1 INJECTION, SOLUTION INTRAMUSCULAR; INTRAVENOUS; SUBCUTANEOUS at 02:51

## 2024-02-27 RX ADMIN — PROMETHAZINE HYDROCHLORIDE 6.25 MG: 12.5 TABLET ORAL at 06:08

## 2024-02-27 RX ADMIN — Medication 10 ML: at 08:13

## 2024-02-27 RX ADMIN — IOPAMIDOL 100 ML: 755 INJECTION, SOLUTION INTRAVENOUS at 16:39

## 2024-02-27 RX ADMIN — ONDANSETRON 4 MG: 2 INJECTION INTRAMUSCULAR; INTRAVENOUS at 20:29

## 2024-02-27 RX ADMIN — PANTOPRAZOLE SODIUM 40 MG: 40 INJECTION, POWDER, FOR SOLUTION INTRAVENOUS at 16:11

## 2024-02-27 RX ADMIN — SODIUM CHLORIDE, POTASSIUM CHLORIDE, SODIUM LACTATE AND CALCIUM CHLORIDE 100 ML/HR: 600; 310; 30; 20 INJECTION, SOLUTION INTRAVENOUS at 12:05

## 2024-02-27 RX ADMIN — METOCLOPRAMIDE HYDROCHLORIDE 10 MG: 5 INJECTION INTRAMUSCULAR; INTRAVENOUS at 12:00

## 2024-02-27 RX ADMIN — ENOXAPARIN SODIUM 40 MG: 100 INJECTION SUBCUTANEOUS at 08:12

## 2024-02-27 RX ADMIN — KETOROLAC TROMETHAMINE 15 MG: 30 INJECTION, SOLUTION INTRAMUSCULAR; INTRAVENOUS at 20:30

## 2024-02-27 RX ADMIN — PROCHLORPERAZINE EDISYLATE 5 MG: 5 INJECTION INTRAMUSCULAR; INTRAVENOUS at 16:11

## 2024-02-27 RX ADMIN — METOCLOPRAMIDE HYDROCHLORIDE 10 MG: 5 INJECTION INTRAMUSCULAR; INTRAVENOUS at 04:08

## 2024-02-27 RX ADMIN — KETOROLAC TROMETHAMINE 15 MG: 30 INJECTION, SOLUTION INTRAMUSCULAR; INTRAVENOUS at 05:40

## 2024-02-27 RX ADMIN — ALTEPLASE: 2.2 INJECTION, POWDER, LYOPHILIZED, FOR SOLUTION INTRAVENOUS at 15:09

## 2024-02-27 RX ADMIN — HYDROMORPHONE HYDROCHLORIDE 0.5 MG: 1 INJECTION, SOLUTION INTRAMUSCULAR; INTRAVENOUS; SUBCUTANEOUS at 17:49

## 2024-02-27 RX ADMIN — HYDROMORPHONE HYDROCHLORIDE 0.5 MG: 1 INJECTION, SOLUTION INTRAMUSCULAR; INTRAVENOUS; SUBCUTANEOUS at 13:42

## 2024-02-27 NOTE — ED NOTES
[facility] informed us that this pt would not get a bed at [facility] until morning. Dr. Valentino and Dr. León notified and will be admitting pt upstairs til [facility] bed available

## 2024-02-27 NOTE — ED NOTES
Called [facility] to get update on bed placement at [facility]. They informed us that they are working on late discharges and will call once they have an update on if the pt will get a bed tonight.

## 2024-02-27 NOTE — PLAN OF CARE
Goal Outcome Evaluation:  Plan of Care Reviewed With: patient        Progress: no change  Outcome Evaluation: Pt continues to have c/o abd pain and nausea/vomiting. Pt breaks out into sweats with vomiting. New central line dressing placed-caps changed. Pt insulin pump intact-log in room with pt. Pt is in no apparent distress at this time, denies any needs currently, call light in reach, awaiting bed at [facility] per pt.

## 2024-02-27 NOTE — PLAN OF CARE
Goal Outcome Evaluation:  Plan of Care Reviewed With: patient        Progress: no change  Outcome Evaluation: Pt was new ED admission this shift. Pt c/o pain/discomfort and N/V this shift, administered prn pain and nausea meds per MAR. Started continuous IV fluids per MAR. Pt is currently waiting for bed at [facility] and per ER report, bed at [facility] may likely be available in the morning. Pt has insulin pump that is accessed, notified MD and obtained orders for pt to use her insulin pump. Insulin contract has been discussed, signed and is in her chart as well as an insulin log is at bedside. Pt is currently resting with no apparent distress at this time, call light in reach. No new issues or new needs noted at this time.

## 2024-02-27 NOTE — PROGRESS NOTES
Patient is a 48-year-old female with past medical history of diabetes, hypertension, hypothyroidism, adrenal insufficiency, status post pancreatectomy, gastroparesis, Crohn's disease, cirrhosis and GERD presented with complaints of severe epigastric pain along with nausea and shortness of breath.  On presentation, CTA chest was negative for PE and showed some hypoventilation changes with no infectious etiology.  She was positive for influenza B with leukocytosis and mildly elevated lactic acid which resolved.  Patient requested to be transferred to [facility] for further management as most of her physicians are located there.  Admitting physician contacted [facility] and patient was accepted.    Interval event:   Patient still complaining of epigastric pain, minimal relief with Zofran.  Compazine was added.  Also started on IV pantoprazole 40 mg daily.  Obtaining CT abdomen and pelvis with and without contrast to rule out any intra-abdominal etiology.  Denies any fever, chills, rigors, chest pain or difficulty breathing.  Patient's daughter at bedside, updated.  Later patient's mom was called and updated.  Saturating well on room air.    General examination:  Tenderness on epigastric region palpation with mild distention of abdomen.  Rest examination within normal limit.    Plan:  -Obtaining CT abdomen and pelvis with and without contrast to rule out any intra-abdominal etiology for abdominal pain.  -Started on IV pantoprazole 40 mg daily.  -Continue as needed IV Zofran.  Started on as needed IV Compazine for nausea and vomiting.  -Will obtain urinalysis.  -Positive for influenza B, will start patient on Tamiflu.  -Started on IV Zosyn, continue.  -Pending transfer to [facility], patient has been accepted.  Likely transfer tomorrow.  -Continue rest of the current management.

## 2024-02-27 NOTE — H&P
[facility]   HISTORY AND PHYSICAL    Patient Name: Kayla Gan  : 1975  MRN: [id number]  Primary Care Physician:  Justin Daniels, LAZARO  Date of admission: 2024    Subjective   Subjective     Chief Complaint: Epigastric pain, nausea, and shortness of breath    HPI:    Kayla Gan is a 48 y.o. female with past medical history of diabetes, hypertension, hypothyroidism, adrenal insufficiency, status post pancreectomy, gastroparesis, Crohn's, cirrhosis, and GERD presented to the ED with complaints of severe epigastric pain with nausea and shortness of breath.  Patient states that she woke up yesterday morning with severe epigastric/chest pain that radiated up to her neck and associated with nausea and vomiting and shortness of breath.  Patient was discharged from [facility] 4 days ago she was discharged on daptomycin for possible port infection which was switched with catheter.  Patient then began to have subjective fevers and chills so she came to the ED for further evaluation.  In the ED patient was hypoxic on arrival and was started on nasal cannula with remaining vitals being within normal limits.  Labs show that she was flu be positive, leukocytosis and with mildly elevated lactic acid level.  Remaining were relatively unremarkable given her chronic conditions including a normal troponin, proBNP, and lipase levels.  CTA of the chest was negative for any PEs with remaining findings being known and chronic.  Patient request to be transferred to [facility] for further management and was accepted but no beds will be available until later today.  Patient was admitted for further management.  When asked she denied any focal weakness,  diarrhea, constipation, dysuria, hematuria, hematochezia, melena, or anxiety.          Review of Systems   All systems were reviewed and negative except for: As per HPI    Personal History     Past Medical History:   Diagnosis Date    Adrenal  insufficiency     Allergic Unsure    Foods, [place], Vancomycin    Anemia     Anxiety     Arthritis     Asthma Unsure    Cholelithiasis Unsure    Cirrhosis     Clotting disorder Unsure    Colon polyp     Crohn's disease     Depression     Diabetes mellitus     Gastroparesis     GERD (gastroesophageal reflux disease)     History of MRSA infection     Hyperlipidemia     Hypertension     Hypothyroidism     Irritable bowel syndrome Unsure    Liver disease     Migraines     Pancreatitis     Urinary tract infection Unsure    Interstitial Cystitis       Past Surgical History:   Procedure Laterality Date    ABSCESS DRAINAGE  12/24/2019    Fluoroscopically guided abscess drainage, [facility]    ADENOIDECTOMY      BACK SURGERY      L4 L5    CHOLECYSTECTOMY N/A     COLONOSCOPY  04/24/2019    NBIH, 3 mm polyp    DILATATION AND CURETTAGE      ENDOMETRIAL ABLATION      ENDOSCOPY N/A 04/24/2019    Normal    ENTEROSCOPY SMALL BOWEL      ERCP  2019    GASTRIC STIMULATOR IMPLANT SURGERY      GASTROJEJUNOSTOMY W/ JEJUNOSTOMY TUBE      removed    HYSTERECTOMY      LYMPH NODE BIOPSY      PANCREATECTOMY  12/2019    TPIAT    PELVIC FLOOR REPAIR      SINUS SURGERY      SPLENECTOMY      TONSILLECTOMY      VENOUS ACCESS DEVICE (PORT) INSERTION         Family History: family history includes Alcohol abuse in her paternal grandfather; Anxiety disorder in her father and mother; Breast cancer in her mother; Colon polyps in her mother; Depression in her brother and father; Heart disease in her father and paternal grandfather; Hypertension in her brother; Hypothyroidism in her father and mother. Otherwise pertinent FHx was reviewed and not pertinent to current issue.    Social History:  reports that she has never smoked. She has never used smokeless tobacco. She reports that she does not drink alcohol and does not use drugs.    Home Medications:  Adalimumab, Atogepant, DAPTOmycin, Insulin Aspart, Insulin Lispro, Omnipod 5 G6 Intro (Gen 5),  Omnipod 5 G6 Pod (Gen 5), Pancrelipase (Lip-Prot-Amyl), SUMAtriptan, Tirzepatide, Vitamin E, Vortioxetine HBr, acetone (urine) test, benzonatate, cetirizine, cholecalciferol, dicyclomine, diphenhydrAMINE, docusate sodium, glucagon, glucose blood, heparin (porcine), hydrOXYzine, hydrocortisone, insulin detemir, lactulose, lansoprazole, levothyroxine, linaclotide, lisinopril, melatonin, ondansetron, phenazopyridine, polyethylene glycol, prochlorperazine, promethazine, propranolol LA, riFAXIMin, sodium chloride, traZODone, and ubrogepant      Allergies:  Allergies   Allergen Reactions    Other Other (See Comments)     Sent patient into kidney failure, heart stopped, in ICU for two days.    Parathyroid Hormone (Recomb) Other (See Comments)     Sent patient into kidney failure, heart stopped, in ICU for two days.      Romosozumab Hives     Other reaction(s): Hives    Other reaction(s): Hives   Other reaction(s): Hives    Sulfa Antibiotics Hives     PT NO LONGER ALLERGIC TO THIS PER PATIENT.    Sulfate Hives    Teriparatide Anaphylaxis and Other (See Comments)     Other reaction(s): Unknown    Tramadol Anaphylaxis and Other (See Comments)     Other reaction(s): Unknown, Unknown    Vancomycin Hives and Itching     Other reaction(s): Unknown, Unknown    Nifedipine Provider Review Needed and Other (See Comments)     Rapid heart beat    Rapid heart beat    Other reaction(s): Provider Review Needed   Other reaction(s): Provider Review Needed   Rapid heart beat    Morphine GI Intolerance     Other reaction(s): Chest Pain  increases billiary pressure and causes chest pain REPORTS SHE IS NO LONGER ALLERGIC TO THIS.L    Codeine Nausea And Vomiting and GI Intolerance     Pt reports she is no longer allergic to this       Objective   Objective     Vitals:   Temp:  [98.9 °F (37.2 °C)-99.3 °F (37.4 °C)] 98.9 °F (37.2 °C)  Heart Rate:  [81-99] 95  Resp:  [16-28] 20  BP: (110-156)/(66-95) 136/84  Flow (L/min):  [2-6] 2  Physical  Exam    Constitutional: Awake, alert   Eyes: PERRLA, sclerae anicteric, no conjunctival injection   HENT: NCAT, mucous membranes moist   Neck: Supple, no thyromegaly, no lymphadenopathy, trachea midline   Respiratory: Clear to auscultation bilaterally, nonlabored respirations    Cardiovascular: Tachycardia, no murmurs, rubs, or gallops, palpable pedal pulses bilaterally   Gastrointestinal: Abdominal tenderness throughout, soft, hypoactive bowel sounds   Musculoskeletal: No bilateral ankle edema, no clubbing or cyanosis to extremities   Psychiatric: Appropriate affect, cooperative   Neurologic: Oriented x 3, strength symmetric in all extremities, Cranial Nerves grossly intact to confrontation, speech clear   Skin: No rashes     Result Review    Result Review:  I have personally reviewed the results from the time of this admission to 2/27/2024 04:27 EST and agree with these findings:  [x]  Laboratory list / accordion  []  Microbiology  [x]  Radiology  [x]  EKG/Telemetry   []  Cardiology/Vascular   []  Pathology  []  Old records  []  Other:  Most notable findings include: Negative troponin and lipase.  Leukocytosis, flu be positive.  CTA of the chest negative for PE      Assessment & Plan   Assessment / Plan     Brief Patient Summary:  Kayla Gan is a 48 y.o. female with past medical history of diabetes, hypertension, hypothyroidism, adrenal insufficiency, status post pancreectomy, gastroparesis, Crohn's, cirrhosis, and GERD presented to the ED with complaints of severe epigastric pain with nausea and shortness of breath    Active Hospital Problems:  Active Hospital Problems    Diagnosis     **Epigastric pain     Dyspnea     Influenza B     Portal hypertension     Type 1 diabetes mellitus without complication     Spleen absent     Adrenal insufficiency     HI (obstructive sleep apnea)     Postpancreatectomy hyperglycemia     Pancreas divisum     Anxiety and depression     Ulcerative colitis      Plan:      Epigastric pain, nausea  -Admit to medical floor  -Troponins negative  -Likely secondary to gastroparesis versus congenital defects  -Pain meds as needed  -Antiemetics as needed  -Reglan IV 3 times daily  -Patient to be transferred to [facility] when bed available  -Supportive care    Shortness of breath  -Supplemental oxygen as needed  -CTA negative for PE  -Patient flu positive  -Supportive care    Diabetes  -Resume patient's insulin pump    HTN  -Currently well controlled  -PRN BP meds  -Resume home meds when available  -Titrate if needed    Adrenal insufficiency  Status post pancreectomy  Crohn's  Cirrhosis    GI ppx  DVT ppx      CODE STATUS:    Level Of Support Discussed With: Patient  Code Status (Patient has no pulse and is not breathing): CPR (Attempt to Resuscitate)  Medical Interventions (Patient has pulse or is breathing): Full Support    Admission Status:  I believe this patient meets observation status.      Electronically signed by Stefan León MD, 02/27/24, 4:27 AM EST.

## 2024-02-27 NOTE — CONSULTS
Discharge Planning Assessment   Guille     Patient Name: Kayla Gan  MRN: [id number]  Today's Date: 2/27/2024    Admit Date: 2/26/2024     Discharge Plan       Row Name 02/27/24 1346       Plan    Patient/Family in Agreement with Plan yes    Final Discharge Disposition Code 02 - [facility] for  care    Final Note Pt will transfer to [facility] once bed is available for further care.           ANGELINA Kay

## 2024-02-28 NOTE — DISCHARGE SUMMARY
[facility]         HOSPITALIST  DISCHARGE SUMMARY    Patient Name: Kayla Gan  : 1975  MRN: [id number]    Date of Admission: 2024  Date of Discharge:  2024  Primary Care Physician: Justin Daniels APRN    Consults       Date and Time Order Name Status Description    2024  8:09 PM Hospitalist (on-call MD unless specified)      2024  5:48 PM IP General Consult (Use specialty-specific consult if known)              Active and Resolved Hospital Problems:  Active Hospital Problems    Diagnosis POA    **Epigastric pain [R10.13] Unknown    Dyspnea [R06.00] Yes    Influenza B [J10.1] Unknown    Portal hypertension [K76.6] Yes    Type 1 diabetes mellitus without complication [E10.9] Yes    Spleen absent [Q89.01] Not Applicable    Adrenal insufficiency [E27.40] Yes    HI (obstructive sleep apnea) [G47.33] Yes    Postpancreatectomy hyperglycemia [E89.1] Yes    Pancreas divisum [Q45.3] Not Applicable    Anxiety and depression [F41.9, F32.A] Yes    Ulcerative colitis [K51.90] Yes      Resolved Hospital Problems   No resolved problems to display.       Hospital Course     Hospital Course:  Kayla Gan is a 48 y.o. female with past medical history of diabetes, hypertension, hypothyroidism, adrenal insufficiency, status post pancreatectomy, gastroparesis, Crohn's disease, cirrhosis and GERD presented with complaints of severe epigastric pain along with nausea and shortness of breath. On presentation, CTA chest was negative for PE and showed some hypoventilation changes with no infectious etiology. She was positive for influenza B with leukocytosis and mildly elevated lactic acid which resolved. Patient requested to be transferred to [facility] for further management as most of her physicians are located there. Admitting physician contacted [facility] and patient was accepted.  Patient was admitted to the medicine service for further evaluation and management  until transfer.  Patient had abdominal pain along with nausea provide CT abdomen pelvis with and without contrast was obtained which showed no acute abnormality in abdomen or pelvis but did show hepatomegaly and hepatic steatosis with new consolidation in left lower lobe and lingula likely atelectasis.  Patient was receiving as needed IV Zofran and IV Reglan was added for nausea.  Also started on IV pantoprazole for symptomatic relief.  Tamiflu was started for influenza B infection.  Patient was also started on Zosyn given the patient had mention she is no longer allergic to sulfa antibiotics.  Blood culture showing no growth at 24 hours.  Later today, [facility] contacted regarding availability of bed.  Patient is being transferred to [facility] for further evaluation and management.  Patient stable at the time of transfer.      DISCHARGE Follow Up Recommendations for labs and diagnostics: As mentioned above.      Day of Discharge     Vital Signs:  Temp:  [98.4 °F (36.9 °C)-101.3 °F (38.5 °C)] 101.3 °F (38.5 °C)  Heart Rate:  [90-99] 96  Resp:  [16-20] 20  BP: (114-140)/(67-84) 126/74  Flow (L/min):  [2] 2  Physical Exam:   General: Alert, awake, oriented x 3  Lungs: Bilateral equal air entry, no wheeze.  Heart: RRR, no murmurs.  GI,: Mild distention of abdomen with tenderness on epigastric region.    Discharge Details        Discharge Medications        Changes to Medications        Instructions Start Date   riFAXIMin 550 MG tablet  Commonly known as: XIFAXAN  What changed: when to take this   550 mg, Oral, Every 12 Hours Scheduled             Continue These Medications        Instructions Start Date   cetirizine 10 MG tablet  Commonly known as: zyrTEC   10 mg, Oral, Daily      Creon [id number] units capsule delayed-release particles capsule  Generic drug: Pancrelipase (Lip-Prot-Amyl)   Take 3 tabs with meals and 2 with snacks      DAPTOmycin 500 MG injection  Commonly known as: CUBICIN   500 mg,  Intravenous, Daily      diphenhydrAMINE 50 MG/ML injection  Commonly known as: BENADRYL   25-50 mg, Intravenous, Every 6 Hours PRN      docusate sodium 100 MG capsule  Commonly known as: COLACE   Take 1 capsule by mouth 2 (Two) Times a Day.      Enulose 10 GM/15ML solution solution (encephalopathy)  Generic drug: lactulose   Take 45 mL by mouth 3 (Three) Times a Day.      heparin (porcine) 100-0.45 UNIT/ML-% infusion   Intravenous, Continuous      Humira (2 Pen) 40 MG/0.4ML Pen-injector Kit  Generic drug: Adalimumab   40 mg, Subcutaneous, Every 14 Days      hydrocortisone 5 MG tablet  Commonly known as: CORTEF   15 mg, Oral, 2 Times Daily      hydrOXYzine 25 MG tablet  Commonly known as: ATARAX   25 mg, Oral, Every 6 Hours PRN      lansoprazole 30 MG capsule  Commonly known as: PREVACID   30 mg, Oral, 2 Times Daily      levothyroxine 125 MCG tablet  Commonly known as: SYNTHROID, LEVOTHROID   125 mcg, Oral, Daily      melatonin 5 MG tablet tablet   5 mg, Oral, Nightly      NovoLOG 100 UNIT/ML injection  Generic drug: Insulin Aspart   Inject  under the skin into the appropriate area as directed.      Omnipod 5 G6 Pod (Gen 5) misc   CHANGE POD EVERY 3 DAYS      ondansetron 2 mg/mL injection  Commonly known as: ZOFRAN   8 mg, Intravenous, Every 6 Hours PRN      prochlorperazine 10 MG tablet  Commonly known as: COMPAZINE   10 mg, Oral, Every 8 Hours PRN      promethazine 25 MG/ML injection  Commonly known as: PHENERGAN   25 mg, Intravenous, Every 4 Hours PRN      propranolol LA 60 MG 24 hr capsule  Commonly known as: INDERAL LA   60 mg, Oral, Daily      sodium chloride 0.9 % solution   Intravenous, Daily, 1 liter everyday      SUMAtriptan 25 MG tablet  Commonly known as: IMITREX   25 mg, Oral, Once As Needed      traZODone 100 MG tablet  Commonly known as: DESYREL   100 mg, Oral, Nightly      Ubrelvy 100 MG tablet  Generic drug: ubrogepant   100 mg, Oral, Once As Needed      VITAMIN E 400 UNIT capsule  Generic drug:  vitamin E   800 Units, Oral, Daily      Vortioxetine HBr 5 MG tablet tablet  Commonly known as: Trintellix   5 mg, Oral, Nightly               Allergies   Allergen Reactions    Other Other (See Comments)     Sent patient into kidney failure, heart stopped, in ICU for two days.    Parathyroid Hormone (Recomb) Other (See Comments)     Sent patient into kidney failure, heart stopped, in ICU for two days.      Romosozumab Hives     Other reaction(s): Hives    Other reaction(s): Hives   Other reaction(s): Hives    Sulfa Antibiotics Hives     PT NO LONGER ALLERGIC TO THIS PER PATIENT.    Sulfate Hives    Teriparatide Anaphylaxis and Other (See Comments)     Other reaction(s): Unknown    Tramadol Anaphylaxis and Other (See Comments)     Other reaction(s): Unknown, Unknown    Vancomycin Hives and Itching     Other reaction(s): Unknown, Unknown    Nifedipine Provider Review Needed and Other (See Comments)     Rapid heart beat    Rapid heart beat    Other reaction(s): Provider Review Needed   Other reaction(s): Provider Review Needed   Rapid heart beat    Morphine GI Intolerance     Other reaction(s): Chest Pain  increases billiary pressure and causes chest pain REPORTS SHE IS NO LONGER ALLERGIC TO THIS.L    Codeine Nausea And Vomiting and GI Intolerance     Pt reports she is no longer allergic to this       Discharge Disposition:  Short Term Hospital (DC - External)    Diet:  Hospital:  Diet Order   Procedures    Diet: Regular/House Diet; Texture: Regular Texture (IDDSI 7); Fluid Consistency: Thin (IDDSI 0)       Discharge Activity:       CODE STATUS:  Code Status and Medical Interventions:   Ordered at: 02/27/24 0241     Level Of Support Discussed With:    Patient     Code Status (Patient has no pulse and is not breathing):    CPR (Attempt to Resuscitate)     Medical Interventions (Patient has pulse or is breathing):    Full Support       Future Appointments   Date Time Provider Department Center   4/18/2024  8:30 PM [facility]  SLEEP ROOM 2 [facility]           Pertinent  and/or Most Recent Results     PROCEDURES:       LAB RESULTS:      Lab 02/27/24  1503 02/27/24  0439 02/26/24  1805 02/26/24  1224 02/26/24  1131   WBC  --  24.34*  --   --  19.78*   HEMOGLOBIN  --  11.6*  --   --  13.5   HEMATOCRIT  --  34.7  --   --  40.7   PLATELETS  --  472*  --   --  583*   NEUTROS ABS  --   --   --   --  11.38*   IMMATURE GRANS (ABS)  --   --   --   --  0.08*   LYMPHS ABS  --   --   --   --  5.98*   MONOS ABS  --   --   --   --  1.99*   EOS ABS  --   --   --   --  0.30   MCV  --  95.3  --   --  94.2   PROCALCITONIN  --   --   --  0.20  --    LACTATE 1.0 1.1 1.5  --  2.6*         Lab 02/27/24  1503 02/26/24  1224   SODIUM  --  138   POTASSIUM  --  3.4*   CHLORIDE  --  98   CO2  --  23.7   ANION GAP  --  16.3*   BUN  --  8   CREATININE  --  0.67   EGFR  --  108.0   GLUCOSE  --  114*   CALCIUM 9.1 9.5   MAGNESIUM 1.9 1.7         Lab 02/27/24  1503 02/26/24  1224 02/26/24  1131   TOTAL PROTEIN 7.4 8.4  --    ALBUMIN 3.4* 3.9  --    GLOBULIN  --  4.5  --    ALT (SGPT) 47* 66*  --    AST (SGOT) 45* 75*  --    BILIRUBIN 0.8 0.9  --    INDIRECT BILIRUBIN 0.5  --   --    BILIRUBIN DIRECT 0.3  --   --    ALK PHOS 180* 214*  --    LIPASE  --   --  5*         Lab 02/26/24  1224 02/26/24  1131   PROBNP  --  290.7   HSTROP T <6  --                  Brief Urine Lab Results  (Last result in the past 365 days)        Color   Clarity   Blood   Leuk Est   Nitrite   Protein   CREAT   Urine HCG        02/10/24 0242 YELLOW   CLEAR   NEGATIVE   NEGATIVE   NEGATIVE   NEGATIVE                 Microbiology Results (last 10 days)       Procedure Component Value - Date/Time    Blood Culture - Blood, Arm, Right [[id number]]  (Normal) Collected: 02/26/24 1224    Lab Status: Preliminary result Specimen: Blood from Arm, Right Updated: 02/27/24 1230     Blood Culture No growth at 24 hours    COVID-19, FLU A/B, RSV PCR 1 HR TAT - Swab, Nasopharynx [[id number]]  (Abnormal)  Collected: 02/26/24 1150    Lab Status: Final result Specimen: Swab from Nasopharynx Updated: 02/26/24 1238     COVID19 Not Detected     Influenza A PCR Not Detected     Influenza B PCR Detected     RSV, PCR Not Detected    Narrative:      Fact sheet for providers: https://www.fda.gov/media/462853/download    Fact sheet for patients: https://www.fda.gov/media/613935/download    Test performed by PCR.    Blood Culture - Blood, Arm, Left [[id number]]  (Normal) Collected: 02/26/24 1142    Lab Status: Preliminary result Specimen: Blood from Arm, Left Updated: 02/27/24 1145     Blood Culture No growth at 24 hours            CT Abdomen Pelvis With & Without Contrast    Result Date: 2/27/2024    1. No acute abnormality in the abdomen or pelvis. 2. Hepatomegaly and hepatic steatosis. 3. New consolidation in left lower lobe and lingula likely atelectasis, superimposed pneumonia difficult to exclude. 4. Additional chronic findings above.      ESTEFANIA GONZALEZ MD       Electronically Signed and Approved By: ESTEFANIA GONZALEZ MD on 2/27/2024 at 17:15             CT Chest With Contrast Diagnostic    Result Date: 2/26/2024    1. Mild motion degradation.  Within limitations no evidence of pulmonary embolism. 2. Hypoventilation changes, without infectious appearing infiltrate. 3. Hepatomegaly and diffuse steatosis. 4. Other ancillary findings detailed above.     MACEY RAMIREZ MD       Electronically Signed and Approved By: MACEY RAMIREZ MD on 2/26/2024 at 15:11             XR Chest 1 View    Result Date: 2/26/2024    1. No acute cardiopulmonary disease.       Jovanni Jenkins M.D.       Electronically Signed and Approved By: Jovanni Jenkins M.D. on 2/26/2024 at 11:45                           Labs Pending at Discharge:  Pending Labs       Order Current Status    Blood Culture - Blood, Arm, Left Preliminary result    Blood Culture - Blood, Arm, Right Preliminary result              Time spent on Discharge including face to face service:  35  minutes    Electronically signed by Ruddy Jones MD, 02/27/24, 8:19 PM EST.

## 2024-03-01 ENCOUNTER — TRANSCRIBE ORDERS (OUTPATIENT)
Dept: ADMINISTRATIVE | Facility: HOSPITAL | Age: 49
End: 2024-03-01
Payer: MEDICARE

## 2024-03-01 DIAGNOSIS — K74.69: Primary | ICD-10-CM

## 2024-03-02 LAB
BACTERIA SPEC AEROBE CULT: NORMAL
BACTERIA SPEC AEROBE CULT: NORMAL

## 2024-03-06 LAB
QT INTERVAL: 375 MS
QT INTERVAL: 414 MS
QTC INTERVAL: 449 MS
QTC INTERVAL: 474 MS

## 2024-03-20 DIAGNOSIS — Z79.4 TYPE 2 DIABETES MELLITUS WITHOUT COMPLICATION, WITH LONG-TERM CURRENT USE OF INSULIN: ICD-10-CM

## 2024-03-20 DIAGNOSIS — K50.919 CROHN'S DISEASE WITH COMPLICATION, UNSPECIFIED GASTROINTESTINAL TRACT LOCATION: ICD-10-CM

## 2024-03-20 DIAGNOSIS — E11.9 TYPE 2 DIABETES MELLITUS WITHOUT COMPLICATION, WITH LONG-TERM CURRENT USE OF INSULIN: ICD-10-CM

## 2024-03-20 DIAGNOSIS — K31.84 GASTROPARESIS: Primary | ICD-10-CM

## 2024-04-15 ENCOUNTER — HOSPITAL ENCOUNTER (OUTPATIENT)
Dept: DIABETES SERVICES | Facility: HOSPITAL | Age: 49
Discharge: HOME | End: 2024-04-15
Admitting: NURSE PRACTITIONER
Payer: MEDICARE

## 2024-04-15 PROCEDURE — 97802 MEDICAL NUTRITION INDIV IN: CPT

## 2024-04-15 NOTE — CONSULTS
Yen met with MADELYN BEY for Diabetes Education. Consistent with the ADA’s standards of care, a comprehensive assessment/training record has been sent to medical records (see media tab).    Sees endocrinology and motility clinic at [facility], [place]. Hx of Sees a liver specialist at . Has an OmniPod 5 with a Dexcom G6. Reports 1 unit per 6-9 g carb.    Discussed a consistent carb eating pattern. Reviewed carb counting. Reports eating raisin bran this morning and entering 50 g carb into pump. Reviewed portions/labels calculated ~27 g carb was actually consumed. Encouraged balancing carbohydrates with lean proteins. Discussed portions and textures of foods in regards to gastroparesis.    Encouraged to reach out to provider with any concerns about blood sugar so they can make recommendations/adjustments as needed. States understanding.    Yen has been encouraged to call our office with questions or additional education needs. Please place referral for additional services or follow-up should need arise.    Thank you for the referral.

## 2024-04-18 ENCOUNTER — HOSPITAL ENCOUNTER (OUTPATIENT)
Dept: SLEEP MEDICINE | Facility: HOSPITAL | Age: 49
End: 2024-04-18
Payer: MEDICARE

## 2024-04-18 DIAGNOSIS — Z78.9 INTOLERANCE OF CONTINUOUS POSITIVE AIRWAY PRESSURE (CPAP) VENTILATION: ICD-10-CM

## 2024-04-18 DIAGNOSIS — I1A.0 RESISTANT HYPERTENSION: ICD-10-CM

## 2024-04-18 DIAGNOSIS — Z94.89 OTHER TRANSPLANTED ORGAN AND TISSUE STATUS: ICD-10-CM

## 2024-04-18 DIAGNOSIS — G47.33 OSA (OBSTRUCTIVE SLEEP APNEA): ICD-10-CM

## 2024-04-18 PROCEDURE — 95810 POLYSOM 6/> YRS 4/> PARAM: CPT

## 2024-04-19 ENCOUNTER — TELEPHONE (OUTPATIENT)
Dept: DIABETES SERVICES | Facility: HOSPITAL | Age: 49
End: 2024-04-19
Payer: MEDICARE

## 2024-04-19 NOTE — TELEPHONE ENCOUNTER
"Called patient to see how blood sugars were doing. Reports she has not had lows or \"HI\" this week. Reports gastrointestinal symptoms have lessened.    Encouraged her to reach out to endocrinology to make adjustments as needed. Encouraged her to discuss re-training on omnipod with company rep or possibly looking into the extended bolus features on pump- currently using it in automated mode.  "

## 2024-04-24 DIAGNOSIS — I1A.0 RESISTANT HYPERTENSION: ICD-10-CM

## 2024-04-24 DIAGNOSIS — G47.33 OSA (OBSTRUCTIVE SLEEP APNEA): Primary | ICD-10-CM

## 2024-04-24 PROCEDURE — 95810 POLYSOM 6/> YRS 4/> PARAM: CPT | Performed by: PSYCHIATRY & NEUROLOGY

## 2024-04-25 ENCOUNTER — LAB (OUTPATIENT)
Dept: LAB | Facility: HOSPITAL | Age: 49
End: 2024-04-25
Payer: MEDICARE

## 2024-04-25 ENCOUNTER — TRANSCRIBE ORDERS (OUTPATIENT)
Dept: LAB | Facility: HOSPITAL | Age: 49
End: 2024-04-25
Payer: MEDICARE

## 2024-04-25 DIAGNOSIS — K74.60 HEPATIC CIRRHOSIS, UNSPECIFIED HEPATIC CIRRHOSIS TYPE, UNSPECIFIED WHETHER ASCITES PRESENT: ICD-10-CM

## 2024-04-25 DIAGNOSIS — K74.60 HEPATIC CIRRHOSIS, UNSPECIFIED HEPATIC CIRRHOSIS TYPE, UNSPECIFIED WHETHER ASCITES PRESENT: Primary | ICD-10-CM

## 2024-04-25 LAB
ALPHA-FETOPROTEIN: 4.17 NG/ML (ref 0–8.3)
BASOPHILS # BLD AUTO: 0.06 10*3/MM3 (ref 0–0.2)
BASOPHILS NFR BLD AUTO: 0.6 % (ref 0–1.5)
DEPRECATED RDW RBC AUTO: 46.2 FL (ref 37–54)
EOSINOPHIL # BLD AUTO: 0.18 10*3/MM3 (ref 0–0.4)
EOSINOPHIL NFR BLD AUTO: 1.7 % (ref 0.3–6.2)
ERYTHROCYTE [DISTWIDTH] IN BLOOD BY AUTOMATED COUNT: 12.8 % (ref 12.3–15.4)
HCT VFR BLD AUTO: 37.9 % (ref 34–46.6)
HGB BLD-MCNC: 12.1 G/DL (ref 12–15.9)
IMM GRANULOCYTES # BLD AUTO: 0.03 10*3/MM3 (ref 0–0.05)
IMM GRANULOCYTES NFR BLD AUTO: 0.3 % (ref 0–0.5)
INR PPP: 1.06 (ref 0.86–1.15)
LYMPHOCYTES # BLD AUTO: 4.73 10*3/MM3 (ref 0.7–3.1)
LYMPHOCYTES NFR BLD AUTO: 44.1 % (ref 19.6–45.3)
MCH RBC QN AUTO: 31.8 PG (ref 26.6–33)
MCHC RBC AUTO-ENTMCNC: 31.9 G/DL (ref 31.5–35.7)
MCV RBC AUTO: 99.5 FL (ref 79–97)
MONOCYTES # BLD AUTO: 1.2 10*3/MM3 (ref 0.1–0.9)
MONOCYTES NFR BLD AUTO: 11.2 % (ref 5–12)
NEUTROPHILS NFR BLD AUTO: 4.52 10*3/MM3 (ref 1.7–7)
NEUTROPHILS NFR BLD AUTO: 42.1 % (ref 42.7–76)
NRBC BLD AUTO-RTO: 0 /100 WBC (ref 0–0.2)
PLATELET # BLD AUTO: 439 10*3/MM3 (ref 140–450)
PMV BLD AUTO: 12.1 FL (ref 6–12)
PROTHROMBIN TIME: 14 SECONDS (ref 11.8–14.9)
RBC # BLD AUTO: 3.81 10*6/MM3 (ref 3.77–5.28)
WBC NRBC COR # BLD AUTO: 10.72 10*3/MM3 (ref 3.4–10.8)

## 2024-04-25 PROCEDURE — 85025 COMPLETE CBC W/AUTO DIFF WBC: CPT

## 2024-04-25 PROCEDURE — 80053 COMPREHEN METABOLIC PANEL: CPT

## 2024-04-25 PROCEDURE — 36415 COLL VENOUS BLD VENIPUNCTURE: CPT

## 2024-04-25 PROCEDURE — 85610 PROTHROMBIN TIME: CPT

## 2024-04-25 PROCEDURE — 82105 ALPHA-FETOPROTEIN SERUM: CPT

## 2024-04-26 LAB
ALBUMIN SERPL-MCNC: 4.1 G/DL (ref 3.5–5.2)
ALBUMIN/GLOB SERPL: 0.9 G/DL
ALP SERPL-CCNC: 173 U/L (ref 39–117)
ALT SERPL W P-5'-P-CCNC: 47 U/L (ref 1–33)
ANION GAP SERPL CALCULATED.3IONS-SCNC: 11 MMOL/L (ref 5–15)
AST SERPL-CCNC: 44 U/L (ref 1–32)
BILIRUB SERPL-MCNC: 0.3 MG/DL (ref 0–1.2)
BUN SERPL-MCNC: 15 MG/DL (ref 6–20)
BUN/CREAT SERPL: 25 (ref 7–25)
CALCIUM SPEC-SCNC: 9.3 MG/DL (ref 8.6–10.5)
CHLORIDE SERPL-SCNC: 101 MMOL/L (ref 98–107)
CO2 SERPL-SCNC: 23 MMOL/L (ref 22–29)
CREAT SERPL-MCNC: 0.6 MG/DL (ref 0.57–1)
EGFRCR SERPLBLD CKD-EPI 2021: 110.9 ML/MIN/1.73
GLOBULIN UR ELPH-MCNC: 4.5 GM/DL
GLUCOSE SERPL-MCNC: 248 MG/DL (ref 65–99)
POTASSIUM SERPL-SCNC: 3.5 MMOL/L (ref 3.5–5.2)
PROT SERPL-MCNC: 8.6 G/DL (ref 6–8.5)
SODIUM SERPL-SCNC: 135 MMOL/L (ref 136–145)

## 2024-04-30 ENCOUNTER — TELEPHONE (OUTPATIENT)
Dept: SLEEP MEDICINE | Facility: HOSPITAL | Age: 49
End: 2024-04-30
Payer: MEDICARE

## 2024-05-23 ENCOUNTER — TELEPHONE (OUTPATIENT)
Dept: GASTROENTEROLOGY | Facility: CLINIC | Age: 49
End: 2024-05-23

## 2024-05-23 NOTE — TELEPHONE ENCOUNTER
Caller: BLANQUITA    Relationship:     Best call back number: [phone]     What was the call regarding: BLANQUITA IS SENDING [employer] TO FILL OUT FOR THE PT'S XIFAXAN SCRIPT. PLEASE FAX BACK [phone].

## 2024-05-31 ENCOUNTER — APPOINTMENT (OUTPATIENT)
Dept: GENERAL RADIOLOGY | Facility: HOSPITAL | Age: 49
End: 2024-05-31
Payer: MEDICARE

## 2024-05-31 ENCOUNTER — HOSPITAL ENCOUNTER (INPATIENT)
Facility: HOSPITAL | Age: 49
LOS: 4 days | Discharge: HOME | End: 2024-06-04
Attending: EMERGENCY MEDICINE | Admitting: STUDENT IN AN ORGANIZED HEALTH CARE EDUCATION/TRAINING PROGRAM
Payer: MEDICARE

## 2024-05-31 DIAGNOSIS — Z95.828 HISTORY OF INSERTION OF CENTRAL VENOUS ACCESS PORT: ICD-10-CM

## 2024-05-31 DIAGNOSIS — A41.9 SEPSIS, DUE TO UNSPECIFIED ORGANISM, UNSPECIFIED WHETHER ACUTE ORGAN DYSFUNCTION PRESENT: Primary | ICD-10-CM

## 2024-05-31 LAB
ALBUMIN SERPL-MCNC: 3.7 G/DL (ref 3.5–5.2)
ALBUMIN/GLOB SERPL: 0.8 G/DL
ALP SERPL-CCNC: 183 U/L (ref 39–117)
ALT SERPL W P-5'-P-CCNC: 34 U/L (ref 1–33)
ANION GAP SERPL CALCULATED.3IONS-SCNC: 14.4 MMOL/L (ref 5–15)
AST SERPL-CCNC: 44 U/L (ref 1–32)
BASOPHILS # BLD AUTO: 0.03 10*3/MM3 (ref 0–0.2)
BASOPHILS NFR BLD AUTO: 0.2 % (ref 0–1.5)
BILIRUB SERPL-MCNC: 0.8 MG/DL (ref 0–1.2)
BILIRUB UR QL STRIP: NEGATIVE
BUN SERPL-MCNC: 13 MG/DL (ref 6–20)
BUN/CREAT SERPL: 16.9 (ref 7–25)
CALCIUM SPEC-SCNC: 8.6 MG/DL (ref 8.6–10.5)
CHLORIDE SERPL-SCNC: 98 MMOL/L (ref 98–107)
CLARITY UR: CLEAR
CO2 SERPL-SCNC: 22.6 MMOL/L (ref 22–29)
COLOR UR: YELLOW
CREAT SERPL-MCNC: 0.77 MG/DL (ref 0.57–1)
D-LACTATE SERPL-SCNC: 1.9 MMOL/L (ref 0.5–2)
DEPRECATED RDW RBC AUTO: 55.2 FL (ref 37–54)
EGFRCR SERPLBLD CKD-EPI 2021: 95.3 ML/MIN/1.73
EOSINOPHIL # BLD AUTO: 0.17 10*3/MM3 (ref 0–0.4)
EOSINOPHIL NFR BLD AUTO: 1 % (ref 0.3–6.2)
ERYTHROCYTE [DISTWIDTH] IN BLOOD BY AUTOMATED COUNT: 15.7 % (ref 12.3–15.4)
FLUAV SUBTYP SPEC NAA+PROBE: NOT DETECTED
FLUBV RNA ISLT QL NAA+PROBE: NOT DETECTED
GLOBULIN UR ELPH-MCNC: 4.6 GM/DL
GLUCOSE BLDC GLUCOMTR-MCNC: 148 MG/DL (ref 70–99)
GLUCOSE SERPL-MCNC: 136 MG/DL (ref 65–99)
GLUCOSE UR STRIP-MCNC: NEGATIVE MG/DL
HCT VFR BLD AUTO: 38.3 % (ref 34–46.6)
HGB BLD-MCNC: 12.9 G/DL (ref 12–15.9)
HGB UR QL STRIP.AUTO: NEGATIVE
HOLD SPECIMEN: NORMAL
HOLD SPECIMEN: NORMAL
IMM GRANULOCYTES # BLD AUTO: 0.05 10*3/MM3 (ref 0–0.05)
IMM GRANULOCYTES NFR BLD AUTO: 0.3 % (ref 0–0.5)
KETONES UR QL STRIP: NEGATIVE
LEUKOCYTE ESTERASE UR QL STRIP.AUTO: NEGATIVE
LIPASE SERPL-CCNC: 4 U/L (ref 13–60)
LYMPHOCYTES # BLD AUTO: 3.3 10*3/MM3 (ref 0.7–3.1)
LYMPHOCYTES NFR BLD AUTO: 20 % (ref 19.6–45.3)
MAGNESIUM SERPL-MCNC: 1.7 MG/DL (ref 1.6–2.6)
MCH RBC QN AUTO: 32.5 PG (ref 26.6–33)
MCHC RBC AUTO-ENTMCNC: 33.7 G/DL (ref 31.5–35.7)
MCV RBC AUTO: 96.5 FL (ref 79–97)
MONOCYTES # BLD AUTO: 2.68 10*3/MM3 (ref 0.1–0.9)
MONOCYTES NFR BLD AUTO: 16.3 % (ref 5–12)
NEUTROPHILS NFR BLD AUTO: 10.26 10*3/MM3 (ref 1.7–7)
NEUTROPHILS NFR BLD AUTO: 62.2 % (ref 42.7–76)
NITRITE UR QL STRIP: NEGATIVE
NRBC BLD AUTO-RTO: 0 /100 WBC (ref 0–0.2)
NT-PROBNP SERPL-MCNC: 205.1 PG/ML (ref 0–450)
PH UR STRIP.AUTO: 6 [PH] (ref 5–8)
PLATELET # BLD AUTO: 494 10*3/MM3 (ref 140–450)
PMV BLD AUTO: 11.2 FL (ref 6–12)
POTASSIUM SERPL-SCNC: 3.6 MMOL/L (ref 3.5–5.2)
PROCALCITONIN SERPL-MCNC: 0.76 NG/ML (ref 0–0.25)
PROT SERPL-MCNC: 8.3 G/DL (ref 6–8.5)
PROT UR QL STRIP: NEGATIVE
QT INTERVAL: 376 MS
QT INTERVAL: 414 MS
QTC INTERVAL: 448 MS
QTC INTERVAL: 452 MS
RBC # BLD AUTO: 3.97 10*6/MM3 (ref 3.77–5.28)
RSV RNA NPH QL NAA+NON-PROBE: NOT DETECTED
SARS-COV-2 RNA RESP QL NAA+PROBE: NOT DETECTED
SODIUM SERPL-SCNC: 135 MMOL/L (ref 136–145)
SP GR UR STRIP: 1.01 (ref 1–1.03)
TROPONIN T SERPL HS-MCNC: <6 NG/L
UROBILINOGEN UR QL STRIP: NORMAL
WBC NRBC COR # BLD AUTO: 16.49 10*3/MM3 (ref 3.4–10.8)
WHOLE BLOOD HOLD COAG: NORMAL
WHOLE BLOOD HOLD SPECIMEN: NORMAL

## 2024-05-31 PROCEDURE — 85025 COMPLETE CBC W/AUTO DIFF WBC: CPT | Performed by: EMERGENCY MEDICINE

## 2024-05-31 PROCEDURE — 83735 ASSAY OF MAGNESIUM: CPT | Performed by: EMERGENCY MEDICINE

## 2024-05-31 PROCEDURE — 83880 ASSAY OF NATRIURETIC PEPTIDE: CPT | Performed by: EMERGENCY MEDICINE

## 2024-05-31 PROCEDURE — 25010000002 ENOXAPARIN PER 10 MG: Performed by: STUDENT IN AN ORGANIZED HEALTH CARE EDUCATION/TRAINING PROGRAM

## 2024-05-31 PROCEDURE — 25010000002 DAPTOMYCIN PER 1 MG: Performed by: STUDENT IN AN ORGANIZED HEALTH CARE EDUCATION/TRAINING PROGRAM

## 2024-05-31 PROCEDURE — 80053 COMPREHEN METABOLIC PANEL: CPT | Performed by: EMERGENCY MEDICINE

## 2024-05-31 PROCEDURE — 84145 PROCALCITONIN (PCT): CPT | Performed by: STUDENT IN AN ORGANIZED HEALTH CARE EDUCATION/TRAINING PROGRAM

## 2024-05-31 PROCEDURE — 81003 URINALYSIS AUTO W/O SCOPE: CPT

## 2024-05-31 PROCEDURE — 83605 ASSAY OF LACTIC ACID: CPT

## 2024-05-31 PROCEDURE — 36415 COLL VENOUS BLD VENIPUNCTURE: CPT

## 2024-05-31 PROCEDURE — 25010000002 ONDANSETRON PER 1 MG: Performed by: EMERGENCY MEDICINE

## 2024-05-31 PROCEDURE — 25010000002 MORPHINE PER 10 MG: Performed by: EMERGENCY MEDICINE

## 2024-05-31 PROCEDURE — 71045 X-RAY EXAM CHEST 1 VIEW: CPT

## 2024-05-31 PROCEDURE — 82948 REAGENT STRIP/BLOOD GLUCOSE: CPT

## 2024-05-31 PROCEDURE — 87040 BLOOD CULTURE FOR BACTERIA: CPT

## 2024-05-31 PROCEDURE — 83690 ASSAY OF LIPASE: CPT | Performed by: EMERGENCY MEDICINE

## 2024-05-31 PROCEDURE — 84484 ASSAY OF TROPONIN QUANT: CPT | Performed by: EMERGENCY MEDICINE

## 2024-05-31 PROCEDURE — 99223 1ST HOSP IP/OBS HIGH 75: CPT | Performed by: STUDENT IN AN ORGANIZED HEALTH CARE EDUCATION/TRAINING PROGRAM

## 2024-05-31 PROCEDURE — 93005 ELECTROCARDIOGRAM TRACING: CPT

## 2024-05-31 PROCEDURE — 25010000002 ONDANSETRON PER 1 MG: Performed by: STUDENT IN AN ORGANIZED HEALTH CARE EDUCATION/TRAINING PROGRAM

## 2024-05-31 PROCEDURE — 93005 ELECTROCARDIOGRAM TRACING: CPT | Performed by: EMERGENCY MEDICINE

## 2024-05-31 PROCEDURE — 25010000002 CEFEPIME PER 500 MG: Performed by: STUDENT IN AN ORGANIZED HEALTH CARE EDUCATION/TRAINING PROGRAM

## 2024-05-31 PROCEDURE — 99285 EMERGENCY DEPT VISIT HI MDM: CPT

## 2024-05-31 PROCEDURE — 25810000003 SODIUM CHLORIDE 0.9 % SOLUTION

## 2024-05-31 PROCEDURE — 87637 SARSCOV2&INF A&B&RSV AMP PRB: CPT

## 2024-05-31 RX ORDER — ASPIRIN 81 MG/1
324 TABLET, CHEWABLE ORAL ONCE
Status: COMPLETED | OUTPATIENT
Start: 2024-05-31 | End: 2024-05-31

## 2024-05-31 RX ORDER — NICOTINE POLACRILEX 4 MG
15 LOZENGE BUCCAL
Status: DISCONTINUED | OUTPATIENT
Start: 2024-05-31 | End: 2024-06-04 | Stop reason: HOSPADM

## 2024-05-31 RX ORDER — PROPRANOLOL HCL 60 MG
60 CAPSULE, EXTENDED RELEASE 24HR ORAL DAILY
Status: DISCONTINUED | OUTPATIENT
Start: 2024-06-01 | End: 2024-06-04 | Stop reason: HOSPADM

## 2024-05-31 RX ORDER — VITAMIN E 268 MG
2 CAPSULE ORAL DAILY
Status: DISCONTINUED | OUTPATIENT
Start: 2024-05-31 | End: 2024-06-04 | Stop reason: HOSPADM

## 2024-05-31 RX ORDER — DOCUSATE SODIUM 100 MG/1
100 CAPSULE, LIQUID FILLED ORAL 2 TIMES DAILY
Status: DISCONTINUED | OUTPATIENT
Start: 2024-05-31 | End: 2024-06-04 | Stop reason: HOSPADM

## 2024-05-31 RX ORDER — ATOGEPANT 60 MG/1
1 TABLET ORAL DAILY
COMMUNITY
Start: 2024-04-27

## 2024-05-31 RX ORDER — ACETAMINOPHEN 500 MG
1000 TABLET ORAL EVERY 8 HOURS PRN
Status: DISCONTINUED | OUTPATIENT
Start: 2024-05-31 | End: 2024-06-04 | Stop reason: HOSPADM

## 2024-05-31 RX ORDER — DEXTROSE MONOHYDRATE 25 G/50ML
25 INJECTION, SOLUTION INTRAVENOUS
Status: DISCONTINUED | OUTPATIENT
Start: 2024-05-31 | End: 2024-06-04 | Stop reason: HOSPADM

## 2024-05-31 RX ORDER — INSULIN GLARGINE 100 [IU]/ML
47 INJECTION, SOLUTION SUBCUTANEOUS DAILY
COMMUNITY

## 2024-05-31 RX ORDER — PROCHLORPERAZINE MALEATE 5 MG/1
10 TABLET ORAL EVERY 8 HOURS PRN
Status: DISCONTINUED | OUTPATIENT
Start: 2024-05-31 | End: 2024-06-04 | Stop reason: HOSPADM

## 2024-05-31 RX ORDER — PANTOPRAZOLE SODIUM 40 MG/1
40 TABLET, DELAYED RELEASE ORAL
Status: DISCONTINUED | OUTPATIENT
Start: 2024-05-31 | End: 2024-06-04 | Stop reason: HOSPADM

## 2024-05-31 RX ORDER — HYDROXYZINE 50 MG/1
50 TABLET, FILM COATED ORAL NIGHTLY
COMMUNITY

## 2024-05-31 RX ORDER — PROMETHAZINE HYDROCHLORIDE 25 MG/1
25 TABLET ORAL EVERY 4 HOURS PRN
COMMUNITY
Start: 2024-03-19

## 2024-05-31 RX ORDER — ENOXAPARIN SODIUM 100 MG/ML
40 INJECTION SUBCUTANEOUS DAILY
Status: DISCONTINUED | OUTPATIENT
Start: 2024-05-31 | End: 2024-06-04 | Stop reason: HOSPADM

## 2024-05-31 RX ORDER — TRAZODONE HYDROCHLORIDE 150 MG/1
150 TABLET ORAL NIGHTLY
COMMUNITY

## 2024-05-31 RX ORDER — LISINOPRIL 40 MG/1
40 TABLET ORAL 2 TIMES DAILY
COMMUNITY

## 2024-05-31 RX ORDER — HYDROCHLOROTHIAZIDE 12.5 MG/1
12.5 TABLET ORAL DAILY
Status: DISCONTINUED | OUTPATIENT
Start: 2024-06-01 | End: 2024-06-04 | Stop reason: HOSPADM

## 2024-05-31 RX ORDER — HYDROCORTISONE 10 MG/1
15 TABLET ORAL 2 TIMES DAILY
Status: DISCONTINUED | OUTPATIENT
Start: 2024-05-31 | End: 2024-06-04 | Stop reason: HOSPADM

## 2024-05-31 RX ORDER — NICOTINE POLACRILEX 4 MG
15 LOZENGE BUCCAL
Status: DISCONTINUED | OUTPATIENT
Start: 2024-05-31 | End: 2024-05-31

## 2024-05-31 RX ORDER — LISINOPRIL 20 MG/1
40 TABLET ORAL 2 TIMES DAILY
Status: DISCONTINUED | OUTPATIENT
Start: 2024-05-31 | End: 2024-06-04 | Stop reason: HOSPADM

## 2024-05-31 RX ORDER — LANOLIN ALCOHOL/MO/W.PET/CERES
2 CREAM (GRAM) TOPICAL DAILY
COMMUNITY
Start: 2024-05-03

## 2024-05-31 RX ORDER — IBUPROFEN 600 MG/1
1 TABLET ORAL
Status: DISCONTINUED | OUTPATIENT
Start: 2024-05-31 | End: 2024-05-31

## 2024-05-31 RX ORDER — SODIUM CHLORIDE 9 MG/ML
40 INJECTION, SOLUTION INTRAVENOUS AS NEEDED
Status: DISCONTINUED | OUTPATIENT
Start: 2024-05-31 | End: 2024-06-04 | Stop reason: HOSPADM

## 2024-05-31 RX ORDER — IMMUNE GLOBULIN (HUMAN) 10 G/100ML
5 INJECTION INTRAVENOUS; SUBCUTANEOUS ONCE
COMMUNITY
Start: 2024-05-23

## 2024-05-31 RX ORDER — SODIUM CHLORIDE 0.9 % (FLUSH) 0.9 %
10 SYRINGE (ML) INJECTION AS NEEDED
Status: DISCONTINUED | OUTPATIENT
Start: 2024-05-31 | End: 2024-06-04 | Stop reason: HOSPADM

## 2024-05-31 RX ORDER — HYDROCORTISONE 5 MG/1
15 TABLET ORAL 2 TIMES DAILY
COMMUNITY
Start: 2024-05-04

## 2024-05-31 RX ORDER — SODIUM CHLORIDE 0.9 % (FLUSH) 0.9 %
10 SYRINGE (ML) INJECTION EVERY 12 HOURS SCHEDULED
Status: DISCONTINUED | OUTPATIENT
Start: 2024-05-31 | End: 2024-06-04 | Stop reason: HOSPADM

## 2024-05-31 RX ORDER — SUMATRIPTAN 25 MG/1
25 TABLET, FILM COATED ORAL ONCE AS NEEDED
Status: DISCONTINUED | OUTPATIENT
Start: 2024-05-31 | End: 2024-06-04 | Stop reason: HOSPADM

## 2024-05-31 RX ORDER — HYDROCHLOROTHIAZIDE 12.5 MG/1
12.5 CAPSULE, GELATIN COATED ORAL EVERY MORNING
COMMUNITY
Start: 2024-05-03

## 2024-05-31 RX ORDER — ONDANSETRON 2 MG/ML
8 INJECTION INTRAMUSCULAR; INTRAVENOUS EVERY 6 HOURS PRN
Status: DISCONTINUED | OUTPATIENT
Start: 2024-05-31 | End: 2024-06-04 | Stop reason: HOSPADM

## 2024-05-31 RX ORDER — ONDANSETRON 2 MG/ML
4 INJECTION INTRAMUSCULAR; INTRAVENOUS ONCE
Status: COMPLETED | OUTPATIENT
Start: 2024-05-31 | End: 2024-05-31

## 2024-05-31 RX ORDER — IBUPROFEN 600 MG/1
1 TABLET ORAL
Status: DISCONTINUED | OUTPATIENT
Start: 2024-05-31 | End: 2024-06-04 | Stop reason: HOSPADM

## 2024-05-31 RX ADMIN — Medication 10 ML: at 20:34

## 2024-05-31 RX ADMIN — ONDANSETRON 4 MG: 2 INJECTION INTRAMUSCULAR; INTRAVENOUS at 12:37

## 2024-05-31 RX ADMIN — HYDROCORTISONE 15 MG: 10 TABLET ORAL at 21:46

## 2024-05-31 RX ADMIN — ASPIRIN 324 MG: 81 TABLET, CHEWABLE ORAL at 12:36

## 2024-05-31 RX ADMIN — PANTOPRAZOLE SODIUM 40 MG: 40 TABLET, DELAYED RELEASE ORAL at 18:28

## 2024-05-31 RX ADMIN — CEFEPIME 2000 MG: 2 INJECTION, POWDER, FOR SOLUTION INTRAVENOUS at 18:28

## 2024-05-31 RX ADMIN — DOCUSATE SODIUM 100 MG: 100 CAPSULE, LIQUID FILLED ORAL at 20:33

## 2024-05-31 RX ADMIN — TRAZODONE HYDROCHLORIDE 150 MG: 50 TABLET ORAL at 20:33

## 2024-05-31 RX ADMIN — PANCRELIPASE 12000 UNITS OF LIPASE: 30000; 6000; 19000 CAPSULE, DELAYED RELEASE PELLETS ORAL at 18:28

## 2024-05-31 RX ADMIN — Medication 360 MG: at 18:28

## 2024-05-31 RX ADMIN — RIFAXIMIN 550 MG: 550 TABLET ORAL at 20:33

## 2024-05-31 RX ADMIN — DAPTOMYCIN 600 MG: 350 INJECTION, POWDER, LYOPHILIZED, FOR SOLUTION INTRAVENOUS at 20:32

## 2024-05-31 RX ADMIN — SUMATRIPTAN SUCCINATE 25 MG: 25 TABLET ORAL at 20:33

## 2024-05-31 RX ADMIN — ONDANSETRON 8 MG: 2 INJECTION INTRAMUSCULAR; INTRAVENOUS at 18:28

## 2024-05-31 RX ADMIN — ENOXAPARIN SODIUM 40 MG: 100 INJECTION SUBCUTANEOUS at 18:28

## 2024-05-31 RX ADMIN — SODIUM CHLORIDE 1000 ML: 9 INJECTION, SOLUTION INTRAVENOUS at 12:36

## 2024-05-31 RX ADMIN — MORPHINE SULFATE 4 MG: 4 INJECTION, SOLUTION INTRAMUSCULAR; INTRAVENOUS at 12:36

## 2024-05-31 RX ADMIN — LISINOPRIL 40 MG: 20 TABLET ORAL at 20:33

## 2024-05-31 NOTE — PLAN OF CARE
Goal Outcome Evaluation:              Outcome Evaluation: Pt vss. pt resting well during shift. Sumatriptan requested from pharmacy for headache. Order for patient insulin pump obtained from MD. See dc care plan for further details.

## 2024-05-31 NOTE — ED PROVIDER NOTES
Time: 2:43 PM EDT  Date of encounter:  5/31/2024  Independent Historian/Clinical History and Information was obtained by:   Patient    History is limited by: N/A    Chief Complaint: Chest tightness and weakness      History of Present Illness:  Patient is a 48 y.o. year old female who presents to the emergency department for evaluation of chest tightness and weakness that began 2 days ago.  Patient has a port in place that she takes IV fluids through daily as well as medication.  Patient states she receives IVIG through port.  Patient states she has a history of bacteremia and sepsis and is feeling like she has the past 2 times she has had bacteremia.    HPI    Patient Care Team  Primary Care Provider: Justin Daniels APRN    Past Medical History:     Allergies   Allergen Reactions    Other Other (See Comments)     Sent patient into kidney failure, heart stopped, in ICU for two days.    Parathyroid Hormone (Recomb) Other (See Comments)     Sent patient into kidney failure, heart stopped, in ICU for two days.      Romosozumab Hives     Other reaction(s): Hives    Other reaction(s): Hives   Other reaction(s): Hives    Sulfate Hives    Teriparatide Anaphylaxis and Other (See Comments)     Other reaction(s): Unknown    Tramadol Anaphylaxis and Other (See Comments)     Other reaction(s): Unknown, Unknown    Vancomycin Hives and Itching     Other reaction(s): Unknown, Unknown    Nifedipine Provider Review Needed and Other (See Comments)     Rapid heart beat    Rapid heart beat    Other reaction(s): Provider Review Needed   Other reaction(s): Provider Review Needed   Rapid heart beat     Past Medical History:   Diagnosis Date    Adrenal insufficiency     Allergic Unsure    Foods, [place], Vancomycin    Anemia     Anxiety     Arthritis     Asthma Unsure    Cholelithiasis Unsure    Cirrhosis     Clotting disorder Unsure    Colon polyp     Crohn's disease     Depression     Diabetes mellitus     Gastroparesis     GERD  (gastroesophageal reflux disease)     History of MRSA infection     Hyperlipidemia     Hypertension     Hypothyroidism     Irritable bowel syndrome Unsure    Liver disease     Migraines     Pancreatitis     Urinary tract infection Unsure    Interstitial Cystitis     Past Surgical History:   Procedure Laterality Date    ABSCESS DRAINAGE  12/24/2019    Fluoroscopically guided abscess drainage, [facility]    ADENOIDECTOMY      BACK SURGERY      L4 L5    CHOLECYSTECTOMY N/A     COLONOSCOPY  04/24/2019    NBIH, 3 mm polyp    DILATATION AND CURETTAGE      ENDOMETRIAL ABLATION      ENDOSCOPY N/A 04/24/2019    Normal    ENTEROSCOPY SMALL BOWEL      ERCP  2019    GASTRIC STIMULATOR IMPLANT SURGERY      GASTROJEJUNOSTOMY W/ JEJUNOSTOMY TUBE      removed    HYSTERECTOMY      LYMPH NODE BIOPSY      PANCREATECTOMY  12/2019    TPIAT    PELVIC FLOOR REPAIR      SINUS SURGERY      SPLENECTOMY      TONSILLECTOMY      VENOUS ACCESS DEVICE (PORT) INSERTION       Family History   Problem Relation Age of Onset    Heart disease Father     Hypothyroidism Father     Anxiety disorder Father     Depression Father     Anxiety disorder Mother     Hypothyroidism Mother     Breast cancer Mother     Colon polyps Mother     Alcohol abuse Paternal Grandfather     Heart disease Paternal Grandfather     Depression Brother     Hypertension Brother        Home Medications:  Prior to Admission medications    Medication Sig Start Date End Date Taking? Authorizing Provider   Adalimumab (Humira Pen) 40 MG/0.4ML Pen-injector Kit Inject 40 mg under the skin into the appropriate area as directed Every 14 (Fourteen) Days. 10/20/23   Mariela Gr APRN   cetirizine (zyrTEC) 10 MG tablet Take 1 tablet by mouth Daily. 7/12/22   ProviderPollo MD   DAPTOmycin (CUBICIN) 500 MG injection Infuse 500 mg into a venous catheter Daily. 2/21/24   Pollo Ray MD   diphenhydrAMINE (BENADRYL) 50 MG/ML injection Infuse 0.5-1 mL into a venous catheter  Every 6 (Six) Hours As Needed. 9/21/22   Pollo Ray MD   docusate sodium (COLACE) 100 MG capsule Take 1 capsule by mouth 2 (Two) Times a Day. 4/16/19   Pollo Ray MD   Enulose 10 GM/15ML solution solution (encephalopathy) Take 45 mL by mouth 3 (Three) Times a Day.    Pollo Ray MD   heparin, porcine, 100-0.45 UNIT/ML-% infusion Infuse  into a venous catheter Continuous.    Pollo Ray MD   hydrocortisone (CORTEF) 5 MG tablet Take 3 tablets by mouth 2 (Two) Times a Day. 8/3/22   Pollo Ray MD   hydrOXYzine (ATARAX) 25 MG tablet Take 1 tablet by mouth Every 6 (Six) Hours As Needed for Itching.    Pollo Ray MD   Insulin Disposable Pump (Omnipod 5 G6 Pod, Gen 5,) misc CHANGE POD EVERY 3 DAYS 12/5/23   Pollo Ray MD   lansoprazole (PREVACID) 30 MG capsule Take 1 capsule by mouth 2 (Two) Times a Day. 9/12/23 9/11/24  Mariela Gr APRN   levothyroxine (SYNTHROID, LEVOTHROID) 125 MCG tablet Take 1 tablet by mouth Daily. 9/1/20   Pollo Ray MD   lisinopril (PRINIVIL,ZESTRIL) 40 MG tablet Take 1 tablet by mouth 2 (Two) Times a Day.    Pollo Ray MD   melatonin 5 MG tablet tablet Take 1 tablet by mouth Every Night.    Pollo Ray MD   NovoLOG 100 UNIT/ML injection Inject  under the skin into the appropriate area as directed. 5/12/22   Pollo Ray MD   ondansetron (ZOFRAN) 2 mg/mL injection Infuse 4 mL into a venous catheter Every 6 (Six) Hours As Needed for Nausea or Vomiting. 10/4/22   Pollo Ray MD   Pancrelipase, Lip-Prot-Amyl, (Creon) 89951-890219 units capsule delayed-release particles capsule Take 3 tabs with meals and 2 with snacks 11/14/23   Mariela Gr APRN   prochlorperazine (COMPAZINE) 10 MG tablet Take 1 tablet by mouth Every 8 (Eight) Hours As Needed for Nausea or Vomiting. 6/6/23   Nathen, Patrizia G, APRN   promethazine (PHENERGAN) 25 MG/ML injection Infuse 1 mL into a venous  catheter Every 4 (Four) Hours As Needed. 10/4/22   ProviderPollo MD   propranolol LA (INDERAL LA) 60 MG 24 hr capsule Take 1 capsule by mouth Daily.  Patient taking differently: Take 1 capsule by mouth 2 (Two) Times a Day. 8/31/23   Dulce Camejo APRN   riFAXIMin (XIFAXAN) 550 MG tablet Take 1 tablet by mouth Every 12 (Twelve) Hours. 5/9/24 5/9/25  Mariela Gr APRN   sodium chloride 0.9 % solution Infuse  into a venous catheter Daily. 1 liter everyday 5/23/22   Emergency, Nurse Epic, RN   SUMAtriptan (IMITREX) 25 MG tablet Take 1 tablet by mouth 1 (One) Time As Needed. 8/8/23   ProviderPollo MD   traZODone (DESYREL) 100 MG tablet Take 1 tablet by mouth Every Night. 2/2/24   ProviderPollo MD   Ubrelvy 100 MG tablet Take 1 tablet by mouth 1 (One) Time As Needed (Migraine). 12/7/23   Dulce Camejo APRN   VITAMIN E 400 UNIT capsule Take 2 capsules by mouth Daily.    Provider, MD Pollo   Vortioxetine HBr (Trintellix) 5 MG tablet tablet Take 1 tablet by mouth Every Night for 30 days. 12/12/23 2/27/24  Kayla Graham PA-C        Social History:   Social History     Tobacco Use    Smoking status: Never    Smokeless tobacco: Never   Vaping Use    Vaping status: Never Used   Substance Use Topics    Alcohol use: Never    Drug use: Never         Review of Systems:  Review of Systems   Constitutional:  Negative for chills and fever.   HENT:  Negative for congestion, rhinorrhea and sore throat.    Eyes:  Negative for pain and visual disturbance.   Respiratory:  Positive for chest tightness. Negative for apnea, cough and shortness of breath.    Cardiovascular:  Negative for chest pain and palpitations.   Gastrointestinal:  Negative for abdominal pain, diarrhea, nausea and vomiting.   Genitourinary:  Negative for difficulty urinating and dysuria.   Musculoskeletal:  Negative for joint swelling and myalgias.   Skin:  Negative for color change.   Neurological:  Positive for  "weakness. Negative for seizures and headaches.   Psychiatric/Behavioral: Negative.     All other systems reviewed and are negative.       Physical Exam:  /74   Pulse 70   Temp 99.4 °F (37.4 °C) (Oral)   Resp 23   Ht 170.2 cm (67\")   Wt 88.2 kg (194 lb 7.1 oz)   SpO2 92%   BMI 30.45 kg/m²     Physical Exam  Vitals and nursing note reviewed.   Constitutional:       General: She is not in acute distress.     Appearance: Normal appearance. She is not toxic-appearing.   HENT:      Head: Normocephalic and atraumatic.      Jaw: There is normal jaw occlusion.   Eyes:      General: Lids are normal.      Extraocular Movements: Extraocular movements intact.      Conjunctiva/sclera: Conjunctivae normal.      Pupils: Pupils are equal, round, and reactive to light.   Cardiovascular:      Rate and Rhythm: Normal rate and regular rhythm.      Pulses: Normal pulses.      Heart sounds: Normal heart sounds.   Pulmonary:      Effort: Pulmonary effort is normal. No respiratory distress.      Breath sounds: Normal breath sounds. No wheezing or rhonchi.   Abdominal:      General: Abdomen is flat.      Palpations: Abdomen is soft.      Tenderness: There is no abdominal tenderness. There is no guarding or rebound.   Musculoskeletal:         General: Normal range of motion.      Cervical back: Normal range of motion and neck supple.      Right lower leg: No edema.      Left lower leg: No edema.   Skin:     General: Skin is warm and dry.   Neurological:      Mental Status: She is alert and oriented to person, place, and time. Mental status is at baseline.   Psychiatric:         Mood and Affect: Mood normal.                  Procedures:  Procedures      Medical Decision Making:      Comorbidities that affect care:    Diabetes, hypertension, hyperlipidemia, asthma    External Notes reviewed:          The following orders were placed and all results were independently analyzed by me:  Orders Placed This Encounter   Procedures    " COVID PRE-OP / PRE-PROCEDURE SCREENING ORDER (NO ISOLATION) - Swab, Nasopharynx    COVID-19, FLU A/B, RSV PCR 1 HR TAT - Swab, Nasopharynx    Blood Culture - Blood,    Blood Culture - Blood,    XR Chest 1 View    [place] Draw    High Sensitivity Troponin T    Comprehensive Metabolic Panel    Lipase    BNP    Magnesium    CBC Auto Differential    Lactic Acid, Plasma    Urinalysis With Microscopic If Indicated (No Culture) - Urine, Clean Catch    NPO Diet NPO Type: Strict NPO    Undress & Gown    Continuous Pulse Oximetry    Inpatient Hospitalist Consult    Oxygen Therapy- Nasal Cannula; Titrate 1-6 LPM Per SpO2; 90 - 95%    Insert Peripheral IV    CBC & Differential    Green Top (Gel)    Lavender Top    Gold Top - SST    Light Blue Top       Medications Given in the Emergency Department:  Medications   sodium chloride 0.9 % flush 10 mL (has no administration in time range)   aspirin chewable tablet 324 mg (324 mg Oral Given 5/31/24 1236)   sodium chloride 0.9 % bolus 1,000 mL (1,000 mL Intravenous New Bag 5/31/24 1236)   morphine injection 4 mg (4 mg Intravenous Given 5/31/24 1236)   ondansetron (ZOFRAN) injection 4 mg (4 mg Intravenous Given 5/31/24 1237)        ED Course:         Labs:    Lab Results (last 24 hours)       Procedure Component Value Units Date/Time    High Sensitivity Troponin T [[id number]]  (Normal) Collected: 05/31/24 1156    Specimen: Blood Updated: 05/31/24 1233     HS Troponin T <6 ng/L     Narrative:      High Sensitive Troponin T Reference Range:  <14.0 ng/L- Negative Female for AMI  <22.0 ng/L- Negative Male for AMI  >=14 - Abnormal Female indicating possible myocardial injury.  >=22 - Abnormal Male indicating possible myocardial injury.   Clinicians would have to utilize clinical acumen, EKG, Troponin, and serial changes to determine if it is an Acute Myocardial Infarction or myocardial injury due to an underlying chronic condition.         CBC & Differential [[id number]]  (Abnormal)  Collected: 05/31/24 1156    Specimen: Blood Updated: 05/31/24 1213    Narrative:      The following orders were created for panel order CBC & Differential.  Procedure                               Abnormality         Status                     ---------                               -----------         ------                     CBC Auto Differential[[id number]]        Abnormal            Final result                 Please view results for these tests on the individual orders.    Comprehensive Metabolic Panel [[id number]]  (Abnormal) Collected: 05/31/24 1156    Specimen: Blood Updated: 05/31/24 1233     Glucose 136 mg/dL      BUN 13 mg/dL      Creatinine 0.77 mg/dL      Sodium 135 mmol/L      Potassium 3.6 mmol/L      Chloride 98 mmol/L      CO2 22.6 mmol/L      Calcium 8.6 mg/dL      Total Protein 8.3 g/dL      Albumin 3.7 g/dL      ALT (SGPT) 34 U/L      AST (SGOT) 44 U/L      Alkaline Phosphatase 183 U/L      Total Bilirubin 0.8 mg/dL      Globulin 4.6 gm/dL      A/G Ratio 0.8 g/dL      BUN/Creatinine Ratio 16.9     Anion Gap 14.4 mmol/L      eGFR 95.3 mL/min/1.73     Narrative:      GFR Normal >60  Chronic Kidney Disease <60  Kidney Failure <15      Lipase [[id number]]  (Abnormal) Collected: 05/31/24 1156    Specimen: Blood Updated: 05/31/24 1233     Lipase 4 U/L     BNP [[id number]]  (Normal) Collected: 05/31/24 1156    Specimen: Blood Updated: 05/31/24 1230     proBNP 205.1 pg/mL     Narrative:      This assay is used as an aid in the diagnosis of individuals suspected of having heart failure. It can be used as an aid in the diagnosis of acute decompensated heart failure (ADHF) in patients presenting with signs and symptoms of ADHF to the emergency department (ED). In addition, NT-proBNP of <300 pg/mL indicates ADHF is not likely.    Age Range Result Interpretation  NT-proBNP Concentration (pg/mL:      <50             Positive            >450                   Gray                 300-450                     Negative             <300    50-75           Positive            >900                  Gray                300-900                  Negative            <300      >75             Positive            >1800                  Gray                300-1800                  Negative            <300    Magnesium [[id number]]  (Normal) Collected: 05/31/24 1156    Specimen: Blood Updated: 05/31/24 1233     Magnesium 1.7 mg/dL     CBC Auto Differential [[id number]]  (Abnormal) Collected: 05/31/24 1156    Specimen: Blood Updated: 05/31/24 1213     WBC 16.49 10*3/mm3      RBC 3.97 10*6/mm3      Hemoglobin 12.9 g/dL      Hematocrit 38.3 %      MCV 96.5 fL      MCH 32.5 pg      MCHC 33.7 g/dL      RDW 15.7 %      RDW-SD 55.2 fl      MPV 11.2 fL      Platelets 494 10*3/mm3      Neutrophil % 62.2 %      Lymphocyte % 20.0 %      Monocyte % 16.3 %      Eosinophil % 1.0 %      Basophil % 0.2 %      Immature Grans % 0.3 %      Neutrophils, Absolute 10.26 10*3/mm3      Lymphocytes, Absolute 3.30 10*3/mm3      Monocytes, Absolute 2.68 10*3/mm3      Eosinophils, Absolute 0.17 10*3/mm3      Basophils, Absolute 0.03 10*3/mm3      Immature Grans, Absolute 0.05 10*3/mm3      nRBC 0.0 /100 WBC     COVID PRE-OP / PRE-PROCEDURE SCREENING ORDER (NO ISOLATION) - Swab, Nasopharynx [[id number]]  (Normal) Collected: 05/31/24 1229    Specimen: Swab from Nasopharynx Updated: 05/31/24 1312    Narrative:      The following orders were created for panel order COVID PRE-OP / PRE-PROCEDURE SCREENING ORDER (NO ISOLATION) - Swab, Nasopharynx.  Procedure                               Abnormality         Status                     ---------                               -----------         ------                     COVID-19, FLU A/B, RSV P...[[id number]]  Normal              Final result                 Please view results for these tests on the individual orders.    COVID-19, FLU A/B, RSV PCR 1 HR TAT - Swab, Nasopharynx [[id number]]  (Normal) Collected: 05/31/24  1229    Specimen: Swab from Nasopharynx Updated: 05/31/24 1311     COVID19 Not Detected     Influenza A PCR Not Detected     Influenza B PCR Not Detected     RSV, PCR Not Detected    Narrative:      Fact sheet for providers: https://www.fda.gov/media/037554/download    Fact sheet for patients: https://www.fda.gov/media/138128/download    Test performed by PCR.    Blood Culture - Blood, Arm, Left [[id number]] Collected: 05/31/24 1251    Specimen: Blood from Arm, Left Updated: 05/31/24 1259    Blood Culture - Blood, Blood, Central Line [[id number]] Collected: 05/31/24 1251    Specimen: Blood, Central Line Updated: 05/31/24 1258    Lactic Acid, Plasma [[id number]]  (Normal) Collected: 05/31/24 1251    Specimen: Blood Updated: 05/31/24 1317     Lactate 1.9 mmol/L     Urinalysis With Microscopic If Indicated (No Culture) - Urine, Clean Catch [[id number]]  (Normal) Collected: 05/31/24 1439    Specimen: Urine, Clean Catch Updated: 05/31/24 1447     Color, UA Yellow     Appearance, UA Clear     pH, UA 6.0     Specific Gravity, UA 1.013     Glucose, UA Negative     Ketones, UA Negative     Bilirubin, UA Negative     Blood, UA Negative     Protein, UA Negative     Leuk Esterase, UA Negative     Nitrite, UA Negative     Urobilinogen, UA 1.0 E.U./dL    Narrative:      Urine microscopic not indicated.             Imaging:    XR Chest 1 View    Result Date: 5/31/2024   DATE OF EXAM: 5/31/2024 12:00 PM  PROCEDURE: XR CHEST 1 VW-  INDICATIONS: Chest Pain Triage Protocol  COMPARISON: 2/26/2021 and prior  TECHNIQUE: Portable Chest  FINDINGS:  Study is limited by overlying support and monitoring apparatus. Heart mediastinal contours are stable. Left IJ approach catheter noted projecting over the SVC. Lung volumes are relatively low there are some vascular crowding at the lung bases. No focal consolidation otherwise noted. Structures are unremarkable      IMPRESSION : Low lung volume exam. No acute process.[  Electronically Signed  Kel Foreman On:5/31/2024 12:10 PM         Differential Diagnosis and Discussion:    Chest Pain:  Based on the patient's signs and symptoms, I considered aortic dissection, myocardial infaction, pulmonary embolism, cardiac tamponade, pericarditis, pneumothorax, musculoskeletal chest pain and other differential diagnosis as an etiology of the patient's chest pain.   Weakness: Based on the patient's history, signs, and symptoms, the diffential diagnosis includes but is not limited to meningitis, stroke, sepsis, subarachnoid hemorrhage, intracranial bleeding, encephalitis, acute uti, dehydration, MS, myasthenia gravis, [place], migraine variant, neuromuscular disorders vertigo, electrolyte imbalance, and metabolic disorders.    All labs were reviewed and interpreted by me.  All X-rays impressions were independently interpreted by me.  EKG was interpreted by supervising attending.    MDM     Amount and/or Complexity of Data Reviewed  Clinical lab tests: reviewed  Tests in the radiology section of CPT®: reviewed  Tests in the medicine section of CPT®: reviewed  Decide to obtain previous medical records or to obtain history from someone other than the patient: yes    CBC shows evidence of leukocytosis.  Patient meets SIRS criteria given she was tachypneic upon arrival, has a low-grade fever, and has leukocytosis.  I gave the patient fluids in the ED and after talking to hospitalist started the patient on IV antibiotics and will admit.  I spoke to Dr. Arvizu who agrees to admit the patient.        Sepsis criteria was met in the emergency department and the Sepsis protocol (including antibiotic administration) was initiated.      SIRS criteria considered:   1.  Temperature > 100.4 or <96.8    2.  Heart Rate > 90    3.  Respiratory Rate > 22    4.  WBC > 12K or <4K.             Severe Sepsis:     Respiratory: Mechanical Ventilation or Bipap  Hypotension: SBP > 90 or MAP < 65  Renal: Creatinine > 2  Metabolic: Lactic  Acid > 2  Hematologic: Platelets < 100K or INR > 1.5  Hepatic: BILI  >  2  CNS: Sudden AMS     Septic Shock:     Severe Sepsis + Persistent hypotension or Lactic Acid > 4     Normal saline bolus, Antibiotics, and final disposition was based on these definitions.        Sepsis was recognized at 1440    Antibiotics were ordered.           Patient did not receive the recommended 30ml/kg fluid bolus for sepsis because it would be harmful or detrimental to the patient.      The patient was ordered 1 liter of fluids.    Total Critical Care time of 30 minutes. Total critical care time documented does not include time spent on separately billed procedures for services of nurses or physician assistants. I personally saw and examined the patient. I have reviewed all diagnostic interpretations and treatment plans as written. I was present for the key portions of any procedures performed and the inclusive time noted in any critical care statement. Critical care time includes patient management by me, time spent at the patients bedside,  time to review lab and imaging results, discussing patient care, documentation in the medical record, and time spent with family or caregiver.    Patient Care Considerations:          Consultants/Shared Management Plan:     I spoke to Dr. Arvizu who agrees to admit the patient.    Social Determinants of Health:          Disposition and Care Coordination:    Admit:   Through independent evaluation of the patient's history, physical, and imperical data, the patient meets criteria for inpatient admission to the hospital.        Final diagnoses:   Sepsis, due to unspecified organism, unspecified whether acute organ dysfunction present        ED Disposition       ED Disposition   Decision to Admit    Condition   --    Comment   --               This medical record created using voice recognition software.             Clovis Pardo PA-C  05/31/24 [id number]

## 2024-05-31 NOTE — H&P
[facility]  Date: 2024   Patient Name: Kayla Gan  : 1975  MRN: [id number]  Primary Care Physician:  Justin Daniels, LAZARO  Date of admission: 2024    Subjective   Subjective     Chief Complaint: Chest tightness and weakness    HPI:    Kayla Gan is a 48 y.o. female with past medical history significant for diabetes, HTN, hypothyroidism, adrenal insufficiency, s/p pancreatectomy, gastroparesis, Crohn's disease, cirrhosis and GERD who presented for chest tightness and weakness    She started having chest tightness and generalized weakness about 2 days ago.  She has chest port for IV fluids and medications.  She said she receives IVIG through the port.  She said she has history of bacteremia and sepsis, she feels like this episode feels similar to the last 2 time she had the bacteremia.  Denies fever, chills, no abdominal pain, no dysuria.      Personal History     Past Medical History:  Past Medical History:   Diagnosis Date    Adrenal insufficiency     Allergic Unsure    Foods, Ulram, Vancomycin    Anemia     Anxiety     Arthritis     Asthma Unsure    Cholelithiasis Unsure    Cirrhosis     Clotting disorder Unsure    Colon polyp     Crohn's disease     Depression     Diabetes mellitus     Gastroparesis     GERD (gastroesophageal reflux disease)     History of MRSA infection     Hyperlipidemia     Hypertension     Hypothyroidism     Irritable bowel syndrome Unsure    Liver disease     Migraines     Pancreatitis     Urinary tract infection Unsure    Interstitial Cystitis           Past Surgical History:  Past Surgical History:   Procedure Laterality Date    ABSCESS DRAINAGE  2019    Fluoroscopically guided abscess drainage, [facility]    ADENOIDECTOMY      BACK SURGERY      L4 L5    CHOLECYSTECTOMY N/A     COLONOSCOPY  2019    NBIH, 3 mm polyp    DILATATION AND CURETTAGE      ENDOMETRIAL ABLATION      ENDOSCOPY N/A 2019     Normal    ENTEROSCOPY SMALL BOWEL      ERCP  2019    GASTRIC STIMULATOR IMPLANT SURGERY      GASTROJEJUNOSTOMY W/ JEJUNOSTOMY TUBE      removed    HYSTERECTOMY      LYMPH NODE BIOPSY      PANCREATECTOMY  12/2019    TPIAT    PELVIC FLOOR REPAIR      SINUS SURGERY      SPLENECTOMY      TONSILLECTOMY      VENOUS ACCESS DEVICE (PORT) INSERTION         Family History:   Family History   Problem Relation Age of Onset    Heart disease Father     Hypothyroidism Father     Anxiety disorder Father     Depression Father     Anxiety disorder Mother     Hypothyroidism Mother     Breast cancer Mother     Colon polyps Mother     Alcohol abuse Paternal Grandfather     Heart disease Paternal Grandfather     Depression Brother     Hypertension Brother        Social History:   Social History     Socioeconomic History    Marital status:    Tobacco Use    Smoking status: Never    Smokeless tobacco: Never   Vaping Use    Vaping status: Never Used   Substance and Sexual Activity    Alcohol use: Never    Drug use: Never    Sexual activity: Not Currently     Partners: Male     Birth control/protection: Abstinence, Post-menopausal       Home Medications:  Atogepant, Insulin Aspart, Insulin Glargine, Omnipod 5 G6 Pods (Gen 5), Pancrelipase (Lip-Prot-Amyl), SUMAtriptan, Vitamin E, Vortioxetine HBr, diphenhydrAMINE, docusate sodium, heparin (porcine), hydrOXYzine, hydroCHLOROthiazide, hydrocortisone, immune globulin (human), lactulose, lansoprazole, levothyroxine, lisinopril, ondansetron, prochlorperazine, promethazine, propranolol LA, riFAXIMin, sodium chloride, traZODone, ubrogepant, and vitamin E    Allergies:  Allergies   Allergen Reactions    Other Other (See Comments)     Sent patient into kidney failure, heart stopped, in ICU for two days.    Parathyroid Hormone (Recomb) Other (See Comments)     Sent patient into kidney failure, heart stopped, in ICU for two days.      Romosozumab Hives     Other reaction(s): Hives    Other  reaction(s): Hives   Other reaction(s): Hives    Sulfate Hives    Teriparatide Anaphylaxis and Other (See Comments)     Other reaction(s): Unknown    Tramadol Anaphylaxis and Other (See Comments)     Other reaction(s): Unknown, Unknown    Vancomycin Hives and Itching     Other reaction(s): Unknown, Unknown    Nifedipine Provider Review Needed and Other (See Comments)     Rapid heart beat    Rapid heart beat    Other reaction(s): Provider Review Needed   Other reaction(s): Provider Review Needed   Rapid heart beat       Review of Systems   All systems were reviewed and negative except for indicated in HPI    Objective   Objective     Vitals:   Temp:  [99.4 °F (37.4 °C)] 99.4 °F (37.4 °C)  Heart Rate:  [69-85] 69  Resp:  [23] 23  BP: (118-142)/(74-92) 118/74    Physical Exam    Constitutional: Awake, alert, no acute distress.  Chest port in place   Eyes: Pupils equal, sclerae anicteric, no conjunctival injection   HENT: NCAT, mucous membranes moist   Neck: Supple, no thyromegaly, no lymphadenopathy, trachea midline   Respiratory: Clear to auscultation bilaterally, nonlabored respirations    Cardiovascular: RRR, no murmurs, rubs, or gallops, palpable pedal pulses bilaterally   Gastrointestinal: Positive bowel sounds, soft, nontender, nondistended   Musculoskeletal: No bilateral ankle edema, no clubbing or cyanosis to extremities   Psychiatric: Appropriate affect, cooperative   Neurologic: Oriented x 3, strength symmetric in all extremities, Cranial Nerves grossly intact to confrontation, speech clear   Skin: No rashes     Result Review    Result Review:  I have personally reviewed the results from the time of this admission to 5/31/2024 16:12 EDT and agree with these findings:  [x]  Laboratory  [x]  Microbiology  [x]  Radiology  [x]  EKG/Telemetry   []  Cardiology/Vascular   []  Pathology  []  Old records  []  Other:    Laboratory Studies:  Recent Results (from the past 24 hour(s))   ECG 12 Lead ED Triage Standing  Order; Chest Pain    Collection Time: 05/31/24 11:33 AM   Result Value Ref Range    QT Interval 376 ms    QTC Interval 448 ms   High Sensitivity Troponin T    Collection Time: 05/31/24 11:56 AM    Specimen: Blood   Result Value Ref Range    HS Troponin T <6 <14 ng/L   Comprehensive Metabolic Panel    Collection Time: 05/31/24 11:56 AM    Specimen: Blood   Result Value Ref Range    Glucose 136 (H) 65 - 99 mg/dL    BUN 13 6 - 20 mg/dL    Creatinine 0.77 0.57 - 1.00 mg/dL    Sodium 135 (L) 136 - 145 mmol/L    Potassium 3.6 3.5 - 5.2 mmol/L    Chloride 98 98 - 107 mmol/L    CO2 22.6 22.0 - 29.0 mmol/L    Calcium 8.6 8.6 - 10.5 mg/dL    Total Protein 8.3 6.0 - 8.5 g/dL    Albumin 3.7 3.5 - 5.2 g/dL    ALT (SGPT) 34 (H) 1 - 33 U/L    AST (SGOT) 44 (H) 1 - 32 U/L    Alkaline Phosphatase 183 (H) 39 - 117 U/L    Total Bilirubin 0.8 0.0 - 1.2 mg/dL    Globulin 4.6 gm/dL    A/G Ratio 0.8 g/dL    BUN/Creatinine Ratio 16.9 7.0 - 25.0    Anion Gap 14.4 5.0 - 15.0 mmol/L    eGFR 95.3 >60.0 mL/min/1.73   Lipase    Collection Time: 05/31/24 11:56 AM    Specimen: Blood   Result Value Ref Range    Lipase 4 (L) 13 - 60 U/L   BNP    Collection Time: 05/31/24 11:56 AM    Specimen: Blood   Result Value Ref Range    proBNP 205.1 0.0 - 450.0 pg/mL   Magnesium    Collection Time: 05/31/24 11:56 AM    Specimen: Blood   Result Value Ref Range    Magnesium 1.7 1.6 - 2.6 mg/dL   Green Top (Gel)    Collection Time: 05/31/24 11:56 AM   Result Value Ref Range    Extra Tube Hold for add-ons.    Lavender Top    Collection Time: 05/31/24 11:56 AM   Result Value Ref Range    Extra Tube hold for add-on    Gold Top - SST    Collection Time: 05/31/24 11:56 AM   Result Value Ref Range    Extra Tube Hold for add-ons.    Light Blue Top    Collection Time: 05/31/24 11:56 AM   Result Value Ref Range    Extra Tube Hold for add-ons.    CBC Auto Differential    Collection Time: 05/31/24 11:56 AM    Specimen: Blood   Result Value Ref Range    WBC 16.49 (H) 3.40 -  10.80 10*3/mm3    RBC 3.97 3.77 - 5.28 10*6/mm3    Hemoglobin 12.9 12.0 - 15.9 g/dL    Hematocrit 38.3 34.0 - 46.6 %    MCV 96.5 79.0 - 97.0 fL    MCH 32.5 26.6 - 33.0 pg    MCHC 33.7 31.5 - 35.7 g/dL    RDW 15.7 (H) 12.3 - 15.4 %    RDW-SD 55.2 (H) 37.0 - 54.0 fl    MPV 11.2 6.0 - 12.0 fL    Platelets 494 (H) 140 - 450 10*3/mm3    Neutrophil % 62.2 42.7 - 76.0 %    Lymphocyte % 20.0 19.6 - 45.3 %    Monocyte % 16.3 (H) 5.0 - 12.0 %    Eosinophil % 1.0 0.3 - 6.2 %    Basophil % 0.2 0.0 - 1.5 %    Immature Grans % 0.3 0.0 - 0.5 %    Neutrophils, Absolute 10.26 (H) 1.70 - 7.00 10*3/mm3    Lymphocytes, Absolute 3.30 (H) 0.70 - 3.10 10*3/mm3    Monocytes, Absolute 2.68 (H) 0.10 - 0.90 10*3/mm3    Eosinophils, Absolute 0.17 0.00 - 0.40 10*3/mm3    Basophils, Absolute 0.03 0.00 - 0.20 10*3/mm3    Immature Grans, Absolute 0.05 0.00 - 0.05 10*3/mm3    nRBC 0.0 0.0 - 0.2 /100 WBC   COVID-19, FLU A/B, RSV PCR 1 HR TAT - Swab, Nasopharynx    Collection Time: 05/31/24 12:29 PM    Specimen: Nasopharynx; Swab   Result Value Ref Range    COVID19 Not Detected Not Detected - Ref. Range    Influenza A PCR Not Detected Not Detected    Influenza B PCR Not Detected Not Detected    RSV, PCR Not Detected Not Detected   Lactic Acid, Plasma    Collection Time: 05/31/24 12:51 PM    Specimen: Blood   Result Value Ref Range    Lactate 1.9 0.5 - 2.0 mmol/L   ECG 12 Lead ED Triage Standing Order; Chest Pain    Collection Time: 05/31/24  2:17 PM   Result Value Ref Range    QT Interval 414 ms    QTC Interval 452 ms   Urinalysis With Microscopic If Indicated (No Culture) - Urine, Clean Catch    Collection Time: 05/31/24  2:39 PM    Specimen: Urine, Clean Catch   Result Value Ref Range    Color, UA Yellow Yellow, Straw    Appearance, UA Clear Clear    pH, UA 6.0 5.0 - 8.0    Specific Gravity, UA 1.013 1.005 - 1.030    Glucose, UA Negative Negative    Ketones, UA Negative Negative    Bilirubin, UA Negative Negative    Blood, UA Negative Negative     Protein, UA Negative Negative    Leuk Esterase, UA Negative Negative    Nitrite, UA Negative Negative    Urobilinogen, UA 1.0 E.U./dL 0.2 - 1.0 E.U./dL       Imaging:  XR Chest 1 View    Result Date: 5/31/2024  Narrative:  DATE OF EXAM: 5/31/2024 12:00 PM  PROCEDURE: XR CHEST 1 VW-  INDICATIONS: Chest Pain Triage Protocol  COMPARISON: 2/26/2021 and prior  TECHNIQUE: Portable Chest  FINDINGS:  Study is limited by overlying support and monitoring apparatus. Heart mediastinal contours are stable. Left IJ approach catheter noted projecting over the SVC. Lung volumes are relatively low there are some vascular crowding at the lung bases. No focal consolidation otherwise noted. Structures are unremarkable      Impression: IMPRESSION : Low lung volume exam. No acute process.[  Electronically Signed By-Paulie Foreman On:5/31/2024 12:10 PM       EKG: (my review): Sinus rhythm, heart rate 72, QTc 452, no ST elevation or depression    Assessment & Plan   Assessment / Plan     -I have discussed the case with the ED physician.  I have seen patient at bedside.  I have independently reviewed her EKGs, labs, imaging, record.    Sepsis  Generalized weakness and chest tightness  Other chronic problems: diabetes, HTN, hypothyroidism, adrenal insufficiency, s/p pancreatectomy, gastroparesis, Crohn's disease, cirrhosis and GERD    -Patient has a chest port.  She said this episode feels similar to when she had bacteremia 2 times in the past.  -I will start patient on daptomycin and cefepime.  She has Vanco allergy  -Pending infectious workups with procalcitonin and blood cultures.  Might need to remove the chest port depending on culture results.    Resume home medication when appropriate.  DVT prophylaxis with Lovenox.      CODE STATUS:    Level Of Support Discussed With: Patient  Code Status (Patient has no pulse and is not breathing): CPR (Attempt to Resuscitate)  Medical Interventions (Patient has pulse or is breathing): Full  Support      Admission Status:  I believe this patient meets admission status.    Electronically signed by Rios Arvizu MD, 05/31/24, 4:00 PM EDT.

## 2024-06-01 ENCOUNTER — APPOINTMENT (OUTPATIENT)
Dept: CT IMAGING | Facility: HOSPITAL | Age: 49
End: 2024-06-01
Payer: MEDICARE

## 2024-06-01 LAB
ALBUMIN SERPL-MCNC: 3.3 G/DL (ref 3.5–5.2)
ALBUMIN/GLOB SERPL: 0.8 G/DL
ALP SERPL-CCNC: 154 U/L (ref 39–117)
ALT SERPL W P-5'-P-CCNC: 27 U/L (ref 1–33)
ANION GAP SERPL CALCULATED.3IONS-SCNC: 9.7 MMOL/L (ref 5–15)
AST SERPL-CCNC: 29 U/L (ref 1–32)
BILIRUB SERPL-MCNC: 0.6 MG/DL (ref 0–1.2)
BUN SERPL-MCNC: 12 MG/DL (ref 6–20)
BUN/CREAT SERPL: 18.8 (ref 7–25)
CALCIUM SPEC-SCNC: 8.8 MG/DL (ref 8.6–10.5)
CHLORIDE SERPL-SCNC: 104 MMOL/L (ref 98–107)
CO2 SERPL-SCNC: 25.3 MMOL/L (ref 22–29)
CREAT SERPL-MCNC: 0.64 MG/DL (ref 0.57–1)
CRP SERPL-MCNC: 6.11 MG/DL (ref 0–0.5)
DEPRECATED RDW RBC AUTO: 57.5 FL (ref 37–54)
EGFRCR SERPLBLD CKD-EPI 2021: 109.2 ML/MIN/1.73
ERYTHROCYTE [DISTWIDTH] IN BLOOD BY AUTOMATED COUNT: 16.2 % (ref 12.3–15.4)
ERYTHROCYTE [SEDIMENTATION RATE] IN BLOOD: 33 MM/HR (ref 0–20)
GLOBULIN UR ELPH-MCNC: 4.1 GM/DL
GLUCOSE BLDC GLUCOMTR-MCNC: 139 MG/DL (ref 70–99)
GLUCOSE BLDC GLUCOMTR-MCNC: 145 MG/DL (ref 70–99)
GLUCOSE BLDC GLUCOMTR-MCNC: 172 MG/DL (ref 70–99)
GLUCOSE BLDC GLUCOMTR-MCNC: 188 MG/DL (ref 70–99)
GLUCOSE SERPL-MCNC: 145 MG/DL (ref 65–99)
HCT VFR BLD AUTO: 35.2 % (ref 34–46.6)
HGB BLD-MCNC: 11.5 G/DL (ref 12–15.9)
MAGNESIUM SERPL-MCNC: 2.2 MG/DL (ref 1.6–2.6)
MCH RBC QN AUTO: 31.9 PG (ref 26.6–33)
MCHC RBC AUTO-ENTMCNC: 32.7 G/DL (ref 31.5–35.7)
MCV RBC AUTO: 97.8 FL (ref 79–97)
PHOSPHATE SERPL-MCNC: 4.4 MG/DL (ref 2.5–4.5)
PLATELET # BLD AUTO: 435 10*3/MM3 (ref 140–450)
PMV BLD AUTO: 10.8 FL (ref 6–12)
POTASSIUM SERPL-SCNC: 3.7 MMOL/L (ref 3.5–5.2)
PROT SERPL-MCNC: 7.4 G/DL (ref 6–8.5)
RBC # BLD AUTO: 3.6 10*6/MM3 (ref 3.77–5.28)
SODIUM SERPL-SCNC: 139 MMOL/L (ref 136–145)
WBC NRBC COR # BLD AUTO: 8.52 10*3/MM3 (ref 3.4–10.8)

## 2024-06-01 PROCEDURE — 82948 REAGENT STRIP/BLOOD GLUCOSE: CPT | Performed by: STUDENT IN AN ORGANIZED HEALTH CARE EDUCATION/TRAINING PROGRAM

## 2024-06-01 PROCEDURE — 25010000002 DAPTOMYCIN 350 MG RECONSTITUTED SOLUTION 1 EACH VIAL: Performed by: STUDENT IN AN ORGANIZED HEALTH CARE EDUCATION/TRAINING PROGRAM

## 2024-06-01 PROCEDURE — 85027 COMPLETE CBC AUTOMATED: CPT | Performed by: STUDENT IN AN ORGANIZED HEALTH CARE EDUCATION/TRAINING PROGRAM

## 2024-06-01 PROCEDURE — 36415 COLL VENOUS BLD VENIPUNCTURE: CPT | Performed by: STUDENT IN AN ORGANIZED HEALTH CARE EDUCATION/TRAINING PROGRAM

## 2024-06-01 PROCEDURE — 25010000002 CEFEPIME PER 500 MG: Performed by: STUDENT IN AN ORGANIZED HEALTH CARE EDUCATION/TRAINING PROGRAM

## 2024-06-01 PROCEDURE — 85652 RBC SED RATE AUTOMATED: CPT | Performed by: FAMILY MEDICINE

## 2024-06-01 PROCEDURE — 71250 CT THORAX DX C-: CPT

## 2024-06-01 PROCEDURE — 84100 ASSAY OF PHOSPHORUS: CPT | Performed by: STUDENT IN AN ORGANIZED HEALTH CARE EDUCATION/TRAINING PROGRAM

## 2024-06-01 PROCEDURE — 86140 C-REACTIVE PROTEIN: CPT | Performed by: FAMILY MEDICINE

## 2024-06-01 PROCEDURE — 80053 COMPREHEN METABOLIC PANEL: CPT | Performed by: STUDENT IN AN ORGANIZED HEALTH CARE EDUCATION/TRAINING PROGRAM

## 2024-06-01 PROCEDURE — 83735 ASSAY OF MAGNESIUM: CPT | Performed by: STUDENT IN AN ORGANIZED HEALTH CARE EDUCATION/TRAINING PROGRAM

## 2024-06-01 PROCEDURE — 99232 SBSQ HOSP IP/OBS MODERATE 35: CPT | Performed by: FAMILY MEDICINE

## 2024-06-01 PROCEDURE — 82948 REAGENT STRIP/BLOOD GLUCOSE: CPT

## 2024-06-01 PROCEDURE — 25010000002 ENOXAPARIN PER 10 MG: Performed by: STUDENT IN AN ORGANIZED HEALTH CARE EDUCATION/TRAINING PROGRAM

## 2024-06-01 RX ORDER — ALPRAZOLAM 0.25 MG/1
0.5 TABLET ORAL 3 TIMES DAILY PRN
Status: DISCONTINUED | OUTPATIENT
Start: 2024-06-01 | End: 2024-06-04 | Stop reason: HOSPADM

## 2024-06-01 RX ADMIN — HYDROCHLOROTHIAZIDE 12.5 MG: 12.5 TABLET ORAL at 08:59

## 2024-06-01 RX ADMIN — TRAZODONE HYDROCHLORIDE 150 MG: 50 TABLET ORAL at 21:58

## 2024-06-01 RX ADMIN — VORTIOXETINE 5 MG: 5 TABLET, FILM COATED ORAL at 02:45

## 2024-06-01 RX ADMIN — CEFEPIME 2000 MG: 2 INJECTION, POWDER, FOR SOLUTION INTRAVENOUS at 17:26

## 2024-06-01 RX ADMIN — PANTOPRAZOLE SODIUM 40 MG: 40 TABLET, DELAYED RELEASE ORAL at 06:34

## 2024-06-01 RX ADMIN — VORTIOXETINE 5 MG: 5 TABLET, FILM COATED ORAL at 23:19

## 2024-06-01 RX ADMIN — HYDROCORTISONE 15 MG: 10 TABLET ORAL at 10:46

## 2024-06-01 RX ADMIN — CEFEPIME 2000 MG: 2 INJECTION, POWDER, FOR SOLUTION INTRAVENOUS at 02:45

## 2024-06-01 RX ADMIN — DOCUSATE SODIUM 100 MG: 100 CAPSULE, LIQUID FILLED ORAL at 09:00

## 2024-06-01 RX ADMIN — LISINOPRIL 40 MG: 20 TABLET ORAL at 21:58

## 2024-06-01 RX ADMIN — HYDROCORTISONE 15 MG: 10 TABLET ORAL at 22:10

## 2024-06-01 RX ADMIN — PANCRELIPASE 12000 UNITS OF LIPASE: 30000; 6000; 19000 CAPSULE, DELAYED RELEASE PELLETS ORAL at 17:25

## 2024-06-01 RX ADMIN — PANTOPRAZOLE SODIUM 40 MG: 40 TABLET, DELAYED RELEASE ORAL at 17:25

## 2024-06-01 RX ADMIN — ALPRAZOLAM 0.5 MG: 0.25 TABLET ORAL at 17:25

## 2024-06-01 RX ADMIN — LEVOTHYROXINE SODIUM 125 MCG: 0.1 TABLET ORAL at 06:34

## 2024-06-01 RX ADMIN — Medication 360 MG: at 10:47

## 2024-06-01 RX ADMIN — PANCRELIPASE 12000 UNITS OF LIPASE: 30000; 6000; 19000 CAPSULE, DELAYED RELEASE PELLETS ORAL at 13:02

## 2024-06-01 RX ADMIN — DAPTOMYCIN 600 MG: 350 INJECTION, POWDER, LYOPHILIZED, FOR SOLUTION INTRAVENOUS at 18:03

## 2024-06-01 RX ADMIN — DOCUSATE SODIUM 100 MG: 100 CAPSULE, LIQUID FILLED ORAL at 21:58

## 2024-06-01 RX ADMIN — CEFEPIME 2000 MG: 2 INJECTION, POWDER, FOR SOLUTION INTRAVENOUS at 10:51

## 2024-06-01 RX ADMIN — LISINOPRIL 40 MG: 20 TABLET ORAL at 09:00

## 2024-06-01 RX ADMIN — RIFAXIMIN 550 MG: 550 TABLET ORAL at 09:00

## 2024-06-01 RX ADMIN — ENOXAPARIN SODIUM 40 MG: 100 INJECTION SUBCUTANEOUS at 09:02

## 2024-06-01 RX ADMIN — PANCRELIPASE 12000 UNITS OF LIPASE: 30000; 6000; 19000 CAPSULE, DELAYED RELEASE PELLETS ORAL at 09:00

## 2024-06-01 RX ADMIN — PROPRANOLOL HYDROCHLORIDE 60 MG: 60 CAPSULE, EXTENDED RELEASE ORAL at 10:47

## 2024-06-01 RX ADMIN — RIFAXIMIN 550 MG: 550 TABLET ORAL at 21:58

## 2024-06-01 RX ADMIN — Medication 10 ML: at 21:59

## 2024-06-01 RX ADMIN — Medication 10 ML: at 09:01

## 2024-06-01 NOTE — PLAN OF CARE
Goal Outcome Evaluation:              Outcome Evaluation: pt up ad heidi. c/o anxiety after antibiotics. md notified. prn xanax ordered.

## 2024-06-01 NOTE — PLAN OF CARE
Goal Outcome Evaluation:  Plan of Care Reviewed With: patient         Pt is alert and orient x4.  VS Wnl for pt.  Pt denies any pain/discomfort.  Pt has insulin pump.

## 2024-06-01 NOTE — PROGRESS NOTES
[facility]   Hospitalist Progress Note  Date: 2024  Patient Name: Kayla Gan  : 1975  MRN: [id number]  Date of admission: 2024      Subjective   Subjective     Chief complaint: Chest pain, weakness    Summary:  48-year-old female with history of pancreatectomy, gastroparesis, Crohn's disease, liver cirrhosis, GERD without esophagitis, hypertension, hypothyroidism, renal insufficiency, port in place with frequent IVIG injections, with history of bacteremia and sepsis, felt unwell for the past 2 days, with chest tightness and weakness.  Though no fevers, chills or sweats.  X-ray chest nondiagnostic, hospitalized with blood cultures pending, leukocytosis improved after starting antibiotics.    Interval follow-up: Seen and examined, no acute distress, no acute major night events, feels better, afebrile.  White blood cell count down to 8000, CRP elevated at 6, creatinine 0.64, potassium 3.7, sodium 139, initial blood cultures negative.  Sed rate elevated 33.  Still looking for source of infection, CT chest pending.    Review of systems:  All systems reviewed negative except for weakness, fatigue    Objective   Objective     Vitals:   Temp:  [97.8 °F (36.6 °C)-98.7 °F (37.1 °C)] 98.4 °F (36.9 °C)  Heart Rate:  [59-84] 84  Resp:  [17-20] 18  BP: (108-130)/(67-73) 123/72  Physical Exam               Constitutional: Awake, alert, no acute distress.  Chest port in place              Eyes: Pupils equal, sclerae anicteric, no conjunctival injection              HENT: NCAT, mucous membranes moist              Neck: Supple, no thyromegaly, no lymphadenopathy, trachea midline              Respiratory: Clear to auscultation bilaterally, nonlabored respirations               Cardiovascular: RRR, no murmurs, rubs, or gallops, palpable pedal pulses bilaterally              Gastrointestinal: Positive bowel sounds, soft, nontender, nondistended              Musculoskeletal: No bilateral ankle edema, no  clubbing or cyanosis to extremities              Psychiatric: Appropriate affect, cooperative              Neurologic: Oriented x 3, strength symmetric in all extremities, Cranial Nerves grossly intact to confrontation, speech clear              Skin: No rashes       Result Review    Result Review:  I have personally reviewed the pertinent results from the past 24 hours to 6/1/2024 15:03 EDT and agree with these findings:  [x]  Laboratory   CBC          4/25/2024    11:43 5/31/2024    11:56 6/1/2024    05:28   CBC   WBC 10.72  16.49  8.52    RBC 3.81  3.97  3.60    Hemoglobin 12.1  12.9  11.5    Hematocrit 37.9  38.3  35.2    MCV 99.5  96.5  97.8    MCH 31.8  32.5  31.9    MCHC 31.9  33.7  32.7    RDW 12.8  15.7  16.2    Platelets 439  494  435      BMP          4/25/2024    11:43 5/31/2024    11:56 6/1/2024    05:28   BMP   BUN 15  13  12    Creatinine 0.60  0.77  0.64    Sodium 135  135  139    Potassium 3.5  3.6  3.7    Chloride 101  98  104    CO2 23.0  22.6  25.3    Calcium 9.3  8.6  8.8      LIVER FUNCTION TESTS:      Lab 06/01/24  0528 05/31/24  1156   TOTAL PROTEIN 7.4 8.3   ALBUMIN 3.3* 3.7   GLOBULIN 4.1 4.6   ALT (SGPT) 27 34*   AST (SGOT) 29 44*   BILIRUBIN 0.6 0.8   ALK PHOS 154* 183*   LIPASE  --  4*       [x]  Microbiology   Microbiology Results (last 10 days)       Procedure Component Value - Date/Time    Blood Culture - Blood, Arm, Left [[id number]]  (Normal) Collected: 05/31/24 1251    Lab Status: Preliminary result Specimen: Blood from Arm, Left Updated: 06/01/24 1301     Blood Culture No growth at 24 hours    Blood Culture - Blood, Blood, Central Line [[id number]]  (Normal) Collected: 05/31/24 1251    Lab Status: Preliminary result Specimen: Blood, Central Line Updated: 06/01/24 1301     Blood Culture No growth at 24 hours    COVID PRE-OP / PRE-PROCEDURE SCREENING ORDER (NO ISOLATION) - Swab, Nasopharynx [[id number]]  (Normal) Collected: 05/31/24 1229    Lab Status: Final result Specimen: Swab from  Nasopharynx Updated: 05/31/24 1312    Narrative:      The following orders were created for panel order COVID PRE-OP / PRE-PROCEDURE SCREENING ORDER (NO ISOLATION) - Swab, Nasopharynx.  Procedure                               Abnormality         Status                     ---------                               -----------         ------                     COVID-19, FLU A/B, RSV P...[[id number]]  Normal              Final result                 Please view results for these tests on the individual orders.    COVID-19, FLU A/B, RSV PCR 1 HR TAT - Swab, Nasopharynx [[id number]]  (Normal) Collected: 05/31/24 1229    Lab Status: Final result Specimen: Swab from Nasopharynx Updated: 05/31/24 1311     COVID19 Not Detected     Influenza A PCR Not Detected     Influenza B PCR Not Detected     RSV, PCR Not Detected    Narrative:      Fact sheet for providers: https://www.fda.gov/media/044998/download    Fact sheet for patients: https://www.fda.gov/media/301554/download    Test performed by PCR.              [x]  Radiology XR Chest 1 View    Result Date: 5/31/2024  IMPRESSION : Low lung volume exam. No acute process.[  Electronically Signed By-Paulie Foreman On:5/31/2024 12:10 PM         []  EKG/Telemetry   ECG 12 Lead ED Triage Standing Order; Chest Pain   Final Result   HEART RATE= 72  bpm   RR Interval= 840  ms   IL Interval= 155  ms   P Horizontal Axis= 4  deg   P Front Axis= -4  deg   QRSD Interval= 97  ms   QT Interval= 414  ms   QTcB= 452  ms   QRS Axis= -23  deg   T Wave Axis= 10  deg   - ABNORMAL ECG -   Sinus rhythm   Abnormal R-wave progression, late transition   Left ventricular hypertrophy   Inferior infarct, old   When compared with ECG of 31-May-2024 11:33:29,   No significant change   Electronically Signed By: Juan Colon ([facility]) 31-May-2024 18:17:40   Date and Time of Study: 2024-05-31 14:17:04      ECG 12 Lead ED Triage Standing Order; Chest Pain   Final Result   HEART RATE= 85  bpm   RR Interval= 704  ms    DC Interval= 151  ms   P Horizontal Axis= 18  deg   P Front Axis= -6  deg   QRSD Interval= 92  ms   QT Interval= 376  ms   QTcB= 448  ms   QRS Axis= -26  deg   T Wave Axis= 54  deg   - ABNORMAL ECG -   Sinus rhythm   Probable left ventricular hypertrophy   Inferior infarct, old   When compared with ECG of 26-Feb-2024 13:39:12,   Nonspecific significant change   Electronically Signed By: Juan Colon ([facility]) 31-May-2024 18:17:46   Date and Time of Study: 2024-05-31 11:33:29          []  Cardiology/Vascular   []  Pathology  [x]  Old records  []  Other:    Assessment & Plan   Assessment / Plan     Assessment/Plan:  Assessment:  Sepsis of unknown source  Crohn's disease  History of pancreatectomy  History of gastroparesis  Diabetes mellitus  Hypothyroidism  Essential hypertension  Concern for bacteremia    Plan:  Labs and imaging reviewed  Continue daptomycin and cefepime  Continue insulin pump use which is a home device  Follow-up CT chest  Follow-up cultures  Continue hydrocortisone 50 mg orally twice a day  Continue hydrochlorothiazide 12.5 mg daily  Continue Inderal 60 mg daily  Hold antihypertensives per parameters  A.m. labs  Full code  DVT prophylaxis with Lovenox  Clinical course dictate further management  Discussed with nurse at the bedside    DVT prophylaxis:  Medical DVT prophylaxis orders are present.        CODE STATUS:   Level Of Support Discussed With: Patient  Code Status (Patient has no pulse and is not breathing): CPR (Attempt to Resuscitate)  Medical Interventions (Patient has pulse or is breathing): Full Support        Electronically signed by Guillaume Pearce MD, 6/1/2024, 15:03 EDT.    Portions of this documentation were transcribed electronically from a voice recognition software.  I confirm all data accurately represents the service(s) I performed at today's visit.

## 2024-06-02 LAB
ALBUMIN SERPL-MCNC: 3.3 G/DL (ref 3.5–5.2)
ALP SERPL-CCNC: 158 U/L (ref 39–117)
ALT SERPL W P-5'-P-CCNC: 30 U/L (ref 1–33)
ANION GAP SERPL CALCULATED.3IONS-SCNC: 13.5 MMOL/L (ref 5–15)
AST SERPL-CCNC: 42 U/L (ref 1–32)
BASOPHILS # BLD AUTO: 0.06 10*3/MM3 (ref 0–0.2)
BASOPHILS NFR BLD AUTO: 0.7 % (ref 0–1.5)
BILIRUB CONJ SERPL-MCNC: <0.2 MG/DL (ref 0–0.3)
BILIRUB INDIRECT SERPL-MCNC: ABNORMAL MG/DL
BILIRUB SERPL-MCNC: 0.5 MG/DL (ref 0–1.2)
BUN SERPL-MCNC: 12 MG/DL (ref 6–20)
BUN/CREAT SERPL: 16.9 (ref 7–25)
CALCIUM SPEC-SCNC: 9.1 MG/DL (ref 8.6–10.5)
CHLORIDE SERPL-SCNC: 100 MMOL/L (ref 98–107)
CK SERPL-CCNC: 22 U/L (ref 20–180)
CO2 SERPL-SCNC: 21.5 MMOL/L (ref 22–29)
CREAT SERPL-MCNC: 0.71 MG/DL (ref 0.57–1)
DEPRECATED RDW RBC AUTO: 57.5 FL (ref 37–54)
EGFRCR SERPLBLD CKD-EPI 2021: 105 ML/MIN/1.73
EOSINOPHIL # BLD AUTO: 0.3 10*3/MM3 (ref 0–0.4)
EOSINOPHIL NFR BLD AUTO: 3.6 % (ref 0.3–6.2)
ERYTHROCYTE [DISTWIDTH] IN BLOOD BY AUTOMATED COUNT: 16 % (ref 12.3–15.4)
GLUCOSE BLDC GLUCOMTR-MCNC: 137 MG/DL (ref 70–99)
GLUCOSE BLDC GLUCOMTR-MCNC: 158 MG/DL (ref 70–99)
GLUCOSE BLDC GLUCOMTR-MCNC: 174 MG/DL (ref 70–99)
GLUCOSE BLDC GLUCOMTR-MCNC: 228 MG/DL (ref 70–99)
GLUCOSE SERPL-MCNC: 195 MG/DL (ref 65–99)
HCT VFR BLD AUTO: 35.7 % (ref 34–46.6)
HGB BLD-MCNC: 12 G/DL (ref 12–15.9)
IMM GRANULOCYTES # BLD AUTO: 0.05 10*3/MM3 (ref 0–0.05)
IMM GRANULOCYTES NFR BLD AUTO: 0.6 % (ref 0–0.5)
LYMPHOCYTES # BLD AUTO: 3.13 10*3/MM3 (ref 0.7–3.1)
LYMPHOCYTES NFR BLD AUTO: 37.4 % (ref 19.6–45.3)
MAGNESIUM SERPL-MCNC: 2.2 MG/DL (ref 1.6–2.6)
MCH RBC QN AUTO: 33.2 PG (ref 26.6–33)
MCHC RBC AUTO-ENTMCNC: 33.6 G/DL (ref 31.5–35.7)
MCV RBC AUTO: 98.9 FL (ref 79–97)
MONOCYTES # BLD AUTO: 1.2 10*3/MM3 (ref 0.1–0.9)
MONOCYTES NFR BLD AUTO: 14.3 % (ref 5–12)
NEUTROPHILS NFR BLD AUTO: 3.64 10*3/MM3 (ref 1.7–7)
NEUTROPHILS NFR BLD AUTO: 43.4 % (ref 42.7–76)
NRBC BLD AUTO-RTO: 0.7 /100 WBC (ref 0–0.2)
PHOSPHATE SERPL-MCNC: 4.4 MG/DL (ref 2.5–4.5)
PLATELET # BLD AUTO: 320 10*3/MM3 (ref 140–450)
PMV BLD AUTO: 11.1 FL (ref 6–12)
POTASSIUM SERPL-SCNC: 4.3 MMOL/L (ref 3.5–5.2)
PROT SERPL-MCNC: 7.8 G/DL (ref 6–8.5)
RBC # BLD AUTO: 3.61 10*6/MM3 (ref 3.77–5.28)
SODIUM SERPL-SCNC: 135 MMOL/L (ref 136–145)
WBC NRBC COR # BLD AUTO: 8.38 10*3/MM3 (ref 3.4–10.8)

## 2024-06-02 PROCEDURE — 87040 BLOOD CULTURE FOR BACTERIA: CPT | Performed by: FAMILY MEDICINE

## 2024-06-02 PROCEDURE — 25010000002 CEFEPIME PER 500 MG: Performed by: STUDENT IN AN ORGANIZED HEALTH CARE EDUCATION/TRAINING PROGRAM

## 2024-06-02 PROCEDURE — 80076 HEPATIC FUNCTION PANEL: CPT | Performed by: FAMILY MEDICINE

## 2024-06-02 PROCEDURE — 82948 REAGENT STRIP/BLOOD GLUCOSE: CPT

## 2024-06-02 PROCEDURE — 25010000002 HEPARIN LOCK FLUSH PER 10 UNITS: Performed by: FAMILY MEDICINE

## 2024-06-02 PROCEDURE — 83735 ASSAY OF MAGNESIUM: CPT | Performed by: FAMILY MEDICINE

## 2024-06-02 PROCEDURE — 82550 ASSAY OF CK (CPK): CPT | Performed by: FAMILY MEDICINE

## 2024-06-02 PROCEDURE — 80048 BASIC METABOLIC PNL TOTAL CA: CPT | Performed by: FAMILY MEDICINE

## 2024-06-02 PROCEDURE — 85025 COMPLETE CBC W/AUTO DIFF WBC: CPT | Performed by: FAMILY MEDICINE

## 2024-06-02 PROCEDURE — 99232 SBSQ HOSP IP/OBS MODERATE 35: CPT | Performed by: FAMILY MEDICINE

## 2024-06-02 PROCEDURE — 25010000002 DAPTOMYCIN 350 MG RECONSTITUTED SOLUTION 1 EACH VIAL: Performed by: STUDENT IN AN ORGANIZED HEALTH CARE EDUCATION/TRAINING PROGRAM

## 2024-06-02 PROCEDURE — 25010000002 ENOXAPARIN PER 10 MG: Performed by: STUDENT IN AN ORGANIZED HEALTH CARE EDUCATION/TRAINING PROGRAM

## 2024-06-02 PROCEDURE — 84100 ASSAY OF PHOSPHORUS: CPT | Performed by: FAMILY MEDICINE

## 2024-06-02 RX ORDER — HEPARIN SODIUM (PORCINE) LOCK FLUSH IV SOLN 100 UNIT/ML 100 UNIT/ML
5 SOLUTION INTRAVENOUS AS NEEDED
Status: DISCONTINUED | OUTPATIENT
Start: 2024-06-02 | End: 2024-06-04 | Stop reason: HOSPADM

## 2024-06-02 RX ORDER — SODIUM CHLORIDE 0.9 % (FLUSH) 0.9 %
20 SYRINGE (ML) INJECTION AS NEEDED
Status: DISCONTINUED | OUTPATIENT
Start: 2024-06-02 | End: 2024-06-04 | Stop reason: HOSPADM

## 2024-06-02 RX ORDER — SODIUM CHLORIDE 9 MG/ML
40 INJECTION, SOLUTION INTRAVENOUS AS NEEDED
Status: DISCONTINUED | OUTPATIENT
Start: 2024-06-02 | End: 2024-06-04 | Stop reason: HOSPADM

## 2024-06-02 RX ORDER — SODIUM CHLORIDE 0.9 % (FLUSH) 0.9 %
10 SYRINGE (ML) INJECTION EVERY 12 HOURS SCHEDULED
Status: DISCONTINUED | OUTPATIENT
Start: 2024-06-02 | End: 2024-06-04 | Stop reason: HOSPADM

## 2024-06-02 RX ORDER — SODIUM CHLORIDE 0.9 % (FLUSH) 0.9 %
10 SYRINGE (ML) INJECTION AS NEEDED
Status: DISCONTINUED | OUTPATIENT
Start: 2024-06-02 | End: 2024-06-04 | Stop reason: HOSPADM

## 2024-06-02 RX ADMIN — HYDROCORTISONE 15 MG: 10 TABLET ORAL at 20:14

## 2024-06-02 RX ADMIN — Medication 10 ML: at 09:11

## 2024-06-02 RX ADMIN — PANCRELIPASE 12000 UNITS OF LIPASE: 30000; 6000; 19000 CAPSULE, DELAYED RELEASE PELLETS ORAL at 09:10

## 2024-06-02 RX ADMIN — RIFAXIMIN 550 MG: 550 TABLET ORAL at 09:10

## 2024-06-02 RX ADMIN — Medication 10 ML: at 20:17

## 2024-06-02 RX ADMIN — VORTIOXETINE 5 MG: 5 TABLET, FILM COATED ORAL at 20:13

## 2024-06-02 RX ADMIN — CEFEPIME 2000 MG: 2 INJECTION, POWDER, FOR SOLUTION INTRAVENOUS at 18:00

## 2024-06-02 RX ADMIN — PROPRANOLOL HYDROCHLORIDE 60 MG: 60 CAPSULE, EXTENDED RELEASE ORAL at 09:50

## 2024-06-02 RX ADMIN — RIFAXIMIN 550 MG: 550 TABLET ORAL at 20:14

## 2024-06-02 RX ADMIN — DAPTOMYCIN 600 MG: 350 INJECTION, POWDER, LYOPHILIZED, FOR SOLUTION INTRAVENOUS at 18:07

## 2024-06-02 RX ADMIN — TRAZODONE HYDROCHLORIDE 150 MG: 50 TABLET ORAL at 20:13

## 2024-06-02 RX ADMIN — LISINOPRIL 40 MG: 20 TABLET ORAL at 20:13

## 2024-06-02 RX ADMIN — LEVOTHYROXINE SODIUM 125 MCG: 0.1 TABLET ORAL at 06:30

## 2024-06-02 RX ADMIN — Medication 360 MG: at 09:10

## 2024-06-02 RX ADMIN — HEPARIN 500 UNITS: 100 SYRINGE at 22:50

## 2024-06-02 RX ADMIN — DOCUSATE SODIUM 100 MG: 100 CAPSULE, LIQUID FILLED ORAL at 09:10

## 2024-06-02 RX ADMIN — HYDROCHLOROTHIAZIDE 12.5 MG: 12.5 TABLET ORAL at 09:10

## 2024-06-02 RX ADMIN — ENOXAPARIN SODIUM 40 MG: 100 INJECTION SUBCUTANEOUS at 09:11

## 2024-06-02 RX ADMIN — LISINOPRIL 40 MG: 20 TABLET ORAL at 09:10

## 2024-06-02 RX ADMIN — HYDROCORTISONE 15 MG: 10 TABLET ORAL at 09:10

## 2024-06-02 RX ADMIN — Medication 10 ML: at 22:03

## 2024-06-02 RX ADMIN — HEPARIN 500 UNITS: 100 SYRINGE at 22:49

## 2024-06-02 RX ADMIN — ALPRAZOLAM 0.5 MG: 0.25 TABLET ORAL at 20:13

## 2024-06-02 RX ADMIN — PANCRELIPASE 12000 UNITS OF LIPASE: 30000; 6000; 19000 CAPSULE, DELAYED RELEASE PELLETS ORAL at 12:21

## 2024-06-02 RX ADMIN — CEFEPIME 2000 MG: 2 INJECTION, POWDER, FOR SOLUTION INTRAVENOUS at 09:50

## 2024-06-02 RX ADMIN — PANCRELIPASE 12000 UNITS OF LIPASE: 30000; 6000; 19000 CAPSULE, DELAYED RELEASE PELLETS ORAL at 18:00

## 2024-06-02 RX ADMIN — CEFEPIME 2000 MG: 2 INJECTION, POWDER, FOR SOLUTION INTRAVENOUS at 02:59

## 2024-06-02 RX ADMIN — DOCUSATE SODIUM 100 MG: 100 CAPSULE, LIQUID FILLED ORAL at 20:13

## 2024-06-02 RX ADMIN — PANTOPRAZOLE SODIUM 40 MG: 40 TABLET, DELAYED RELEASE ORAL at 18:00

## 2024-06-02 RX ADMIN — PANTOPRAZOLE SODIUM 40 MG: 40 TABLET, DELAYED RELEASE ORAL at 06:31

## 2024-06-02 NOTE — PROGRESS NOTES
[facility]   Hospitalist Progress Note  Date: 2024  Patient Name: Kayla Gan  : 1975  MRN: [id number]  Date of admission: 2024      Subjective   Subjective     Chief complaint: Chest pain, weakness    Summary:  48-year-old female with history of pancreatectomy, gastroparesis, Crohn's disease, liver cirrhosis, GERD without esophagitis, hypertension, hypothyroidism, renal insufficiency, port in place with frequent IVIG injections, with history of bacteremia and sepsis, felt unwell for the past 2 days, with chest tightness and weakness.  Though no fevers, chills or sweats.  X-ray chest nondiagnostic, hospitalized with blood cultures pending, leukocytosis improved after starting antibiotics.  Chest CT with possible retained foreign bodies involving the central right brachiocephalic vein, may represent retained remnants of previously placed central line, no definitive thrombosis in the region.    Interval follow-up: Seen and examined, no acute distress, no acute major night events, feels better, afebrile.  White blood cell count down to 8000, CRP elevated at 6, creatinine 0.64, potassium 3.7, sodium 139, initial blood cultures negative.  Sed rate elevated 33.  Still looking for source of infection, CT chest pending.  Borderline care for patient's port.  Remains on antibiotics, went ahead and ordered a culture from her port.  White blood cell count 8000, hemoglobin 12.0, creatinine 0.7, BUN 12.  Sodium 135.    Review of systems:  All systems reviewed negative except for weakness, fatigue    Objective   Objective     Vitals:   Temp:  [97.2 °F (36.2 °C)-98.1 °F (36.7 °C)] 98.1 °F (36.7 °C)  Heart Rate:  [64-97] 66  Resp:  [16-18] 18  BP: (113-131)/(65-77) 125/65  Physical Exam                 Constitutional: Awake, alert, no acute distress.  Chest port in place, accessed              Eyes: Pupils equal, sclerae anicteric, no conjunctival injection              HENT: NCAT, mucous membranes moist               Neck: Supple, full range of motion              Respiratory: Clear to auscultation bilaterally, nonlabored respirations               Cardiovascular: RRR, no murmurs, rubs, or gallops, palpable pedal pulses bilaterally              Gastrointestinal: Positive bowel sounds, soft, nontender, nondistended              Musculoskeletal: No bilateral ankle edema, no clubbing or cyanosis to extremities              Psychiatric: Appropriate affect, cooperative              Neurologic: Oriented x 3, strength symmetric in all extremities, Cranial Nerves grossly intact to confrontation, speech clear              Skin: No rashes     Result Review    Result Review:  I have personally reviewed the pertinent results from the past 24 hours to 6/2/2024 14:50 EDT and agree with these findings:  [x]  Laboratory   CBC          5/31/2024    11:56 6/1/2024    05:28 6/2/2024    06:30   CBC   WBC 16.49  8.52  8.38    RBC 3.97  3.60  3.61    Hemoglobin 12.9  11.5  12.0    Hematocrit 38.3  35.2  35.7    MCV 96.5  97.8  98.9    MCH 32.5  31.9  33.2    MCHC 33.7  32.7  33.6    RDW 15.7  16.2  16.0    Platelets 494  435  320      BMP          5/31/2024    11:56 6/1/2024    05:28 6/2/2024    06:30   BMP   BUN 13  12  12    Creatinine 0.77  0.64  0.71    Sodium 135  139  135    Potassium 3.6  3.7  4.3    Chloride 98  104  100    CO2 22.6  25.3  21.5    Calcium 8.6  8.8  9.1      LIVER FUNCTION TESTS:      Lab 06/02/24  0630 06/01/24  0528 05/31/24  1156   TOTAL PROTEIN 7.8 7.4 8.3   ALBUMIN 3.3* 3.3* 3.7   GLOBULIN  --  4.1 4.6   ALT (SGPT) 30 27 34*   AST (SGOT) 42* 29 44*   BILIRUBIN 0.5 0.6 0.8   BILIRUBIN DIRECT <0.2  --   --    ALK PHOS 158* 154* 183*   LIPASE  --   --  4*       [x]  Microbiology   Microbiology Results (last 10 days)       Procedure Component Value - Date/Time    Blood Culture - Blood, Arm, Left [[id number]]  (Normal) Collected: 05/31/24 1251    Lab Status: Preliminary result Specimen: Blood from Arm, Left Updated:  06/02/24 1300     Blood Culture No growth at 2 days    Blood Culture - Blood, Blood, Central Line [[id number]]  (Normal) Collected: 05/31/24 1251    Lab Status: Preliminary result Specimen: Blood, Central Line Updated: 06/02/24 1300     Blood Culture No growth at 2 days    COVID PRE-OP / PRE-PROCEDURE SCREENING ORDER (NO ISOLATION) - Swab, Nasopharynx [[id number]]  (Normal) Collected: 05/31/24 1229    Lab Status: Final result Specimen: Swab from Nasopharynx Updated: 05/31/24 1312    Narrative:      The following orders were created for panel order COVID PRE-OP / PRE-PROCEDURE SCREENING ORDER (NO ISOLATION) - Swab, Nasopharynx.  Procedure                               Abnormality         Status                     ---------                               -----------         ------                     COVID-19, FLU A/B, RSV P...[[id number]]  Normal              Final result                 Please view results for these tests on the individual orders.    COVID-19, FLU A/B, RSV PCR 1 HR TAT - Swab, Nasopharynx [[id number]]  (Normal) Collected: 05/31/24 1229    Lab Status: Final result Specimen: Swab from Nasopharynx Updated: 05/31/24 1311     COVID19 Not Detected     Influenza A PCR Not Detected     Influenza B PCR Not Detected     RSV, PCR Not Detected    Narrative:      Fact sheet for providers: https://www.fda.gov/media/404358/download    Fact sheet for patients: https://www.fda.gov/media/808080/download    Test performed by PCR.              [x]  Radiology CT Chest Without Contrast Diagnostic    Result Date: 6/2/2024   1. No acute infiltrate.  2. There are suspected retained foreign bodies involving the central right brachiocephalic vein. They may represent retained remnants of a previously placed central venous line. Thrombosis in this region cannot be excluded.  3. Please see above comments for further detail.      Please note that portions of this note were completed with a voice recognition program.            Electronically Signed By-Sujit Tan MD On:6/2/2024 12:18 AM      XR Chest 1 View    Result Date: 5/31/2024  IMPRESSION : Low lung volume exam. No acute process.[  Electronically Signed By-Paulie Foreman On:5/31/2024 12:10 PM         []  EKG/Telemetry   ECG 12 Lead ED Triage Standing Order; Chest Pain   Final Result   HEART RATE= 72  bpm   RR Interval= 840  ms   NJ Interval= 155  ms   P Horizontal Axis= 4  deg   P Front Axis= -4  deg   QRSD Interval= 97  ms   QT Interval= 414  ms   QTcB= 452  ms   QRS Axis= -23  deg   T Wave Axis= 10  deg   - ABNORMAL ECG -   Sinus rhythm   Abnormal R-wave progression, late transition   Left ventricular hypertrophy   Inferior infarct, old   When compared with ECG of 31-May-2024 11:33:29,   No significant change   Electronically Signed By: Juan Colon ([facility]) 31-May-2024 18:17:40   Date and Time of Study: 2024-05-31 14:17:04      ECG 12 Lead ED Triage Standing Order; Chest Pain   Final Result   HEART RATE= 85  bpm   RR Interval= 704  ms   NJ Interval= 151  ms   P Horizontal Axis= 18  deg   P Front Axis= -6  deg   QRSD Interval= 92  ms   QT Interval= 376  ms   QTcB= 448  ms   QRS Axis= -26  deg   T Wave Axis= 54  deg   - ABNORMAL ECG -   Sinus rhythm   Probable left ventricular hypertrophy   Inferior infarct, old   When compared with ECG of 26-Feb-2024 13:39:12,   Nonspecific significant change   Electronically Signed By: Juan Colon ([facility]) 31-May-2024 18:17:46   Date and Time of Study: 2024-05-31 11:33:29          []  Cardiology/Vascular   []  Pathology  [x]  Old records  []  Other:    Assessment & Plan   Assessment / Plan     Assessment/Plan:  Assessment:  Sepsis of unknown source  Crohn's disease  History of pancreatectomy  History of gastroparesis  Diabetes mellitus  Hypothyroidism  Essential hypertension  Concern for bacteremia  Questional remnants of old central line in the central right brachiocephalic vein    Plan:  Labs and imaging reviewed  Etiology of sepsis remains  uncertain  Check culture from actual port  Continue daptomycin and cefepime  Continue insulin pump use which is a home device  Follow-up cultures  Continue hydrocortisone 50 mg orally twice a day  Continue hydrochlorothiazide 12.5 mg daily  Continue Inderal 60 mg daily  Hold antihypertensives per parameters  A.m. labs  Full code  DVT prophylaxis with Lovenox  Clinical course dictate further management  Discussed with nurse at the bedside    DVT prophylaxis:  Medical DVT prophylaxis orders are present.        CODE STATUS:   Level Of Support Discussed With: Patient  Code Status (Patient has no pulse and is not breathing): CPR (Attempt to Resuscitate)  Medical Interventions (Patient has pulse or is breathing): Full Support        Electronically signed by Guillaume Pearce MD, 6/2/2024, 14:50 EDT.    Portions of this documentation were transcribed electronically from a voice recognition software.  I confirm all data accurately represents the service(s) I performed at today's visit.

## 2024-06-02 NOTE — PLAN OF CARE
Goal Outcome Evaluation:  Plan of Care Reviewed With: patient            Pt is alert and orient x4.  Vs Wnl for pt.  Pt cont with insulin pump.  Pt denies any pain/discomfort.  Pt ambulated halls this shift.

## 2024-06-02 NOTE — PLAN OF CARE
Problem: Diabetes Comorbidity  Goal: Blood Glucose Level Within Targeted Range  Outcome: Ongoing, Progressing  Intervention: Monitor and Manage Glycemia  Recent Flowsheet Documentation  Taken 6/1/2024 2045 by Ronel Koch, RN  Glycemic Management: blood glucose monitored   Goal Outcome Evaluation:         Pt has a continuous glucose monitor with insulin pump.  Pt accu checks performed and slightly elevated but no insulin admin. Since on pump.  Pt reported no pain  no anxiety.  Rested all hs.  Continues to have antibiotics via tunnel cath. Tolerating well.  Family visited last night and lifted Pts spirits up.

## 2024-06-03 ENCOUNTER — APPOINTMENT (OUTPATIENT)
Facility: HOSPITAL | Age: 49
End: 2024-06-03
Payer: MEDICARE

## 2024-06-03 PROBLEM — A41.9 SEPSIS: Status: RESOLVED | Noted: 2024-05-31 | Resolved: 2024-06-03

## 2024-06-03 LAB — GLUCOSE BLDC GLUCOMTR-MCNC: 186 MG/DL (ref 70–99)

## 2024-06-03 PROCEDURE — 25010000002 CEFEPIME PER 500 MG: Performed by: STUDENT IN AN ORGANIZED HEALTH CARE EDUCATION/TRAINING PROGRAM

## 2024-06-03 PROCEDURE — 99232 SBSQ HOSP IP/OBS MODERATE 35: CPT | Performed by: FAMILY MEDICINE

## 2024-06-03 PROCEDURE — 93971 EXTREMITY STUDY: CPT

## 2024-06-03 PROCEDURE — 82948 REAGENT STRIP/BLOOD GLUCOSE: CPT | Performed by: STUDENT IN AN ORGANIZED HEALTH CARE EDUCATION/TRAINING PROGRAM

## 2024-06-03 PROCEDURE — 25010000002 ENOXAPARIN PER 10 MG: Performed by: STUDENT IN AN ORGANIZED HEALTH CARE EDUCATION/TRAINING PROGRAM

## 2024-06-03 PROCEDURE — 99221 1ST HOSP IP/OBS SF/LOW 40: CPT | Performed by: SURGERY

## 2024-06-03 PROCEDURE — 93971 EXTREMITY STUDY: CPT | Performed by: SURGERY

## 2024-06-03 RX ADMIN — CEFEPIME 2000 MG: 2 INJECTION, POWDER, FOR SOLUTION INTRAVENOUS at 09:27

## 2024-06-03 RX ADMIN — Medication 10 ML: at 09:22

## 2024-06-03 RX ADMIN — PROPRANOLOL HYDROCHLORIDE 60 MG: 60 CAPSULE, EXTENDED RELEASE ORAL at 09:20

## 2024-06-03 RX ADMIN — LEVOTHYROXINE SODIUM 125 MCG: 0.1 TABLET ORAL at 06:47

## 2024-06-03 RX ADMIN — Medication 360 MG: at 09:20

## 2024-06-03 RX ADMIN — PANCRELIPASE 12000 UNITS OF LIPASE: 30000; 6000; 19000 CAPSULE, DELAYED RELEASE PELLETS ORAL at 09:20

## 2024-06-03 RX ADMIN — HYDROCORTISONE 15 MG: 10 TABLET ORAL at 09:20

## 2024-06-03 RX ADMIN — ALPRAZOLAM 0.5 MG: 0.25 TABLET ORAL at 21:44

## 2024-06-03 RX ADMIN — PANCRELIPASE 12000 UNITS OF LIPASE: 30000; 6000; 19000 CAPSULE, DELAYED RELEASE PELLETS ORAL at 18:32

## 2024-06-03 RX ADMIN — PANTOPRAZOLE SODIUM 40 MG: 40 TABLET, DELAYED RELEASE ORAL at 17:05

## 2024-06-03 RX ADMIN — PANCRELIPASE 12000 UNITS OF LIPASE: 30000; 6000; 19000 CAPSULE, DELAYED RELEASE PELLETS ORAL at 12:33

## 2024-06-03 RX ADMIN — CEFEPIME 2000 MG: 2 INJECTION, POWDER, FOR SOLUTION INTRAVENOUS at 18:32

## 2024-06-03 RX ADMIN — TRAZODONE HYDROCHLORIDE 150 MG: 50 TABLET ORAL at 21:43

## 2024-06-03 RX ADMIN — Medication 10 ML: at 09:23

## 2024-06-03 RX ADMIN — DOXYCYCLINE 100 MG: 100 INJECTION, POWDER, LYOPHILIZED, FOR SOLUTION INTRAVENOUS at 17:05

## 2024-06-03 RX ADMIN — RIFAXIMIN 550 MG: 550 TABLET ORAL at 21:43

## 2024-06-03 RX ADMIN — RIFAXIMIN 550 MG: 550 TABLET ORAL at 09:20

## 2024-06-03 RX ADMIN — VORTIOXETINE 5 MG: 5 TABLET, FILM COATED ORAL at 21:43

## 2024-06-03 RX ADMIN — ENOXAPARIN SODIUM 40 MG: 100 INJECTION SUBCUTANEOUS at 09:22

## 2024-06-03 RX ADMIN — Medication 10 ML: at 21:44

## 2024-06-03 RX ADMIN — PANTOPRAZOLE SODIUM 40 MG: 40 TABLET, DELAYED RELEASE ORAL at 06:47

## 2024-06-03 RX ADMIN — HYDROCHLOROTHIAZIDE 12.5 MG: 12.5 TABLET ORAL at 09:20

## 2024-06-03 RX ADMIN — CEFEPIME 2000 MG: 2 INJECTION, POWDER, FOR SOLUTION INTRAVENOUS at 02:22

## 2024-06-03 RX ADMIN — HYDROCORTISONE 15 MG: 10 TABLET ORAL at 21:43

## 2024-06-03 RX ADMIN — LISINOPRIL 40 MG: 20 TABLET ORAL at 21:44

## 2024-06-03 RX ADMIN — DOCUSATE SODIUM 100 MG: 100 CAPSULE, LIQUID FILLED ORAL at 09:20

## 2024-06-03 RX ADMIN — DOCUSATE SODIUM 100 MG: 100 CAPSULE, LIQUID FILLED ORAL at 21:43

## 2024-06-03 RX ADMIN — LISINOPRIL 40 MG: 20 TABLET ORAL at 09:20

## 2024-06-03 NOTE — PROGRESS NOTES
[facility]   Hospitalist Progress Note  Date: 6/3/2024  Patient Name: Kayla Gan  : 1975  MRN: [id number]  Date of admission: 2024      Subjective   Subjective     Chief complaint: Chest pain, weakness    Summary:  48-year-old female with history of pancreatectomy, gastroparesis, Crohn's disease, liver cirrhosis, GERD without esophagitis, hypertension, hypothyroidism, renal insufficiency, port in place with frequent IVIG injections, with history of bacteremia and sepsis, felt unwell for the past 2 days, with chest tightness and weakness.  Though no fevers, chills or sweats.  X-ray chest nondiagnostic, hospitalized with blood cultures pending, leukocytosis improved after starting antibiotics.  Chest CT with possible retained foreign bodies involving the central right brachiocephalic vein, may represent retained remnants of previously placed central line, no definitive thrombosis in the region.  Vascular surgery consulted for further evaluation as patient complains of tenderness in that region.    Interval follow-up: Seen and examined, no acute distress, no acute major night events, no fevers, chills, sweats.  No nausea or vomiting.  Blood pressure stable.  Did complain of some tenderness in her previous port site, CT scan results reviewed again, vascular surgery consulted for further evaluation.  Creatinine 0.7, BUN 12, bicarb 21, potassium 4.3, sodium 135, white blood cell count 8000.  Repeat blood culture from port is pending.    Review of systems:  All systems reviewed negative except for weakness, fatigue, right-sided chest pain    Objective   Objective     Vitals:   Temp:  [97.7 °F (36.5 °C)-98.1 °F (36.7 °C)] 98.1 °F (36.7 °C)  Heart Rate:  [66-76] 76  Resp:  [18] 18  BP: ()/(65-79) 101/71  Physical Exam               Constitutional: Awake, alert, no acute distress.  Chest port in place and accessed, appears comfortable              Eyes: Pupils equal, sclerae anicteric, no  Patient notified.  BMP ordered for Monday under Dr. Stafford.   conjunctival injection              HENT: NCAT, mucous membranes moist              Neck: Supple, Full range of motion              Respiratory: Clear to auscultation bilaterally, nonlabored respirations               Cardiovascular: RRR, no murmurs, rubs, or gallops, palpable pedal pulses bilaterally              Gastrointestinal: Positive bowel sounds, soft, nontender, nondistended              Musculoskeletal: No bilateral ankle edema, no clubbing or cyanosis to extremities              Psychiatric: Appropriate affect, cooperative              Neurologic: Oriented x 3, strength symmetric in all extremities, Cranial Nerves grossly intact to confrontation, speech clear              Skin: No rashes     Result Review    Result Review:  I have personally reviewed the pertinent results from the past 24 hours to 6/3/2024 10:38 EDT and agree with these findings:  [x]  Laboratory   CBC          5/31/2024    11:56 6/1/2024    05:28 6/2/2024    06:30   CBC   WBC 16.49  8.52  8.38    RBC 3.97  3.60  3.61    Hemoglobin 12.9  11.5  12.0    Hematocrit 38.3  35.2  35.7    MCV 96.5  97.8  98.9    MCH 32.5  31.9  33.2    MCHC 33.7  32.7  33.6    RDW 15.7  16.2  16.0    Platelets 494  435  320      BMP          5/31/2024    11:56 6/1/2024    05:28 6/2/2024    06:30   BMP   BUN 13  12  12    Creatinine 0.77  0.64  0.71    Sodium 135  139  135    Potassium 3.6  3.7  4.3    Chloride 98  104  100    CO2 22.6  25.3  21.5    Calcium 8.6  8.8  9.1      LIVER FUNCTION TESTS:      Lab 06/02/24  0630 06/01/24  0528 05/31/24  1156   TOTAL PROTEIN 7.8 7.4 8.3   ALBUMIN 3.3* 3.3* 3.7   GLOBULIN  --  4.1 4.6   ALT (SGPT) 30 27 34*   AST (SGOT) 42* 29 44*   BILIRUBIN 0.5 0.6 0.8   BILIRUBIN DIRECT <0.2  --   --    ALK PHOS 158* 154* 183*   LIPASE  --   --  4*       [x]  Microbiology   Microbiology Results (last 10 days)       Procedure Component Value - Date/Time    Blood Culture - Blood, Arm, Left [[id number]]  (Normal) Collected: 05/31/24 1256     Lab Status: Preliminary result Specimen: Blood from Arm, Left Updated: 06/02/24 1300     Blood Culture No growth at 2 days    Blood Culture - Blood, Blood, Central Line [[id number]]  (Normal) Collected: 05/31/24 1251    Lab Status: Preliminary result Specimen: Blood, Central Line Updated: 06/02/24 1300     Blood Culture No growth at 2 days    COVID PRE-OP / PRE-PROCEDURE SCREENING ORDER (NO ISOLATION) - Swab, Nasopharynx [[id number]]  (Normal) Collected: 05/31/24 1229    Lab Status: Final result Specimen: Swab from Nasopharynx Updated: 05/31/24 1312    Narrative:      The following orders were created for panel order COVID PRE-OP / PRE-PROCEDURE SCREENING ORDER (NO ISOLATION) - Swab, Nasopharynx.  Procedure                               Abnormality         Status                     ---------                               -----------         ------                     COVID-19, FLU A/B, RSV P...[[id number]]  Normal              Final result                 Please view results for these tests on the individual orders.    COVID-19, FLU A/B, RSV PCR 1 HR TAT - Swab, Nasopharynx [[id number]]  (Normal) Collected: 05/31/24 1229    Lab Status: Final result Specimen: Swab from Nasopharynx Updated: 05/31/24 1311     COVID19 Not Detected     Influenza A PCR Not Detected     Influenza B PCR Not Detected     RSV, PCR Not Detected    Narrative:      Fact sheet for providers: https://www.fda.gov/media/273613/download    Fact sheet for patients: https://www.fda.gov/media/320832/download    Test performed by PCR.              [x]  Radiology CT Chest Without Contrast Diagnostic    Result Date: 6/2/2024   1. No acute infiltrate.  2. There are suspected retained foreign bodies involving the central right brachiocephalic vein. They may represent retained remnants of a previously placed central venous line. Thrombosis in this region cannot be excluded.  3. Please see above comments for further detail.      Please note that portions of  this note were completed with a voice recognition program.           Electronically Signed By-Sujit Tan MD On:6/2/2024 12:18 AM      XR Chest 1 View    Result Date: 5/31/2024  IMPRESSION : Low lung volume exam. No acute process.[  Electronically Signed By-Paulie Foreman On:5/31/2024 12:10 PM         []  EKG/Telemetry   ECG 12 Lead ED Triage Standing Order; Chest Pain   Final Result   HEART RATE= 72  bpm   RR Interval= 840  ms   NE Interval= 155  ms   P Horizontal Axis= 4  deg   P Front Axis= -4  deg   QRSD Interval= 97  ms   QT Interval= 414  ms   QTcB= 452  ms   QRS Axis= -23  deg   T Wave Axis= 10  deg   - ABNORMAL ECG -   Sinus rhythm   Abnormal R-wave progression, late transition   Left ventricular hypertrophy   Inferior infarct, old   When compared with ECG of 31-May-2024 11:33:29,   No significant change   Electronically Signed By: Juan Colon ([facility]) 31-May-2024 18:17:40   Date and Time of Study: 2024-05-31 14:17:04      ECG 12 Lead ED Triage Standing Order; Chest Pain   Final Result   HEART RATE= 85  bpm   RR Interval= 704  ms   NE Interval= 151  ms   P Horizontal Axis= 18  deg   P Front Axis= -6  deg   QRSD Interval= 92  ms   QT Interval= 376  ms   QTcB= 448  ms   QRS Axis= -26  deg   T Wave Axis= 54  deg   - ABNORMAL ECG -   Sinus rhythm   Probable left ventricular hypertrophy   Inferior infarct, old   When compared with ECG of 26-Feb-2024 13:39:12,   Nonspecific significant change   Electronically Signed By: Juan Colon ([facility]) 31-May-2024 18:17:46   Date and Time of Study: 2024-05-31 11:33:29          []  Cardiology/Vascular   []  Pathology  [x]  Old records  []  Other:    Assessment & Plan   Assessment / Plan     Assessment/Plan:    Assessment:  Sepsis of unknown source  Crohn's disease  History of pancreatectomy  History of gastroparesis  Diabetes mellitus  Hypothyroidism  Essential hypertension  Concern for bacteremia  Questional remnants of old central line in the central right brachiocephalic  vein    Plan:  Labs and imaging reviewed  Vascular surgery consulted for evaluation of right-sided chest discomfort with underlying imaging showing concern for retained foreign object versus possible thrombus  Follow-up culture from current port  Continue daptomycin and cefepime  Continue insulin pump use which is a home device  Follow-up cultures  Continue hydrocortisone 50 mg orally twice a day  Continue hydrochlorothiazide 12.5 mg daily  Continue Inderal 60 mg daily  Hold antihypertensives per parameters  A.m. labs  Full code  DVT prophylaxis with Lovenox  Clinical course dictate further management  Discussed with nurse at the bedside    DVT prophylaxis:  Medical DVT prophylaxis orders are present.        CODE STATUS:   Level Of Support Discussed With: Patient  Code Status (Patient has no pulse and is not breathing): CPR (Attempt to Resuscitate)  Medical Interventions (Patient has pulse or is breathing): Full Support        Electronically signed by Guillaume Pearce MD, 6/3/2024, 10:38 EDT.    Portions of this documentation were transcribed electronically from a voice recognition software.  I confirm all data accurately represents the service(s) I performed at today's visit.

## 2024-06-03 NOTE — CONSULTS
[facility]   VASCULAR SURGERY CONSULT    Patient Name: Kayla Gan  : 1975  MRN: [id number]  Primary Care Physician:  Justin Daniels, LAZARO  Date of admission: 2024    Subjective   Subjective     Chief Complaint: Concern for retained foreign body    HPI:    Kayla Gan is a 48 y.o. female who has had significant medical issues and has had indwelling catheters for quite some time.  She had a right-sided tunneled catheter for 5 years that was removed and subsequently had a catheter for another month that got infected earlier this year.  That was removed and now she has a left-sided Noble catheter.  She has chronic autoimmune disorders, pancreatic insufficiency status post pancreatic transplant, adrenal insufficiency, liver failure.  She requires IVIG infusion and frequent lab draws and IV hydration frequently and that is why she has a central line.    The patient was admitted because of some chest pain and weakness.  CT was performed.  A CT was reported to show possibly retained catheter/foreign body in the right IJ/innominate vein.  I was asked to see her for the same.    The patient does not have any central venous congestion symptoms.  She feels like she has been getting sick frequently but nothing that would be contributed to the catheter if she has any retained segment.  She does not recall if there was any concern after removal of the catheter about a segment left behind.    The patient did have sepsis in February but that was related to her port that was removed.  She did have leukocytosis this admission but no positive cultures.    Review of Systems    Patient has multiple complaints including palpitations, not feeling well, frequent nausea, weakness and fatigue.  No significant complaints of facial swelling or upper extremity swelling.    Personal History     Past Medical History:   Diagnosis Date    Adrenal insufficiency     Allergic Unsure    Foods, Ulram, Vancomycin     Anemia     Anxiety     Arthritis     Asthma Unsure    Cholelithiasis Unsure    Cirrhosis     Clotting disorder Unsure    Colon polyp     Crohn's disease     Depression     Diabetes mellitus     Gastroparesis     GERD (gastroesophageal reflux disease)     History of MRSA infection     Hyperlipidemia     Hypertension     Hypothyroidism     Irritable bowel syndrome Unsure    Liver disease     Migraines     Pancreatitis     Urinary tract infection Unsure    Interstitial Cystitis       Past Surgical History:   Procedure Laterality Date    ABSCESS DRAINAGE  12/24/2019    Fluoroscopically guided abscess drainage, [facility]    ADENOIDECTOMY      BACK SURGERY      L4 L5    CHOLECYSTECTOMY N/A     COLONOSCOPY  04/24/2019    NBIH, 3 mm polyp    DILATATION AND CURETTAGE      ENDOMETRIAL ABLATION      ENDOSCOPY N/A 04/24/2019    Normal    ENTEROSCOPY SMALL BOWEL      ERCP  2019    GASTRIC STIMULATOR IMPLANT SURGERY      GASTROJEJUNOSTOMY W/ JEJUNOSTOMY TUBE      removed    HYSTERECTOMY      LYMPH NODE BIOPSY      PANCREATECTOMY  12/2019    TPIAT    PELVIC FLOOR REPAIR      SINUS SURGERY      SPLENECTOMY      TONSILLECTOMY      VENOUS ACCESS DEVICE (PORT) INSERTION         Family History: family history includes Alcohol abuse in her paternal grandfather; Anxiety disorder in her father and mother; Breast cancer in her mother; Colon polyps in her mother; Depression in her brother and father; Heart disease in her father and paternal grandfather; Hypertension in her brother; Hypothyroidism in her father and mother. Otherwise pertinent FHx was reviewed and not pertinent to current issue.    Social History:  reports that she has never smoked. She has never used smokeless tobacco. She reports that she does not drink alcohol and does not use drugs.    Home Medications:  Atogepant, Insulin Aspart, Insulin Glargine, Omnipod 5 G6 Pods (Gen 5), Pancrelipase (Lip-Prot-Amyl), SUMAtriptan, Vitamin E, Vortioxetine HBr, diphenhydrAMINE,  docusate sodium, heparin (porcine), hydrOXYzine, hydroCHLOROthiazide, hydrocortisone, immune globulin (human), lactulose, lansoprazole, levothyroxine, lisinopril, ondansetron, prochlorperazine, promethazine, propranolol LA, riFAXIMin, sodium chloride, traZODone, ubrogepant, and vitamin E    Allergies:  Allergies   Allergen Reactions    Other Other (See Comments)     Sent patient into kidney failure, heart stopped, in ICU for two days.    Parathyroid Hormone (Recomb) Other (See Comments)     Sent patient into kidney failure, heart stopped, in ICU for two days.      Romosozumab Hives     Other reaction(s): Hives    Other reaction(s): Hives   Other reaction(s): Hives    Sulfate Hives    Teriparatide Anaphylaxis and Other (See Comments)     Other reaction(s): Unknown    Tramadol Anaphylaxis and Other (See Comments)     Other reaction(s): Unknown, Unknown    Vancomycin Hives and Itching     Other reaction(s): Unknown, Unknown    Nifedipine Provider Review Needed and Other (See Comments)     Rapid heart beat    Rapid heart beat    Other reaction(s): Provider Review Needed   Other reaction(s): Provider Review Needed   Rapid heart beat       Objective   Objective     Vitals:   Temp:  [97.3 °F (36.3 °C)-98.1 °F (36.7 °C)] 97.7 °F (36.5 °C)  Heart Rate:  [66-76] 68  Resp:  [18] 18  BP: ()/(68-87) 126/85    Physical Exam   Patient is awake and alert.  Obese.  No acute distress.  Appears stated age.  Rhythm is regular.  Nonlabored breathing.  Left chest wall catheter is in place.  No significant dilated chest wall veins noted.Palpable radial pulses.  Right DP is palpable.  No left DP palpable.  Feet are warm otherwise.  Edema is present.    Diagnostic studies: I reviewed the images of her chest x-ray and her CT scan.  There is short segments of hypodense lesions noted in the right innominate veins/proximal IJ.  On the chest x-ray there is no foreign body or catheter segment that is obvious.    Labs:      Results from last  7 days   Lab Units 06/02/24  0630 06/01/24  0528 05/31/24  1156   WBC 10*3/mm3 8.38 8.52 16.49*   HEMOGLOBIN g/dL 12.0 11.5* 12.9   HEMATOCRIT % 35.7 35.2 38.3   PLATELETS 10*3/mm3 320 435 494*         Results from last 7 days   Lab Units 06/02/24  0630 06/01/24  0528 05/31/24  1156   CREATININE mg/dL 0.71 0.64 0.77                    Lab Results   Component Value Date    LDL 94 02/06/2024         Results from last 7 days   Lab Units 06/01/24  0528   CRP mg/dL 6.11*        Assessment & Plan   Assessment / Plan     Active Hospital Problems:  There are no active hospital problems to display for this patient.      Assessment/plan:   48-year-old female with concern for retained foreign body.  She had a indwelling catheter for many years.  This could be calcification that is remnant from the catheter.  There is no obvious catheter segment on x-ray.  Either way I do not think there is any reason to remove this or try and investigate this for removal.  If she has recurrent bacteremia with no other obvious source, then further workup may be needed to see if there is any foreign body that is retained and is seated.  There is no current indication for such.    I do think the patient has some central venous stenosis based on her enlarged hemizygous vein and previous catheter placement.  Will get a baseline duplex.    I counseled the patient extensively about this.  I explained to her that there is no indication for aggressive intervention Or to attempt removal.  I explained to her that removal could be attempted percutaneous but most likely would require a sternotomy to remove this safely.  Duplex pending.      Electronically signed by Onesimo Parker MD, 06/03/24, 7:55 PM EDT.

## 2024-06-03 NOTE — CONSULTS
Met with patient at bedside. Completed and signed insulin pump contract. No needs or questions at this time.

## 2024-06-03 NOTE — SIGNIFICANT NOTE
06/03/24 1045   Coping/Psychosocial   Observed Emotional State calm;cooperative   Verbalized Emotional State hopefulness   Trust Relationship/Rapport empathic listening provided   Involvement in Care interacting with patient   Additional Documentation Spiritual Care (Group)   Spiritual Care   Use of Spiritual Resources non-Jainism use of spiritual care   Spiritual Care Source  initiative   Spiritual Care Follow-Up follow-up, none required as presently assessed   Response to Spiritual Care receptive of support;thanks expressed   Spiritual Care Interventions supportive conversation provided   Spiritual Care Visit Type initial   Receptivity to Spiritual Care visit welcomed

## 2024-06-03 NOTE — PLAN OF CARE
Goal Outcome Evaluation:  Plan of Care Reviewed With: patient        Progress: no change  Outcome Evaluation: PT is AAOx4, VSS, no c/o of N/V/D, SOA or acute pain, tolerating IV Cefepime and Doxy, patient supplied insulin pump refilled by patient, BG under 300 this shift, ad heidi, negative blood cultures at 24 hrs, potential dc home tomorrow, bed in low/locked position, call light and personal items within reach, continue with current POC at this time.

## 2024-06-03 NOTE — PLAN OF CARE
Goal Outcome Evaluation:              Outcome Evaluation: pt vital signs are stable, PRN xanax given for anxiety, TDC flushed with heparin and dressing changed, blood sugar taken and insulin pump remains in use on patient, continue plan of care

## 2024-06-04 ENCOUNTER — READMISSION MANAGEMENT (OUTPATIENT)
Dept: CALL CENTER | Facility: HOSPITAL | Age: 49
End: 2024-06-04
Payer: MEDICARE

## 2024-06-04 VITALS
TEMPERATURE: 97.9 F | DIASTOLIC BLOOD PRESSURE: 82 MMHG | HEART RATE: 80 BPM | WEIGHT: 194.45 LBS | SYSTOLIC BLOOD PRESSURE: 113 MMHG | HEIGHT: 67 IN | OXYGEN SATURATION: 97 % | BODY MASS INDEX: 30.52 KG/M2 | RESPIRATION RATE: 18 BRPM

## 2024-06-04 PROBLEM — Z95.828: Status: ACTIVE | Noted: 2024-06-04

## 2024-06-04 LAB
ALBUMIN SERPL-MCNC: 3.4 G/DL (ref 3.5–5.2)
ALP SERPL-CCNC: 173 U/L (ref 39–117)
ALT SERPL W P-5'-P-CCNC: 34 U/L (ref 1–33)
ANION GAP SERPL CALCULATED.3IONS-SCNC: 11 MMOL/L (ref 5–15)
AST SERPL-CCNC: 40 U/L (ref 1–32)
BASOPHILS # BLD AUTO: 0.08 10*3/MM3 (ref 0–0.2)
BASOPHILS NFR BLD AUTO: 0.6 % (ref 0–1.5)
BH CV UPPER VENOUS LEFT INTERNAL JUGULAR AUGMENT: NORMAL
BH CV UPPER VENOUS LEFT INTERNAL JUGULAR COMPRESS: NORMAL
BH CV UPPER VENOUS LEFT INTERNAL JUGULAR PHASIC: NORMAL
BH CV UPPER VENOUS LEFT INTERNAL JUGULAR SPONT: NORMAL
BH CV UPPER VENOUS LEFT SUBCLAVIAN AUGMENT: NORMAL
BH CV UPPER VENOUS LEFT SUBCLAVIAN COMPRESS: NORMAL
BH CV UPPER VENOUS LEFT SUBCLAVIAN PHASIC: NORMAL
BH CV UPPER VENOUS LEFT SUBCLAVIAN SPONT: NORMAL
BH CV UPPER VENOUS RIGHT AXILLARY AUGMENT: NORMAL
BH CV UPPER VENOUS RIGHT AXILLARY COMPRESS: NORMAL
BH CV UPPER VENOUS RIGHT AXILLARY PHASIC: NORMAL
BH CV UPPER VENOUS RIGHT AXILLARY SPONT: NORMAL
BH CV UPPER VENOUS RIGHT BASILIC FOREARM COMPRESS: NORMAL
BH CV UPPER VENOUS RIGHT BASILIC UPPER COMPRESS: NORMAL
BH CV UPPER VENOUS RIGHT BRACHIAL COMPRESS: NORMAL
BH CV UPPER VENOUS RIGHT CEPHALIC FOREARM COMPRESS: NORMAL
BH CV UPPER VENOUS RIGHT CEPHALIC UPPER COMPRESS: NORMAL
BH CV UPPER VENOUS RIGHT INTERNAL JUGULAR AUGMENT: NORMAL
BH CV UPPER VENOUS RIGHT INTERNAL JUGULAR COMPRESS: NORMAL
BH CV UPPER VENOUS RIGHT INTERNAL JUGULAR PHASIC: NORMAL
BH CV UPPER VENOUS RIGHT INTERNAL JUGULAR SPONT: NORMAL
BH CV UPPER VENOUS RIGHT RADIAL COMPRESS: NORMAL
BH CV UPPER VENOUS RIGHT SUBCLAVIAN AUGMENT: NORMAL
BH CV UPPER VENOUS RIGHT SUBCLAVIAN COMPRESS: NORMAL
BH CV UPPER VENOUS RIGHT SUBCLAVIAN PHASIC: NORMAL
BH CV UPPER VENOUS RIGHT SUBCLAVIAN SPONT: NORMAL
BH CV UPPER VENOUS RIGHT ULNAR COMPRESS: NORMAL
BH CV VAS PRELIMINARY FINDINGS SCRIPTING: 1
BILIRUB CONJ SERPL-MCNC: <0.2 MG/DL (ref 0–0.3)
BILIRUB INDIRECT SERPL-MCNC: ABNORMAL MG/DL
BILIRUB SERPL-MCNC: 0.4 MG/DL (ref 0–1.2)
BUN SERPL-MCNC: 13 MG/DL (ref 6–20)
BUN/CREAT SERPL: 18.1 (ref 7–25)
CALCIUM SPEC-SCNC: 9.4 MG/DL (ref 8.6–10.5)
CHLORIDE SERPL-SCNC: 100 MMOL/L (ref 98–107)
CO2 SERPL-SCNC: 25 MMOL/L (ref 22–29)
CREAT SERPL-MCNC: 0.72 MG/DL (ref 0.57–1)
DEPRECATED RDW RBC AUTO: 55.2 FL (ref 37–54)
EGFRCR SERPLBLD CKD-EPI 2021: 103.3 ML/MIN/1.73
EOSINOPHIL # BLD AUTO: 0.3 10*3/MM3 (ref 0–0.4)
EOSINOPHIL NFR BLD AUTO: 2.4 % (ref 0.3–6.2)
ERYTHROCYTE [DISTWIDTH] IN BLOOD BY AUTOMATED COUNT: 15.5 % (ref 12.3–15.4)
GLUCOSE BLDC GLUCOMTR-MCNC: 171 MG/DL (ref 70–99)
GLUCOSE SERPL-MCNC: 164 MG/DL (ref 65–99)
HCT VFR BLD AUTO: 39.7 % (ref 34–46.6)
HGB BLD-MCNC: 13.1 G/DL (ref 12–15.9)
IMM GRANULOCYTES # BLD AUTO: 0.06 10*3/MM3 (ref 0–0.05)
IMM GRANULOCYTES NFR BLD AUTO: 0.5 % (ref 0–0.5)
LYMPHOCYTES # BLD AUTO: 4.92 10*3/MM3 (ref 0.7–3.1)
LYMPHOCYTES NFR BLD AUTO: 39.9 % (ref 19.6–45.3)
MAGNESIUM SERPL-MCNC: 2.2 MG/DL (ref 1.6–2.6)
MCH RBC QN AUTO: 32.1 PG (ref 26.6–33)
MCHC RBC AUTO-ENTMCNC: 33 G/DL (ref 31.5–35.7)
MCV RBC AUTO: 97.3 FL (ref 79–97)
MONOCYTES # BLD AUTO: 1.51 10*3/MM3 (ref 0.1–0.9)
MONOCYTES NFR BLD AUTO: 12.2 % (ref 5–12)
NEUTROPHILS NFR BLD AUTO: 44.4 % (ref 42.7–76)
NEUTROPHILS NFR BLD AUTO: 5.47 10*3/MM3 (ref 1.7–7)
NRBC BLD AUTO-RTO: 0 /100 WBC (ref 0–0.2)
PHOSPHATE SERPL-MCNC: 4.5 MG/DL (ref 2.5–4.5)
PLATELET # BLD AUTO: 543 10*3/MM3 (ref 140–450)
PMV BLD AUTO: 11.2 FL (ref 6–12)
POTASSIUM SERPL-SCNC: 4.4 MMOL/L (ref 3.5–5.2)
PROT SERPL-MCNC: 8.3 G/DL (ref 6–8.5)
RBC # BLD AUTO: 4.08 10*6/MM3 (ref 3.77–5.28)
SODIUM SERPL-SCNC: 136 MMOL/L (ref 136–145)
WBC NRBC COR # BLD AUTO: 12.34 10*3/MM3 (ref 3.4–10.8)

## 2024-06-04 PROCEDURE — 82948 REAGENT STRIP/BLOOD GLUCOSE: CPT

## 2024-06-04 PROCEDURE — 85025 COMPLETE CBC W/AUTO DIFF WBC: CPT | Performed by: FAMILY MEDICINE

## 2024-06-04 PROCEDURE — 84100 ASSAY OF PHOSPHORUS: CPT | Performed by: FAMILY MEDICINE

## 2024-06-04 PROCEDURE — 83735 ASSAY OF MAGNESIUM: CPT | Performed by: FAMILY MEDICINE

## 2024-06-04 PROCEDURE — 25010000002 CEFEPIME PER 500 MG: Performed by: STUDENT IN AN ORGANIZED HEALTH CARE EDUCATION/TRAINING PROGRAM

## 2024-06-04 PROCEDURE — 80076 HEPATIC FUNCTION PANEL: CPT | Performed by: FAMILY MEDICINE

## 2024-06-04 PROCEDURE — 80048 BASIC METABOLIC PNL TOTAL CA: CPT | Performed by: FAMILY MEDICINE

## 2024-06-04 PROCEDURE — 99239 HOSP IP/OBS DSCHRG MGMT >30: CPT | Performed by: FAMILY MEDICINE

## 2024-06-04 RX ORDER — DOXYCYCLINE HYCLATE 100 MG/1
100 CAPSULE ORAL 2 TIMES DAILY
Qty: 20 CAPSULE | Refills: 0 | Status: SHIPPED | OUTPATIENT
Start: 2024-06-04 | End: 2024-06-14

## 2024-06-04 RX ADMIN — PANCRELIPASE 12000 UNITS OF LIPASE: 30000; 6000; 19000 CAPSULE, DELAYED RELEASE PELLETS ORAL at 09:29

## 2024-06-04 RX ADMIN — RIFAXIMIN 550 MG: 550 TABLET ORAL at 09:29

## 2024-06-04 RX ADMIN — HYDROCHLOROTHIAZIDE 12.5 MG: 12.5 TABLET ORAL at 09:34

## 2024-06-04 RX ADMIN — LEVOTHYROXINE SODIUM 125 MCG: 0.1 TABLET ORAL at 05:55

## 2024-06-04 RX ADMIN — Medication 360 MG: at 09:34

## 2024-06-04 RX ADMIN — Medication 10 ML: at 09:30

## 2024-06-04 RX ADMIN — PANTOPRAZOLE SODIUM 40 MG: 40 TABLET, DELAYED RELEASE ORAL at 09:29

## 2024-06-04 RX ADMIN — HYDROCORTISONE 15 MG: 10 TABLET ORAL at 09:28

## 2024-06-04 RX ADMIN — DOCUSATE SODIUM 100 MG: 100 CAPSULE, LIQUID FILLED ORAL at 09:29

## 2024-06-04 RX ADMIN — LISINOPRIL 40 MG: 20 TABLET ORAL at 09:29

## 2024-06-04 RX ADMIN — CEFEPIME 2000 MG: 2 INJECTION, POWDER, FOR SOLUTION INTRAVENOUS at 02:44

## 2024-06-04 RX ADMIN — PROPRANOLOL HYDROCHLORIDE 60 MG: 60 CAPSULE, EXTENDED RELEASE ORAL at 09:28

## 2024-06-04 RX ADMIN — DOXYCYCLINE 100 MG: 100 INJECTION, POWDER, LYOPHILIZED, FOR SOLUTION INTRAVENOUS at 05:55

## 2024-06-04 NOTE — PLAN OF CARE
Goal Outcome Evaluation:                      Ultrasound of right arm complete, no evidence of DVT.  BG and insulin controlled by pump/patient, no complications.  PRN xanax given once, helped with sleep.

## 2024-06-04 NOTE — DISCHARGE SUMMARY
[facility]         HOSPITALIST  DISCHARGE SUMMARY    Patient Name: Kayla Gan  : 1975  MRN: [id number]    Date of Admission: 2024  Date of Discharge:  2024    Primary Care Physician: Justin Daniels APRN    Consults       Date and Time Order Name Status Description    6/3/2024 10:06 AM Inpatient Vascular Surgery Consult      2024  1:47 PM Inpatient Hospitalist Consult              Active and Resolved Hospital Problems:    Sepsis suspected to be of viral illness  Crohn's disease  History of pancreatectomy  History of gastroparesis  Diabetes mellitus  Hypothyroidism  Essential hypertension  Concern for bacteremia  Questional remnants of old central line versus heavy calcification versus small thrombus in the central right brachiocephalic vein    Active Hospital Problems    Diagnosis POA    History of insertion of central venous access port [Z95.828] Not Applicable      Resolved Hospital Problems    Diagnosis POA    **Sepsis [A41.9] Yes       Hospital Course     Hospital Course:  48-year-old female with history of pancreatectomy, gastroparesis, Crohn's disease, liver cirrhosis, GERD without esophagitis, hypertension, hypothyroidism, renal insufficiency, port in place with frequent IVIG injections, with history of bacteremia and sepsis, felt unwell for the past 2 days, with chest tightness and weakness.  Though no fevers, chills or sweats.  X-ray chest nondiagnostic, hospitalized with blood cultures pending, leukocytosis improved after starting antibiotics.  Chest CT with possible retained foreign bodies involving the central right brachiocephalic vein, may represent retained remnants of previously placed central line, no definitive thrombosis in the region.  Vascular surgery consulted for further evaluation as patient complains of tenderness in that region.  Doppler study ruled out blood clot.  Vascular surgery noted what was seen on imaging could be calcification that  was remanent from the catheter, there is no obvious catheter segment on x-ray, no reason to remove this or investigate, if she does have recurrent bacteremia with no other obvious source, she will need an advanced surgery if she were to ever want to have this area explored, there is no indication for aggressive intervention at this time or attempted removal, she was referred to a vascular surgeon in [place] for further evaluation, continuity of care.  The patient's cultures all came back negative.  Discharged in hemodynamically stable condition on 6/4/2024 with empiric antibiotics and follow-up with vascular surgery as outpatient.    Day of Discharge     Vital Signs:  Temp:  [97.5 °F (36.4 °C)-98.1 °F (36.7 °C)] 97.9 °F (36.6 °C)  Heart Rate:  [68-80] 80  Resp:  [18] 18  BP: (106-143)/(72-95) 113/82  Review of systems:  All systems reviewed negative except for weakness, fatigue    Physical Exam                         Constitutional: Awake, alert, no acute distress.  Chest port in place and accessed, appears comfortable              Eyes: Pupils equal, sclerae anicteric, no conjunctival injection              HENT: NCAT, mucous membranes moist              Neck: Supple, Full range of motion              Respiratory: Clear to auscultation bilaterally, nonlabored respirations               Cardiovascular: RRR, no murmurs, rubs, or gallops, palpable pedal pulses bilaterally              Gastrointestinal: Positive bowel sounds, soft, nontender, nondistended              Musculoskeletal: No bilateral ankle edema, no clubbing or cyanosis to extremities              Psychiatric: Appropriate affect, cooperative              Neurologic: Oriented x 3, strength symmetric in all extremities, Cranial Nerves grossly intact to confrontation, speech clear              Skin: No rashes           Discharge Details        Discharge Medications        New Medications        Instructions Start Date   doxycycline 100 MG  capsule  Commonly known as: VIBRAMYCIN   100 mg, Oral, 2 Times Daily             Changes to Medications        Instructions Start Date   propranolol LA 60 MG 24 hr capsule  Commonly known as: INDERAL LA  What changed: when to take this   60 mg, Oral, Daily      Ubrelvy 100 MG tablet  Generic drug: ubrogepant  What changed: how much to take   100 mg, Oral, Once As Needed             Continue These Medications        Instructions Start Date   Creon [id number] units capsule delayed-release particles capsule  Generic drug: Pancrelipase (Lip-Prot-Amyl)   Take 3 tabs with meals and 2 with snacks      diphenhydrAMINE 50 MG/ML injection  Commonly known as: BENADRYL   25-50 mg, Intravenous, Every 6 Hours PRN      docusate sodium 100 MG capsule  Commonly known as: COLACE   Take 1 capsule by mouth 2 (Two) Times a Day.      Enulose 10 GM/15ML solution solution (encephalopathy)  Generic drug: lactulose   Take 45 mL by mouth As Needed.      Gamunex-C 5 GM/50ML solution infusion  Generic drug: immune globulin (human)   5 g, Intravenous, Once      heparin (porcine) 100-0.45 UNIT/ML-% infusion   Intravenous, Continuous      hydroCHLOROthiazide 12.5 MG capsule  Commonly known as: MICROZIDE   12.5 mg, Oral, Every Morning      hydrocortisone 5 MG tablet  Commonly known as: CORTEF   15 mg, Oral, 2 Times Daily      hydrOXYzine 50 MG tablet  Commonly known as: ATARAX   50 mg, Oral, Nightly      lansoprazole 30 MG capsule  Commonly known as: PREVACID   30 mg, Oral, 2 Times Daily      Lantus SoloStar 100 UNIT/ML injection pen  Generic drug: Insulin Glargine   47 Units, Subcutaneous, Daily, Only for insulin pump failure      levothyroxine 125 MCG tablet  Commonly known as: SYNTHROID, LEVOTHROID   125 mcg, Oral, Daily      lisinopril 40 MG tablet  Commonly known as: PRINIVIL,ZESTRIL   40 mg, Oral, 2 Times Daily      NovoLOG 100 UNIT/ML injection  Generic drug: Insulin Aspart   Inject  under the skin into the appropriate area as directed.  Type of Insulin: Novolog 100 units/ml  Type of Pump:Other:Omnipod  Max Bolus rate: 2.5 units/ hr  Max Basal rate : 10 units/hr  Correction factor: 40mg/dl  Insulin to carbohydrate ratio: 6-9 g of carb  Next fill date :5/31/24   13 units left in pump  Date of last site change: Wed  Location of pod :upper abd    Frequency of refill: 2-3 days  Last refill date: 5/29/24    Prescriber: Dr Silver  Phone number: [phone]      Omnipod 5 G6 Pods (Gen 5) misc   Apply 1 cartridge. topically to the appropriate area as directed Every 3 (Three) Days. Per patient changes pod every 2-3 days      ondansetron 2 mg/mL injection  Commonly known as: ZOFRAN   8 mg, Intravenous, Every 6 Hours PRN      prochlorperazine 10 MG tablet  Commonly known as: COMPAZINE   10 mg, Oral, Every 8 Hours PRN      promethazine 25 MG/ML injection  Commonly known as: PHENERGAN   25 mg, Intravenous, Every 4 Hours PRN      promethazine 25 MG tablet  Commonly known as: PHENERGAN   25 mg, Oral, Every 4 Hours PRN      Qulipta 60 MG tablet  Generic drug: Atogepant   1 tablet, Oral, Daily      riFAXIMin 550 MG tablet  Commonly known as: XIFAXAN   550 mg, Oral, Every 12 Hours Scheduled      sodium chloride 0.9 % solution   Intravenous, Daily, 1 liter everyday      SUMAtriptan 25 MG tablet  Commonly known as: IMITREX   25 mg, Oral, Once As Needed      traZODone 150 MG tablet  Commonly known as: DESYREL   150 mg, Oral, Nightly      E-400 180 MG (400 UNIT) capsule capsule  Generic drug: Vitamin E   2 capsules, Oral, Daily      vitamin E 400 UNIT capsule   800 Units, Oral, Daily      Vortioxetine HBr 5 MG tablet tablet  Commonly known as: Trintellix   5 mg, Oral, Nightly               Allergies   Allergen Reactions    Other Other (See Comments)     Sent patient into kidney failure, heart stopped, in ICU for two days.    Parathyroid Hormone (Recomb) Other (See Comments)     Sent patient into kidney failure, heart stopped, in ICU for two days.      Romosozumab  Hives     Other reaction(s): Hives    Other reaction(s): Hives   Other reaction(s): Hives    Sulfate Hives    Teriparatide Anaphylaxis and Other (See Comments)     Other reaction(s): Unknown    Tramadol Anaphylaxis and Other (See Comments)     Other reaction(s): Unknown, Unknown    Vancomycin Hives and Itching     Other reaction(s): Unknown, Unknown    Nifedipine Provider Review Needed and Other (See Comments)     Rapid heart beat    Rapid heart beat    Other reaction(s): Provider Review Needed   Other reaction(s): Provider Review Needed   Rapid heart beat       Discharge Disposition:  Home or Self Care    Diet:  Hospital:  Diet Order   Procedures    Diet: Diabetic; Consistent Carbohydrate; Fluid Consistency: Thin (IDDSI 0)       Discharge Activity:   Activity Instructions    As Tolerated           CODE STATUS:  Code Status and Medical Interventions:   Ordered at: 05/31/24 1791     Level Of Support Discussed With:    Patient     Code Status (Patient has no pulse and is not breathing):    CPR (Attempt to Resuscitate)     Medical Interventions (Patient has pulse or is breathing):    Full Support         Future Appointments   Date Time Provider Department Center   7/3/2024 10:00 AM Sujit Andersen MD [facility] SLEEP CT [facility]       Additional Instructions for the Follow-ups that You Need to Schedule       Discharge Follow-up with PCP   As directed       Currently Documented PCP:    Justin Daniels APRN    PCP Phone Number:    [phone]     Follow Up Details: 3 to 7 days                Pertinent  and/or Most Recent Results     PROCEDURES:   Duplex Venous Upper Extremity - Right CV-READ    Result Date: 6/4/2024    Normal right upper extremity venous duplex scan.     CT Chest Without Contrast Diagnostic    Result Date: 6/2/2024  CT CHEST WO CONTRAST DIAGNOSTIC-  Date of Exam: 6/1/2024 7:07 PM  Indications: weakness; chest pain; possible pneumonia; chest x-ray performed; a41.9-possible sepsis, unspecified organism   Comparisons: 5/31/2024; 2/26/2024.  Technique: Axial CT images were obtained of the chest without contrast administration.  Reconstructed coronal and sagittal images were also obtained. Automated exposure control and iterative construction methods were used.  Findings: No acute infiltrate is identified. There may be minimal subsegmental atelectasis and/or fibrosis bilaterally. No pleural or pericardial effusion. Coronary artery calcifications are seen. No aneurysmal dilatation of the thoracic aorta is suggested. No enlarged mediastinal lymph nodes are seen. No enlarged axillary lymph nodes. No cardiac enlargement. There is slight asymmetric elevation of the right diaphragm. There is a left-sided central venous line/port in place with its distal tip in the upper superior vena cava (SVC), near the expected confluence of the brachiocephalic veins. Its central-most portion is directed in a nearly perpendicular manner to the lateral upper superior vena cava (SVC) sidewall. Please correlate with regard to its desired position. There are suspected retained foreign bodies involving the central right brachiocephalic vein. Thrombosis in this region cannot be excluded. There is diffuse hepatic steatosis with suspected hepatomegaly. The patient has undergone splenectomy and cholecystectomy. Pneumobilia is identified. Degenerative changes involve the imaged spine. No acute fracture or aggressive osseous lesion is suggested.       1. No acute infiltrate.  2. There are suspected retained foreign bodies involving the central right brachiocephalic vein. They may represent retained remnants of a previously placed central venous line. Thrombosis in this region cannot be excluded.  3. Please see above comments for further detail.      Please note that portions of this note were completed with a voice recognition program.           Electronically Signed By-Sujit Tan MD On:6/2/2024 12:18 AM      XR Chest 1 View    Result Date:  5/31/2024   DATE OF EXAM: 5/31/2024 12:00 PM  PROCEDURE: XR CHEST 1 VW-  INDICATIONS: Chest Pain Triage Protocol  COMPARISON: 2/26/2021 and prior  TECHNIQUE: Portable Chest  FINDINGS:  Study is limited by overlying support and monitoring apparatus. Heart mediastinal contours are stable. Left IJ approach catheter noted projecting over the SVC. Lung volumes are relatively low there are some vascular crowding at the lung bases. No focal consolidation otherwise noted. Structures are unremarkable      IMPRESSION : Low lung volume exam. No acute process.[  Electronically Signed By-Paulie Foreman On:5/31/2024 12:10 PM         LAB RESULTS:      Lab 06/04/24  0614 06/02/24  0630 06/01/24  0528 05/31/24  1251 05/31/24  1156   WBC 12.34* 8.38 8.52  --  16.49*   HEMOGLOBIN 13.1 12.0 11.5*  --  12.9   HEMATOCRIT 39.7 35.7 35.2  --  38.3   PLATELETS 543* 320 435  --  494*   NEUTROS ABS 5.47 3.64  --   --  10.26*   IMMATURE GRANS (ABS) 0.06* 0.05  --   --  0.05   LYMPHS ABS 4.92* 3.13*  --   --  3.30*   MONOS ABS 1.51* 1.20*  --   --  2.68*   EOS ABS 0.30 0.30  --   --  0.17   MCV 97.3* 98.9* 97.8*  --  96.5   SED RATE  --   --  33*  --   --    CRP  --   --  6.11*  --   --    PROCALCITONIN  --   --   --   --  0.76*   LACTATE  --   --   --  1.9  --          Lab 06/04/24  0614 06/02/24  0630 06/01/24  0528 05/31/24  1156   SODIUM 136 135* 139 135*   POTASSIUM 4.4 4.3 3.7 3.6   CHLORIDE 100 100 104 98   CO2 25.0 21.5* 25.3 22.6   ANION GAP 11.0 13.5 9.7 14.4   BUN 13 12 12 13   CREATININE 0.72 0.71 0.64 0.77   EGFR 103.3 105.0 109.2 95.3   GLUCOSE 164* 195* 145* 136*   CALCIUM 9.4 9.1 8.8 8.6   MAGNESIUM 2.2 2.2 2.2 1.7   PHOSPHORUS 4.5 4.4 4.4  --          Lab 06/04/24  0614 06/02/24  0630 06/01/24  0528 05/31/24  1156   TOTAL PROTEIN 8.3 7.8 7.4 8.3   ALBUMIN 3.4* 3.3* 3.3* 3.7   GLOBULIN  --   --  4.1 4.6   ALT (SGPT) 34* 30 27 34*   AST (SGOT) 40* 42* 29 44*   BILIRUBIN 0.4 0.5 0.6 0.8   BILIRUBIN DIRECT <0.2 <0.2  --   --     ALK PHOS 173* 158* 154* 183*   LIPASE  --   --   --  4*         Lab 05/31/24  1156   PROBNP 205.1   HSTROP T <6                 Brief Urine Lab Results  (Last result in the past 365 days)        Color   Clarity   Blood   Leuk Est   Nitrite   Protein   CREAT   Urine HCG        05/31/24 1439 Yellow   Clear   Negative   Negative   Negative   Negative                 Microbiology Results (last 10 days)       Procedure Component Value - Date/Time    Blood Culture - Blood, Blood, Port [[id number]]  (Normal) Collected: 06/02/24 1634    Lab Status: Preliminary result Specimen: Blood, Port Updated: 06/03/24 1645     Blood Culture No growth at 24 hours    Blood Culture - Blood, Arm, Left [[id number]]  (Normal) Collected: 05/31/24 1251    Lab Status: Preliminary result Specimen: Blood from Arm, Left Updated: 06/03/24 1301     Blood Culture No growth at 3 days    Blood Culture - Blood, Blood, Central Line [[id number]]  (Normal) Collected: 05/31/24 1251    Lab Status: Preliminary result Specimen: Blood, Central Line Updated: 06/03/24 1301     Blood Culture No growth at 3 days    COVID PRE-OP / PRE-PROCEDURE SCREENING ORDER (NO ISOLATION) - Swab, Nasopharynx [[id number]]  (Normal) Collected: 05/31/24 1229    Lab Status: Final result Specimen: Swab from Nasopharynx Updated: 05/31/24 1312    Narrative:      The following orders were created for panel order COVID PRE-OP / PRE-PROCEDURE SCREENING ORDER (NO ISOLATION) - Swab, Nasopharynx.  Procedure                               Abnormality         Status                     ---------                               -----------         ------                     COVID-19, FLU A/B, RSV P...[[id number]]  Normal              Final result                 Please view results for these tests on the individual orders.    COVID-19, FLU A/B, RSV PCR 1 HR TAT - Swab, Nasopharynx [[id number]]  (Normal) Collected: 05/31/24 1229    Lab Status: Final result Specimen: Swab from Nasopharynx Updated:  05/31/24 1311     COVID19 Not Detected     Influenza A PCR Not Detected     Influenza B PCR Not Detected     RSV, PCR Not Detected    Narrative:      Fact sheet for providers: https://www.fda.gov/media/975399/download    Fact sheet for patients: https://www.fda.gov/media/053337/download    Test performed by PCR.            CT Chest Without Contrast Diagnostic    Result Date: 6/2/2024  Impression:  1. No acute infiltrate.  2. There are suspected retained foreign bodies involving the central right brachiocephalic vein. They may represent retained remnants of a previously placed central venous line. Thrombosis in this region cannot be excluded.  3. Please see above comments for further detail.      Please note that portions of this note were completed with a voice recognition program.           Electronically Signed By-Sujit Tan MD On:6/2/2024 12:18 AM      XR Chest 1 View    Result Date: 5/31/2024  Impression: IMPRESSION : Low lung volume exam. No acute process.[  Electronically Signed By-Paulie Foreman On:5/31/2024 12:10 PM       Results for orders placed during the hospital encounter of 05/31/24    Duplex Venous Upper Extremity - Right CV-READ    Interpretation Summary    Normal right upper extremity venous duplex scan.      Results for orders placed during the hospital encounter of 05/31/24    Duplex Venous Upper Extremity - Right CV-READ    Interpretation Summary    Normal right upper extremity venous duplex scan.          Labs Pending at Discharge:  Pending Labs       Order Current Status    Blood Culture - Blood, Arm, Left Preliminary result    Blood Culture - Blood, Blood, Central Line Preliminary result    Blood Culture - Blood, Blood, Port Preliminary result              Time spent on Discharge including face to face service:  35 minutes    Electronically signed by Guillaume Pearce MD, 06/04/24, 12:39 PM EDT.    Portions of this documentation were transcribed electronically from a voice recognition  software.  I confirm all data accurately represents the service(s) I performed at today's visit.

## 2024-06-05 LAB
BACTERIA SPEC AEROBE CULT: NORMAL
BACTERIA SPEC AEROBE CULT: NORMAL

## 2024-06-05 NOTE — OUTREACH NOTE
Prep Survey      Flowsheet Row Responses   [facility] patient discharged from? [facility]   Is LACE score < 7 ? No   Eligibility Readm Mgmt   Discharge diagnosis Sepsis suspected to be of viral illness   Does the patient have one of the following disease processes/diagnoses(primary or secondary)? Sepsis   Does the patient have Home health ordered? No   Is there a DME ordered? No   Prep survey completed? Yes            PETE LONG - Registered Nurse

## 2024-06-07 LAB — BACTERIA SPEC AEROBE CULT: NORMAL

## 2024-06-11 ENCOUNTER — READMISSION MANAGEMENT (OUTPATIENT)
Dept: CALL CENTER | Facility: HOSPITAL | Age: 49
End: 2024-06-11
Payer: MEDICARE

## 2024-06-11 NOTE — OUTREACH NOTE
Sepsis Week 1 Survey      Flowsheet Row Responses   [facility] patient discharged from? [facility]   Does the patient have one of the following disease processes/diagnoses(primary or secondary)? Sepsis   Week 1 attempt successful? Yes   Call start time 0838   Call end time 0840   Discharge diagnosis Sepsis suspected to be of viral illness   Meds reviewed with patient/caregiver? Yes   Is the patient having any side effects they believe may be caused by any medication additions or changes? No   Does the patient have all medications related to this admission filled (includes all antibiotics, inhalers, nebulizers,steroids,etc.) Yes   Is the patient taking all medications as directed (includes completed medication regime)? Yes   Does the patient have a primary care provider?  Yes   Does the patient have an appointment with their PCP within 7 days of discharge? No   What is preventing the patient from scheduling follow up appointments within 7 days of discharge? Haven't had time   Nursing Interventions Educated patient on importance of making appointment, Advised patient to make appointment   Has the patient kept scheduled appointments due by today? N/A   Has home health visited the patient within 72 hours of discharge? N/A   Psychosocial issues? No   Did the patient receive a copy of their discharge instructions? Yes   Nursing interventions Reviewed instructions with patient   What is the patient's perception of their health status since discharge? Improving   Nursing interventions Nurse provided patient education   Is the patient/caregiver able to teach back TIME? T emperature - higher or lower than normal, I nfection - may have signs and symptoms of an infection, M ental Decline - confused, sleepy, difficult to arouse, E xtremely Ill - severe pain, discomfort, shortness of breath   Nursing interventions Nurse provided reassurance to patient   Is patient/caregiver able to teach back steps to recovery at home? Set  small, achievable goals for return to baseline health, Rest and regain strength   Is the patient/caregiver able to teach back signs and symptoms of worsening condition: Fever, Hyperthermia   If the patient is a current smoker, are they able to teach back resources for cessation? Not a smoker   Is the patient/caregiver able to teach back the hierarchy of who to call/visit for symptoms/problems? PCP, Specialist, Home health nurse, Urgent Care, ED, 911 Yes   Week 1 call completed? Yes   Call end time 0840            Angelic Mao Nurse

## 2024-06-19 ENCOUNTER — READMISSION MANAGEMENT (OUTPATIENT)
Dept: CALL CENTER | Facility: HOSPITAL | Age: 49
End: 2024-06-19
Payer: MEDICARE

## 2024-06-19 ENCOUNTER — APPOINTMENT (OUTPATIENT)
Dept: GENERAL RADIOLOGY | Facility: HOSPITAL | Age: 49
DRG: 314 | End: 2024-06-19
Payer: MEDICARE

## 2024-06-19 ENCOUNTER — HOSPITAL ENCOUNTER (INPATIENT)
Facility: HOSPITAL | Age: 49
LOS: 1 days | Discharge: ANOTHER HOSPITAL | DRG: 314 | End: 2024-06-21
Attending: EMERGENCY MEDICINE | Admitting: FAMILY MEDICINE
Payer: MEDICARE

## 2024-06-19 DIAGNOSIS — R52 BODY ACHES: ICD-10-CM

## 2024-06-19 DIAGNOSIS — R50.9 FEVER, UNSPECIFIED FEVER CAUSE: Primary | ICD-10-CM

## 2024-06-19 LAB
ALBUMIN SERPL-MCNC: 3.8 G/DL (ref 3.5–5.2)
ALBUMIN/GLOB SERPL: 1 G/DL
ALP SERPL-CCNC: 174 U/L (ref 39–117)
ALT SERPL W P-5'-P-CCNC: 45 U/L (ref 1–33)
ANION GAP SERPL CALCULATED.3IONS-SCNC: 12.1 MMOL/L (ref 5–15)
AST SERPL-CCNC: 47 U/L (ref 1–32)
BASOPHILS # BLD AUTO: 0.05 10*3/MM3 (ref 0–0.2)
BASOPHILS NFR BLD AUTO: 0.4 % (ref 0–1.5)
BILIRUB SERPL-MCNC: 0.3 MG/DL (ref 0–1.2)
BUN SERPL-MCNC: 13 MG/DL (ref 6–20)
BUN/CREAT SERPL: 20 (ref 7–25)
CALCIUM SPEC-SCNC: 8.5 MG/DL (ref 8.6–10.5)
CHLORIDE SERPL-SCNC: 100 MMOL/L (ref 98–107)
CO2 SERPL-SCNC: 22.9 MMOL/L (ref 22–29)
CREAT SERPL-MCNC: 0.65 MG/DL (ref 0.57–1)
D-LACTATE SERPL-SCNC: 2 MMOL/L (ref 0.5–2)
DEPRECATED RDW RBC AUTO: 50.8 FL (ref 37–54)
EGFRCR SERPLBLD CKD-EPI 2021: 108.8 ML/MIN/1.73
EOSINOPHIL # BLD AUTO: 0.14 10*3/MM3 (ref 0–0.4)
EOSINOPHIL NFR BLD AUTO: 1.1 % (ref 0.3–6.2)
ERYTHROCYTE [DISTWIDTH] IN BLOOD BY AUTOMATED COUNT: 14.4 % (ref 12.3–15.4)
FLUAV SUBTYP SPEC NAA+PROBE: NOT DETECTED
FLUBV RNA ISLT QL NAA+PROBE: NOT DETECTED
GLOBULIN UR ELPH-MCNC: 3.7 GM/DL
GLUCOSE SERPL-MCNC: 176 MG/DL (ref 65–99)
HCT VFR BLD AUTO: 36.3 % (ref 34–46.6)
HGB BLD-MCNC: 12.1 G/DL (ref 12–15.9)
HOLD SPECIMEN: NORMAL
HOLD SPECIMEN: NORMAL
IMM GRANULOCYTES # BLD AUTO: 0.04 10*3/MM3 (ref 0–0.05)
IMM GRANULOCYTES NFR BLD AUTO: 0.3 % (ref 0–0.5)
LIPASE SERPL-CCNC: 5 U/L (ref 13–60)
LYMPHOCYTES # BLD AUTO: 4.75 10*3/MM3 (ref 0.7–3.1)
LYMPHOCYTES NFR BLD AUTO: 36.6 % (ref 19.6–45.3)
MAGNESIUM SERPL-MCNC: 2 MG/DL (ref 1.6–2.6)
MCH RBC QN AUTO: 32.2 PG (ref 26.6–33)
MCHC RBC AUTO-ENTMCNC: 33.3 G/DL (ref 31.5–35.7)
MCV RBC AUTO: 96.5 FL (ref 79–97)
MONOCYTES # BLD AUTO: 1.82 10*3/MM3 (ref 0.1–0.9)
MONOCYTES NFR BLD AUTO: 14 % (ref 5–12)
NEUTROPHILS NFR BLD AUTO: 47.6 % (ref 42.7–76)
NEUTROPHILS NFR BLD AUTO: 6.17 10*3/MM3 (ref 1.7–7)
NRBC BLD AUTO-RTO: 0 /100 WBC (ref 0–0.2)
NT-PROBNP SERPL-MCNC: 88.2 PG/ML (ref 0–450)
PLATELET # BLD AUTO: 423 10*3/MM3 (ref 140–450)
PMV BLD AUTO: 10.5 FL (ref 6–12)
POTASSIUM SERPL-SCNC: 3.8 MMOL/L (ref 3.5–5.2)
PROT SERPL-MCNC: 7.5 G/DL (ref 6–8.5)
RBC # BLD AUTO: 3.76 10*6/MM3 (ref 3.77–5.28)
RSV RNA NPH QL NAA+NON-PROBE: NOT DETECTED
SARS-COV-2 RNA RESP QL NAA+PROBE: NOT DETECTED
SODIUM SERPL-SCNC: 135 MMOL/L (ref 136–145)
TROPONIN T SERPL HS-MCNC: <6 NG/L
WBC NRBC COR # BLD AUTO: 12.97 10*3/MM3 (ref 3.4–10.8)
WHOLE BLOOD HOLD COAG: NORMAL
WHOLE BLOOD HOLD SPECIMEN: NORMAL

## 2024-06-19 PROCEDURE — 93005 ELECTROCARDIOGRAM TRACING: CPT

## 2024-06-19 PROCEDURE — 87637 SARSCOV2&INF A&B&RSV AMP PRB: CPT | Performed by: EMERGENCY MEDICINE

## 2024-06-19 PROCEDURE — 25010000002 KETOROLAC TROMETHAMINE PER 15 MG: Performed by: EMERGENCY MEDICINE

## 2024-06-19 PROCEDURE — 93010 ELECTROCARDIOGRAM REPORT: CPT | Performed by: INTERNAL MEDICINE

## 2024-06-19 PROCEDURE — 80053 COMPREHEN METABOLIC PANEL: CPT | Performed by: EMERGENCY MEDICINE

## 2024-06-19 PROCEDURE — 87186 SC STD MICRODIL/AGAR DIL: CPT | Performed by: EMERGENCY MEDICINE

## 2024-06-19 PROCEDURE — 36415 COLL VENOUS BLD VENIPUNCTURE: CPT

## 2024-06-19 PROCEDURE — 83880 ASSAY OF NATRIURETIC PEPTIDE: CPT | Performed by: EMERGENCY MEDICINE

## 2024-06-19 PROCEDURE — 87040 BLOOD CULTURE FOR BACTERIA: CPT | Performed by: EMERGENCY MEDICINE

## 2024-06-19 PROCEDURE — 87154 CUL TYP ID BLD PTHGN 6+ TRGT: CPT | Performed by: EMERGENCY MEDICINE

## 2024-06-19 PROCEDURE — 71045 X-RAY EXAM CHEST 1 VIEW: CPT

## 2024-06-19 PROCEDURE — 99285 EMERGENCY DEPT VISIT HI MDM: CPT

## 2024-06-19 PROCEDURE — 84484 ASSAY OF TROPONIN QUANT: CPT | Performed by: EMERGENCY MEDICINE

## 2024-06-19 PROCEDURE — 83690 ASSAY OF LIPASE: CPT | Performed by: EMERGENCY MEDICINE

## 2024-06-19 PROCEDURE — 83735 ASSAY OF MAGNESIUM: CPT | Performed by: EMERGENCY MEDICINE

## 2024-06-19 PROCEDURE — 87077 CULTURE AEROBIC IDENTIFY: CPT | Performed by: EMERGENCY MEDICINE

## 2024-06-19 PROCEDURE — 83605 ASSAY OF LACTIC ACID: CPT | Performed by: EMERGENCY MEDICINE

## 2024-06-19 PROCEDURE — 85025 COMPLETE CBC W/AUTO DIFF WBC: CPT | Performed by: EMERGENCY MEDICINE

## 2024-06-19 PROCEDURE — 93005 ELECTROCARDIOGRAM TRACING: CPT | Performed by: EMERGENCY MEDICINE

## 2024-06-19 PROCEDURE — 25010000002 ONDANSETRON PER 1 MG: Performed by: EMERGENCY MEDICINE

## 2024-06-19 PROCEDURE — 25010000002 PIPERACILLIN SOD-TAZOBACTAM PER 1 G: Performed by: EMERGENCY MEDICINE

## 2024-06-19 RX ORDER — SODIUM CHLORIDE 0.9 % (FLUSH) 0.9 %
10 SYRINGE (ML) INJECTION AS NEEDED
Status: DISCONTINUED | OUTPATIENT
Start: 2024-06-19 | End: 2024-06-21 | Stop reason: HOSPADM

## 2024-06-19 RX ORDER — PROPRANOLOL HCL 60 MG
60 CAPSULE, EXTENDED RELEASE 24HR ORAL 2 TIMES DAILY
COMMUNITY

## 2024-06-19 RX ORDER — ONDANSETRON 2 MG/ML
4 INJECTION INTRAMUSCULAR; INTRAVENOUS ONCE
Status: COMPLETED | OUTPATIENT
Start: 2024-06-19 | End: 2024-06-19

## 2024-06-19 RX ORDER — ASPIRIN 81 MG/1
324 TABLET, CHEWABLE ORAL ONCE
Status: COMPLETED | OUTPATIENT
Start: 2024-06-19 | End: 2024-06-19

## 2024-06-19 RX ORDER — KETOROLAC TROMETHAMINE 15 MG/ML
15 INJECTION, SOLUTION INTRAMUSCULAR; INTRAVENOUS ONCE
Status: COMPLETED | OUTPATIENT
Start: 2024-06-19 | End: 2024-06-19

## 2024-06-19 RX ORDER — LINEZOLID 2 MG/ML
600 INJECTION, SOLUTION INTRAVENOUS ONCE
Status: COMPLETED | OUTPATIENT
Start: 2024-06-20 | End: 2024-06-20

## 2024-06-19 RX ORDER — ACETAMINOPHEN 325 MG/1
975 TABLET ORAL ONCE
Status: COMPLETED | OUTPATIENT
Start: 2024-06-19 | End: 2024-06-19

## 2024-06-19 RX ADMIN — ONDANSETRON 4 MG: 2 INJECTION INTRAMUSCULAR; INTRAVENOUS at 23:30

## 2024-06-19 RX ADMIN — KETOROLAC TROMETHAMINE 15 MG: 15 INJECTION, SOLUTION INTRAMUSCULAR; INTRAVENOUS at 23:29

## 2024-06-19 RX ADMIN — PIPERACILLIN AND TAZOBACTAM 3.38 G: 3; .375 INJECTION, POWDER, FOR SOLUTION INTRAVENOUS at 23:30

## 2024-06-19 RX ADMIN — ASPIRIN 324 MG: 81 TABLET, CHEWABLE ORAL at 22:55

## 2024-06-19 RX ADMIN — ACETAMINOPHEN 975 MG: 325 TABLET ORAL at 22:55

## 2024-06-19 NOTE — OUTREACH NOTE
Sepsis Week 2 Survey      Flowsheet Row Responses   [facility] [place] patient discharged from? [facility]   Does the patient have one of the following disease processes/diagnoses(primary or secondary)? Sepsis   Week 2 attempt successful? Yes   Call start time 1157   Call end time 1158   Person spoke with today (if not patient) and relationship pt   Meds reviewed with patient/caregiver? Yes   Does the patient have a primary care provider?  Yes   Comments regarding PCP has seen pcp and vascular surgeon   Has home health visited the patient within 72 hours of discharge? N/A   Psychosocial issues? No   Did the patient receive a copy of their discharge instructions? Yes   Nursing interventions Reviewed instructions with patient   What is the patient's perception of their health status since discharge? Improving   Nursing interventions Nurse provided patient education   Is the patient/caregiver able to teach back TIME? T emperature - higher or lower than normal, I nfection - may have signs and symptoms of an infection, M ental Decline - confused, sleepy, difficult to arouse, E xtremely Ill - severe pain, discomfort, shortness of breath   Nursing interventions Nurse provided patient education   Is patient/caregiver able to teach back steps to recovery at home? Set small, achievable goals for return to baseline health, Rest and regain strength   Is the patient/caregiver able to teach back signs and symptoms of worsening condition: Fever, Rapid heart rate (>90), Shortness of breath/rapid respiratory rate, Altered mental status(confusion/coma)   Is the patient/caregiver able to teach back the hierarchy of who to call/visit for symptoms/problems? PCP, Specialist, Home health nurse, Urgent Care, ED, 911 Yes   Week 2 call completed? Yes   Graduated Yes   Wrap up additional comments pt reports doing well, not at baseline yet- waiting for upcoming surgery. . no concerns or questions noted.   Call end time 1158            Radha Lawson  Registered Nurse

## 2024-06-20 LAB
ANION GAP SERPL CALCULATED.3IONS-SCNC: 10.9 MMOL/L (ref 5–15)
BACTERIA BLD CULT: ABNORMAL
BILIRUB UR QL STRIP: NEGATIVE
BOTTLE TYPE: ABNORMAL
BUN SERPL-MCNC: 8 MG/DL (ref 6–20)
BUN/CREAT SERPL: 11.4 (ref 7–25)
CALCIUM SPEC-SCNC: 7.9 MG/DL (ref 8.6–10.5)
CHLORIDE SERPL-SCNC: 103 MMOL/L (ref 98–107)
CLARITY UR: CLEAR
CO2 SERPL-SCNC: 23.1 MMOL/L (ref 22–29)
COLOR UR: YELLOW
CREAT SERPL-MCNC: 0.7 MG/DL (ref 0.57–1)
CRP SERPL-MCNC: 1.19 MG/DL (ref 0–0.5)
EGFRCR SERPLBLD CKD-EPI 2021: 106.8 ML/MIN/1.73
ERYTHROCYTE [SEDIMENTATION RATE] IN BLOOD: 23 MM/HR (ref 0–20)
GEN 5 2HR TROPONIN T REFLEX: <6 NG/L
GLUCOSE BLDC GLUCOMTR-MCNC: 101 MG/DL (ref 70–99)
GLUCOSE BLDC GLUCOMTR-MCNC: 140 MG/DL (ref 70–99)
GLUCOSE BLDC GLUCOMTR-MCNC: 141 MG/DL (ref 70–99)
GLUCOSE BLDC GLUCOMTR-MCNC: 182 MG/DL (ref 70–99)
GLUCOSE SERPL-MCNC: 156 MG/DL (ref 65–99)
GLUCOSE UR STRIP-MCNC: NEGATIVE MG/DL
HAV IGM SERPL QL IA: NORMAL
HBV CORE IGM SERPL QL IA: NORMAL
HBV SURFACE AG SERPL QL IA: NORMAL
HCV AB SER QL: NORMAL
HGB UR QL STRIP.AUTO: NEGATIVE
KETONES UR QL STRIP: NEGATIVE
LEUKOCYTE ESTERASE UR QL STRIP.AUTO: NEGATIVE
NITRITE UR QL STRIP: NEGATIVE
PH UR STRIP.AUTO: 6.5 [PH] (ref 5–8)
POTASSIUM SERPL-SCNC: 3.5 MMOL/L (ref 3.5–5.2)
PROCALCITONIN SERPL-MCNC: 0.47 NG/ML (ref 0–0.25)
PROT UR QL STRIP: NEGATIVE
SODIUM SERPL-SCNC: 137 MMOL/L (ref 136–145)
SP GR UR STRIP: 1.01 (ref 1–1.03)
TROPONIN T DELTA: NORMAL
TROPONIN T SERPL HS-MCNC: <6 NG/L
TROPONIN T SERPL HS-MCNC: <6 NG/L
UROBILINOGEN UR QL STRIP: NORMAL

## 2024-06-20 PROCEDURE — 25010000002 LINEZOLID 600 MG/300ML SOLUTION: Performed by: EMERGENCY MEDICINE

## 2024-06-20 PROCEDURE — 84484 ASSAY OF TROPONIN QUANT: CPT | Performed by: INTERNAL MEDICINE

## 2024-06-20 PROCEDURE — 99223 1ST HOSP IP/OBS HIGH 75: CPT | Performed by: FAMILY MEDICINE

## 2024-06-20 PROCEDURE — 85652 RBC SED RATE AUTOMATED: CPT | Performed by: FAMILY MEDICINE

## 2024-06-20 PROCEDURE — 25010000002 MORPHINE PER 10 MG: Performed by: FAMILY MEDICINE

## 2024-06-20 PROCEDURE — 84484 ASSAY OF TROPONIN QUANT: CPT | Performed by: EMERGENCY MEDICINE

## 2024-06-20 PROCEDURE — 25010000002 ONDANSETRON PER 1 MG: Performed by: FAMILY MEDICINE

## 2024-06-20 PROCEDURE — 25010000002 ENOXAPARIN PER 10 MG: Performed by: FAMILY MEDICINE

## 2024-06-20 PROCEDURE — 25010000002 PROCHLORPERAZINE 10 MG/2ML SOLUTION: Performed by: PHYSICIAN ASSISTANT

## 2024-06-20 PROCEDURE — 80048 BASIC METABOLIC PNL TOTAL CA: CPT | Performed by: FAMILY MEDICINE

## 2024-06-20 PROCEDURE — 25010000002 KETOROLAC TROMETHAMINE PER 15 MG: Performed by: FAMILY MEDICINE

## 2024-06-20 PROCEDURE — 87040 BLOOD CULTURE FOR BACTERIA: CPT | Performed by: INTERNAL MEDICINE

## 2024-06-20 PROCEDURE — 93010 ELECTROCARDIOGRAM REPORT: CPT | Performed by: INTERNAL MEDICINE

## 2024-06-20 PROCEDURE — 84145 PROCALCITONIN (PCT): CPT | Performed by: INTERNAL MEDICINE

## 2024-06-20 PROCEDURE — 82948 REAGENT STRIP/BLOOD GLUCOSE: CPT

## 2024-06-20 PROCEDURE — 81003 URINALYSIS AUTO W/O SCOPE: CPT | Performed by: INTERNAL MEDICINE

## 2024-06-20 PROCEDURE — 25810000003 LACTATED RINGERS PER 1000 ML: Performed by: FAMILY MEDICINE

## 2024-06-20 PROCEDURE — 25010000002 PIPERACILLIN SOD-TAZOBACTAM PER 1 G: Performed by: FAMILY MEDICINE

## 2024-06-20 PROCEDURE — 25010000002 CEFTRIAXONE PER 250 MG: Performed by: INTERNAL MEDICINE

## 2024-06-20 PROCEDURE — 80074 ACUTE HEPATITIS PANEL: CPT | Performed by: FAMILY MEDICINE

## 2024-06-20 PROCEDURE — 93005 ELECTROCARDIOGRAM TRACING: CPT | Performed by: INTERNAL MEDICINE

## 2024-06-20 PROCEDURE — 86140 C-REACTIVE PROTEIN: CPT | Performed by: FAMILY MEDICINE

## 2024-06-20 RX ORDER — SACCHAROMYCES BOULARDII 250 MG
250 CAPSULE ORAL 2 TIMES DAILY
Status: DISCONTINUED | OUTPATIENT
Start: 2024-06-20 | End: 2024-06-21 | Stop reason: HOSPADM

## 2024-06-20 RX ORDER — ACETAMINOPHEN 325 MG/1
650 TABLET ORAL EVERY 6 HOURS PRN
COMMUNITY

## 2024-06-20 RX ORDER — NICOTINE POLACRILEX 4 MG
15 LOZENGE BUCCAL
Status: DISCONTINUED | OUTPATIENT
Start: 2024-06-20 | End: 2024-06-20 | Stop reason: SDUPTHER

## 2024-06-20 RX ORDER — SACCHAROMYCES BOULARDII 250 MG
250 CAPSULE ORAL 2 TIMES DAILY
Start: 2024-06-20

## 2024-06-20 RX ORDER — NICOTINE POLACRILEX 4 MG
15 LOZENGE BUCCAL
Status: DISCONTINUED | OUTPATIENT
Start: 2024-06-20 | End: 2024-06-21

## 2024-06-20 RX ORDER — ONDANSETRON 2 MG/ML
4 INJECTION INTRAMUSCULAR; INTRAVENOUS EVERY 6 HOURS PRN
Status: DISCONTINUED | OUTPATIENT
Start: 2024-06-20 | End: 2024-06-21 | Stop reason: HOSPADM

## 2024-06-20 RX ORDER — SODIUM CHLORIDE, SODIUM LACTATE, POTASSIUM CHLORIDE, CALCIUM CHLORIDE 600; 310; 30; 20 MG/100ML; MG/100ML; MG/100ML; MG/100ML
100 INJECTION, SOLUTION INTRAVENOUS CONTINUOUS
Status: ACTIVE | OUTPATIENT
Start: 2024-06-20 | End: 2024-06-20

## 2024-06-20 RX ORDER — LINEZOLID 2 MG/ML
600 INJECTION, SOLUTION INTRAVENOUS EVERY 12 HOURS
Status: DISCONTINUED | OUTPATIENT
Start: 2024-06-20 | End: 2024-06-20

## 2024-06-20 RX ORDER — HEPARIN 100 UNIT/ML
500 SYRINGE INTRAVENOUS 2 TIMES DAILY PRN
COMMUNITY

## 2024-06-20 RX ORDER — LISINOPRIL 20 MG/1
40 TABLET ORAL 2 TIMES DAILY
Status: DISCONTINUED | OUTPATIENT
Start: 2024-06-20 | End: 2024-06-21 | Stop reason: HOSPADM

## 2024-06-20 RX ORDER — AMOXICILLIN 250 MG
2 CAPSULE ORAL 2 TIMES DAILY PRN
Status: DISCONTINUED | OUTPATIENT
Start: 2024-06-20 | End: 2024-06-21 | Stop reason: HOSPADM

## 2024-06-20 RX ORDER — PROCHLORPERAZINE EDISYLATE 5 MG/ML
5 INJECTION INTRAMUSCULAR; INTRAVENOUS EVERY 4 HOURS PRN
Status: DISCONTINUED | OUTPATIENT
Start: 2024-06-20 | End: 2024-06-21 | Stop reason: HOSPADM

## 2024-06-20 RX ORDER — SODIUM CHLORIDE, SODIUM LACTATE, POTASSIUM CHLORIDE, CALCIUM CHLORIDE 600; 310; 30; 20 MG/100ML; MG/100ML; MG/100ML; MG/100ML
100 INJECTION, SOLUTION INTRAVENOUS CONTINUOUS
Start: 2024-06-20

## 2024-06-20 RX ORDER — ENOXAPARIN SODIUM 100 MG/ML
40 INJECTION SUBCUTANEOUS DAILY
Status: DISCONTINUED | OUTPATIENT
Start: 2024-06-20 | End: 2024-06-21 | Stop reason: HOSPADM

## 2024-06-20 RX ORDER — MORPHINE SULFATE 2 MG/ML
2 INJECTION, SOLUTION INTRAMUSCULAR; INTRAVENOUS EVERY 4 HOURS PRN
Status: DISCONTINUED | OUTPATIENT
Start: 2024-06-20 | End: 2024-06-21 | Stop reason: HOSPADM

## 2024-06-20 RX ORDER — BISACODYL 5 MG/1
5 TABLET, DELAYED RELEASE ORAL DAILY PRN
Status: DISCONTINUED | OUTPATIENT
Start: 2024-06-20 | End: 2024-06-21 | Stop reason: HOSPADM

## 2024-06-20 RX ORDER — DEXTROSE MONOHYDRATE 25 G/50ML
25 INJECTION, SOLUTION INTRAVENOUS
Status: DISCONTINUED | OUTPATIENT
Start: 2024-06-20 | End: 2024-06-21

## 2024-06-20 RX ORDER — METHYLPREDNISOLONE SODIUM SUCCINATE 40 MG/ML
20 INJECTION, POWDER, LYOPHILIZED, FOR SOLUTION INTRAMUSCULAR; INTRAVENOUS ONCE
COMMUNITY

## 2024-06-20 RX ORDER — SODIUM CHLORIDE 9 MG/ML
40 INJECTION, SOLUTION INTRAVENOUS AS NEEDED
Status: DISCONTINUED | OUTPATIENT
Start: 2024-06-20 | End: 2024-06-21 | Stop reason: HOSPADM

## 2024-06-20 RX ORDER — IBUPROFEN 400 MG/1
400 TABLET ORAL EVERY 6 HOURS PRN
Status: DISCONTINUED | OUTPATIENT
Start: 2024-06-20 | End: 2024-06-21 | Stop reason: HOSPADM

## 2024-06-20 RX ORDER — DEXTROSE MONOHYDRATE 25 G/50ML
25 INJECTION, SOLUTION INTRAVENOUS
Status: DISCONTINUED | OUTPATIENT
Start: 2024-06-20 | End: 2024-06-20 | Stop reason: SDUPTHER

## 2024-06-20 RX ORDER — BISACODYL 10 MG
10 SUPPOSITORY, RECTAL RECTAL DAILY PRN
Status: DISCONTINUED | OUTPATIENT
Start: 2024-06-20 | End: 2024-06-21 | Stop reason: HOSPADM

## 2024-06-20 RX ORDER — MAGNESIUM HYDROXIDE 1200 MG/15ML
1000 LIQUID ORAL AS NEEDED
COMMUNITY

## 2024-06-20 RX ORDER — OXYCODONE HYDROCHLORIDE 5 MG/1
5 TABLET ORAL EVERY 6 HOURS PRN
Status: DISCONTINUED | OUTPATIENT
Start: 2024-06-20 | End: 2024-06-21 | Stop reason: HOSPADM

## 2024-06-20 RX ORDER — INSULIN LISPRO 100 [IU]/ML
2-9 INJECTION, SOLUTION INTRAVENOUS; SUBCUTANEOUS
Status: DISCONTINUED | OUTPATIENT
Start: 2024-06-20 | End: 2024-06-20

## 2024-06-20 RX ORDER — IBUPROFEN 600 MG/1
1 TABLET ORAL
Status: DISCONTINUED | OUTPATIENT
Start: 2024-06-20 | End: 2024-06-20

## 2024-06-20 RX ORDER — SODIUM CHLORIDE 0.9 % (FLUSH) 0.9 %
10 SYRINGE (ML) INJECTION EVERY 12 HOURS SCHEDULED
Status: DISCONTINUED | OUTPATIENT
Start: 2024-06-20 | End: 2024-06-21 | Stop reason: HOSPADM

## 2024-06-20 RX ORDER — KETOROLAC TROMETHAMINE 30 MG/ML
15 INJECTION, SOLUTION INTRAMUSCULAR; INTRAVENOUS EVERY 6 HOURS PRN
Status: DISCONTINUED | OUTPATIENT
Start: 2024-06-20 | End: 2024-06-20

## 2024-06-20 RX ORDER — SODIUM CHLORIDE 0.9 % (FLUSH) 0.9 %
10 SYRINGE (ML) INJECTION AS NEEDED
Status: DISCONTINUED | OUTPATIENT
Start: 2024-06-20 | End: 2024-06-21 | Stop reason: HOSPADM

## 2024-06-20 RX ORDER — BUTALBITAL, ACETAMINOPHEN AND CAFFEINE 300; 40; 50 MG/1; MG/1; MG/1
1 CAPSULE ORAL EVERY 4 HOURS PRN
Status: DISCONTINUED | OUTPATIENT
Start: 2024-06-20 | End: 2024-06-21 | Stop reason: HOSPADM

## 2024-06-20 RX ORDER — IBUPROFEN 600 MG/1
1 TABLET ORAL
Status: DISCONTINUED | OUTPATIENT
Start: 2024-06-20 | End: 2024-06-21

## 2024-06-20 RX ORDER — POLYETHYLENE GLYCOL 3350 17 G/17G
17 POWDER, FOR SOLUTION ORAL DAILY PRN
Status: DISCONTINUED | OUTPATIENT
Start: 2024-06-20 | End: 2024-06-21 | Stop reason: HOSPADM

## 2024-06-20 RX ADMIN — OXYCODONE HYDROCHLORIDE 5 MG: 5 TABLET ORAL at 19:53

## 2024-06-20 RX ADMIN — PROCHLORPERAZINE EDISYLATE 5 MG: 5 INJECTION INTRAMUSCULAR; INTRAVENOUS at 22:39

## 2024-06-20 RX ADMIN — PANCRELIPASE 24000 UNITS OF LIPASE: 120000; 24000; 76000 CAPSULE, DELAYED RELEASE PELLETS ORAL at 12:19

## 2024-06-20 RX ADMIN — Medication 10 ML: at 01:09

## 2024-06-20 RX ADMIN — ONDANSETRON 4 MG: 2 INJECTION INTRAMUSCULAR; INTRAVENOUS at 13:03

## 2024-06-20 RX ADMIN — KETOROLAC TROMETHAMINE 15 MG: 30 INJECTION, SOLUTION INTRAMUSCULAR; INTRAVENOUS at 02:14

## 2024-06-20 RX ADMIN — Medication 10 ML: at 21:28

## 2024-06-20 RX ADMIN — CEFTRIAXONE SODIUM 2000 MG: 2 INJECTION, POWDER, FOR SOLUTION INTRAMUSCULAR; INTRAVENOUS at 12:19

## 2024-06-20 RX ADMIN — LISINOPRIL 40 MG: 20 TABLET ORAL at 21:27

## 2024-06-20 RX ADMIN — PANCRELIPASE 24000 UNITS OF LIPASE: 120000; 24000; 76000 CAPSULE, DELAYED RELEASE PELLETS ORAL at 17:29

## 2024-06-20 RX ADMIN — BUTALBITAL, ACETAMINOPHEN AND CAFFEINE 1 CAPSULE: 300; 40; 50 CAPSULE ORAL at 09:05

## 2024-06-20 RX ADMIN — OXYCODONE HYDROCHLORIDE 5 MG: 5 TABLET ORAL at 13:23

## 2024-06-20 RX ADMIN — LINEZOLID 600 MG: 600 INJECTION, SOLUTION INTRAVENOUS at 01:08

## 2024-06-20 RX ADMIN — PIPERACILLIN AND TAZOBACTAM 3.38 G: 3; .375 INJECTION, POWDER, FOR SOLUTION INTRAVENOUS at 05:58

## 2024-06-20 RX ADMIN — Medication 10 ML: at 08:25

## 2024-06-20 RX ADMIN — Medication 250 MG: at 21:28

## 2024-06-20 RX ADMIN — ONDANSETRON 4 MG: 2 INJECTION INTRAMUSCULAR; INTRAVENOUS at 19:53

## 2024-06-20 RX ADMIN — Medication 250 MG: at 08:25

## 2024-06-20 RX ADMIN — MORPHINE SULFATE 2 MG: 2 INJECTION, SOLUTION INTRAMUSCULAR; INTRAVENOUS at 22:40

## 2024-06-20 RX ADMIN — PANCRELIPASE 24000 UNITS OF LIPASE: 120000; 24000; 76000 CAPSULE, DELAYED RELEASE PELLETS ORAL at 08:24

## 2024-06-20 RX ADMIN — SODIUM CHLORIDE, POTASSIUM CHLORIDE, SODIUM LACTATE AND CALCIUM CHLORIDE 100 ML/HR: 600; 310; 30; 20 INJECTION, SOLUTION INTRAVENOUS at 01:08

## 2024-06-20 RX ADMIN — ENOXAPARIN SODIUM 40 MG: 100 INJECTION SUBCUTANEOUS at 08:25

## 2024-06-20 RX ADMIN — LISINOPRIL 40 MG: 20 TABLET ORAL at 08:25

## 2024-06-20 NOTE — PLAN OF CARE
Goal Outcome Evaluation:              Outcome Evaluation: Patient alert and able to make needs known. Complained of chest pain this shift. EKG and trophs done and WNL. MD ordered pain medication. Patient given PRN pain medication and zofran, effective per patient. Patient complained of migrain this morning, medication given was effective. Patient currently visiting with daughter. Doing much better, according to patient. Denies wants or needs at this time. Frequently used items within easy reach. Will continue with plan of care.         Rita Price RN

## 2024-06-20 NOTE — DISCHARGE SUMMARY
[facility]         HOSPITALIST  DISCHARGE SUMMARY    Patient Name: Kayla Gan  : 1975  MRN: [id number]    Date of Admission: 2024  Date of Discharge:  24 to [facility] (pending bed availability)  Primary Care Physician: Justin Daniels APRN  Admitting: Medicine    Final Diagnoses:  Sepsis  Bacteremia with Klebsiella oxytoca  Concern for Port-A-Cath infection  Adrenal insufficiency  Cirrhosis  Chronic pancreatitis status post previous pancreatectomy  Crohn's disease  Insulin-dependent diabetes mellitus  Gastroparesis  Dependence on home IV fluids and antiemetics through her port      Hospital Course     Hospital Course:  Kayla Gan is a 48 y.o. female with a host of chronic complex medical problems as outlined above, presented to this hospital on 2024 with fevers and bodyaches.  She had been seen earlier that day by Dr. Crowder with vascular surgery at [facility] as a referral for possible retained fragments from a previous port.  Patient was feeling fine at that visit and had no fevers.  When she got home that night however she began feeling poorly and came to our ED.  She had a leukocytosis and a fever.  ER physician requested hospitalist admit patient here.  This morning her blood cultures returned positive for Klebsiella, sensitivities are pending.  She was started initially on Zyvox and Zosyn.  With return of her cultures she has been de-escalated to ceftriaxone.  I spoke with Dr. Crowder today, he reviewed x-ray and did not see any retained fragments it was not felt that the patient needed any vascular intervention at this time and rather can be treated with IV antibiotics for bacteremia.  Patient has a complex history, receives care from multiple specialists at [facility] and did prefer to transfer there for continuity of care.  Given her host of issues and recurrent infections I do think she would actually benefit from seeing infectious disease  which is not available in-house at this facility.  Spoke with Dr. Hughes with the hospitalist service at [facility] who is graciously agreed to accept the patient.  Will transfer the patient to [facility] when bed is available.  She is currently afebrile and hemodynamically stable.  She may need port removal, potentially line holiday while bacteremia clears.  She says these have previously been placed by IR at [facility].    Of note, the patient does take Creon at home as well as Phenergan IV as needed, these are not on the included med rec because [facility] will not allow me to continue these on her med rec for some reason.     DISCHARGE Follow Up Recommendations for labs and diagnostics: Patient to be transferred to [facility]      Day of Discharge     Vital Signs:  Temp:  [97.3 °F (36.3 °C)-102.1 °F (38.9 °C)] 98.1 °F (36.7 °C)  Heart Rate:  [64-90] 81  Resp:  [18-25] 18  BP: ()/(69-87) 132/81  Physical Exam: No distress, nontoxic appearing; S1S2 rrr, chest clear, port side w/o redness      Discharge Details        Discharge Medications        New Medications        Instructions Start Date   cefTRIAXone 2,000 mg in sodium chloride 0.9 % 100 mL IVPB   2,000 mg, Intravenous, Every 24 Hours   Start Date: June 21, 2024     lactated ringers infusion   100 mL/hr (100 mL/hr), Intravenous, Continuous      saccharomyces boulardii 250 MG capsule  Commonly known as: FLORASTOR   250 mg, Oral, 2 Times Daily             Changes to Medications        Instructions Start Date   Ubrelvy 100 MG tablet  Generic drug: ubrogepant  What changed: how much to take   100 mg, Oral, Once As Needed             Continue These Medications        Instructions Start Date   acetaminophen 325 MG tablet  Commonly known as: TYLENOL   650 mg, Oral, Every 6 Hours PRN, Pre IVIG infusion      BENADRYL IJ   25 mg, Intravenous, 2 Times Daily PRN, Pre and post IVIG      docusate sodium 100 MG capsule  Commonly known as: COLACE   Take 1 capsule by mouth  2 (Two) Times a Day.      Enulose 10 GM/15ML solution solution (encephalopathy)  Generic drug: lactulose   Take 45 mL by mouth As Needed.      Gamunex-C 5 GM/50ML solution infusion  Generic drug: immune globulin (human)   5 g, Intravenous, Every 7 Days, 10% 35g       heparin 100 UNIT/ML solution injection   500 Units, Intracatheter, 2 Times Daily PRN      hydrocortisone 5 MG tablet  Commonly known as: CORTEF   15 mg, Oral, 2 Times Daily      hydrOXYzine 50 MG tablet  Commonly known as: ATARAX   50 mg, Oral, Nightly      Inderal LA 60 MG 24 hr capsule  Generic drug: propranolol LA   60 mg, Oral, 2 Times Daily      lansoprazole 30 MG capsule  Commonly known as: PREVACID   30 mg, Oral, 2 Times Daily      Lantus SoloStar 100 UNIT/ML injection pen  Generic drug: Insulin Glargine   47 Units, Subcutaneous, Daily, Only for insulin pump failure      levothyroxine 125 MCG tablet  Commonly known as: SYNTHROID, LEVOTHROID   125 mcg, Oral, Daily      lisinopril 40 MG tablet  Commonly known as: PRINIVIL,ZESTRIL   40 mg, Oral, 2 Times Daily      methylPREDNISolone sodium succinate 40 MG injection  Commonly known as: SOLU-Medrol   20 mg, Intravenous, Once, Pre IVIG infusion       NORMAL SALINE FLUSH IV   1,000 mL, Intravenous, 2 Times Daily PRN, Pre and post IVIG infusion      NovoLOG 100 UNIT/ML injection  Generic drug: Insulin Aspart   Inject  under the skin into the appropriate area as directed. Type of Insulin: Lispro 100 units/ml  Type of Pump:Omnipod  Bolus: 1u 5-7g max bolus 2.5units/hr  Basal: 5714-3482 1.5u/hr max basal 10units/hr  Correction factor: 40mg/dl  Insulin to carbohydrate ratio: 7-5 g of carb  Next fill date: 06/23/24    Date of last site change: 06/19/24  Location of pod: right upper arm    Frequency of refill: 2-3 days  Last refill date: 06/03/24    Prescriber: Dr. Chelsea Silver  Phone number: [phone]      ONDANSETRON HCL IV   8 mg, Intravenous, Every 6 Hours PRN      prochlorperazine 10 MG  tablet  Commonly known as: COMPAZINE   10 mg, Oral, Every 8 Hours PRN      Qulipta 60 MG tablet  Generic drug: Atogepant   1 tablet, Oral, Daily      riFAXIMin 550 MG tablet  Commonly known as: XIFAXAN   550 mg, Oral, Every 12 Hours Scheduled      sodium chloride 0.9 % irrigation  Commonly known as: NS   1,000 mL, Irrigation, As Needed      sodium chloride 0.9 % solution   Intravenous, Daily, 1 liter everyday      SUMAtriptan 25 MG tablet  Commonly known as: IMITREX   25 mg, Oral, Once As Needed      traZODone 150 MG tablet  Commonly known as: DESYREL   150 mg, Oral, Nightly      vitamin E 400 UNIT capsule   800 Units, Oral, Daily      Vortioxetine HBr 5 MG tablet tablet  Commonly known as: Trintellix   5 mg, Oral, Nightly             Stop These Medications      Creon [id number] units capsule delayed-release particles capsule  Generic drug: Pancrelipase (Lip-Prot-Amyl)     Pancrelipase (Lip-Prot-Amyl) 71779-002518 units capsule delayed-release particles capsule  Commonly known as: CREON     promethazine 25 MG tablet  Commonly known as: PHENERGAN     promethazine 25 MG/ML injection  Commonly known as: PHENERGAN     Sodium Chloride (PF) 0.9 % solution 20 mL with promethazine 25 MG/ML solution 25 mg              Allergies   Allergen Reactions    Other Other (See Comments)     Sent patient into kidney failure, heart stopped, in ICU for two days.    Parathyroid Hormone (Recomb) Other (See Comments)     Sent patient into kidney failure, heart stopped, in ICU for two days.      Romosozumab Hives     Other reaction(s): Hives    Other reaction(s): Hives   Other reaction(s): Hives    Sulfate Hives    Teriparatide Anaphylaxis and Other (See Comments)     Other reaction(s): Unknown    Tramadol Anaphylaxis and Other (See Comments)     Other reaction(s): Unknown, Unknown    Nifedipine Provider Review Needed and Other (See Comments)     Rapid heart beat    Rapid heart beat    Other reaction(s): Provider Review Needed   Other  reaction(s): Provider Review Needed   Rapid heart beat    Vancomycin Hives and Itching     vancomycin-infusion reaction (VIR, formerly Ivett's syndrome): give over longer infusion time       Discharge Disposition:      Diet:  Hospital:  Diet Order   Procedures    Diet: Cardiac, Diabetic; Healthy Heart (2-3 Na+); Consistent Carbohydrate; Fluid Consistency: Thin (IDDSI 0)       Discharge Activity:       CODE STATUS:  Code Status and Medical Interventions:   Ordered at: 06/20/24 0036     Level Of Support Discussed With:    Patient     Code Status (Patient has no pulse and is not breathing):    CPR (Attempt to Resuscitate)     Medical Interventions (Patient has pulse or is breathing):    Full Support         Future Appointments   Date Time Provider Department Center   7/3/2024 10:00 AM Sujit Andersen MD [facility] SLEEP CT [facility]           Pertinent  and/or Most Recent Results     PROCEDURES:   None    LAB RESULTS:      Lab 06/20/24  0419 06/20/24  0010 06/19/24 2204   WBC  --   --  12.97*   HEMOGLOBIN  --   --  12.1   HEMATOCRIT  --   --  36.3   PLATELETS  --   --  423   NEUTROS ABS  --   --  6.17   IMMATURE GRANS (ABS)  --   --  0.04   LYMPHS ABS  --   --  4.75*   MONOS ABS  --   --  1.82*   EOS ABS  --   --  0.14   MCV  --   --  96.5   SED RATE 23*  --   --    CRP  --  1.19*  --    PROCALCITONIN  --  0.47*  --    LACTATE  --   --  2.0         Lab 06/19/24 2204   SODIUM 135*   POTASSIUM 3.8   CHLORIDE 100   CO2 22.9   ANION GAP 12.1   BUN 13   CREATININE 0.65   EGFR 108.8   GLUCOSE 176*   CALCIUM 8.5*   MAGNESIUM 2.0         Lab 06/19/24 2204   TOTAL PROTEIN 7.5   ALBUMIN 3.8   GLOBULIN 3.7   ALT (SGPT) 45*   AST (SGOT) 47*   BILIRUBIN 0.3   ALK PHOS 174*   LIPASE 5*         Lab 06/20/24  1300 06/20/24  0010 06/19/24 2204   PROBNP  --   --  88.2   HSTROP T <6 <6 <6                 Brief Urine Lab Results  (Last result in the past 365 days)        Color   Clarity   Blood   Leuk Est   Nitrite   Protein   CREAT   Urine  HCG        06/20/24 1617 Yellow   Clear   Negative   Negative   Negative   Negative                 Microbiology Results (last 10 days)       Procedure Component Value - Date/Time    COVID-19, FLU A/B, RSV PCR 1 HR TAT - Swab, Nasopharynx [[id number]]  (Normal) Collected: 06/19/24 2216    Lab Status: Final result Specimen: Swab from Nasopharynx Updated: 06/19/24 2300     COVID19 Not Detected     Influenza A PCR Not Detected     Influenza B PCR Not Detected     RSV, PCR Not Detected    Narrative:      Fact sheet for providers: https://www.fda.gov/media/014339/download    Fact sheet for patients: https://www.fda.gov/media/835597/download    Test performed by PCR.    Blood Culture - Blood, Blood, Central Line [[id number]]  (Abnormal) Collected: 06/19/24 2204    Lab Status: Preliminary result Specimen: Blood, Central Line Updated: 06/20/24 1108     Blood Culture Abnormal Stain     Gram Stain Aerobic Bottle Gram negative bacilli      Anaerobic Bottle Gram negative bacilli    Blood Culture ID, PCR - Blood, Blood, Central Line [[id number]]  (Abnormal) Collected: 06/19/24 2204    Lab Status: Final result Specimen: Blood, Central Line Updated: 06/20/24 1051     BCID, PCR Klebsiella oxytoca. Identification by BCID2 PCR     BOTTLE TYPE Aerobic Bottle    Narrative:      No resistance genes detected.            XR Chest 1 View    Result Date: 6/19/2024  No acute infiltrate is appreciated.    Please note that portions of this note were completed with a voice recognition program.  Electronically Signed: Sujit Tan MD  6/19/2024 9:47 PM EDT  Workstation ID: [id number]      Results for orders placed during the hospital encounter of 05/31/24    Duplex Venous Upper Extremity - Right CV-READ    Interpretation Summary    Normal right upper extremity venous duplex scan.      Results for orders placed during the hospital encounter of 05/31/24    Duplex Venous Upper Extremity - Right CV-READ    Interpretation Summary    Normal right upper  extremity venous duplex scan.          Labs Pending at Discharge:  Pending Labs       Order Current Status    Blood Culture - Blood, Arm, Left In process    Blood Culture - Blood, Arm, Right In process    Blood Culture - Blood, Hand, Right In process    Blood Culture - Blood, Blood, Central Line Preliminary result              Time spent on Discharge including face to face service:  ~75 minutes    Electronically signed by Gary Hemphill MD, 06/20/24, 6:37 PM EDT.       Addendum:  Earlier this morning the patient became hypotensive down into the 60s-70s systolic, was given fluid boluses, started on stress dose steroids with Solu-Cortef 100 mg IV every 8 hours, midodrine.  She was transferred to the ICU.  Levophed was ordered but she ended up not needing it.  With fluids and steroids her blood pressure started to improve and most recently has been in the low 100s.  She feels better.  Creatinine increased to 1.66 this morning, possibly secondary to hypotension, continues on fluids.  Discussed with [facility] transfer center and intensivist, Dr. Claudio who has kindly accepted the patient.  Discussed case, continues on ceftriaxone for Klebsiella bacteremia.  Still awaiting sensitivities.  Noble may need to be removed, line holiday may be preferable.  Will continue her on steroids and fluids for now.  Patient to be transferred to [facility] ICU when bed available.  Appreciate the assistance from the team at [facility].  Updated vitals/labs as below:    Vitals:  Vitals:    06/21/24 1100 06/21/24 1130 06/21/24 1155 06/21/24 1200   BP: 109/63 108/72  112/66   BP Location:       Patient Position:       Pulse: 87 86  84   Resp:       Temp:   98.2 °F (36.8 °C)    TempSrc:   Oral    SpO2: 96% 95%  96%   Weight:       Height:          CBC          6/4/2024    06:14 6/19/2024    22:04 6/21/2024    04:46   CBC   WBC 12.34  12.97  11.12    RBC 4.08  3.76  3.11    Hemoglobin 13.1  12.1  10.0    Hematocrit 39.7  36.3   30.5    MCV 97.3  96.5  98.1    MCH 32.1  32.2  32.2    MCHC 33.0  33.3  32.8    RDW 15.5  14.4  14.3    Platelets 543  423  256      CMP          6/19/2024    22:04 6/20/2024    22:40 6/21/2024    09:18   CMP   Glucose 176  156  138    BUN 13  8  13    Creatinine 0.65  0.70  1.66    EGFR 108.8  106.8  37.9    Sodium 135  137  136    Potassium 3.8  3.5  3.7    Chloride 100  103  101    Calcium 8.5  7.9  8.2    Total Protein 7.5      Albumin 3.8      Globulin 3.7      Total Bilirubin 0.3      Alkaline Phosphatase 174      AST (SGOT) 47      ALT (SGPT) 45      Albumin/Globulin Ratio 1.0      BUN/Creatinine Ratio 20.0  11.4  7.8    Anion Gap 12.1  10.9  12.3      Blood Culture   Date Value Ref Range Status   06/20/2024 No growth at 24 hours  Preliminary     BCID, PCR   Date Value Ref Range Status   06/19/2024 Klebsiella oxytoca. Identification by BCID2 PCR (A) Negative by BCID PCR. Culture to Follow. Final     Electronically signed by Gary Hemphill MD, 06/21/24, 12:27 PM EDT.       Your medication list        START taking these medications        Instructions Last Dose Given Next Dose Due   cefTRIAXone 2,000 mg in sodium chloride 0.9 % 100 mL IVPB      Infuse 2,000 mg into a venous catheter Daily for 6 doses. Indications: Bacteria in the Blood       dextrose 40 % gel  Commonly known as: GLUTOSE      Take 15 g by mouth Every 15 (Fifteen) Minutes As Needed for Low Blood Sugar (Per Glucommander).       dextrose 50 % solution  Commonly known as: D50W      Infuse 10-50 mL into a venous catheter Every 15 (Fifteen) Minutes As Needed for Low Blood Sugar (per Glucommander).       glucagon 1 MG injection  Commonly known as: GLUCAGEN      Inject 1 mg into the appropriate muscle as directed by prescriber Every 15 (Fifteen) Minutes As Needed for Low Blood Sugar (per Glucommander).       Hydrocortisone Sod Suc (PF) 100 MG injection  Commonly known as: Solu-CORTEF      Infuse 100 mg into a venous catheter Every 8 (Eight) Hours.        lactated ringers infusion      Infuse 100 mL/hr into a venous catheter Continuous.       lactated ringers infusion      Infuse 100 mL/hr into a venous catheter Continuous.       midodrine 5 MG tablet  Commonly known as: PROAMATINE      Take 1 tablet by mouth 3 (Three) Times a Day Before Meals.       Norepinephrine-Sodium Chloride 8-0.9 MG/250ML-% solution infusion  Commonly known as: LEVOPHED      Infuse 1.766-26.49 mcg/min into a venous catheter Dose Adjusted By Provider As Needed.       prochlorperazine 10 MG/2ML injection  Commonly known as: COMPAZINE      Infuse 1 mL into a venous catheter Every 4 (Four) Hours As Needed for Nausea or Vomiting.       saccharomyces boulardii 250 MG capsule  Commonly known as: FLORASTOR      Take 1 capsule by mouth 2 (Two) Times a Day.              CHANGE how you take these medications        Instructions Last Dose Given Next Dose Due   Ubrelvy 100 MG tablet  Generic drug: ubrogepant  What changed: how much to take      Take 1 tablet by mouth 1 (One) Time As Needed (Migraine).              CONTINUE taking these medications        Instructions Last Dose Given Next Dose Due   acetaminophen 325 MG tablet  Commonly known as: TYLENOL      Take 2 tablets by mouth Every 6 (Six) Hours As Needed for Mild Pain. Pre IVIG infusion       BENADRYL IJ      Infuse 25 mg into a venous catheter 2 (Two) Times a Day As Needed. Pre and post IVIG       docusate sodium 100 MG capsule  Commonly known as: COLACE      Take 1 capsule by mouth 2 (Two) Times a Day.       Enulose 10 GM/15ML solution solution (encephalopathy)  Generic drug: lactulose      Take 45 mL by mouth As Needed.       Gamunex-C 5 GM/50ML solution infusion  Generic drug: immune globulin (human)      Infuse 5 g into a venous catheter Every 7 (Seven) Days. 10% 35g       heparin 100 UNIT/ML solution injection      5 mL by Intracatheter route 2 (Two) Times a Day As Needed (daily but can use bid prn).       hydrocortisone 5 MG  tablet  Commonly known as: CORTEF      Take 3 tablets by mouth 2 (Two) Times a Day.       hydrOXYzine 50 MG tablet  Commonly known as: ATARAX      Take 1 tablet by mouth Every Night.       Inderal LA 60 MG 24 hr capsule  Generic drug: propranolol LA      Take 1 capsule by mouth 2 (Two) Times a Day.       lansoprazole 30 MG capsule  Commonly known as: PREVACID      Take 1 capsule by mouth 2 (Two) Times a Day.       Lantus SoloStar 100 UNIT/ML injection pen  Generic drug: Insulin Glargine      Inject 47 Units under the skin into the appropriate area as directed Daily. Only for insulin pump failure       levothyroxine 125 MCG tablet  Commonly known as: SYNTHROID, LEVOTHROID      Take 1 tablet by mouth Daily.       lisinopril 40 MG tablet  Commonly known as: PRINIVIL,ZESTRIL      Take 1 tablet by mouth 2 (Two) Times a Day.       methylPREDNISolone sodium succinate 40 MG injection  Commonly known as: SOLU-Medrol      Infuse 0.5 mL into a venous catheter 1 (One) Time. Pre IVIG infusion       NORMAL SALINE FLUSH IV      Infuse 1,000 mL into a venous catheter 2 (Two) Times a Day As Needed. Pre and post IVIG infusion       NovoLOG 100 UNIT/ML injection  Generic drug: Insulin Aspart      Inject  under the skin into the appropriate area as directed. Type of Insulin: Lispro 100 units/ml  Type of Pump:Omnipod  Bolus: 1u 5-7g max bolus 2.5units/hr  Basal: 7456-9994 1.5u/hr max basal 10units/hr  Correction factor: 40mg/dl  Insulin to carbohydrate ratio: 7-5 g of carb  Next fill date: 06/23/24    Date of last site change: 06/19/24  Location of pod: right upper arm    Frequency of refill: 2-3 days  Last refill date: 06/03/24    Prescriber: Dr. Chelsea Silver  Phone number: [phone]       ONDANSETRON HCL IV      Infuse 8 mg into a venous catheter Every 6 (Six) Hours As Needed (nausea and vomiting).       prochlorperazine 10 MG tablet  Commonly known as: COMPAZINE      Take 1 tablet by mouth Every 8 (Eight) Hours As  Needed for Nausea or Vomiting.       Qulipta 60 MG tablet  Generic drug: Atogepant      Take 1 tablet by mouth Daily.       riFAXIMin 550 MG tablet  Commonly known as: XIFAXAN      Take 1 tablet by mouth Every 12 (Twelve) Hours.       sodium chloride 0.9 % irrigation  Commonly known as: NS      Irrigate with 1,000 mL to the affected area as directed by provider As Needed.       sodium chloride 0.9 % solution      Infuse  into a venous catheter Daily. 1 liter everyday       SUMAtriptan 25 MG tablet  Commonly known as: IMITREX      Take 1 tablet by mouth 1 (One) Time As Needed for Migraine.       traZODone 150 MG tablet  Commonly known as: DESYREL      Take 1 tablet by mouth Every Night.       vitamin E 400 UNIT capsule      Take 2 capsules by mouth Daily.       Vortioxetine HBr 5 MG tablet tablet  Commonly known as: Trintellix      Take 1 tablet by mouth Every Night for 30 days.              STOP taking these medications      Creon 03955-000303 units capsule delayed-release particles capsule  Generic drug: Pancrelipase (Lip-Prot-Amyl)        Pancrelipase (Lip-Prot-Amyl) 31351-539467 units capsule delayed-release particles capsule  Commonly known as: CREON        promethazine 25 MG tablet  Commonly known as: PHENERGAN        promethazine 25 MG/ML injection  Commonly known as: PHENERGAN        Sodium Chloride (PF) 0.9 % solution 20 mL with promethazine 25 MG/ML solution 25 mg                  Where to Get Your Medications        Information about where to get these medications is not yet available    Ask your nurse or doctor about these medications  cefTRIAXone 2,000 mg in sodium chloride 0.9 % 100 mL IVPB  dextrose 40 % gel  dextrose 50 % solution  glucagon 1 MG injection  Hydrocortisone Sod Suc (PF) 100 MG injection  lactated ringers infusion  lactated ringers infusion  midodrine 5 MG tablet  Norepinephrine-Sodium Chloride 8-0.9 MG/250ML-% solution infusion  prochlorperazine 10 MG/2ML injection  saccharomyces  boulardii 250 MG capsule          Electronically signed by Gary Hemphill MD, 06/21/24, 1:09 PM EDT.

## 2024-06-20 NOTE — PROGRESS NOTES
[facility]   Hospitalist Progress Note  Date: 2024  Patient Name: Kayla Gan  : 1975  MRN: [id number]  Date of admission: 2024  Room/Bed: [facility]/      Subjective   Subjective     Chief Complaint: Fever, body aches    Summary:Kayla Gan is a 48 y.o. female with cirrhosis, Crohn's, gastroparesis, pancreatectomy, admitted here earlier in the month, had concern for retained fragments from previous central catheter.  Was seen by vascular surgery in [place] yesterday for a referral and follow-up, chest x-ray was ordered.  Patient returned shortly after that with fever, is currently bacteremic with Klebsiella and admitted to the medicine service.    Interval Followup: Patient has been afebrile this morning but is just not feeling well in general, blood culture returned Klebsiella.  Having some vague chest pain that is worse with coughing.        Objective   Objective     Vitals:   Temp:  [97.3 °F (36.3 °C)-102.1 °F (38.9 °C)] 97.9 °F (36.6 °C)  Heart Rate:  [64-90] 74  Resp:  [18-25] 18  BP: ()/(69-87) 126/78    Physical Exam   General: Looks as if she does not feel well but she does not appear acutely ill or toxic  HENT: NCAT, MMM  Eyes: pupils equal, no scleral icterus  Cardiovascular: RRR, no murmurs   Pulmonary: CTA bilaterally; no wheezes; no conversational dyspnea  Gastrointestinal: S/ND/NT, +BS  Musculoskeletal: No gross deformities  Skin: Left chest port without erythema or drainage  Neuro: CN II through XII grossly intact; speech clear; no tremor  Psych: Mood and affect appropriate    Result Review    Result Review:  I have personally reviewed these results:  [x]  Laboratory      Lab 24  0419 24  0010 24  2204   WBC  --   --  12.97*   HEMOGLOBIN  --   --  12.1   HEMATOCRIT  --   --  36.3   PLATELETS  --   --  423   NEUTROS ABS  --   --  6.17   IMMATURE GRANS (ABS)  --   --  0.04   LYMPHS ABS  --   --  4.75*   MONOS ABS  --   --  1.82*   EOS ABS  --   --   0.14   MCV  --   --  96.5   SED RATE 23*  --   --    CRP  --  1.19*  --    PROCALCITONIN  --  0.47*  --    LACTATE  --   --  2.0         Lab 06/19/24 2204   SODIUM 135*   POTASSIUM 3.8   CHLORIDE 100   CO2 22.9   ANION GAP 12.1   BUN 13   CREATININE 0.65   EGFR 108.8   GLUCOSE 176*   CALCIUM 8.5*   MAGNESIUM 2.0         Lab 06/19/24 2204   TOTAL PROTEIN 7.5   ALBUMIN 3.8   GLOBULIN 3.7   ALT (SGPT) 45*   AST (SGOT) 47*   BILIRUBIN 0.3   ALK PHOS 174*   LIPASE 5*         Lab 06/20/24  0010 06/19/24 2204   PROBNP  --  88.2   HSTROP T <6 <6                 Brief Urine Lab Results  (Last result in the past 365 days)        Color   Clarity   Blood   Leuk Est   Nitrite   Protein   CREAT   Urine HCG        05/31/24 1439 Yellow   Clear   Negative   Negative   Negative   Negative                 [x]  Microbiology   Microbiology Results (last 10 days)       Procedure Component Value - Date/Time    COVID-19, FLU A/B, RSV PCR 1 HR TAT - Swab, Nasopharynx [[id number]]  (Normal) Collected: 06/19/24 2216    Lab Status: Final result Specimen: Swab from Nasopharynx Updated: 06/19/24 2300     COVID19 Not Detected     Influenza A PCR Not Detected     Influenza B PCR Not Detected     RSV, PCR Not Detected    Narrative:      Fact sheet for providers: https://www.fda.gov/media/334547/download    Fact sheet for patients: https://www.fda.gov/media/173043/download    Test performed by PCR.    Blood Culture - Blood, Blood, Central Line [[id number]]  (Abnormal) Collected: 06/19/24 2204    Lab Status: Preliminary result Specimen: Blood, Central Line Updated: 06/20/24 1108     Blood Culture Abnormal Stain     Gram Stain Aerobic Bottle Gram negative bacilli      Anaerobic Bottle Gram negative bacilli    Blood Culture ID, PCR - Blood, Blood, Central Line [[id number]]  (Abnormal) Collected: 06/19/24 2204    Lab Status: Final result Specimen: Blood, Central Line Updated: 06/20/24 1051     BCID, PCR Klebsiella oxytoca. Identification by BCID2 PCR      BOTTLE TYPE Aerobic Bottle    Narrative:      No resistance genes detected.          [x]  Radiology  XR Chest 1 View    Result Date: 6/19/2024  No acute infiltrate is appreciated.    Please note that portions of this note were completed with a voice recognition program.  Electronically Signed: Sujit Tan MD  6/19/2024 9:47 PM EDT  Workstation ID: [id number]   []  EKG/Telemetry   []  Cardiology/Vascular   []  Pathology  []  Old records  []  Other:    Assessment & Plan   Assessment / Plan     Assessment:  Sepsis with Klebsiella bacteremia  Possible retained fragments from previous central line  Crohn's disease  History of previous pancreatectomy  Hepatic cirrhosis    Plan:  Admitted to medicine service  Continue antibiotics, currently on Zyvox due to vancomycin allergy.  Ceftriaxone for gram-negative bacteremia with Klebsiella.  Vague chest pain, will get EKG and troponin but does not seem cardiac clinically  I spoke with Dr. Crowder, vascular surgeon at [place], will review films and we will discuss further afterward; appreciate the help.  Port-A-Cath likely will eventually need to come out       Discussed with bedside RN.    VTE Prophylaxis:  Pharmacologic VTE prophylaxis orders are present.        CODE STATUS:   Level Of Support Discussed With: Patient  Code Status (Patient has no pulse and is not breathing): CPR (Attempt to Resuscitate)  Medical Interventions (Patient has pulse or is breathing): Full Support      Electronically signed by Gary Hemphill MD, 6/20/2024, 13:07 EDT.

## 2024-06-20 NOTE — CONSULTS
Met with patient at bedside. Patient wearing Omnipod insulin pump and would prefer to continue wearing it while she is in the hospital. Patient also wears a Dexcom G6 for her blood glucose readings. Completed and signed insulin pump contract. Patient understands that the RN will still have to check blood sugars prior to meals. No further questions or needs at this time. Will continue to assist and support as needed.

## 2024-06-20 NOTE — ED PROVIDER NOTES
Time: 9:50 PM EDT  Date of encounter:  6/19/2024  Independent Historian/Clinical History and Information was obtained by:   Patient    History is limited by: N/A    Chief Complaint: Fever, possible sepsis      History of Present Illness:  Patient is a 48 y.o. year old female who presents to the emergency department for evaluation of fever and possible sepsis.  The patient states that she is possibly septic.  States that she has bodyaches, chills, fever.  States that she was just in the hospital recently for sepsis but there was no source of it.  Patient states that she started having fever and bodyaches and chills today and came back to the hospital.  Just finished a course of doxycycline.  She also reports that she gets weekly IVIG and has a catheter to her left side of her chest.  She also reports that she has a remnant catheter in her right side of her chest.  She states that she saw a vascular surgeon today who was talking about doing possible surgery on her.  States that she just does not feel well.    HPI    Patient Care Team  Primary Care Provider: Justin Daniels APRN    Past Medical History:     Allergies   Allergen Reactions    Other Other (See Comments)     Sent patient into kidney failure, heart stopped, in ICU for two days.    Parathyroid Hormone (Recomb) Other (See Comments)     Sent patient into kidney failure, heart stopped, in ICU for two days.      Romosozumab Hives     Other reaction(s): Hives    Other reaction(s): Hives   Other reaction(s): Hives    Sulfate Hives    Teriparatide Anaphylaxis and Other (See Comments)     Other reaction(s): Unknown    Tramadol Anaphylaxis and Other (See Comments)     Other reaction(s): Unknown, Unknown    Vancomycin Hives and Itching     Other reaction(s): Unknown, Unknown    Nifedipine Provider Review Needed and Other (See Comments)     Rapid heart beat    Rapid heart beat    Other reaction(s): Provider Review Needed   Other reaction(s): Provider Review Needed    Rapid heart beat     Past Medical History:   Diagnosis Date    Adrenal insufficiency     Allergic Unsure    Foods, [place], Vancomycin    Anemia     Anxiety     Arthritis     Asthma Unsure    Cholelithiasis Unsure    Cirrhosis     Clotting disorder Unsure    Colon polyp     Crohn's disease     Depression     Diabetes mellitus     Gastroparesis     GERD (gastroesophageal reflux disease)     History of MRSA infection     Hyperlipidemia     Hypertension     Hypothyroidism     Irritable bowel syndrome Unsure    Liver disease     Migraines     Pancreatitis     Urinary tract infection Unsure    Interstitial Cystitis     Past Surgical History:   Procedure Laterality Date    ABSCESS DRAINAGE  12/24/2019    Fluoroscopically guided abscess drainage, [facility]    ADENOIDECTOMY      BACK SURGERY      L4 L5    CHOLECYSTECTOMY N/A     COLONOSCOPY  04/24/2019    NBIH, 3 mm polyp    DILATATION AND CURETTAGE      ENDOMETRIAL ABLATION      ENDOSCOPY N/A 04/24/2019    Normal    ENTEROSCOPY SMALL BOWEL      ERCP  2019    GASTRIC STIMULATOR IMPLANT SURGERY      GASTROJEJUNOSTOMY W/ JEJUNOSTOMY TUBE      removed    HYSTERECTOMY      LYMPH NODE BIOPSY      PANCREATECTOMY  12/2019    TPIAT    PELVIC FLOOR REPAIR      SINUS SURGERY      SPLENECTOMY      TONSILLECTOMY      VENOUS ACCESS DEVICE (PORT) INSERTION       Family History   Problem Relation Age of Onset    Heart disease Father     Hypothyroidism Father     Anxiety disorder Father     Depression Father     Anxiety disorder Mother     Hypothyroidism Mother     Breast cancer Mother     Colon polyps Mother     Alcohol abuse Paternal Grandfather     Heart disease Paternal Grandfather     Depression Brother     Hypertension Brother        Home Medications:  Prior to Admission medications    Medication Sig Start Date End Date Taking? Authorizing Provider   diphenhydrAMINE (BENADRYL) 50 MG/ML injection Infuse 0.5-1 mL into a venous catheter Every 6 (Six) Hours As Needed. 9/21/22    Pollo Ray MD   docusate sodium (COLACE) 100 MG capsule Take 1 capsule by mouth 2 (Two) Times a Day. 4/16/19   Pollo Ray MD   E-400 180 MG (400 UNIT) capsule capsule Take 2 capsules by mouth Daily. 5/3/24   Plolo Ray MD   Enulose 10 GM/15ML solution solution (encephalopathy) Take 45 mL by mouth As Needed.    Pollo Ray MD   Gamunex-C 5 GM/50ML solution infusion Infuse 5 g into a venous catheter 1 (One) Time. 5/23/24   Pollo Ray MD   heparin, porcine, 100-0.45 UNIT/ML-% infusion Infuse  into a venous catheter Continuous.    Pollo Ray MD   hydroCHLOROthiazide (MICROZIDE) 12.5 MG capsule Take 1 capsule by mouth Every Morning. 5/3/24   Pollo Ray MD   hydrocortisone (CORTEF) 5 MG tablet Take 3 tablets by mouth 2 (Two) Times a Day. 5/4/24   Pollo Ray MD   hydrOXYzine (ATARAX) 50 MG tablet Take 1 tablet by mouth Every Night.    Pollo Ray MD   Insulin Disposable Pump (Omnipod 5 G6 Pod, Gen 5,) misc Apply 1 cartridge. topically to the appropriate area as directed Every 3 (Three) Days. Per patient changes pod every 2-3 days 12/5/23   Pollo Ray MD   lansoprazole (PREVACID) 30 MG capsule Take 1 capsule by mouth 2 (Two) Times a Day. 9/12/23 9/11/24  Mariela rG APRN   Lankyle SoloStar 100 UNIT/ML injection pen Inject 47 Units under the skin into the appropriate area as directed Daily. Only for insulin pump failure    Pollo Ray MD   levothyroxine (SYNTHROID, LEVOTHROID) 125 MCG tablet Take 1 tablet by mouth Daily. 9/1/20   Pollo Ray MD   lisinopril (PRINIVIL,ZESTRIL) 40 MG tablet Take 1 tablet by mouth 2 (Two) Times a Day.    Pollo Ray MD   NovoLOG 100 UNIT/ML injection Inject  under the skin into the appropriate area as directed. Type of Insulin: Novolog 100 units/ml  Type of Pump:Other:Omnipod  Max Bolus rate: 2.5 units/ hr  Max Basal rate : 10 units/hr  Correction  factor: 40mg/dl  Insulin to carbohydrate ratio: 6-9 g of carb  Next fill date :5/31/24   13 units left in pump  Date of last site change: Wed  Location of pod :upper abd    Frequency of refill: 2-3 days  Last refill date: 5/29/24    Prescriber: Dr Silver  Phone number: [phone] 5/12/22   Pollo Ray MD   ondansetron (ZOFRAN) 2 mg/mL injection Infuse 4 mL into a venous catheter Every 6 (Six) Hours As Needed for Nausea or Vomiting. 10/4/22   Pollo Ray MD   Pancrelipase, Lip-Prot-Amyl, (Creon) 30018-023381 units capsule delayed-release particles capsule Take 3 tabs with meals and 2 with snacks 11/14/23   Mariela Gr APRN   prochlorperazine (COMPAZINE) 10 MG tablet Take 1 tablet by mouth Every 8 (Eight) Hours As Needed for Nausea or Vomiting. 6/6/23   Patrizia Sena APRN   promethazine (PHENERGAN) 25 MG tablet Take 1 tablet by mouth Every 4 (Four) Hours As Needed for Nausea or Vomiting. 3/19/24   Pollo Ray MD   promethazine (PHENERGAN) 25 MG/ML injection Infuse 1 mL into a venous catheter Every 4 (Four) Hours As Needed. 10/4/22   Pollo Ray MD   propranolol LA (INDERAL LA) 60 MG 24 hr capsule Take 1 capsule by mouth Daily.  Patient taking differently: Take 1 capsule by mouth 2 (Two) Times a Day. 8/31/23   Dulce Camejo APRN   Qulipta 60 MG tablet Take 1 tablet by mouth Daily. 4/27/24   Pollo Ray MD   riFAXIMin (XIFAXAN) 550 MG tablet Take 1 tablet by mouth Every 12 (Twelve) Hours. 5/9/24 5/9/25  Mariela Gr APRN   sodium chloride 0.9 % solution Infuse  into a venous catheter Daily. 1 liter everyday 5/23/22   Emergency, Nurse Elder, RN   SUMAtriptan (IMITREX) 25 MG tablet Take 1 tablet by mouth 1 (One) Time As Needed. 8/8/23   Pollo Ray MD   traZODone (DESYREL) 150 MG tablet Take 1 tablet by mouth Every Night.    Provider, MD Pollo   Ubrelvy 100 MG tablet Take 1 tablet by mouth 1 (One) Time As Needed  "(Migraine).  Patient taking differently: Take 1-2 tablets by mouth 1 (One) Time As Needed (Migraine). 12/7/23   Dulce Camejo APRN   VITAMIN E 400 UNIT capsule Take 2 capsules by mouth Daily.    Provider, MD Pollo   Vortioxetine HBr (Trintellix) 5 MG tablet tablet Take 1 tablet by mouth Every Night for 30 days. 12/12/23 5/31/24  Kayla Graham PA-C        Social History:   Social History     Tobacco Use    Smoking status: Never    Smokeless tobacco: Never   Vaping Use    Vaping status: Never Used   Substance Use Topics    Alcohol use: Never    Drug use: Never         Review of Systems:  Review of Systems   Constitutional:  Positive for fever.        Physical Exam:  /76 (Patient Position: Lying)   Pulse 87   Temp 99.2 °F (37.3 °C) (Oral)   Resp 22   Ht 170.2 cm (67\")   Wt 88.6 kg (195 lb 5.2 oz)   SpO2 93%   BMI 30.59 kg/m²     Physical Exam  Vitals and nursing note reviewed.   Constitutional:       Appearance: Normal appearance.   HENT:      Head: Normocephalic and atraumatic.   Eyes:      General: No scleral icterus.  Cardiovascular:      Rate and Rhythm: Normal rate and regular rhythm.      Heart sounds: Normal heart sounds.   Pulmonary:      Effort: Pulmonary effort is normal.      Breath sounds: Normal breath sounds.      Comments: Tunneled cath to the left side of the chest  Abdominal:      Palpations: Abdomen is soft.      Tenderness: There is no abdominal tenderness.   Musculoskeletal:         General: Normal range of motion.      Cervical back: Normal range of motion.   Skin:     Findings: No rash.   Neurological:      General: No focal deficit present.      Mental Status: She is alert.                  Procedures:  Procedures      Medical Decision Making:      Comorbidities that affect care:    Diabetes, hypertension, gastroparesis, Crohn's    External Notes reviewed:    Reviewed admission from 5/31/2024      The following orders were placed and all results were independently " analyzed by me:  Orders Placed This Encounter   Procedures    COVID-19, FLU A/B, RSV PCR 1 HR TAT - Swab, Nasopharynx    Blood Culture - Blood,    Blood Culture - Blood,    XR Chest 1 View    [place] Draw    High Sensitivity Troponin T    Comprehensive Metabolic Panel    Lipase    BNP    Magnesium    CBC Auto Differential    Lactic Acid, Plasma    High Sensitivity Troponin T 2Hr    NPO Diet NPO Type: Strict NPO    Undress & Gown    Continuous Pulse Oximetry    Inpatient Hospitalist Consult    Oxygen Therapy- Nasal Cannula; Titrate 1-6 LPM Per SpO2; 90 - 95%    ECG 12 Lead ED Triage Standing Order; Chest Pain    ECG 12 Lead ED Triage Standing Order; Chest Pain    Insert Peripheral IV    CBC & Differential    Green Top (Gel)    Lavender Top    Gold Top - SST    Light Blue Top       Medications Given in the Emergency Department:  Medications   sodium chloride 0.9 % flush 10 mL (has no administration in time range)   piperacillin-tazobactam (ZOSYN) IVPB 3.375 g IVPB in 100 mL NS (VTB) (has no administration in time range)   ondansetron (ZOFRAN) injection 4 mg (has no administration in time range)   ketorolac (TORADOL) injection 15 mg (has no administration in time range)   aspirin chewable tablet 324 mg (324 mg Oral Given 6/19/24 2255)   acetaminophen (TYLENOL) tablet 975 mg (975 mg Oral Given 6/19/24 2255)        ED Course:    ED Course as of 06/19/24 2322 Wed Jun 19, 2024 2148 EKG interpreted by me  Time: 2127  Heart rate 87  Sinus, LVH, inferior Q waves [MA]   2321 Spoke with Dr. León who agrees to admit [MA]      ED Course User Index  [MA] Jake Mcneal MD       Labs:    Lab Results (last 24 hours)       Procedure Component Value Units Date/Time    High Sensitivity Troponin T [[id number]]  (Normal) Collected: 06/19/24 2204    Specimen: Blood Updated: 06/19/24 2235     HS Troponin T <6 ng/L     Narrative:      High Sensitive Troponin T Reference Range:  <14.0 ng/L- Negative Female for AMI  <22.0 ng/L-  Negative Male for AMI  >=14 - Abnormal Female indicating possible myocardial injury.  >=22 - Abnormal Male indicating possible myocardial injury.   Clinicians would have to utilize clinical acumen, EKG, Troponin, and serial changes to determine if it is an Acute Myocardial Infarction or myocardial injury due to an underlying chronic condition.         CBC & Differential [[id number]]  (Abnormal) Collected: 06/19/24 2204    Specimen: Blood Updated: 06/19/24 2217    Narrative:      The following orders were created for panel order CBC & Differential.  Procedure                               Abnormality         Status                     ---------                               -----------         ------                     CBC Auto Differential[[id number]]        Abnormal            Final result               Scan Slide[[id number]]                                                                    Please view results for these tests on the individual orders.    Comprehensive Metabolic Panel [[id number]]  (Abnormal) Collected: 06/19/24 2204    Specimen: Blood Updated: 06/19/24 2235     Glucose 176 mg/dL      BUN 13 mg/dL      Creatinine 0.65 mg/dL      Sodium 135 mmol/L      Potassium 3.8 mmol/L      Chloride 100 mmol/L      CO2 22.9 mmol/L      Calcium 8.5 mg/dL      Total Protein 7.5 g/dL      Albumin 3.8 g/dL      ALT (SGPT) 45 U/L      AST (SGOT) 47 U/L      Alkaline Phosphatase 174 U/L      Total Bilirubin 0.3 mg/dL      Globulin 3.7 gm/dL      A/G Ratio 1.0 g/dL      BUN/Creatinine Ratio 20.0     Anion Gap 12.1 mmol/L      eGFR 108.8 mL/min/1.73     Narrative:      GFR Normal >60  Chronic Kidney Disease <60  Kidney Failure <15      Lipase [[id number]]  (Abnormal) Collected: 06/19/24 2204    Specimen: Blood Updated: 06/19/24 2235     Lipase 5 U/L     BNP [[id number]]  (Normal) Collected: 06/19/24 2204    Specimen: Blood Updated: 06/19/24 2233     proBNP 88.2 pg/mL     Narrative:      This assay is used as an aid in the  diagnosis of individuals suspected of having heart failure. It can be used as an aid in the diagnosis of acute decompensated heart failure (ADHF) in patients presenting with signs and symptoms of ADHF to the emergency department (ED). In addition, NT-proBNP of <300 pg/mL indicates ADHF is not likely.    Age Range Result Interpretation  NT-proBNP Concentration (pg/mL:      <50             Positive            >450                   Gray                 300-450                    Negative             <300    50-75           Positive            >900                  Gray                300-900                  Negative            <300      >75             Positive            >1800                  Gray                300-1800                  Negative            <300    Magnesium [[id number]]  (Normal) Collected: 06/19/24 2204    Specimen: Blood Updated: 06/19/24 2235     Magnesium 2.0 mg/dL     CBC Auto Differential [[id number]]  (Abnormal) Collected: 06/19/24 2204    Specimen: Blood Updated: 06/19/24 2217     WBC 12.97 10*3/mm3      RBC 3.76 10*6/mm3      Hemoglobin 12.1 g/dL      Hematocrit 36.3 %      MCV 96.5 fL      MCH 32.2 pg      MCHC 33.3 g/dL      RDW 14.4 %      RDW-SD 50.8 fl      MPV 10.5 fL      Platelets 423 10*3/mm3      Neutrophil % 47.6 %      Lymphocyte % 36.6 %      Monocyte % 14.0 %      Eosinophil % 1.1 %      Basophil % 0.4 %      Immature Grans % 0.3 %      Neutrophils, Absolute 6.17 10*3/mm3      Lymphocytes, Absolute 4.75 10*3/mm3      Monocytes, Absolute 1.82 10*3/mm3      Eosinophils, Absolute 0.14 10*3/mm3      Basophils, Absolute 0.05 10*3/mm3      Immature Grans, Absolute 0.04 10*3/mm3      nRBC 0.0 /100 WBC     Blood Culture - Blood, Blood, Central Line [[id number]] Collected: 06/19/24 2204    Specimen: Blood, Central Line Updated: 06/19/24 2211    Lactic Acid, Plasma [[id number]]  (Normal) Collected: 06/19/24 2204    Specimen: Blood Updated: 06/19/24 2231     Lactate 2.0 mmol/L      COVID-19, FLU A/B, RSV PCR 1 HR TAT - Swab, Nasopharynx [[id number]]  (Normal) Collected: 06/19/24 2216    Specimen: Swab from Nasopharynx Updated: 06/19/24 2300     COVID19 Not Detected     Influenza A PCR Not Detected     Influenza B PCR Not Detected     RSV, PCR Not Detected    Narrative:      Fact sheet for providers: https://www.fda.gov/media/048994/download    Fact sheet for patients: https://www.fda.gov/media/304293/download    Test performed by PCR.    Blood Culture - Blood, Arm, Right [[id number]] Collected: 06/19/24 2258    Specimen: Blood from Arm, Right Updated: 06/19/24 2307             Imaging:    XR Chest 1 View    Result Date: 6/19/2024  XR CHEST 1 VW Date of Exam: 6/19/2024 9:36 PM EDT Indications: 48-year-old female w/ chest pain; fever; chills. Comparison: 5/31/2024.  FINDINGS: A single AP (or PA) upright portable chest radiograph was performed. Borderline cardiac enlargement is seen. No acute infiltrate is appreciated. No pleural effusion or pneumothorax is identified. Chronic asymmetric elevation of the right diaphragm is seen, as before. There may be mild subsegmental atelectasis and/or linear fibrosis in the lung bases, especially on the right. There is a left-sided internal jugular (IJ) central venous line in place with its distal tip near the expected confluence of the brachiocephalic veins, probably in the upper superior vena cava (SVC), seen previously. There may be chronic calcified granulomatous disease of the chest. External artifact obscures detail. Degenerative changes involve the imaged spine and the bilateral shoulders. The patient has undergone cholecystectomy. No significant interval change is seen since the prior study (or studies).      No acute infiltrate is appreciated.    Please note that portions of this note were completed with a voice recognition program.  Electronically Signed: Sujit Tan MD  6/19/2024 9:47 PM EDT  Workstation ID: [id number]    XR Chest 2 View    Result  "Date: 6/19/2024  ACCESSION: [id number] STUDY: 1-view portable chest radiograph DATE: 6/19/2024 10:15 INDICATION: Foreign Body COMPARISON: Portable AP chest radiograph performed 2/29/2024. TECHNIQUE: A portable AP chest radiograph was obtained. FINDINGS: There is a left approach central venous port terminating over the region of the superior vena cava. No interval change from the most recent exam performed 2/29/2024. Endoscopic clips over the left upper quadrant. Partially visualized right upper quadrant surgical clips. The bilateral lung volumes are adequate. Opacification of the left mediastinal region was present on most recent exam in February and does not appear to have changed. No other focal opacifications. No pleural effusion or pneumothorax. The cardia mediastinal silhouette shows no appreciable change. No new osseous abnormality identified. IMPRESSION: 1.  Left approach central venous catheter terminates over the region of the superior vena cava. 2.  Indeterminate left sided opacifications show no interval change from the prior exam. \"I, the attending/teaching physician, have personally reviewed, discussed, and supervised this radiological examination with the resident and this report reflects my agreement.\" Dictated by: Keli Ayala Signed by Steve Biggs MD on 6/19/2024 10:53 ##### Final ##### Dictated by:    KELI AYALA MD-INT Dictated DT/TM: 06/19/2024 10:53 am Interpreted and electronically signed by:  STEVE BIGGS MD-RAD Signed DT/TM:  06/19/2024 10:53 am       Differential Diagnosis and Discussion:    Fever: Based on the complaint of fever, differential diagnosis includes but is not limited to meningitis, pneumonia, pyelonephritis, acute uti,  systemic immune response syndrome, sepsis, viral syndrome, fungal infection, tick born illness and other bacterial infections.    All labs were reviewed and interpreted by me.  All X-rays impressions were independently interpreted by " me.    MDM     Amount and/or Complexity of Data Reviewed  Decide to obtain previous medical records or to obtain history from someone other than the patient: yes       Patient is a 48-year-old female who presents with complaints of fever and possible sepsis.  Was just recently admitted for what appeared to be viral sepsis versus sepsis of unknown source.  Cultures were negative at that time.  She does have a tunnel cath.  She was on antibiotics into the last couple days.  Started running a fever again today as well as body aches.  There is no source of infection noted here in the emergency room but does have a fever as well as slightly elevated white count.  Will place back on IV antibiotics and admitted to the hospital.  Could be from the tunnel cath on the left but we will culture the blood from the catheter as well as admit to the hospital for further workup and management.  She is not septic shock at this time because the lactic is not severely elevated.          Patient Care Considerations:          Consultants/Shared Management Plan:    Hospitalist: I have discussed the case with Dr. León who agrees to accept the patient for admission.    Social Determinants of Health:          Disposition and Care Coordination:    Admit:   Through independent evaluation of the patient's history, physical, and imperical data, the patient meets criteria for inpatient admission to the hospital.        Final diagnoses:   Fever, unspecified fever cause   Body aches        ED Disposition       ED Disposition   Decision to Admit    Condition   --    Comment   --               This medical record created using voice recognition software.             Jake Mcneal MD  06/19/24 0269

## 2024-06-20 NOTE — CASE MANAGEMENT/SOCIAL WORK
Discharge Planning Assessment   Guille     Patient Name: Kayla Gan  MRN: [id number]  Today's Date: 6/20/2024    Admit Date: 6/19/2024    Plan: Pt lives at home with adult children. Pt usually independent. PCP: LUIS DANIEL Daniels, Pharm: COLEMAN Guerra. Pt states that she does the use [facility] for one medication. She is aware that [facility] will not longer serve clients at the end of June. SW spoke with Pt about [facility] care in [place] if needed. Pt has [facility] coming into the home for IVIG and Hydration infusions. Pt is a re-admit. Pt plans on returning home at discharge. SW will continue to follow for needs.   Discharge Needs Assessment       Row Name 06/20/24 1156       Living Environment    People in Home child(rhonda), adult    Current Living Arrangements home    Potentially Unsafe Housing Conditions none    In the past 12 months has the electric, gas, oil, or water company threatened to shut off services in your home? No    Primary Care Provided by self    Provides Primary Care For no one    Family Caregiver if Needed child(rhonda), adult    Quality of Family Relationships helpful;involved    Able to Return to Prior Arrangements yes       Resource/Environmental Concerns    Resource/Environmental Concerns none    Transportation Concerns none       Transportation Needs    In the past 12 months, has lack of transportation kept you from medical appointments or from getting medications? no    In the past 12 months, has lack of transportation kept you from meetings, work, or from getting things needed for daily living? No       Food Insecurity    Within the past 12 months, you worried that your food would run out before you got the money to buy more. Sometimes    Within the past 12 months, the food you bought just didn't last and you didn't have money to get more. Sometimes       Transition Planning    Patient/Family Anticipates Transition to home with family    Patient/Family Anticipated  Services at Transition none    Transportation Anticipated family or friend will provide       Discharge Needs Assessment    Equipment Currently Used at Home medication pump    Concerns to be Addressed discharge planning    Anticipated Changes Related to Illness none    Discharge Coordination/Progress Pt lives at home with adult children. Pt usually independent. PCP: LUIS DANIEL Daniels, Pharm: COLEMAN Guerra. Pt states that she does the use [facility] for one medication. She is aware that [facility] will not longer serve clients at the end of June. SW spoke with Pt about [facility] care in [place] if needed. Pt has [facility] coming into the home for IVIG and Hydration infusions. Pt is a re-admit. Pt plans on returning home at discharge.  SW will continue to follow for needs.                   Discharge Plan       Row Name 06/20/24 1208       Plan    Plan Pt lives at home with adult children. Pt usually independent. PCP: LUIS DANIEL Daniels, Pharm: COLEMAN Guerra. Pt states that she does the use [facility] for one medication. She is aware that [facility] will not longer serve clients at the end of June. SW spoke with Pt about [facility] care in [place] if needed. Pt has [facility] coming into the home for IVIG and Hydration infusions. Pt is a re-admit. Pt plans on returning home at discharge. SW will continue to follow for needs.                  Continued Care and Services - Admitted Since 6/19/2024    No active coordination exists for this encounter.          Demographic Summary       Row Name 06/20/24 1153       General Information    Admission Type inpatient    Arrived From emergency department    Referral Source admission list    Reason for Consult discharge planning    Preferred Language English       Contact Information    Permission Granted to Share Info With permission denied                   Functional Status       Row Name 06/20/24 1155       Employment/     Employment Status disabled      Row Name 06/20/24 1154       Functional Status    Usual Activity Tolerance moderate    Current Activity Tolerance moderate       Physical Activity    On average, how many days per week do you engage in moderate to strenuous exercise (like a brisk walk)? 0 days    On average, how many minutes do you engage in exercise at this level? 0 min    Number of minutes of exercise per week 0       Assessment of Health Literacy    How often do you have someone help you read hospital materials? Never    How often do you have problems learning about your medical condition because of difficulty understanding written information? Never    How often do you have a problem understanding what is told to you about your medical condition? Never    How confident are you filling out medical forms by yourself? Quite a bit    Health Literacy Good       Functional Status, IADL    Medications independent    Meal Preparation independent    Housekeeping independent    Laundry independent    Shopping independent       Mental Status    General Appearance WDL WDL       Mental Status Summary    Recent Changes in Mental Status/Cognitive Functioning no changes                   Psychosocial    No documentation.                  Abuse/Neglect    No documentation.                  Legal       Row Name 06/20/24 1156       Financial Resource Strain    How hard is it for you to pay for the very basics like food, housing, medical care, and heating? Not hard       Financial/Legal    Source of Income disability    Application for Public Assistance not applied       Legal    Criminal Activity/Legal Involvement none                   Substance Abuse    No documentation.                  Patient Forms    No documentation.                     Kayla Gallego

## 2024-06-20 NOTE — H&P
[facility]   HISTORY AND PHYSICAL    Patient Name: Kayla Gan  : 1975  MRN: [id number]  Primary Care Physician:  Justin Daniels, LAZARO  Date of admission: 2024    Subjective   Subjective     Chief Complaint: Fevers and chills    HPI:    Kayla Gan is a 48 y.o. female with past medical history of liver cirrhosis, Crohn's disease, gastroparesis, pancreectomy, hypertension, hypothyroidism, CKD, frequent IVIG injections, esophagitis, GERD presented to the ED with complaints of fevers and chills.  Patient states that back in February she became septic and bacteremic and had her right-sided port removed since it was a possible source and after her she had a left Noble catheter placed at [facility].  3 weeks ago she was seen at this facility with similar complaints and CT of the chest showed remnants from her port within the central right brachiocephalic vein.  Patient was seen by vascular surgery at [facility] yesterday for possible removal of foreign bodies but after her appointment she began to have intermittent fevers, chills, diaphoresis, and bodyaches.  Since the symptoms persisted she came to the ED for further evaluation.  In the ED patient was febrile and tachypneic on arrival.  Labs showed he had leukocytosis with abnormal LFTs and remaining labs being relatively unremarkable given her chronic conditions including a normal lactic acid, lipase, proBNP, COVID flu RSV, and borderline CRP.  Chest x-ray was negative for any acute findings.  When asked she denied any headaches, focal weakness, chest pain, palpitation, shortness of breath, cough, abdominal pain, nausea, constipation, dysuria, hematuria, hematochezia, melena, or anxiety.  Patient admitted for further evaluation and treatment    Review of Systems   All systems were reviewed and negative except for: As per HPI    Personal History     Past Medical History:   Diagnosis Date    Adrenal  insufficiency     Allergic Unsure    Foods, [place], Vancomycin    Anemia     Anxiety     Arthritis     Asthma Unsure    Cholelithiasis Unsure    Cirrhosis     Clotting disorder Unsure    Colon polyp     Crohn's disease     Depression     Diabetes mellitus     Gastroparesis     GERD (gastroesophageal reflux disease)     History of MRSA infection     Hyperlipidemia     Hypertension     Hypothyroidism     Irritable bowel syndrome Unsure    Liver disease     Migraines     Pancreatitis     Urinary tract infection Unsure    Interstitial Cystitis       Past Surgical History:   Procedure Laterality Date    ABSCESS DRAINAGE  12/24/2019    Fluoroscopically guided abscess drainage, [facility]    ADENOIDECTOMY      BACK SURGERY      L4 L5    CHOLECYSTECTOMY N/A     COLONOSCOPY  04/24/2019    NBIH, 3 mm polyp    DILATATION AND CURETTAGE      ENDOMETRIAL ABLATION      ENDOSCOPY N/A 04/24/2019    Normal    ENTEROSCOPY SMALL BOWEL      ERCP  2019    GASTRIC STIMULATOR IMPLANT SURGERY      GASTROJEJUNOSTOMY W/ JEJUNOSTOMY TUBE      removed    HYSTERECTOMY      LYMPH NODE BIOPSY      PANCREATECTOMY  12/2019    TPIAT    PELVIC FLOOR REPAIR      SINUS SURGERY      SPLENECTOMY      TONSILLECTOMY      VENOUS ACCESS DEVICE (PORT) INSERTION         Family History: family history includes Alcohol abuse in her paternal grandfather; Anxiety disorder in her father and mother; Breast cancer in her mother; Colon polyps in her mother; Depression in her brother and father; Heart disease in her father and paternal grandfather; Hypertension in her brother; Hypothyroidism in her father and mother. Otherwise pertinent FHx was reviewed and not pertinent to current issue.    Social History:  reports that she has never smoked. She has never used smokeless tobacco. She reports that she does not drink alcohol and does not use drugs.    Home Medications:  Atogepant, Insulin Aspart, Insulin Glargine, Omnipod 5 G6 Pods (Gen 5), Pancrelipase  (Lip-Prot-Amyl), SUMAtriptan, Vitamin E, Vortioxetine HBr, docusate sodium, heparin (porcine), hydrOXYzine, hydroCHLOROthiazide, hydrocortisone, immune globulin (human), lactulose, lansoprazole, levothyroxine, lisinopril, ondansetron, prochlorperazine, promethazine, propranolol LA, riFAXIMin, sodium chloride, traZODone, ubrogepant, and vitamin E      Allergies:  Allergies   Allergen Reactions    Other Other (See Comments)     Sent patient into kidney failure, heart stopped, in ICU for two days.    Parathyroid Hormone (Recomb) Other (See Comments)     Sent patient into kidney failure, heart stopped, in ICU for two days.      Romosozumab Hives     Other reaction(s): Hives    Other reaction(s): Hives   Other reaction(s): Hives    Sulfate Hives    Teriparatide Anaphylaxis and Other (See Comments)     Other reaction(s): Unknown    Tramadol Anaphylaxis and Other (See Comments)     Other reaction(s): Unknown, Unknown    Vancomycin Hives and Itching     Other reaction(s): Unknown, Unknown    Nifedipine Provider Review Needed and Other (See Comments)     Rapid heart beat    Rapid heart beat    Other reaction(s): Provider Review Needed   Other reaction(s): Provider Review Needed   Rapid heart beat       Objective   Objective     Vitals:   Temp:  [98.1 °F (36.7 °C)-102.1 °F (38.9 °C)] 99 °F (37.2 °C)  Heart Rate:  [78-90] 78  Resp:  [20-25] 20  BP: (116-138)/(69-87) 116/69  Physical Exam    Constitutional: Awake, alert   Eyes: PERRLA, sclerae anicteric, no conjunctival injection   HENT: NCAT, mucous membranes moist   Neck: Supple, no thyromegaly, no lymphadenopathy, trachea midline   Respiratory: Clear to auscultation bilaterally, nonlabored respirations    Cardiovascular: RRR, no murmurs, rubs, or gallops, palpable pedal pulses bilaterally   Gastrointestinal: Positive bowel sounds, soft, nontender, nondistended   Musculoskeletal: No bilateral ankle edema, no clubbing or cyanosis to extremities   Psychiatric: Appropriate  affect, cooperative   Neurologic: Oriented x 3, strength symmetric in all extremities, Cranial Nerves grossly intact to confrontation, speech clear   Skin: No rashes     Result Review    Result Review:  I have personally reviewed the results from the time of this admission to 6/20/2024 02:31 EDT and agree with these findings:  [x]  Laboratory list / accordion  []  Microbiology  [x]  Radiology  [x]  EKG/Telemetry   []  Cardiology/Vascular   []  Pathology  []  Old records  []  Other:  Most notable findings include: Leukocytosis, normal lactic acid, borderline CRP, negative COVID flu RSV abnormality, chest x-ray negative      Assessment & Plan   Assessment / Plan     Brief Patient Summary:  Kayla Gan is a 48 y.o. female with past medical history of liver cirrhosis, Crohn's disease, gastroparesis, pancreectomy, hypertension, hypothyroidism, CKD, frequent IVIG injections, esophagitis, GERD presented to the ED with complaints of fevers and chills    Active Hospital Problems:  Active Hospital Problems    Diagnosis     **Fever of unknown origin     Portal hypertension     Gastroparesis     Type 1 diabetes mellitus without complication     Spleen absent     HI (obstructive sleep apnea)     Hypertensive disorder     Major depressive disorder     Hypothyroidism      Plan:     Fever  -Admit to telemetry  -Possible bacteremia  -No clear source but patient does have remnants from right sided port.  Patient has left-sided Noble catheter  -Empiric antibiotics  -Blood cultures ordered and pending  -Supportive medications  -Remove catheters if blood cultures positive  -Transfer to [facility] if needed  -Supportive care    HTN  -Currently well controlled  -PRN BP meds  -Resume home meds when available  -Titrate if needed    Diabetes  -Insulin sliding scale  -Levemir at bedtime  -Titrate as needed    Hx Cirrhosis   Hx Crohn's  Hx hypothyroidism  Hx CKD    GI ppx  DVT ppx      VTE Prophylaxis:  Pharmacologic VTE prophylaxis orders  are present.        CODE STATUS:    Level Of Support Discussed With: Patient  Code Status (Patient has no pulse and is not breathing): CPR (Attempt to Resuscitate)  Medical Interventions (Patient has pulse or is breathing): Full Support    Admission Status:  I believe this patient meets inpatient status.      Electronically signed by Stefan León MD, 06/20/24, 2:31 AM EDT.

## 2024-06-20 NOTE — PLAN OF CARE
G  Problem: Adult Inpatient Plan of Care  Goal: Plan of Care Review  Outcome: Ongoing, Not Progressing  Flowsheets (Taken 6/20/2024 0535)  Progress: no change  Plan of Care Reviewed With: patient  Outcome Evaluation: Patient admitted from ED. Report received from Sondra. Patient is currently resting with no pain or signs of distress. Will continue with plan of care.  Goal: Patient-Specific Goal (Individualized)  Outcome: Ongoing, Not Progressing  Goal: Absence of Hospital-Acquired Illness or Injury  Outcome: Ongoing, Not Progressing  Intervention: Identify and Manage Fall Risk  Recent Flowsheet Documentation  Taken 6/20/2024 0500 by Kathryn Rothman RN  Safety Promotion/Fall Prevention:   safety round/check completed   room organization consistent   nonskid shoes/slippers when out of bed   lighting adjusted   fall prevention program maintained   clutter free environment maintained   assistive device/personal items within reach   activity supervised  Taken 6/20/2024 0400 by Kathryn Rothman RN  Safety Promotion/Fall Prevention:   safety round/check completed   room organization consistent   nonskid shoes/slippers when out of bed   lighting adjusted   fall prevention program maintained   clutter free environment maintained   assistive device/personal items within reach   activity supervised  Taken 6/20/2024 0300 by Kathryn Rothman RN  Safety Promotion/Fall Prevention:   safety round/check completed   room organization consistent   nonskid shoes/slippers when out of bed   lighting adjusted   fall prevention program maintained   clutter free environment maintained   assistive device/personal items within reach   activity supervised  Taken 6/20/2024 0200 by Kathryn Rothman RN  Safety Promotion/Fall Prevention:   safety round/check completed   room organization consistent   nonskid shoes/slippers when out of bed   lighting adjusted   fall prevention program maintained   clutter free environment maintained    assistive device/personal items within reach   activity supervised  Taken 6/20/2024 0100 by Kathryn Rothman RN  Safety Promotion/Fall Prevention:   safety round/check completed   room organization consistent   nonskid shoes/slippers when out of bed   lighting adjusted   fall prevention program maintained   clutter free environment maintained   assistive device/personal items within reach   activity supervised  Taken 6/20/2024 0023 by Kathryn Rothman RN  Safety Promotion/Fall Prevention:   safety round/check completed   room organization consistent   nonskid shoes/slippers when out of bed   lighting adjusted   fall prevention program maintained   clutter free environment maintained   assistive device/personal items within reach   activity supervised  Intervention: Prevent Skin Injury  Recent Flowsheet Documentation  Taken 6/20/2024 0023 by Kathryn Rothman RN  Body Position: position changed independently  Intervention: Prevent and Manage VTE (Venous Thromboembolism) Risk  Recent Flowsheet Documentation  Taken 6/20/2024 0023 by Kathryn Rothman RN  Activity Management: up ad heidi  Intervention: Prevent Infection  Recent Flowsheet Documentation  Taken 6/20/2024 0500 by Kathryn Rothman RN  Infection Prevention:   rest/sleep promoted   hand hygiene promoted   equipment surfaces disinfected   environmental surveillance performed  Taken 6/20/2024 0400 by Kathryn Rothman RN  Infection Prevention:   rest/sleep promoted   hand hygiene promoted   equipment surfaces disinfected   environmental surveillance performed  Taken 6/20/2024 0300 by Kathryn Rothman RN  Infection Prevention:   rest/sleep promoted   hand hygiene promoted   equipment surfaces disinfected   environmental surveillance performed  Taken 6/20/2024 0200 by Kathryn Rothman RN  Infection Prevention:   rest/sleep promoted   hand hygiene promoted   equipment surfaces disinfected   environmental surveillance performed  Taken 6/20/2024 0100 by  Kathryn Rothman RN  Infection Prevention:   rest/sleep promoted   hand hygiene promoted   equipment surfaces disinfected   environmental surveillance performed  Goal: Optimal Comfort and Wellbeing  Outcome: Ongoing, Not Progressing  Intervention: Provide Person-Centered Care  Recent Flowsheet Documentation  Taken 6/20/2024 0023 by Kathryn Rothman RN  Trust Relationship/Rapport:   care explained   choices provided   emotional support provided   empathic listening provided   questions answered   questions encouraged   reassurance provided   thoughts/feelings acknowledged  Goal: Readiness for Transition of Care  Outcome: Ongoing, Not Progressing  Intervention: Mutually Develop Transition Plan  Recent Flowsheet Documentation  Taken 6/20/2024 0032 by Kathryn Rothman RN  Transportation Anticipated: family or friend will provide  Patient/Family Anticipated Services at Transition: none  Patient/Family Anticipates Transition to: home with family  Taken 6/20/2024 0030 by Kathryn Rothman RN  Equipment Currently Used at Home: medication pump     Problem: Behavioral Health Comorbidity  Goal: Maintenance of Behavioral Health Symptom Control  Outcome: Ongoing, Not Progressing  Intervention: Maintain Behavioral Health Symptom Control  Recent Flowsheet Documentation  Taken 6/20/2024 0023 by Kathryn Rothman RN  Medication Review/Management: medications reviewed     Problem: Hypertension Comorbidity  Goal: Blood Pressure in Desired Range  Outcome: Ongoing, Not Progressing  Intervention: Maintain Blood Pressure Management  Recent Flowsheet Documentation  Taken 6/20/2024 0023 by Kathryn Rothman RN  Medication Review/Management: medications reviewed     Problem: Obstructive Sleep Apnea Risk or Actual Comorbidity Management  Goal: Unobstructed Breathing During Sleep  Outcome: Ongoing, Not Progressing   oal Outcome Evaluation:  Plan of Care Reviewed With: patient        Progress: no change  Outcome Evaluation: Patient  admitted from ED. Report received from Sondra. Patient is currently resting with no pain or signs of distress. Will continue with plan of care.

## 2024-06-21 VITALS
TEMPERATURE: 98.2 F | OXYGEN SATURATION: 93 % | HEART RATE: 82 BPM | BODY MASS INDEX: 30.55 KG/M2 | HEIGHT: 67 IN | SYSTOLIC BLOOD PRESSURE: 116 MMHG | WEIGHT: 194.67 LBS | DIASTOLIC BLOOD PRESSURE: 67 MMHG | RESPIRATION RATE: 20 BRPM

## 2024-06-21 LAB
ANION GAP SERPL CALCULATED.3IONS-SCNC: 12.3 MMOL/L (ref 5–15)
BUN SERPL-MCNC: 13 MG/DL (ref 6–20)
BUN/CREAT SERPL: 7.8 (ref 7–25)
CALCIUM SPEC-SCNC: 8.2 MG/DL (ref 8.6–10.5)
CHLORIDE SERPL-SCNC: 101 MMOL/L (ref 98–107)
CO2 SERPL-SCNC: 22.7 MMOL/L (ref 22–29)
CREAT SERPL-MCNC: 1.66 MG/DL (ref 0.57–1)
DEPRECATED RDW RBC AUTO: 51.5 FL (ref 37–54)
EGFRCR SERPLBLD CKD-EPI 2021: 37.9 ML/MIN/1.73
ERYTHROCYTE [DISTWIDTH] IN BLOOD BY AUTOMATED COUNT: 14.3 % (ref 12.3–15.4)
GLUCOSE BLDC GLUCOMTR-MCNC: 143 MG/DL (ref 70–99)
GLUCOSE BLDC GLUCOMTR-MCNC: 177 MG/DL (ref 70–99)
GLUCOSE BLDC GLUCOMTR-MCNC: 188 MG/DL (ref 70–99)
GLUCOSE BLDC GLUCOMTR-MCNC: 188 MG/DL (ref 70–99)
GLUCOSE BLDC GLUCOMTR-MCNC: 201 MG/DL (ref 70–99)
GLUCOSE BLDC GLUCOMTR-MCNC: 209 MG/DL (ref 70–99)
GLUCOSE SERPL-MCNC: 138 MG/DL (ref 65–99)
HCT VFR BLD AUTO: 30.5 % (ref 34–46.6)
HGB BLD-MCNC: 10 G/DL (ref 12–15.9)
MCH RBC QN AUTO: 32.2 PG (ref 26.6–33)
MCHC RBC AUTO-ENTMCNC: 32.8 G/DL (ref 31.5–35.7)
MCV RBC AUTO: 98.1 FL (ref 79–97)
PLATELET # BLD AUTO: 256 10*3/MM3 (ref 140–450)
PMV BLD AUTO: 10.9 FL (ref 6–12)
POTASSIUM SERPL-SCNC: 3.7 MMOL/L (ref 3.5–5.2)
QT INTERVAL: 334 MS
QTC INTERVAL: 439 MS
RBC # BLD AUTO: 3.11 10*6/MM3 (ref 3.77–5.28)
SODIUM SERPL-SCNC: 136 MMOL/L (ref 136–145)
WBC NRBC COR # BLD AUTO: 11.12 10*3/MM3 (ref 3.4–10.8)

## 2024-06-21 PROCEDURE — 99291 CRITICAL CARE FIRST HOUR: CPT | Performed by: INTERNAL MEDICINE

## 2024-06-21 PROCEDURE — 85027 COMPLETE CBC AUTOMATED: CPT | Performed by: FAMILY MEDICINE

## 2024-06-21 PROCEDURE — 25810000003 LACTATED RINGERS SOLUTION: Performed by: PHYSICIAN ASSISTANT

## 2024-06-21 PROCEDURE — 82948 REAGENT STRIP/BLOOD GLUCOSE: CPT | Performed by: FAMILY MEDICINE

## 2024-06-21 PROCEDURE — 25010000002 CEFTRIAXONE PER 250 MG: Performed by: INTERNAL MEDICINE

## 2024-06-21 PROCEDURE — 25810000003 LACTATED RINGERS SOLUTION: Performed by: INTERNAL MEDICINE

## 2024-06-21 PROCEDURE — 82948 REAGENT STRIP/BLOOD GLUCOSE: CPT | Performed by: INTERNAL MEDICINE

## 2024-06-21 PROCEDURE — 82948 REAGENT STRIP/BLOOD GLUCOSE: CPT

## 2024-06-21 PROCEDURE — 25010000002 ENOXAPARIN PER 10 MG: Performed by: FAMILY MEDICINE

## 2024-06-21 PROCEDURE — 25010000002 PROCHLORPERAZINE 10 MG/2ML SOLUTION: Performed by: PHYSICIAN ASSISTANT

## 2024-06-21 PROCEDURE — 25010000002 HYDROCORTISONE SOD SUC (PF) 100 MG RECONSTITUTED SOLUTION: Performed by: INTERNAL MEDICINE

## 2024-06-21 PROCEDURE — 25810000003 LACTATED RINGERS PER 1000 ML: Performed by: NURSE PRACTITIONER

## 2024-06-21 PROCEDURE — 80048 BASIC METABOLIC PNL TOTAL CA: CPT | Performed by: NURSE PRACTITIONER

## 2024-06-21 RX ORDER — DEXTROSE MONOHYDRATE 25 G/50ML
10-50 INJECTION, SOLUTION INTRAVENOUS
Start: 2024-06-21

## 2024-06-21 RX ORDER — NOREPINEPHRINE BITARTRATE 0.03 MG/ML
.02-.3 INJECTION, SOLUTION INTRAVENOUS
Start: 2024-06-21

## 2024-06-21 RX ORDER — SODIUM CHLORIDE, SODIUM LACTATE, POTASSIUM CHLORIDE, CALCIUM CHLORIDE 600; 310; 30; 20 MG/100ML; MG/100ML; MG/100ML; MG/100ML
100 INJECTION, SOLUTION INTRAVENOUS CONTINUOUS
Start: 2024-06-21

## 2024-06-21 RX ORDER — NICOTINE POLACRILEX 4 MG
15 LOZENGE BUCCAL
Start: 2024-06-21

## 2024-06-21 RX ORDER — PROCHLORPERAZINE EDISYLATE 5 MG/ML
5 INJECTION INTRAMUSCULAR; INTRAVENOUS EVERY 4 HOURS PRN
Start: 2024-06-21

## 2024-06-21 RX ORDER — NOREPINEPHRINE BITARTRATE 0.03 MG/ML
.02-.3 INJECTION, SOLUTION INTRAVENOUS
Status: DISCONTINUED | OUTPATIENT
Start: 2024-06-21 | End: 2024-06-21 | Stop reason: HOSPADM

## 2024-06-21 RX ORDER — MIDODRINE HYDROCHLORIDE 5 MG/1
5 TABLET ORAL
Status: DISCONTINUED | OUTPATIENT
Start: 2024-06-21 | End: 2024-06-21 | Stop reason: HOSPADM

## 2024-06-21 RX ORDER — PANTOPRAZOLE SODIUM 40 MG/1
40 TABLET, DELAYED RELEASE ORAL
Start: 2024-06-22 | End: 2024-06-21 | Stop reason: HOSPADM

## 2024-06-21 RX ORDER — NICOTINE POLACRILEX 4 MG
15 LOZENGE BUCCAL
Status: DISCONTINUED | OUTPATIENT
Start: 2024-06-21 | End: 2024-06-21 | Stop reason: HOSPADM

## 2024-06-21 RX ORDER — IBUPROFEN 600 MG/1
1 TABLET ORAL
Start: 2024-06-21

## 2024-06-21 RX ORDER — SODIUM CHLORIDE, SODIUM LACTATE, POTASSIUM CHLORIDE, CALCIUM CHLORIDE 600; 310; 30; 20 MG/100ML; MG/100ML; MG/100ML; MG/100ML
100 INJECTION, SOLUTION INTRAVENOUS CONTINUOUS
Status: DISCONTINUED | OUTPATIENT
Start: 2024-06-21 | End: 2024-06-21 | Stop reason: HOSPADM

## 2024-06-21 RX ORDER — MIDODRINE HYDROCHLORIDE 5 MG/1
5 TABLET ORAL
Start: 2024-06-21

## 2024-06-21 RX ORDER — DEXTROSE MONOHYDRATE 25 G/50ML
10-50 INJECTION, SOLUTION INTRAVENOUS
Status: DISCONTINUED | OUTPATIENT
Start: 2024-06-21 | End: 2024-06-21 | Stop reason: HOSPADM

## 2024-06-21 RX ORDER — PANTOPRAZOLE SODIUM 40 MG/1
40 TABLET, DELAYED RELEASE ORAL
Status: DISCONTINUED | OUTPATIENT
Start: 2024-06-21 | End: 2024-06-21 | Stop reason: HOSPADM

## 2024-06-21 RX ORDER — IBUPROFEN 600 MG/1
1 TABLET ORAL
Status: DISCONTINUED | OUTPATIENT
Start: 2024-06-21 | End: 2024-06-21 | Stop reason: HOSPADM

## 2024-06-21 RX ADMIN — MIDODRINE HYDROCHLORIDE 5 MG: 5 TABLET ORAL at 10:57

## 2024-06-21 RX ADMIN — SODIUM CHLORIDE, POTASSIUM CHLORIDE, SODIUM LACTATE AND CALCIUM CHLORIDE 100 ML/HR: 600; 310; 30; 20 INJECTION, SOLUTION INTRAVENOUS at 10:58

## 2024-06-21 RX ADMIN — PANTOPRAZOLE SODIUM 40 MG: 40 TABLET, DELAYED RELEASE ORAL at 09:52

## 2024-06-21 RX ADMIN — MIDODRINE HYDROCHLORIDE 5 MG: 5 TABLET ORAL at 08:04

## 2024-06-21 RX ADMIN — SODIUM CHLORIDE, POTASSIUM CHLORIDE, SODIUM LACTATE AND CALCIUM CHLORIDE 1000 ML: 600; 310; 30; 20 INJECTION, SOLUTION INTRAVENOUS at 09:09

## 2024-06-21 RX ADMIN — SODIUM CHLORIDE, SODIUM LACTATE, POTASSIUM CHLORIDE, AND CALCIUM CHLORIDE 1000 ML: .6; .31; .03; .02 INJECTION, SOLUTION INTRAVENOUS at 07:56

## 2024-06-21 RX ADMIN — SODIUM CHLORIDE, POTASSIUM CHLORIDE, SODIUM LACTATE AND CALCIUM CHLORIDE 1000 ML: 600; 310; 30; 20 INJECTION, SOLUTION INTRAVENOUS at 04:40

## 2024-06-21 RX ADMIN — HYDROCORTISONE SODIUM SUCCINATE 100 MG: 100 INJECTION, POWDER, FOR SOLUTION INTRAMUSCULAR; INTRAVENOUS at 08:05

## 2024-06-21 RX ADMIN — PANCRELIPASE 24000 UNITS OF LIPASE: 120000; 24000; 76000 CAPSULE, DELAYED RELEASE PELLETS ORAL at 11:52

## 2024-06-21 RX ADMIN — ENOXAPARIN SODIUM 40 MG: 100 INJECTION SUBCUTANEOUS at 08:05

## 2024-06-21 RX ADMIN — Medication 250 MG: at 08:04

## 2024-06-21 RX ADMIN — RIFAXIMIN 550 MG: 550 TABLET ORAL at 13:50

## 2024-06-21 RX ADMIN — IBUPROFEN 400 MG: 400 TABLET, FILM COATED ORAL at 05:49

## 2024-06-21 RX ADMIN — CEFTRIAXONE SODIUM 2000 MG: 2 INJECTION, POWDER, FOR SOLUTION INTRAMUSCULAR; INTRAVENOUS at 11:53

## 2024-06-21 RX ADMIN — PANCRELIPASE 24000 UNITS OF LIPASE: 120000; 24000; 76000 CAPSULE, DELAYED RELEASE PELLETS ORAL at 08:05

## 2024-06-21 RX ADMIN — Medication 10 ML: at 08:05

## 2024-06-21 RX ADMIN — PROCHLORPERAZINE EDISYLATE 5 MG: 5 INJECTION INTRAMUSCULAR; INTRAVENOUS at 02:55

## 2024-06-21 RX ADMIN — INSULIN HUMAN 2.6 UNITS/HR: 1 INJECTION, SOLUTION INTRAVENOUS at 09:55

## 2024-06-21 NOTE — PROGRESS NOTES
[facility]   Hospitalist Progress Note  Date: 2024  Patient Name: Kayla Gan  : 1975  MRN: [id number]  Date of admission: 2024  Room/Bed: [facility]      Subjective   Subjective     Chief Complaint: Fever, body aches    Summary:Kayla Gan is a 48 y.o. female with cirrhosis, Crohn's, gastroparesis, pancreatectomy, admitted here earlier in the month, had concern for retained fragments from previous central catheter.  Was seen by vascular surgery in [place] yesterday for a referral and follow-up, chest x-ray was ordered.  Patient returned shortly after that with fever, is currently bacteremic with Klebsiella and admitted to the medicine service.    Interval Followup: BP 60s this am, patient felt like she was dying; contacted by RN - fluids/midodrine/steroids/transfer to ICU ordered.  Pt now in icu and feeling better; SBP low 90s, feeling better.        Objective   Objective     Vitals:   Temp:  [98.1 °F (36.7 °C)-100 °F (37.8 °C)] 98.7 °F (37.1 °C)  Heart Rate:  [] 89  Resp:  [18-20] 20  BP: ()/(30-96) 101/62  Flow (L/min):  [2] 2    Physical Exam   General: nad, looks tired but no distress  HENT: NCAT, MMM  Eyes: pupils equal, no scleral icterus  Cardiovascular: RRR, no murmurs   Pulmonary: CTA bilaterally; no wheezes; no conversational dyspnea  Gastrointestinal: S/ND/NT, +BS  Musculoskeletal: No gross deformities  Skin: Left chest port without erythema or drainage  Neuro: CN II through XII grossly intact; speech clear; no tremor  Psych: Mood and affect appropriate    Result Review    Result Review:  I have personally reviewed these results:  [x]  Laboratory      Lab 24  0446 24  0419 24  0010 24  2204   WBC 11.12*  --   --  12.97*   HEMOGLOBIN 10.0*  --   --  12.1   HEMATOCRIT 30.5*  --   --  36.3   PLATELETS 256  --   --  423   NEUTROS ABS  --   --   --  6.17   IMMATURE GRANS (ABS)  --   --   --  0.04   LYMPHS ABS  --   --   --  4.75*   MONOS ABS   --   --   --  1.82*   EOS ABS  --   --   --  0.14   MCV 98.1*  --   --  96.5   SED RATE  --  23*  --   --    CRP  --   --  1.19*  --    PROCALCITONIN  --   --  0.47*  --    LACTATE  --   --   --  2.0         Lab 06/21/24  0918 06/20/24  2240 06/19/24  2204   SODIUM 136 137 135*   POTASSIUM 3.7 3.5 3.8   CHLORIDE 101 103 100   CO2 22.7 23.1 22.9   ANION GAP 12.3 10.9 12.1   BUN 13 8 13   CREATININE 1.66* 0.70 0.65   EGFR 37.9* 106.8 108.8   GLUCOSE 138* 156* 176*   CALCIUM 8.2* 7.9* 8.5*   MAGNESIUM  --   --  2.0         Lab 06/19/24  2204   TOTAL PROTEIN 7.5   ALBUMIN 3.8   GLOBULIN 3.7   ALT (SGPT) 45*   AST (SGOT) 47*   BILIRUBIN 0.3   ALK PHOS 174*   LIPASE 5*         Lab 06/20/24 2240 06/20/24  1300 06/20/24  0010 06/19/24  2204   PROBNP  --   --   --  88.2   HSTROP T <6 <6 <6 <6                 Brief Urine Lab Results  (Last result in the past 365 days)        Color   Clarity   Blood   Leuk Est   Nitrite   Protein   CREAT   Urine HCG        06/20/24 1617 Yellow   Clear   Negative   Negative   Negative   Negative                 [x]  Microbiology   Microbiology Results (last 10 days)       Procedure Component Value - Date/Time    Blood Culture - Blood, Hand, Right [[id number]]  (Normal) Collected: 06/20/24 0939    Lab Status: Preliminary result Specimen: Blood from Hand, Right Updated: 06/21/24 1000     Blood Culture No growth at 24 hours    Blood Culture - Blood, Arm, Left [[id number]]  (Normal) Collected: 06/20/24 0935    Lab Status: Preliminary result Specimen: Blood from Arm, Left Updated: 06/21/24 1000     Blood Culture No growth at 24 hours    Blood Culture - Blood, Arm, Right [[id number]]  (Normal) Collected: 06/19/24 2258    Lab Status: Preliminary result Specimen: Blood from Arm, Right Updated: 06/20/24 2315     Blood Culture No growth at 24 hours    COVID-19, FLU A/B, RSV PCR 1 HR TAT - Swab, Nasopharynx [[id number]]  (Normal) Collected: 06/19/24 5728    Lab Status: Final result Specimen: Swab from  Nasopharynx Updated: 06/19/24 2300     COVID19 Not Detected     Influenza A PCR Not Detected     Influenza B PCR Not Detected     RSV, PCR Not Detected    Narrative:      Fact sheet for providers: https://www.fda.gov/media/114068/download    Fact sheet for patients: https://www.fda.gov/media/352120/download    Test performed by PCR.    Blood Culture - Blood, Blood, Central Line [[id number]]  (Abnormal) Collected: 06/19/24 2204    Lab Status: Preliminary result Specimen: Blood, Central Line Updated: 06/21/24 0704     Blood Culture Gram Negative Bacilli     Isolated from Aerobic and Anaerobic Bottles     Gram Stain Aerobic Bottle Gram negative bacilli      Anaerobic Bottle Gram negative bacilli    Blood Culture ID, PCR - Blood, Blood, Central Line [[id number]]  (Abnormal) Collected: 06/19/24 2204    Lab Status: Final result Specimen: Blood, Central Line Updated: 06/20/24 1051     BCID, PCR Klebsiella oxytoca. Identification by BCID2 PCR     BOTTLE TYPE Aerobic Bottle    Narrative:      No resistance genes detected.          [x]  Radiology  XR Chest 1 View    Result Date: 6/19/2024  No acute infiltrate is appreciated.    Please note that portions of this note were completed with a voice recognition program.  Electronically Signed: Sujit Tan MD  6/19/2024 9:47 PM EDT  Workstation ID: [id number]   []  EKG/Telemetry   []  Cardiology/Vascular   []  Pathology  []  Old records  []  Other:    Assessment & Plan   Assessment / Plan     Assessment:  Sepsis with Klebsiella bacteremia  Refractory hypotension, improving some  Possible retained fragments from previous central line, not seen on most recent imaging  Adrenal insufficiency  Crohn's disease  History of previous pancreatectomy  Hepatic cirrhosis    Plan:  Admitted to medicine service  Continue antibiotics, currently on Zyvox due to vancomycin allergy.  Ceftriaxone for gram-negative bacteremia with Klebsiella.  BP dropped considerably this morning  Have moved to  ICU  Ordered 2 additional fluid boluses this am on PCU  Midodrine  Begin Solu-Cortef 100mg IV q8h first dose asap (given at 8am)  Critical care consult  Plan was to transfer to [facility]; transfer held for now until patient more stable.  Will get back in contact with [facility] at a later time.    35 min cc time       Discussed with bedside RN.    VTE Prophylaxis:  Pharmacologic VTE prophylaxis orders are present.        CODE STATUS:   Level Of Support Discussed With: Patient  Code Status (Patient has no pulse and is not breathing): CPR (Attempt to Resuscitate)  Medical Interventions (Patient has pulse or is breathing): Full Support      Electronically signed by Gary Hemphill MD, 6/21/2024, 11:02 EDT.

## 2024-06-21 NOTE — PLAN OF CARE
Goal Outcome Evaluation: Patient transferring to [facility]

## 2024-06-21 NOTE — PLAN OF CARE
Goal Outcome Evaluation:  Plan of Care Reviewed With: patient        Progress: declining  Outcome Evaluation: Patient has been feeling well throughout the night. Have been communicating with DWAIN Smith on patient's status and treatment plan. Please refer to flowsheet. Spoke with [facility] at 0300 and they are still waiting for a bed to become available. Patient is currently resting. Will continue with plan of care.

## 2024-06-21 NOTE — CONSULTS
Pulmonary / Critical Care Consult Note      Patient Name: Kayla Gan  : 1975  MRN: [id number]  Primary Care Physician:  Justin Daniels, LAZARO  Referring Physician: Gary Hemphill MD  Date of admission: 2024    Subjective   Subjective     Reason for Consult/ Chief Complaint: Hypotension    HPI:  Kayla Gan is a 48 y.o. female with history of gastroparesis, pancreatic attack to me on home insulin, adrenal insufficiency on home steroids, recent and remote history of bacteremia noted to have retained fragments of Port-A-Cath followed by [facility].  Was readmitted to the hospital for feeling unwell, patient notably had positive blood cultures with Klebsiella bacteremia, vascular surgery/IR was contacted and patient has been awaiting transfer to [facility] for possible intervention of retained fragments or removal of Noble catheter in the left chest.  This morning patient had hypotension refractory to IV fluids, was transferred to the intensive care unit.  Of note patient is on steroids at home which were not resumed, additionally was receiving lisinopril.  On exam patient is awake, she is on room air, she is in no acute distress, does report feeling unwell, severe fatigue and weakness.  She is afebrile.        Personal History     Past Medical History:   Diagnosis Date    Adrenal insufficiency     Allergic Unsure    Foods, Ulram, Vancomycin    Anemia     Anxiety     Arthritis     Asthma Unsure    Cholelithiasis Unsure    Cirrhosis     Clotting disorder Unsure    Colon polyp     Crohn's disease     Depression     Diabetes mellitus     Gastroparesis     GERD (gastroesophageal reflux disease)     History of MRSA infection     Hyperlipidemia     Hypertension     Hypothyroidism     Irritable bowel syndrome Unsure    Liver disease     Migraines     Pancreatitis     Urinary tract infection Unsure    Interstitial Cystitis       Past Surgical History:   Procedure Laterality Date    ABSCESS  DRAINAGE  12/24/2019    Fluoroscopically guided abscess drainage, [facility]    ADENOIDECTOMY      BACK SURGERY      L4 L5    CHOLECYSTECTOMY N/A     COLONOSCOPY  04/24/2019    NBIH, 3 mm polyp    DILATATION AND CURETTAGE      ENDOMETRIAL ABLATION      ENDOSCOPY N/A 04/24/2019    Normal    ENTEROSCOPY SMALL BOWEL      ERCP  2019    GASTRIC STIMULATOR IMPLANT SURGERY      GASTROJEJUNOSTOMY W/ JEJUNOSTOMY TUBE      removed    HYSTERECTOMY      LYMPH NODE BIOPSY      PANCREATECTOMY  12/2019    TPIAT    PELVIC FLOOR REPAIR      SINUS SURGERY      SPLENECTOMY      TONSILLECTOMY      VENOUS ACCESS DEVICE (PORT) INSERTION         Family History: family history includes Alcohol abuse in her paternal grandfather; Anxiety disorder in her father and mother; Breast cancer in her mother; Colon polyps in her mother; Depression in her brother and father; Heart disease in her father and paternal grandfather; Hypertension in her brother; Hypothyroidism in her father and mother. Otherwise pertinent FHx was reviewed and not pertinent to current issue.    Social History:  reports that she has never smoked. She has never used smokeless tobacco. She reports that she does not drink alcohol and does not use drugs.    Home Medications:  Atogepant, Insulin Aspart, Insulin Glargine, Ondansetron, Pancrelipase (Lip-Prot-Amyl), SUMAtriptan, Sodium Chloride (PF) 0.9 % solution 20 mL with promethazine 25 MG/ML solution 25 mg, Sodium Chloride Flush, Vortioxetine HBr, acetaminophen, (cefTRIAXone 2,000 mg in sodium chloride 0.9 % 100 mL IVPB), diphenhydrAMINE HCl, docusate sodium, heparin, hydrOXYzine, hydrocortisone, immune globulin (human), lactated ringers, lactulose, lansoprazole, levothyroxine, lisinopril, methylPREDNISolone sodium succinate, prochlorperazine, promethazine, propranolol LA, riFAXIMin, saccharomyces boulardii, sodium chloride, traZODone, ubrogepant, and vitamin E    Allergies:  Allergies   Allergen Reactions    Other Other  (See Comments)     Sent patient into kidney failure, heart stopped, in ICU for two days.    Parathyroid Hormone (Recomb) Other (See Comments)     Sent patient into kidney failure, heart stopped, in ICU for two days.      Romosozumab Hives     Other reaction(s): Hives    Other reaction(s): Hives   Other reaction(s): Hives    Sulfate Hives    Teriparatide Anaphylaxis and Other (See Comments)     Other reaction(s): Unknown    Tramadol Anaphylaxis and Other (See Comments)     Other reaction(s): Unknown, Unknown    Nifedipine Provider Review Needed and Other (See Comments)     Rapid heart beat    Rapid heart beat    Other reaction(s): Provider Review Needed   Other reaction(s): Provider Review Needed   Rapid heart beat    Vancomycin Hives and Itching     vancomycin-infusion reaction (VIR, formerly Ivett's syndrome): give over longer infusion time       Objective    Objective     Vitals:   Temp:  [97.9 °F (36.6 °C)-100 °F (37.8 °C)] 99 °F (37.2 °C)  Heart Rate:  [] 103  Resp:  [18-20] 20  BP: ()/(30-96) 78/30    Physical Exam:  Vital Signs Reviewed   General:  WDWN, Alert, NAD.  Acutely ill  HEENT:  PERRL, EOMI.  OP, nares clear  Neck:  Supple, no JVD, no thyromegaly  Chest:  good aeration, clear to auscultation bilaterally, tympanic to percussion bilaterally, no work of breathing noted  CV: RRR, no MGR, pulses 2+, equal.  Abd:  Soft, NT, ND, + BS, no HSM  EXT:  no clubbing, no cyanosis, no edema  Neuro:  A&Ox3, CN grossly intact, no focal deficits.  Skin: No rashes or lesions noted      Result Review    Result Review:  I have personally reviewed the results from the time of this admission to 6/21/2024 08:33 EDT and agree with these findings:  [x]  Laboratory  [x]  Microbiology  [x]  Radiology  [x]  EKG/Telemetry   []  Cardiology/Vascular   []  Pathology  [x]  Old records  []  Other:  Most notable findings include:       Lab 06/21/24  0446 06/20/24  2240 06/19/24  2204   WBC 11.12*  --  12.97*   HEMOGLOBIN  10.0*  --  12.1   HEMATOCRIT 30.5*  --  36.3   PLATELETS 256  --  423   SODIUM  --  137 135*   POTASSIUM  --  3.5 3.8   CHLORIDE  --  103 100   CO2  --  23.1 22.9   BUN  --  8 13   CREATININE  --  0.70 0.65   GLUCOSE  --  156* 176*   CALCIUM  --  7.9* 8.5*   TOTAL PROTEIN  --   --  7.5   ALBUMIN  --   --  3.8   GLOBULIN  --   --  3.7     XR Chest 1 View    Result Date: 6/19/2024  No acute infiltrate is appreciated.    Please note that portions of this note were completed with a voice recognition program.  Electronically Signed: Sujit Tan MD  6/19/2024 9:47 PM EDT  Workstation ID: [id number]     Blood cultures growing Klebsiella      Assessment & Plan   Assessment / Plan     Active Hospital Problems:  Active Hospital Problems    Diagnosis     **Fever of unknown origin     Portal hypertension     Gastroparesis     Type 1 diabetes mellitus without complication     Spleen absent     HI (obstructive sleep apnea)     Hypertensive disorder     Major depressive disorder     Hypothyroidism      Impression:  Refractory hypotension requiring pressors  Addisonian crisis  Klebsiella bacteremia  Sepsis, present on admission  Adrenal insufficiency on home steroids  Chronic pancreatitis s/p pancreatectomy on home insulin  History of port placement with retained port fragments  Cirrhosis  Crohn's disease  Gastroparesis     Plan:  -Agree with IV fluid resuscitation LR x 3 L  -Start LR at 100 ML per hour  -Start stress dose steroids Solu-Cortef 100 mg every 8 hours  -Okay to use Levophed to maintain MAP at 65  -Hold lisinopril  -Okay to start midodrine 5 mg 3 times daily  -Patient acutely ill, on home insulin pump has managed well during hospitalization however requested to have us manage insulin during this acute event  -Will start insulin drip per Glucomander  -Okay to have diet if patient can tolerate  -Continue ceftriaxone for Klebsiella bacteremia  -Noble Port-A-Cath left chest  -Awaiting transfer to [facility] to be  evaluated by IR/vascular surgery for possible retained fragments of previous Port-A-Cath contributing to recurrent bacteremia  -Peptic ulcer prophylaxis-Protonix    VTE Prophylaxis:  Pharmacologic VTE prophylaxis orders are present.      Code Status and Medical Interventions:   Ordered at: 06/20/24 0036     Level Of Support Discussed With:    Patient     Code Status (Patient has no pulse and is not breathing):    CPR (Attempt to Resuscitate)     Medical Interventions (Patient has pulse or is breathing):    Full Support        Patient is critically ill with addisonian crisis, sepsis, Klebsiella bacteremia, retained port fragments. We have personally reviewed all pertinent labs, imaging, microbiology and documentation. We have discussed care with the primary service as well as at multidisciplinary critical care rounds with the bedside nurse, respiratory therapist, pharmacist and all other ancillary services.40 minutes of critical care time was spent managing this patient, excluding procedures. Of this time, I spent 25 minutes in accordance with split shared billing.        I, LAZARO Lott, spent 15 minutes critical care time.    Electronically signed by LAZARO Olivo, 06/21/24, 9:43 AM EDT.    Electronically signed by Niraj Cruz MD, 06/21/24, 10:32 AM EDT.

## 2024-06-22 LAB
BACTERIA SPEC AEROBE CULT: ABNORMAL
GRAM STN SPEC: ABNORMAL
GRAM STN SPEC: ABNORMAL
ISOLATED FROM: ABNORMAL

## 2024-06-24 LAB
BACTERIA SPEC AEROBE CULT: NORMAL
QT INTERVAL: 360 MS
QT INTERVAL: 369 MS
QT INTERVAL: 379 MS
QTC INTERVAL: 421 MS
QTC INTERVAL: 434 MS
QTC INTERVAL: 441 MS

## 2024-06-25 LAB
BACTERIA SPEC AEROBE CULT: NORMAL
BACTERIA SPEC AEROBE CULT: NORMAL

## 2024-07-01 ENCOUNTER — TRANSCRIBE ORDERS (OUTPATIENT)
Dept: LAB | Facility: HOSPITAL | Age: 49
End: 2024-07-01
Payer: MEDICARE

## 2024-07-01 ENCOUNTER — LAB (OUTPATIENT)
Dept: LAB | Facility: HOSPITAL | Age: 49
End: 2024-07-01
Payer: MEDICARE

## 2024-07-01 DIAGNOSIS — E55.9 VITAMIN D DEFICIENCY, UNSPECIFIED: ICD-10-CM

## 2024-07-01 DIAGNOSIS — E63.9 NUTRITIONAL DISORDER: ICD-10-CM

## 2024-07-01 DIAGNOSIS — E03.9 HYPOTHYROIDISM, UNSPECIFIED TYPE: ICD-10-CM

## 2024-07-01 DIAGNOSIS — E03.9 HYPOTHYROIDISM, UNSPECIFIED TYPE: Primary | ICD-10-CM

## 2024-07-01 LAB
25(OH)D3 SERPL-MCNC: 33.8 NG/ML (ref 30–100)
T4 FREE SERPL-MCNC: 1.75 NG/DL (ref 0.92–1.68)
TSH SERPL DL<=0.05 MIU/L-ACNC: 0.63 UIU/ML (ref 0.27–4.2)
VIT B12 BLD-MCNC: 579 PG/ML (ref 211–946)

## 2024-07-01 PROCEDURE — 84446 ASSAY OF VITAMIN E: CPT

## 2024-07-01 PROCEDURE — 84590 ASSAY OF VITAMIN A: CPT

## 2024-07-01 PROCEDURE — 84439 ASSAY OF FREE THYROXINE: CPT

## 2024-07-01 PROCEDURE — 36415 COLL VENOUS BLD VENIPUNCTURE: CPT

## 2024-07-01 PROCEDURE — 82306 VITAMIN D 25 HYDROXY: CPT

## 2024-07-01 PROCEDURE — 84443 ASSAY THYROID STIM HORMONE: CPT

## 2024-07-01 PROCEDURE — 82607 VITAMIN B-12: CPT

## 2024-07-04 LAB
A-TOCOPHEROL VIT E SERPL-MCNC: 20 MG/L (ref 7–25.1)
GAMMA TOCOPHEROL SERPL-MCNC: 0.9 MG/L (ref 0.5–5.5)
VIT A SERPL-MCNC: 31.7 UG/DL (ref 20.1–62)

## 2024-07-05 ENCOUNTER — OFFICE VISIT (OUTPATIENT)
Dept: SLEEP MEDICINE | Facility: HOSPITAL | Age: 49
End: 2024-07-05
Payer: MEDICARE

## 2024-07-05 VITALS
WEIGHT: 191 LBS | SYSTOLIC BLOOD PRESSURE: 123 MMHG | OXYGEN SATURATION: 98 % | HEART RATE: 67 BPM | HEIGHT: 67 IN | BODY MASS INDEX: 29.98 KG/M2 | DIASTOLIC BLOOD PRESSURE: 87 MMHG

## 2024-07-05 DIAGNOSIS — G47.33 OSA (OBSTRUCTIVE SLEEP APNEA): Primary | ICD-10-CM

## 2024-07-05 DIAGNOSIS — E66.3 OVER WEIGHT: ICD-10-CM

## 2024-07-05 DIAGNOSIS — I1A.0 RESISTANT HYPERTENSION: ICD-10-CM

## 2024-07-05 PROCEDURE — G0463 HOSPITAL OUTPT CLINIC VISIT: HCPCS

## 2024-07-05 RX ORDER — CETIRIZINE HYDROCHLORIDE 10 MG/1
1 TABLET ORAL DAILY
COMMUNITY
Start: 2024-06-15

## 2024-07-05 RX ORDER — INSULIN LISPRO 100 [IU]/ML
INJECTION, SOLUTION INTRAVENOUS; SUBCUTANEOUS
COMMUNITY
Start: 2024-06-20

## 2024-07-05 RX ORDER — TRAZODONE HYDROCHLORIDE 100 MG/1
100 TABLET ORAL
COMMUNITY
Start: 2024-02-10

## 2024-07-05 RX ORDER — INSULIN PMP CART,AUT,G6/7,CNTR
EACH SUBCUTANEOUS
COMMUNITY
Start: 2024-07-01

## 2024-07-05 RX ORDER — ANTIFUNGAL 1 G/100G
CREAM TOPICAL
COMMUNITY
Start: 2024-06-19

## 2024-07-05 NOTE — PROGRESS NOTES
"Follow Up [facility] Disorders [facility] Note     Chief Complaint: Sleep apnea on CPAP    Primary Care Physician: Justin Daniels, APRN    Interval History:   The patient is a 48 y.o. female  who has CPAP therapy after her study showed mild obstructive sleep apnea.  She tells me today that she uses a fullface mask.  She says her headaches are much better and this is the most improved aspect of CPAP therapy.  Also she is her blood pressure is better.  Previously she had been intolerant of CPAP therapy but she says she is doing well with it now.  She recently had bacteremia and an adrenal crisis and so is still recovering from that.    Downloaded PAP Data Evaluated For Therapeutic Response and Compliance:  DME is [employer].  Downloads between 6/5/2024 and 7/4/2024.  Average usage is 8 hours and 52 minutes and 77% of the last 30 days.  Average AHI is 0.5.  PAP pressure is 7.1 cm water pressure    Review of Systems:    A complete review of systems was done and all were negative with the exception of none    Social History:    Social History     Socioeconomic History    Marital status:    Tobacco Use    Smoking status: Never    Smokeless tobacco: Never   Vaping Use    Vaping status: Never Used   Substance and Sexual Activity    Alcohol use: Never    Drug use: Never    Sexual activity: Not Currently     Partners: Male     Birth control/protection: Abstinence, Post-menopausal       Allergies:  Other, Parathyroid hormone (recomb), Romosozumab, Sulfate, Teriparatide, Tramadol, Nifedipine, and Vancomycin     Medication Review:  Reviewed.      Vital Signs:    Vitals:    07/05/24 1100   BP: 123/87   Pulse: 67   SpO2: 98%   Weight: 86.6 kg (191 lb)   Height: 170.2 cm (67.01\")     Body mass index is 29.91 kg/m².  .BMIFOLLOWUP    Physical Exam:    Constitutional:  Well developed 48 y.o. female that appears in no apparent distress.  Awake & oriented times 3.  Normal mood with normal recent and remote memory and normal " judgement.  Eyes:  Conjunctivae normal.      Self-administered Paradox Sleepiness Scale test results: 8  0-5 Lower normal daytime sleepiness  6-10 Higher normal daytime sleepiness  11-12 Mild, 13-15 Moderate, & 16-24 Severe excessive daytime sleepiness       I have reviewed the above results and compared them with the patient's last downloads and reviewed with the patient.    Impression:     Encounter Diagnoses   Name Primary?    HI (obstructive sleep apnea) Yes    Over weight     Resistant hypertension          Plan:  Good sleep hygiene measures should be maintained.  Weight loss would be beneficial in this patient who is overweight by Body mass index is 29.91 kg/m².      After evaluating the patient and assessing results available, the patient is benefiting from the treatment being provided.     The patient will continue auto titrating positive airway pressure.  Potential side effects of PAP therapy reviewed and addressed as needed.  After clinical evaluation and review of downloads, I recommend no changes to the patient's pressures.      I answered all of the patient's questions.  The patient will call the [facility] Center for any problems and will follow up 1 year.      Sujit Andersen MD  Sleep Medicine  07/05/24  12:01 EDT

## 2024-07-15 ENCOUNTER — LAB (OUTPATIENT)
Dept: LAB | Facility: HOSPITAL | Age: 49
End: 2024-07-15
Payer: MEDICARE

## 2024-07-15 DIAGNOSIS — K74.60 HEPATIC CIRRHOSIS, UNSPECIFIED HEPATIC CIRRHOSIS TYPE, UNSPECIFIED WHETHER ASCITES PRESENT: ICD-10-CM

## 2024-07-15 LAB
ALBUMIN SERPL-MCNC: 4.1 G/DL (ref 3.5–5.2)
ALBUMIN/GLOB SERPL: 0.9 G/DL
ALP SERPL-CCNC: 143 U/L (ref 39–117)
ALPHA-FETOPROTEIN: 4.33 NG/ML (ref 0–8.3)
ALT SERPL W P-5'-P-CCNC: 49 U/L (ref 1–33)
ANION GAP SERPL CALCULATED.3IONS-SCNC: 16.6 MMOL/L (ref 5–15)
AST SERPL-CCNC: 49 U/L (ref 1–32)
BASOPHILS # BLD AUTO: 0.04 10*3/MM3 (ref 0–0.2)
BASOPHILS NFR BLD AUTO: 0.4 % (ref 0–1.5)
BILIRUB SERPL-MCNC: 0.4 MG/DL (ref 0–1.2)
BUN SERPL-MCNC: 11 MG/DL (ref 6–20)
BUN/CREAT SERPL: 16.2 (ref 7–25)
CALCIUM SPEC-SCNC: 9.6 MG/DL (ref 8.6–10.5)
CHLORIDE SERPL-SCNC: 98 MMOL/L (ref 98–107)
CO2 SERPL-SCNC: 22.4 MMOL/L (ref 22–29)
CREAT SERPL-MCNC: 0.68 MG/DL (ref 0.57–1)
DEPRECATED RDW RBC AUTO: 43.4 FL (ref 37–54)
EGFRCR SERPLBLD CKD-EPI 2021: 107.6 ML/MIN/1.73
EOSINOPHIL # BLD AUTO: 0.31 10*3/MM3 (ref 0–0.4)
EOSINOPHIL NFR BLD AUTO: 2.8 % (ref 0.3–6.2)
ERYTHROCYTE [DISTWIDTH] IN BLOOD BY AUTOMATED COUNT: 12.5 % (ref 12.3–15.4)
GLOBULIN UR ELPH-MCNC: 4.7 GM/DL
GLUCOSE SERPL-MCNC: 250 MG/DL (ref 65–99)
HCT VFR BLD AUTO: 39.5 % (ref 34–46.6)
HGB BLD-MCNC: 13 G/DL (ref 12–15.9)
IMM GRANULOCYTES # BLD AUTO: 0.05 10*3/MM3 (ref 0–0.05)
IMM GRANULOCYTES NFR BLD AUTO: 0.4 % (ref 0–0.5)
INR PPP: 1.02 (ref 0.86–1.15)
LYMPHOCYTES # BLD AUTO: 4.17 10*3/MM3 (ref 0.7–3.1)
LYMPHOCYTES NFR BLD AUTO: 37.3 % (ref 19.6–45.3)
MCH RBC QN AUTO: 31.6 PG (ref 26.6–33)
MCHC RBC AUTO-ENTMCNC: 32.9 G/DL (ref 31.5–35.7)
MCV RBC AUTO: 96.1 FL (ref 79–97)
MONOCYTES # BLD AUTO: 1.36 10*3/MM3 (ref 0.1–0.9)
MONOCYTES NFR BLD AUTO: 12.2 % (ref 5–12)
NEUTROPHILS NFR BLD AUTO: 46.9 % (ref 42.7–76)
NEUTROPHILS NFR BLD AUTO: 5.26 10*3/MM3 (ref 1.7–7)
NRBC BLD AUTO-RTO: 0 /100 WBC (ref 0–0.2)
PLATELET # BLD AUTO: 419 10*3/MM3 (ref 140–450)
PMV BLD AUTO: 11.2 FL (ref 6–12)
POTASSIUM SERPL-SCNC: 4 MMOL/L (ref 3.5–5.2)
PROT SERPL-MCNC: 8.8 G/DL (ref 6–8.5)
PROTHROMBIN TIME: 13.6 SECONDS (ref 11.8–14.9)
RBC # BLD AUTO: 4.11 10*6/MM3 (ref 3.77–5.28)
SODIUM SERPL-SCNC: 137 MMOL/L (ref 136–145)
WBC NRBC COR # BLD AUTO: 11.19 10*3/MM3 (ref 3.4–10.8)

## 2024-07-15 PROCEDURE — 36415 COLL VENOUS BLD VENIPUNCTURE: CPT

## 2024-07-15 PROCEDURE — 85610 PROTHROMBIN TIME: CPT

## 2024-07-15 PROCEDURE — 85025 COMPLETE CBC W/AUTO DIFF WBC: CPT

## 2024-07-15 PROCEDURE — 82105 ALPHA-FETOPROTEIN SERUM: CPT

## 2024-07-15 PROCEDURE — 80053 COMPREHEN METABOLIC PANEL: CPT

## 2024-07-31 ENCOUNTER — TELEPHONE (OUTPATIENT)
Dept: GASTROENTEROLOGY | Facility: CLINIC | Age: 49
End: 2024-07-31

## 2024-07-31 NOTE — TELEPHONE ENCOUNTER
The [facility] received a fax that requires your attention. The document has been indexed to the patient’s chart for your review.      Reason for sending: GASTROPARESIS NOTE    Documents Description: CHARLETTE ALMANZAR-SHAUNA WILLIS-7/26/24    Name of Sender: [employer]    Date Indexed: 7/26/24    Notes (if needed):

## 2024-08-01 ENCOUNTER — HOSPITAL ENCOUNTER (OUTPATIENT)
Dept: OTHER | Facility: HOSPITAL | Age: 49
Discharge: HOME | End: 2024-08-01

## 2024-09-06 ENCOUNTER — OFFICE VISIT (OUTPATIENT)
Dept: NEUROSURGERY | Facility: CLINIC | Age: 49
End: 2024-09-06
Payer: MEDICARE

## 2024-09-06 VITALS
DIASTOLIC BLOOD PRESSURE: 87 MMHG | HEIGHT: 68 IN | HEART RATE: 66 BPM | BODY MASS INDEX: 29.25 KG/M2 | SYSTOLIC BLOOD PRESSURE: 140 MMHG | WEIGHT: 193 LBS

## 2024-09-06 DIAGNOSIS — M48.061 FORAMINAL STENOSIS OF LUMBAR REGION: ICD-10-CM

## 2024-09-06 DIAGNOSIS — Z98.890 HISTORY OF LUMBAR SURGERY: ICD-10-CM

## 2024-09-06 DIAGNOSIS — M54.16 LUMBAR RADICULOPATHY: ICD-10-CM

## 2024-09-06 DIAGNOSIS — M51.36 DDD (DEGENERATIVE DISC DISEASE), LUMBAR: Primary | ICD-10-CM

## 2024-09-06 DIAGNOSIS — M47.816 FACET ARTHRITIS OF LUMBAR REGION: ICD-10-CM

## 2024-09-06 RX ORDER — LANSOPRAZOLE 30 MG/1
30 CAPSULE, DELAYED RELEASE ORAL 2 TIMES DAILY
Qty: 180 CAPSULE | Refills: 1 | Status: SHIPPED | OUTPATIENT
Start: 2024-09-06

## 2024-09-06 NOTE — TELEPHONE ENCOUNTER
LOV 09-21-2  NOV NONE   Renal Progress Note:    Subjective:     No acute events overnight. Remains somnolent this AM    Medications:     Current Facility-Administered Medications   Medication Dose Route Frequency Provider Last Rate Last Admin   • amLODIPine (NORVASC) tablet 2.5 mg  2.5 mg Oral Daily Linda Gallego MD   2.5 mg at 01/10/22 0852   • donepezil (ARICEPT) tablet 10 mg  10 mg Oral Nightly Linda Gallego MD   10 mg at 01/09/22 2107   • docusate sodium-sennosides (SENOKOT S) 50-8.6 MG 2 tablet  2 tablet Oral Daily Main Thomas MD   2 tablet at 01/10/22 0852   • aspirin (ECOTRIN) enteric coated tablet 81 mg  81 mg Oral Daily Main Thomas MD   81 mg at 01/10/22 0852   • melatonin tablet 6 mg  6 mg Oral Nightly PRN Main Thomas MD   6 mg at 01/09/22 2107   • hydrALAZINE (APRESOLINE) injection 10 mg  10 mg Intravenous Q6H PRN Main Thomas MD       • sodium chloride 0.9 % flush bag 25 mL  25 mL Intravenous PRN Main Thomas MD       • sodium chloride (PF) 0.9 % injection 2 mL  2 mL Intracatheter 2 times per day Main Thomas MD   2 mL at 01/10/22 0852   • Potassium Standard Replacement Protocol   Does not apply See Admin Instructions Main Tohmas MD       • Magnesium Standard Replacement Protocol   Does not apply See Admin Instructions Main Thomas MD       • ondansetron (ZOFRAN) injection 4 mg  4 mg Intravenous BID PRN Main Thomas MD       • prochlorperazine (COMPAZINE) injection 5 mg  5 mg Intravenous Q4H PRN Main Thomas MD       • acetaminophen (TYLENOL) tablet 650 mg  650 mg Oral Q4H PRN Main Thomas MD   650 mg at 01/06/22 0837   • HYDROcodone-acetaminophen (NORCO) 5-325 MG per tablet 1 tablet  1 tablet Oral Q4H PRN Main Thomas MD       • docusate sodium-sennosides (SENOKOT S) 50-8.6 MG 2 tablet  2 tablet Oral Daily PRN Main Thomas MD       • aluminum-magnesium hydroxide-simethicone (MAALOX) 200-200-20 MG/5ML suspension 20 mL  20 mL Oral Q4H PRN Main Thoams MD       • sodium chloride 0.9 %  flush bag 25 mL  25 mL Intravenous PRN Main Thomas MD       • lidocaine 2% urethral (UROJET) 2 % jelly 10 mL  10 mL Transurethral Once PRN Scot Davila MD             Objective:      Visit Vitals  /81 (BP Location: RUE - Right upper extremity, Patient Position: Supine)   Pulse 76   Temp 99 °F (37.2 °C) (Oral)   Resp 16   Ht 5' 7\" (1.702 m)   Wt 54.4 kg (120 lb)   SpO2 98%   BMI 18.79 kg/m²       Physical Exam:  Gen: Somnolent, weak and frail appearing, lying in bed on RA  HEENT: PERRL, Dry oral mucosa  Neck: Supple, No JVD  CV: RRR S1S2 nl  Lungs: Diminished BS at bases, Non-labored respirations  Abd: Soft/NT/ND + BS  Ext: No e/c/c  Neuro: Somnolent but arousable, confused and disoriented  Skin: No rashes/lesions    Data Review:   Chemistry:  Lab Results   Component Value Date    SODIUM 141 01/10/2022    POTASSIUM 3.2 (L) 01/10/2022    CHLORIDE 105 01/10/2022    CO2 25 01/10/2022    BUN 26 (H) 01/10/2022    CREATININE 2.02 (H) 01/10/2022    CALCIUM 9.7 01/10/2022    GLUCOSE 121 (H) 01/10/2022      CBC:  Lab Results   Component Value Date    WBC 5.5 01/10/2022    RBC 3.24 (L) 01/10/2022    HGB 9.8 (L) 01/10/2022    HCT 31.3 (L) 01/10/2022     (L) 01/10/2022      Urinalysis Component Data:  No results found for: MUCUS  Cardiac markers:   No results found for: TROPONINI, MYOGLOBIN, CKMB   No components found for: 28255  Magnesium   Date Value Ref Range Status   01/10/2022 1.7 1.7 - 2.4 mg/dL Final     Phosphorus   Date Value Ref Range Status   01/10/2022 2.2 (L) 2.4 - 4.7 mg/dL Final   ?      Imaging Study      Intake and Output  No intake/output data recorded.        Assessment/Plan:     Acute Kidney Injury  -2/2 severe intravascular volume contraction with renal function slowly improving  -IVF's stopped this AM and will continue to encourage increased PO intake  -dose all medications for decreased GFR  -avoid nephrotoxic agents if at all possible   -Renal imaging unremarkable  -No emergent need for  Hemodialysis at this time  (Patient a very poor candidate given significant co-morbidities)     Hypernatremia   -2/2 severe Free H20 deficit a/w poor PO intake and improved s/p hypotonic IVF's     Hypercalcemia  -Likely 2/2 severe volume contraction and improved with IVF's     Hypokalemia/Hypophosphatemia  -Replace this AM     Hypotension: resovled      Acute metabolic encephalopathy  - Multi-factorial in etiology with underlying dementia contributing     H/O HTN     Elevated K/L ratio  -Workup in progress as per Heme    Thank you for the consult.  Please feel free to contact with any further questions.  Will continue to monitor closely from a renal standpoint.    Patient Active Problem List    Diagnosis Date Noted   • Hypokalemia 01/05/2022     Priority: Medium   • Acute encephalopathy 01/05/2022     Priority: Low   • Hypernatremia 01/05/2022     Priority: Low   • Acute kidney injury (CMS/HCC) 01/05/2022     Priority: Low   • Hypovolemic shock (CMS/HCC) 01/05/2022     Priority: Low   • Failure to thrive in adult 01/05/2022     Priority: Low   • Severe protein-calorie malnutrition (CMS/HCC) 01/05/2022     Priority: Low   • Hypercalcemia 01/05/2022     Priority: Low   • Elevated troponin 01/05/2022     Priority: Low       Authored by Melanie Estes MD on 1/10/2022 10:46 AM

## 2024-09-06 NOTE — TELEPHONE ENCOUNTER
Patient will need office follow-up visit sometime this fall for reassessment of symptoms and medication refills.  I did send in a 90-day prescription with 1 refill.  Please contact the patient to schedule a follow-up appointment.  Thank you.  Mariela WILLIS

## 2024-09-06 NOTE — PROGRESS NOTES
"Chief Complaint  Back Pain, Hip Pain (Bilateral ), Leg Pain (Right hip to foot, left hip to knee), Numbness (Right hip to foot, left hip to knee), and Tingling (Right hip to foot, left hip to knee)    Subjective          Kayla Gan who is a 49 y.o. year old female who presents to [facility] for Evaluation of the Spine.     - Reason for consultation: R leg pain, weakness, numbness/tingling (started 8/1/2024)  - Pain: 3/10 at rest, 7-8/10 w/ movement  - Worse: standing, walking, any activity  - Better: lying flat  - Sharp, electrical shock-like pain down hips  - Weakness in legs, falls (bad fall last night w/ R side hematomas)  - Bladder control issues initially  - PMHx: L4-L5 surgery (Dr. Langley)  - Meds: steroids, muscle relaxers (minimal relief)  - Treatments: PT (ineffective)  - SocHx: non-smoker, no nicotine use    Was this the result of an injury or accident? : No    History of Previous Spinal Surgery?: Yes.  Lumbar, Date 2010s, L4-L5.     reports that she has never smoked. She has never used smokeless tobacco.    Review of Systems   Musculoskeletal:  Positive for back pain.        Objective   Vital Signs:   /87 (BP Location: Left arm, Patient Position: Sitting, Cuff Size: Adult)   Pulse 66   Ht 173.7 cm (68.4\")   Wt 87.5 kg (193 lb)   BMI 29.00 kg/m²       Physical Exam  Constitutional:       Appearance: Normal appearance.   Pulmonary:      Effort: Pulmonary effort is normal.   Musculoskeletal:         General: No tenderness.      Comments: SLR bilaterally worsens back pain   Neurological:      General: No focal deficit present.      Mental Status: She is alert and oriented to person, place, and time.      Sensory: No sensory deficit.      Motor: No weakness.      Deep Tendon Reflexes: Reflexes normal.   Psychiatric:         Mood and Affect: Mood normal.         Behavior: Behavior normal.        Neurologic Exam     Mental Status   Oriented to person, " place, and time.        Result Review     I have personally interpreted the CT of lumbar spine without contrast from 8/1/2024 which shows status post left laminotomy at L4-L5.  There is mild multilevel spondylosis and facet arthritis.  There is at least moderate left foraminal narrowing at L4-L5 due to bone spurring. There is at least mild central canal narrowing at L5-S1 with possible right paracentric disc bulge.     Assessment and Plan    Diagnoses and all orders for this visit:    1. DDD (degenerative disc disease), lumbar (Primary)  -     Ambulatory Referral to Pain Management    2. Facet arthritis of lumbar region  -     Ambulatory Referral to Pain Management    3. Foraminal stenosis of lumbar region  -     Ambulatory Referral to Pain Management    4. History of lumbar surgery    1. R leg radiculopathy  - CT findings insufficient to recommend surgery  - Order CT myelogram  - Consider spinal injections (steroids)  - F/u in 4 wks or sooner after myelogram  - Discussed conservative tx options (PT already tried)  - Advised cane/walker use to prevent falls    2. Chronic pain management  - Refer to [facility] for LESI trial    Additional Notes:  - Pt has trip to [place] end of Oct, hoping to complete workup before then  - Discussed limitations of CT vs. myelogram for visualizing disc/nerves      Follow Up   Return in about 4 weeks (around 10/4/2024).  Patient was given instructions and counseling regarding her condition or for health maintenance advice. Please see specific information pulled into the AVS if appropriate.

## 2024-09-12 ENCOUNTER — TELEPHONE (OUTPATIENT)
Dept: NEUROSURGERY | Facility: CLINIC | Age: 49
End: 2024-09-12
Payer: MEDICARE

## 2024-09-12 DIAGNOSIS — M47.816 FACET ARTHRITIS OF LUMBAR REGION: ICD-10-CM

## 2024-09-12 DIAGNOSIS — Z98.890 HISTORY OF LUMBAR SURGERY: ICD-10-CM

## 2024-09-12 DIAGNOSIS — M51.36 DDD (DEGENERATIVE DISC DISEASE), LUMBAR: Primary | ICD-10-CM

## 2024-09-12 DIAGNOSIS — M54.16 LUMBAR RADICULOPATHY: ICD-10-CM

## 2024-09-12 DIAGNOSIS — Z79.4 DIABETES MELLITUS DUE TO UNDERLYING CONDITION WITH DIABETIC NEPHROPATHY, WITH LONG-TERM CURRENT USE OF INSULIN: ICD-10-CM

## 2024-09-12 DIAGNOSIS — E08.21 DIABETES MELLITUS DUE TO UNDERLYING CONDITION WITH DIABETIC NEPHROPATHY, WITH LONG-TERM CURRENT USE OF INSULIN: ICD-10-CM

## 2024-09-12 DIAGNOSIS — M48.061 FORAMINAL STENOSIS OF LUMBAR REGION: ICD-10-CM

## 2024-09-12 NOTE — TELEPHONE ENCOUNTER
PATIENT CALLED AND STATES SHE IS SCHEDULED TO HAVE CT WITH CONTRAST COMPLETED 09/20/2024  AND STATES SHE WAS TOLD SHE NEEDED TO HAVE ANTI COAGULATION LABS DRAWN EITHER THE DAY BEFORE OR THE DAY OF.      PATIENT ALSO STATES THAT [facility] TOLD HER ONCE HER IMAGING WAS SCHEDULED SHE COULD SCHEDULE HER APPT TO FOLLOW UP SOONER.  DUE TO PATIENT HAVING NEW IMAGING AT TIME OF APPT ATTEMPTED TO RESCHEDULE FOLLOW UP EXT WITH [facility] AND THERE WERE NO SOONER AVAILABILITY.  PATIENT STATES PER [facility] SILVER THE APPT WAS SUPPOSED TO BE MOVED UP    PLEASE CALL PATIENT   THANK YOU

## 2024-09-13 RX ORDER — ONDANSETRON 8 MG/1
8 TABLET, FILM COATED ORAL
COMMUNITY

## 2024-09-13 RX ORDER — QUETIAPINE FUMARATE 50 MG/1
50 TABLET, FILM COATED ORAL NIGHTLY
COMMUNITY

## 2024-09-19 ENCOUNTER — TELEPHONE (OUTPATIENT)
Dept: FAMILY MEDICINE CLINIC | Facility: CLINIC | Age: 49
End: 2024-09-19
Payer: MEDICARE

## 2024-09-19 ENCOUNTER — TRANSCRIBE ORDERS (OUTPATIENT)
Dept: LAB | Facility: HOSPITAL | Age: 49
End: 2024-09-19
Payer: MEDICARE

## 2024-09-19 ENCOUNTER — LAB (OUTPATIENT)
Dept: LAB | Facility: HOSPITAL | Age: 49
End: 2024-09-19
Payer: MEDICARE

## 2024-09-19 DIAGNOSIS — M48.061 FORAMINAL STENOSIS OF LUMBAR REGION: ICD-10-CM

## 2024-09-19 DIAGNOSIS — K31.84 GASTROPARESIS: Primary | ICD-10-CM

## 2024-09-19 DIAGNOSIS — M47.816 FACET ARTHRITIS OF LUMBAR REGION: ICD-10-CM

## 2024-09-19 DIAGNOSIS — K31.84 GASTROPARESIS: ICD-10-CM

## 2024-09-19 DIAGNOSIS — Z79.4 DIABETES MELLITUS DUE TO UNDERLYING CONDITION WITH DIABETIC NEPHROPATHY, WITH LONG-TERM CURRENT USE OF INSULIN: ICD-10-CM

## 2024-09-19 DIAGNOSIS — E08.21 DIABETES MELLITUS DUE TO UNDERLYING CONDITION WITH DIABETIC NEPHROPATHY, WITH LONG-TERM CURRENT USE OF INSULIN: ICD-10-CM

## 2024-09-19 DIAGNOSIS — M54.16 LUMBAR RADICULOPATHY: ICD-10-CM

## 2024-09-19 DIAGNOSIS — Z98.890 HISTORY OF LUMBAR SURGERY: ICD-10-CM

## 2024-09-19 DIAGNOSIS — M51.369 DDD (DEGENERATIVE DISC DISEASE), LUMBAR: ICD-10-CM

## 2024-09-19 LAB
ALBUMIN SERPL-MCNC: 4.3 G/DL (ref 3.5–5.2)
ALBUMIN/GLOB SERPL: 1.1 G/DL
ALP SERPL-CCNC: 181 U/L (ref 39–117)
ALT SERPL W P-5'-P-CCNC: 38 U/L (ref 1–33)
ANION GAP SERPL CALCULATED.3IONS-SCNC: 14.5 MMOL/L (ref 5–15)
APTT PPP: 25.8 SECONDS (ref 24.2–34.2)
AST SERPL-CCNC: 38 U/L (ref 1–32)
BASOPHILS # BLD AUTO: 0.05 10*3/MM3 (ref 0–0.2)
BASOPHILS NFR BLD AUTO: 0.5 % (ref 0–1.5)
BILIRUB SERPL-MCNC: 0.3 MG/DL (ref 0–1.2)
BUN SERPL-MCNC: 14 MG/DL (ref 6–20)
BUN/CREAT SERPL: 20.9 (ref 7–25)
CALCIUM SPEC-SCNC: 9.8 MG/DL (ref 8.6–10.5)
CHLORIDE SERPL-SCNC: 101 MMOL/L (ref 98–107)
CO2 SERPL-SCNC: 23.5 MMOL/L (ref 22–29)
CREAT SERPL-MCNC: 0.67 MG/DL (ref 0.57–1)
DEPRECATED RDW RBC AUTO: 43.9 FL (ref 37–54)
EGFRCR SERPLBLD CKD-EPI 2021: 107.3 ML/MIN/1.73
EOSINOPHIL # BLD AUTO: 0.24 10*3/MM3 (ref 0–0.4)
EOSINOPHIL NFR BLD AUTO: 2.6 % (ref 0.3–6.2)
ERYTHROCYTE [DISTWIDTH] IN BLOOD BY AUTOMATED COUNT: 13 % (ref 12.3–15.4)
GLOBULIN UR ELPH-MCNC: 3.9 GM/DL
GLUCOSE SERPL-MCNC: 139 MG/DL (ref 65–99)
HCT VFR BLD AUTO: 43.6 % (ref 34–46.6)
HGB BLD-MCNC: 14.5 G/DL (ref 12–15.9)
IMM GRANULOCYTES # BLD AUTO: 0.03 10*3/MM3 (ref 0–0.05)
IMM GRANULOCYTES NFR BLD AUTO: 0.3 % (ref 0–0.5)
INR PPP: 1.02 (ref 0.86–1.15)
LYMPHOCYTES # BLD AUTO: 3.57 10*3/MM3 (ref 0.7–3.1)
LYMPHOCYTES NFR BLD AUTO: 38.8 % (ref 19.6–45.3)
MCH RBC QN AUTO: 30.7 PG (ref 26.6–33)
MCHC RBC AUTO-ENTMCNC: 33.3 G/DL (ref 31.5–35.7)
MCV RBC AUTO: 92.2 FL (ref 79–97)
MONOCYTES # BLD AUTO: 1.11 10*3/MM3 (ref 0.1–0.9)
MONOCYTES NFR BLD AUTO: 12.1 % (ref 5–12)
NEUTROPHILS NFR BLD AUTO: 4.21 10*3/MM3 (ref 1.7–7)
NEUTROPHILS NFR BLD AUTO: 45.7 % (ref 42.7–76)
NRBC BLD AUTO-RTO: 0 /100 WBC (ref 0–0.2)
PLATELET # BLD AUTO: 474 10*3/MM3 (ref 140–450)
PMV BLD AUTO: 11.6 FL (ref 6–12)
POTASSIUM SERPL-SCNC: 3.7 MMOL/L (ref 3.5–5.2)
PROT SERPL-MCNC: 8.2 G/DL (ref 6–8.5)
PROTHROMBIN TIME: 13.6 SECONDS (ref 11.8–14.9)
RBC # BLD AUTO: 4.73 10*6/MM3 (ref 3.77–5.28)
SODIUM SERPL-SCNC: 139 MMOL/L (ref 136–145)
WBC NRBC COR # BLD AUTO: 9.21 10*3/MM3 (ref 3.4–10.8)

## 2024-09-19 PROCEDURE — 36415 COLL VENOUS BLD VENIPUNCTURE: CPT

## 2024-09-19 PROCEDURE — 85610 PROTHROMBIN TIME: CPT

## 2024-09-19 PROCEDURE — 85025 COMPLETE CBC W/AUTO DIFF WBC: CPT

## 2024-09-19 PROCEDURE — 85730 THROMBOPLASTIN TIME PARTIAL: CPT

## 2024-09-19 PROCEDURE — 80053 COMPREHEN METABOLIC PANEL: CPT

## 2024-09-20 ENCOUNTER — HOSPITAL ENCOUNTER (OUTPATIENT)
Dept: CT IMAGING | Facility: HOSPITAL | Age: 49
Discharge: HOME | End: 2024-09-20
Payer: MEDICARE

## 2024-09-20 ENCOUNTER — APPOINTMENT (OUTPATIENT)
Dept: CT IMAGING | Facility: HOSPITAL | Age: 49
End: 2024-09-20
Payer: MEDICARE

## 2024-09-20 ENCOUNTER — HOSPITAL ENCOUNTER (OUTPATIENT)
Dept: INTERVENTIONAL RADIOLOGY/VASCULAR | Facility: HOSPITAL | Age: 49
Discharge: HOME | End: 2024-09-20
Payer: MEDICARE

## 2024-09-20 ENCOUNTER — HOSPITAL ENCOUNTER (EMERGENCY)
Facility: HOSPITAL | Age: 49
Discharge: HOME | End: 2024-09-20
Attending: EMERGENCY MEDICINE
Payer: MEDICARE

## 2024-09-20 ENCOUNTER — APPOINTMENT (OUTPATIENT)
Dept: GENERAL RADIOLOGY | Facility: HOSPITAL | Age: 49
End: 2024-09-20
Payer: MEDICARE

## 2024-09-20 VITALS
SYSTOLIC BLOOD PRESSURE: 127 MMHG | RESPIRATION RATE: 16 BRPM | WEIGHT: 192.9 LBS | OXYGEN SATURATION: 92 % | HEART RATE: 102 BPM | DIASTOLIC BLOOD PRESSURE: 69 MMHG | TEMPERATURE: 99.5 F | HEIGHT: 68 IN | BODY MASS INDEX: 29.24 KG/M2

## 2024-09-20 DIAGNOSIS — R10.84 GENERALIZED ABDOMINAL PAIN: Primary | ICD-10-CM

## 2024-09-20 LAB
ALBUMIN SERPL-MCNC: 4.2 G/DL (ref 3.5–5.2)
ALBUMIN/GLOB SERPL: 1.1 G/DL
ALP SERPL-CCNC: 202 U/L (ref 39–117)
ALT SERPL W P-5'-P-CCNC: 38 U/L (ref 1–33)
ANION GAP SERPL CALCULATED.3IONS-SCNC: 15.1 MMOL/L (ref 5–15)
AST SERPL-CCNC: 43 U/L (ref 1–32)
BASOPHILS # BLD AUTO: 0.04 10*3/MM3 (ref 0–0.2)
BASOPHILS NFR BLD AUTO: 0.3 % (ref 0–1.5)
BILIRUB SERPL-MCNC: 0.6 MG/DL (ref 0–1.2)
BILIRUB UR QL STRIP: NEGATIVE
BUN SERPL-MCNC: 13 MG/DL (ref 6–20)
BUN/CREAT SERPL: 16.9 (ref 7–25)
CALCIUM SPEC-SCNC: 9.8 MG/DL (ref 8.6–10.5)
CHLORIDE SERPL-SCNC: 102 MMOL/L (ref 98–107)
CLARITY UR: CLEAR
CO2 SERPL-SCNC: 23.9 MMOL/L (ref 22–29)
COLOR UR: YELLOW
CREAT SERPL-MCNC: 0.77 MG/DL (ref 0.57–1)
D-LACTATE SERPL-SCNC: 2.3 MMOL/L (ref 0.5–2)
DEPRECATED RDW RBC AUTO: 44.8 FL (ref 37–54)
EGFRCR SERPLBLD CKD-EPI 2021: 94.7 ML/MIN/1.73
EOSINOPHIL # BLD AUTO: 0.17 10*3/MM3 (ref 0–0.4)
EOSINOPHIL NFR BLD AUTO: 1.1 % (ref 0.3–6.2)
ERYTHROCYTE [DISTWIDTH] IN BLOOD BY AUTOMATED COUNT: 13.8 % (ref 12.3–15.4)
FLUAV SUBTYP SPEC NAA+PROBE: NOT DETECTED
FLUBV RNA ISLT QL NAA+PROBE: NOT DETECTED
GLOBULIN UR ELPH-MCNC: 4 GM/DL
GLUCOSE SERPL-MCNC: 150 MG/DL (ref 65–99)
GLUCOSE UR STRIP-MCNC: NEGATIVE MG/DL
HCT VFR BLD AUTO: 40.5 % (ref 34–46.6)
HGB BLD-MCNC: 13.3 G/DL (ref 12–15.9)
HGB UR QL STRIP.AUTO: NEGATIVE
HOLD SPECIMEN: NORMAL
HOLD SPECIMEN: NORMAL
IMM GRANULOCYTES # BLD AUTO: 0.06 10*3/MM3 (ref 0–0.05)
IMM GRANULOCYTES NFR BLD AUTO: 0.4 % (ref 0–0.5)
KETONES UR QL STRIP: NEGATIVE
LEUKOCYTE ESTERASE UR QL STRIP.AUTO: NEGATIVE
LIPASE SERPL-CCNC: 5 U/L (ref 13–60)
LYMPHOCYTES # BLD AUTO: 2.87 10*3/MM3 (ref 0.7–3.1)
LYMPHOCYTES NFR BLD AUTO: 19.4 % (ref 19.6–45.3)
MAGNESIUM SERPL-MCNC: 1.7 MG/DL (ref 1.6–2.6)
MCH RBC QN AUTO: 29.3 PG (ref 26.6–33)
MCHC RBC AUTO-ENTMCNC: 32.8 G/DL (ref 31.5–35.7)
MCV RBC AUTO: 89.2 FL (ref 79–97)
MONOCYTES # BLD AUTO: 1.4 10*3/MM3 (ref 0.1–0.9)
MONOCYTES NFR BLD AUTO: 9.4 % (ref 5–12)
NEUTROPHILS NFR BLD AUTO: 10.28 10*3/MM3 (ref 1.7–7)
NEUTROPHILS NFR BLD AUTO: 69.4 % (ref 42.7–76)
NITRITE UR QL STRIP: NEGATIVE
NRBC BLD AUTO-RTO: 0 /100 WBC (ref 0–0.2)
NT-PROBNP SERPL-MCNC: 96.7 PG/ML (ref 0–450)
PH UR STRIP.AUTO: 6 [PH] (ref 5–8)
PLATELET # BLD AUTO: 454 10*3/MM3 (ref 140–450)
PMV BLD AUTO: 10.6 FL (ref 6–12)
POTASSIUM SERPL-SCNC: 3.6 MMOL/L (ref 3.5–5.2)
PROT SERPL-MCNC: 8.2 G/DL (ref 6–8.5)
PROT UR QL STRIP: NEGATIVE
QT INTERVAL: 297 MS
QT INTERVAL: 357 MS
QTC INTERVAL: 385 MS
QTC INTERVAL: 448 MS
RBC # BLD AUTO: 4.54 10*6/MM3 (ref 3.77–5.28)
RSV RNA NPH QL NAA+NON-PROBE: NOT DETECTED
SARS-COV-2 RNA RESP QL NAA+PROBE: NOT DETECTED
SODIUM SERPL-SCNC: 141 MMOL/L (ref 136–145)
SP GR UR STRIP: >1.03 (ref 1–1.03)
TROPONIN T SERPL HS-MCNC: <6 NG/L
UROBILINOGEN UR QL STRIP: ABNORMAL
WBC NRBC COR # BLD AUTO: 14.82 10*3/MM3 (ref 3.4–10.8)
WHOLE BLOOD HOLD COAG: NORMAL
WHOLE BLOOD HOLD SPECIMEN: NORMAL

## 2024-09-20 PROCEDURE — 25010000002 DROPERIDOL PER 5 MG: Performed by: EMERGENCY MEDICINE

## 2024-09-20 PROCEDURE — 96375 TX/PRO/DX INJ NEW DRUG ADDON: CPT

## 2024-09-20 PROCEDURE — 81003 URINALYSIS AUTO W/O SCOPE: CPT | Performed by: EMERGENCY MEDICINE

## 2024-09-20 PROCEDURE — 83880 ASSAY OF NATRIURETIC PEPTIDE: CPT | Performed by: EMERGENCY MEDICINE

## 2024-09-20 PROCEDURE — 87637 SARSCOV2&INF A&B&RSV AMP PRB: CPT | Performed by: EMERGENCY MEDICINE

## 2024-09-20 PROCEDURE — 87040 BLOOD CULTURE FOR BACTERIA: CPT | Performed by: EMERGENCY MEDICINE

## 2024-09-20 PROCEDURE — 83690 ASSAY OF LIPASE: CPT | Performed by: EMERGENCY MEDICINE

## 2024-09-20 PROCEDURE — 71260 CT THORAX DX C+: CPT

## 2024-09-20 PROCEDURE — 99285 EMERGENCY DEPT VISIT HI MDM: CPT

## 2024-09-20 PROCEDURE — 87077 CULTURE AEROBIC IDENTIFY: CPT | Performed by: EMERGENCY MEDICINE

## 2024-09-20 PROCEDURE — 96374 THER/PROPH/DIAG INJ IV PUSH: CPT

## 2024-09-20 PROCEDURE — 87186 SC STD MICRODIL/AGAR DIL: CPT | Performed by: EMERGENCY MEDICINE

## 2024-09-20 PROCEDURE — 25810000003 SODIUM CHLORIDE 0.9 % SOLUTION: Performed by: EMERGENCY MEDICINE

## 2024-09-20 PROCEDURE — 83735 ASSAY OF MAGNESIUM: CPT | Performed by: EMERGENCY MEDICINE

## 2024-09-20 PROCEDURE — 74177 CT ABD & PELVIS W/CONTRAST: CPT

## 2024-09-20 PROCEDURE — 71045 X-RAY EXAM CHEST 1 VIEW: CPT

## 2024-09-20 PROCEDURE — 83605 ASSAY OF LACTIC ACID: CPT | Performed by: EMERGENCY MEDICINE

## 2024-09-20 PROCEDURE — 25010000002 DIPHENHYDRAMINE PER 50 MG: Performed by: EMERGENCY MEDICINE

## 2024-09-20 PROCEDURE — 85025 COMPLETE CBC W/AUTO DIFF WBC: CPT | Performed by: EMERGENCY MEDICINE

## 2024-09-20 PROCEDURE — 93005 ELECTROCARDIOGRAM TRACING: CPT | Performed by: EMERGENCY MEDICINE

## 2024-09-20 PROCEDURE — 25010000002 HYDROMORPHONE 1 MG/ML SOLUTION: Performed by: EMERGENCY MEDICINE

## 2024-09-20 PROCEDURE — 87086 URINE CULTURE/COLONY COUNT: CPT | Performed by: EMERGENCY MEDICINE

## 2024-09-20 PROCEDURE — 25510000001 IOPAMIDOL PER 1 ML: Performed by: EMERGENCY MEDICINE

## 2024-09-20 PROCEDURE — 80053 COMPREHEN METABOLIC PANEL: CPT | Performed by: EMERGENCY MEDICINE

## 2024-09-20 PROCEDURE — 84484 ASSAY OF TROPONIN QUANT: CPT | Performed by: EMERGENCY MEDICINE

## 2024-09-20 PROCEDURE — 36415 COLL VENOUS BLD VENIPUNCTURE: CPT

## 2024-09-20 PROCEDURE — 25010000002 ONDANSETRON PER 1 MG: Performed by: EMERGENCY MEDICINE

## 2024-09-20 RX ORDER — ASPIRIN 81 MG/1
324 TABLET, CHEWABLE ORAL ONCE
Status: DISCONTINUED | OUTPATIENT
Start: 2024-09-20 | End: 2024-09-20 | Stop reason: HOSPADM

## 2024-09-20 RX ORDER — DIPHENHYDRAMINE HYDROCHLORIDE 50 MG/ML
25 INJECTION INTRAMUSCULAR; INTRAVENOUS ONCE
Status: COMPLETED | OUTPATIENT
Start: 2024-09-20 | End: 2024-09-20

## 2024-09-20 RX ORDER — DROPERIDOL 2.5 MG/ML
2.5 INJECTION, SOLUTION INTRAMUSCULAR; INTRAVENOUS ONCE
Status: COMPLETED | OUTPATIENT
Start: 2024-09-20 | End: 2024-09-20

## 2024-09-20 RX ORDER — SODIUM CHLORIDE 0.9 % (FLUSH) 0.9 %
10 SYRINGE (ML) INJECTION AS NEEDED
Status: DISCONTINUED | OUTPATIENT
Start: 2024-09-20 | End: 2024-09-20 | Stop reason: HOSPADM

## 2024-09-20 RX ORDER — IOPAMIDOL 755 MG/ML
100 INJECTION, SOLUTION INTRAVASCULAR
Status: COMPLETED | OUTPATIENT
Start: 2024-09-20 | End: 2024-09-20

## 2024-09-20 RX ORDER — DICYCLOMINE HCL 20 MG
20 TABLET ORAL EVERY 8 HOURS PRN
Qty: 12 TABLET | Refills: 0 | Status: SHIPPED | OUTPATIENT
Start: 2024-09-20

## 2024-09-20 RX ORDER — ONDANSETRON 4 MG/1
4 TABLET, ORALLY DISINTEGRATING ORAL EVERY 8 HOURS PRN
Qty: 12 TABLET | Refills: 0 | Status: SHIPPED | OUTPATIENT
Start: 2024-09-20

## 2024-09-20 RX ORDER — ONDANSETRON 2 MG/ML
4 INJECTION INTRAMUSCULAR; INTRAVENOUS ONCE
Status: COMPLETED | OUTPATIENT
Start: 2024-09-20 | End: 2024-09-20

## 2024-09-20 RX ADMIN — ONDANSETRON 4 MG: 2 INJECTION INTRAMUSCULAR; INTRAVENOUS at 10:05

## 2024-09-20 RX ADMIN — DROPERIDOL 2.5 MG: 2.5 INJECTION, SOLUTION INTRAMUSCULAR; INTRAVENOUS at 11:30

## 2024-09-20 RX ADMIN — IOPAMIDOL 100 ML: 755 INJECTION, SOLUTION INTRAVENOUS at 10:27

## 2024-09-20 RX ADMIN — SODIUM CHLORIDE 1000 ML: 9 INJECTION, SOLUTION INTRAVENOUS at 10:03

## 2024-09-20 RX ADMIN — HYDROMORPHONE HYDROCHLORIDE 1 MG: 1 INJECTION, SOLUTION INTRAMUSCULAR; INTRAVENOUS; SUBCUTANEOUS at 10:06

## 2024-09-20 RX ADMIN — DIPHENHYDRAMINE HYDROCHLORIDE 25 MG: 50 INJECTION, SOLUTION INTRAMUSCULAR; INTRAVENOUS at 11:30

## 2024-09-21 LAB — BACTERIA SPEC AEROBE CULT: NORMAL

## 2024-09-23 ENCOUNTER — PATIENT MESSAGE (OUTPATIENT)
Dept: NEUROLOGY | Facility: CLINIC | Age: 49
End: 2024-09-23
Payer: MEDICARE

## 2024-09-24 LAB
BACTERIA SPEC AEROBE CULT: ABNORMAL
BACTERIA SPEC AEROBE CULT: ABNORMAL
GRAM STN SPEC: ABNORMAL
ISOLATED FROM: ABNORMAL
ISOLATED FROM: ABNORMAL

## 2024-09-30 LAB
QT INTERVAL: 297 MS
QT INTERVAL: 357 MS
QTC INTERVAL: 385 MS
QTC INTERVAL: 448 MS

## 2024-10-15 ENCOUNTER — OFFICE VISIT (OUTPATIENT)
Dept: UROLOGY | Facility: CLINIC | Age: 49
End: 2024-10-15
Payer: MEDICARE

## 2024-10-15 VITALS
HEART RATE: 98 BPM | SYSTOLIC BLOOD PRESSURE: 147 MMHG | DIASTOLIC BLOOD PRESSURE: 84 MMHG | HEIGHT: 67 IN | WEIGHT: 196 LBS | TEMPERATURE: 98.2 F | BODY MASS INDEX: 30.76 KG/M2

## 2024-10-15 DIAGNOSIS — N39.0 RECURRENT UTI: ICD-10-CM

## 2024-10-15 DIAGNOSIS — N30.10 INTERSTITIAL CYSTITIS: Primary | ICD-10-CM

## 2024-10-15 LAB
BACTERIA UR QL AUTO: NORMAL /HPF
BILIRUB BLD-MCNC: NEGATIVE MG/DL
CLARITY, POC: CLEAR
COLOR UR: YELLOW
EPI CELLS #/AREA URNS HPF: 0 /[HPF]
EXPIRATION DATE: NORMAL
GLUCOSE UR STRIP-MCNC: NEGATIVE MG/DL
KETONES UR QL: NEGATIVE
LEUKOCYTE EST, POC: NEGATIVE
Lab: NORMAL
NITRITE UR-MCNC: NEGATIVE MG/ML
PH UR: 6.5 [PH] (ref 5–8)
PROT UR STRIP-MCNC: NEGATIVE MG/DL
RBC # UR STRIP: NEGATIVE /UL
RBC # UR STRIP: NORMAL /HPF
RENAL EPITHELIAL, POC: 0
SP GR UR: 1.02 (ref 1–1.03)
UNIDENT CRYS URNS QL MICRO: NORMAL /HPF
UROBILINOGEN UR QL: NORMAL
WBC # UR STRIP: NORMAL /HPF

## 2024-10-15 PROCEDURE — 87186 SC STD MICRODIL/AGAR DIL: CPT | Performed by: UROLOGY

## 2024-10-15 PROCEDURE — 87077 CULTURE AEROBIC IDENTIFY: CPT | Performed by: UROLOGY

## 2024-10-15 PROCEDURE — 87086 URINE CULTURE/COLONY COUNT: CPT | Performed by: UROLOGY

## 2024-10-15 RX ORDER — TRAZODONE HYDROCHLORIDE 100 MG/1
50 TABLET ORAL NIGHTLY PRN
COMMUNITY
End: 2024-10-17

## 2024-10-15 RX ORDER — PROPRANOLOL HCL 60 MG
1 CAPSULE, EXTENDED RELEASE 24HR ORAL DAILY
COMMUNITY

## 2024-10-15 RX ORDER — QUETIAPINE FUMARATE 100 MG/1
50 TABLET, FILM COATED ORAL
COMMUNITY
End: 2024-10-17

## 2024-10-15 RX ORDER — LISINOPRIL 40 MG/1
40 TABLET ORAL DAILY
COMMUNITY

## 2024-10-15 RX ORDER — LINEZOLID 600 MG
TABLET ORAL
COMMUNITY
End: 2024-10-18

## 2024-10-15 RX ORDER — CEPHALEXIN 500 MG/1
CAPSULE ORAL
COMMUNITY
End: 2024-10-18

## 2024-10-15 RX ORDER — FLUCONAZOLE 150 MG/1
150 TABLET ORAL DAILY
Status: ON HOLD | COMMUNITY
End: 2024-10-21

## 2024-10-15 RX ORDER — PROMETHAZINE HYDROCHLORIDE 25 MG/1
25 TABLET ORAL
COMMUNITY

## 2024-10-15 RX ORDER — OXYCODONE HYDROCHLORIDE 5 MG/1
TABLET ORAL
COMMUNITY
Start: 2024-10-09 | End: 2024-10-17

## 2024-10-15 NOTE — PROGRESS NOTES
"Chief Complaint  Interstitial cystitis (Pt. Is wanting to start bladder cocktails again, has been in the hospital with UTI and had to have IV antibiotics. Has been present for 2 weeks. Bladder pain 5/10 but has been getting worse and very uncomfortable. Keeps getting chills.), Urinary Urgency, and Urinary Frequency    Subjective patient is in some distress        Kayla Gan presents to [facility]  History of Present Illness    49-year-old white female with complicated urological history.  Patient has had pancreatectomy and splenectomy and is on steroids.  She also has liver cirrhosis.  Patient has been having problem with dysuria and suprapubic pain for several months now.  She has not had recurrent UTI and currently is on Keflex and Zyvox.  She was diagnosed with recurrent UTI.    Patient was diagnosed to have IC few years ago.  She was getting intravesical cocktail and then Botox which really helped her.  Patient stopped coming to the office after injection of Botox.    Patient has been having recurrent fever which is from low-grade 202.  She was in the hospital for a week for IV antibiotics.  Patient also has gastroparesis and has gastric stimulator.  Patient wants to get bladder cocktail restarted.  No history of gross hematuria.    Patient has urgency about half the time.  She has urgency incontinence occasionally and leaks a teaspoon whenever she leaks.  Day frequency is 7-8 times a day and nocturia 1 time.  Overall overactive bladder assessment tool is 9/25.    Objective patient is in some pain  Vital Signs:   /84 (BP Location: Right arm, Patient Position: Sitting, Cuff Size: Adult)   Pulse 98   Temp 98.2 °F (36.8 °C) (Temporal)   Ht 170.2 cm (67\")   Wt 88.9 kg (196 lb)   BMI 30.70 kg/m²     Allergies   Allergen Reactions    Other Other (See Comments)     Sent patient into kidney failure, heart stopped, in ICU for two days.    Parathyroid Hormone (Recomb) Other (See " Comments) and Unknown - High Severity     Sent patient into kidney failure, heart stopped, in ICU for two days.    Romosozumab Hives     Other reaction(s): Hives    Other reaction(s): Hives   Other reaction(s): Hives    Teriparatide Anaphylaxis and Other (See Comments)     Other reaction(s): Unknown    Tramadol Anaphylaxis and Other (See Comments)     Other reaction(s): Unknown, Unknown    Nifedipine Other (See Comments)     Rapid heart beat        Vancomycin Hives, Itching and Rash     vancomycin-infusion reaction (VIR, formerly Ivett's syndrome): give over longer infusion time      Past medical history:  has a past medical history of Adrenal insufficiency, Allergic (Unsure), Anemia, Anxiety, Arthritis, Asthma (Unsure), Bleeding disorder, Cholelithiasis (Unsure), Cirrhosis, Clotting disorder (Unsure), Colon polyp, Crohn's disease, Depression, Diabetes mellitus, Gastroparesis, GERD (gastroesophageal reflux disease), History of MRSA infection, History of transfusion, Hyperlipidemia, Hypertension, Hypothyroidism, Interstitial cystitis, Irritable bowel syndrome (Unsure), Liver disease, Low back pain, Lumbosacral disc disease, Migraines, MRSA (methicillin resistant Staphylococcus aureus), Pancreatitis, Urinary incontinence, Urinary tract infection (Unsure), and Vaginal infection.   Past surgical history:  has a past surgical history that includes Cholecystectomy (N/A); Hysterectomy; Venous Access Device (Port); Back surgery; Abscess drainage (12/24/2019); Dilation and curettage of uterus; Sinus surgery; Pelvic floor repair; Tonsillectomy; Pancreatectomy (12/2019); Esophagogastroduodenoscopy (N/A, 04/24/2019); ERCP (2019); Splenectomy, total; Gastrojejunostomy w/ jejunostomy tube; Small bowel enteroscopy; Colonoscopy (04/24/2019); Gastric stimulator implant surgery; Adenoidectomy; Endometrial ablation; Lymph node biopsy; Epidural block injection; Oophorectomy; Bladder surgery; Cystoscopy; and Other surgical  history.  Personal history: family history includes Alcohol abuse in her paternal grandfather; Anxiety disorder in her father and mother; Arthritis in her mother; Breast cancer in her mother; Cancer in her mother; Colon polyps in her mother; Depression in her brother, father, and mother; Heart disease in her father and paternal grandfather; Hypertension in her brother and father; Hypothyroidism in her father and mother; Kidney disease in her father; Thyroid disease in her father and mother.  Social history:  reports that she has never smoked. She has never been exposed to tobacco smoke. She has never used smokeless tobacco. She reports that she does not drink alcohol and does not use drugs.    Review of Systems    Please see past medical and surgical history    Physical Exam  Constitutional:       General: She is not in acute distress.     Appearance: Normal appearance. She is obese. She is not ill-appearing, toxic-appearing or diaphoretic.      Comments: Patient seem to be in some distress   HENT:      Head: Normocephalic and atraumatic.      Ears:      Comments: No loss of hearing  Pulmonary:      Effort: Pulmonary effort is normal. No respiratory distress.   Abdominal:      Palpations: Abdomen is soft. There is no mass.      Tenderness: There is no abdominal tenderness. There is right CVA tenderness and left CVA tenderness.      Comments: Minor tenderness everywhere without rigidity or rebound tenderness   Musculoskeletal:         General: Normal range of motion.   Skin:     General: Skin is warm.      Coloration: Skin is not jaundiced.   Neurological:      General: No focal deficit present.      Mental Status: She is alert and oriented to person, place, and time.      Motor: No weakness.      Gait: Gait normal.   Psychiatric:         Mood and Affect: Mood normal.         Behavior: Behavior normal.         Thought Content: Thought content normal.         Judgment: Judgment normal.        Result Review :                  Assessment and Plan    Diagnoses and all orders for this visit:    1. Interstitial cystitis (Primary)  -     POC Urinalysis Dipstick, Automated  -     Urine Culture - Urine, Urine, Random Void; Future  -     Urine Culture - Urine, Urine, Random Void  -     Urine Culture - Urine, Urine, Clean Catch  -     POC Urine Microscopic Only    2. Recurrent UTI  -     Urine Culture - Urine, Urine, Clean Catch  -     POC Urine Microscopic Only    Patient has too many medical problems and is on steroids.  She is on prophylactic antibiotics need to make sure that her culture is negative and I am going to repeat cystoscope per to see what is going on in the urinary bladder and start using bladder cocktail after that.  I will bring her back in 48 hours to do cystoscopy and bladder cocktail instillation.     Brief Urine Lab Results  (Last result in the past 365 days)        Color   Clarity   Blood   Leuk Est   Nitrite   Protein   CREAT   Urine HCG        10/15/24 0942 Yellow   Clear   Negative   Negative   Negative   Negative                    Follow Up   No follow-ups on file.  Patient was given instructions and counseling regarding her condition or for health maintenance advice. Please see specific information pulled into the AVS if appropriate.     Shari Parker MD

## 2024-10-17 ENCOUNTER — HOSPITAL ENCOUNTER (OUTPATIENT)
Dept: INTERVENTIONAL RADIOLOGY/VASCULAR | Facility: HOSPITAL | Age: 49
Discharge: HOME | End: 2024-10-17
Payer: MEDICARE

## 2024-10-17 ENCOUNTER — HOSPITAL ENCOUNTER (OUTPATIENT)
Dept: CT IMAGING | Facility: HOSPITAL | Age: 49
Discharge: HOME | End: 2024-10-17
Payer: MEDICARE

## 2024-10-17 VITALS
HEART RATE: 72 BPM | SYSTOLIC BLOOD PRESSURE: 144 MMHG | OXYGEN SATURATION: 95 % | HEIGHT: 67 IN | BODY MASS INDEX: 29.82 KG/M2 | WEIGHT: 190 LBS | DIASTOLIC BLOOD PRESSURE: 91 MMHG

## 2024-10-17 DIAGNOSIS — M47.816 FACET ARTHRITIS OF LUMBAR REGION: ICD-10-CM

## 2024-10-17 DIAGNOSIS — M54.16 LUMBAR RADICULOPATHY: ICD-10-CM

## 2024-10-17 DIAGNOSIS — Z98.890 HISTORY OF LUMBAR SURGERY: ICD-10-CM

## 2024-10-17 DIAGNOSIS — M51.369 DDD (DEGENERATIVE DISC DISEASE), LUMBAR: ICD-10-CM

## 2024-10-17 DIAGNOSIS — M48.061 FORAMINAL STENOSIS OF LUMBAR REGION: ICD-10-CM

## 2024-10-17 DIAGNOSIS — K74.60 HEPATIC CIRRHOSIS, UNSPECIFIED HEPATIC CIRRHOSIS TYPE, UNSPECIFIED WHETHER ASCITES PRESENT: ICD-10-CM

## 2024-10-17 LAB
ALBUMIN SERPL-MCNC: 4 G/DL (ref 3.5–5.2)
ALBUMIN/GLOB SERPL: 1.1 G/DL
ALP SERPL-CCNC: 130 U/L (ref 39–117)
ALPHA-FETOPROTEIN: 4.52 NG/ML (ref 0–8.3)
ALT SERPL W P-5'-P-CCNC: 21 U/L (ref 1–33)
ANION GAP SERPL CALCULATED.3IONS-SCNC: 10.6 MMOL/L (ref 5–15)
APTT PPP: 25.6 SECONDS (ref 24.2–34.2)
AST SERPL-CCNC: 24 U/L (ref 1–32)
BASOPHILS # BLD AUTO: 0.05 10*3/MM3 (ref 0–0.2)
BASOPHILS NFR BLD AUTO: 0.6 % (ref 0–1.5)
BILIRUB SERPL-MCNC: 0.3 MG/DL (ref 0–1.2)
BUN SERPL-MCNC: 11 MG/DL (ref 6–20)
BUN/CREAT SERPL: 16.7 (ref 7–25)
CALCIUM SPEC-SCNC: 9.5 MG/DL (ref 8.6–10.5)
CHLORIDE SERPL-SCNC: 105 MMOL/L (ref 98–107)
CO2 SERPL-SCNC: 26.4 MMOL/L (ref 22–29)
CREAT SERPL-MCNC: 0.66 MG/DL (ref 0.57–1)
DEPRECATED RDW RBC AUTO: 46.3 FL (ref 37–54)
EGFRCR SERPLBLD CKD-EPI 2021: 107.7 ML/MIN/1.73
EOSINOPHIL # BLD AUTO: 0.36 10*3/MM3 (ref 0–0.4)
EOSINOPHIL NFR BLD AUTO: 4.4 % (ref 0.3–6.2)
ERYTHROCYTE [DISTWIDTH] IN BLOOD BY AUTOMATED COUNT: 14.1 % (ref 12.3–15.4)
GLOBULIN UR ELPH-MCNC: 3.8 GM/DL
GLUCOSE SERPL-MCNC: 127 MG/DL (ref 65–99)
HCT VFR BLD AUTO: 40.5 % (ref 34–46.6)
HGB BLD-MCNC: 13.2 G/DL (ref 12–15.9)
IMM GRANULOCYTES # BLD AUTO: 0.01 10*3/MM3 (ref 0–0.05)
IMM GRANULOCYTES NFR BLD AUTO: 0.1 % (ref 0–0.5)
INR PPP: 1.02 (ref 0.86–1.15)
LYMPHOCYTES # BLD AUTO: 4.2 10*3/MM3 (ref 0.7–3.1)
LYMPHOCYTES NFR BLD AUTO: 51.4 % (ref 19.6–45.3)
MCH RBC QN AUTO: 29.3 PG (ref 26.6–33)
MCHC RBC AUTO-ENTMCNC: 32.6 G/DL (ref 31.5–35.7)
MCV RBC AUTO: 89.8 FL (ref 79–97)
MONOCYTES # BLD AUTO: 1.01 10*3/MM3 (ref 0.1–0.9)
MONOCYTES NFR BLD AUTO: 12.4 % (ref 5–12)
NEUTROPHILS NFR BLD AUTO: 2.54 10*3/MM3 (ref 1.7–7)
NEUTROPHILS NFR BLD AUTO: 31.1 % (ref 42.7–76)
NRBC BLD AUTO-RTO: 0.2 /100 WBC (ref 0–0.2)
PLATELET # BLD AUTO: 433 10*3/MM3 (ref 140–450)
PMV BLD AUTO: 10.3 FL (ref 6–12)
POTASSIUM SERPL-SCNC: 4.1 MMOL/L (ref 3.5–5.2)
PROT SERPL-MCNC: 7.8 G/DL (ref 6–8.5)
PROTHROMBIN TIME: 13.6 SECONDS (ref 11.8–14.9)
RBC # BLD AUTO: 4.51 10*6/MM3 (ref 3.77–5.28)
RBC MORPH BLD: NORMAL
SMALL PLATELETS BLD QL SMEAR: ADEQUATE
SODIUM SERPL-SCNC: 142 MMOL/L (ref 136–145)
WBC MORPH BLD: NORMAL
WBC NRBC COR # BLD AUTO: 8.17 10*3/MM3 (ref 3.4–10.8)

## 2024-10-17 PROCEDURE — 85025 COMPLETE CBC W/AUTO DIFF WBC: CPT | Performed by: INTERNAL MEDICINE

## 2024-10-17 PROCEDURE — 80053 COMPREHEN METABOLIC PANEL: CPT | Performed by: INTERNAL MEDICINE

## 2024-10-17 PROCEDURE — 85007 BL SMEAR W/DIFF WBC COUNT: CPT | Performed by: INTERNAL MEDICINE

## 2024-10-17 PROCEDURE — 85730 THROMBOPLASTIN TIME PARTIAL: CPT | Performed by: PHYSICIAN ASSISTANT

## 2024-10-17 PROCEDURE — 85610 PROTHROMBIN TIME: CPT | Performed by: PHYSICIAN ASSISTANT

## 2024-10-17 PROCEDURE — 82105 ALPHA-FETOPROTEIN SERUM: CPT | Performed by: INTERNAL MEDICINE

## 2024-10-17 RX ORDER — QUETIAPINE FUMARATE 50 MG/1
50 TABLET, FILM COATED ORAL NIGHTLY
Status: ON HOLD | COMMUNITY
End: 2024-10-21

## 2024-10-17 RX ORDER — LIDOCAINE HYDROCHLORIDE 20 MG/ML
20 INJECTION, SOLUTION INFILTRATION; PERINEURAL ONCE
Status: DISCONTINUED | OUTPATIENT
Start: 2024-10-17 | End: 2024-10-17

## 2024-10-17 RX ORDER — TRAZODONE HYDROCHLORIDE 50 MG/1
50 TABLET, FILM COATED ORAL NIGHTLY
COMMUNITY

## 2024-10-17 RX ORDER — IOPAMIDOL 408 MG/ML
20 INJECTION, SOLUTION INTRATHECAL
Status: DISCONTINUED | OUTPATIENT
Start: 2024-10-17 | End: 2024-10-17

## 2024-10-18 DIAGNOSIS — N39.0 RECURRENT UTI: Primary | ICD-10-CM

## 2024-10-18 LAB — BACTERIA SPEC AEROBE CULT: ABNORMAL

## 2024-10-18 RX ORDER — CIPROFLOXACIN 500 MG/1
500 TABLET, FILM COATED ORAL 2 TIMES DAILY
Qty: 14 TABLET | Refills: 0 | Status: ON HOLD | OUTPATIENT
Start: 2024-10-18 | End: 2024-10-21

## 2024-10-19 ENCOUNTER — HOSPITAL ENCOUNTER (INPATIENT)
Facility: HOSPITAL | Age: 49
LOS: 2 days | Discharge: HOME | End: 2024-10-21
Attending: EMERGENCY MEDICINE | Admitting: INTERNAL MEDICINE
Payer: MEDICARE

## 2024-10-19 ENCOUNTER — APPOINTMENT (OUTPATIENT)
Dept: CT IMAGING | Facility: HOSPITAL | Age: 49
End: 2024-10-19
Payer: MEDICARE

## 2024-10-19 DIAGNOSIS — R10.9 ACUTE RIGHT FLANK PAIN: ICD-10-CM

## 2024-10-19 DIAGNOSIS — N39.0 RECURRENT UTI: ICD-10-CM

## 2024-10-19 DIAGNOSIS — R26.2 DIFFICULTY IN WALKING: ICD-10-CM

## 2024-10-19 DIAGNOSIS — A41.9 SEPSIS WITH ACUTE ORGAN DYSFUNCTION WITHOUT SEPTIC SHOCK, DUE TO UNSPECIFIED ORGANISM, UNSPECIFIED ORGAN DYSFUNCTION TYPE: ICD-10-CM

## 2024-10-19 DIAGNOSIS — N10 ACUTE PYELONEPHRITIS: Primary | ICD-10-CM

## 2024-10-19 DIAGNOSIS — B96.5 URINARY TRACT INFECTION DUE TO PSEUDOMONAS AERUGINOSA: ICD-10-CM

## 2024-10-19 DIAGNOSIS — N39.0 URINARY TRACT INFECTION DUE TO PSEUDOMONAS AERUGINOSA: ICD-10-CM

## 2024-10-19 DIAGNOSIS — R65.20 SEPSIS WITH ACUTE ORGAN DYSFUNCTION WITHOUT SEPTIC SHOCK, DUE TO UNSPECIFIED ORGANISM, UNSPECIFIED ORGAN DYSFUNCTION TYPE: ICD-10-CM

## 2024-10-19 LAB
ALBUMIN SERPL-MCNC: 4 G/DL (ref 3.5–5.2)
ALBUMIN/GLOB SERPL: 1.1 G/DL
ALP SERPL-CCNC: 141 U/L (ref 39–117)
ALT SERPL W P-5'-P-CCNC: 27 U/L (ref 1–33)
ANION GAP SERPL CALCULATED.3IONS-SCNC: 15.2 MMOL/L (ref 5–15)
AST SERPL-CCNC: 32 U/L (ref 1–32)
BACTERIA UR QL AUTO: NORMAL /HPF
BASOPHILS # BLD AUTO: 0.04 10*3/MM3 (ref 0–0.2)
BASOPHILS NFR BLD AUTO: 0.3 % (ref 0–1.5)
BILIRUB SERPL-MCNC: 0.7 MG/DL (ref 0–1.2)
BILIRUB UR QL STRIP: NEGATIVE
BUN SERPL-MCNC: 12 MG/DL (ref 6–20)
BUN/CREAT SERPL: 15.8 (ref 7–25)
CALCIUM SPEC-SCNC: 9.2 MG/DL (ref 8.6–10.5)
CHLORIDE SERPL-SCNC: 101 MMOL/L (ref 98–107)
CLARITY UR: CLEAR
CO2 SERPL-SCNC: 24.8 MMOL/L (ref 22–29)
COLOR UR: ABNORMAL
CREAT SERPL-MCNC: 0.76 MG/DL (ref 0.57–1)
D-LACTATE SERPL-SCNC: 1.7 MMOL/L (ref 0.5–2)
DEPRECATED RDW RBC AUTO: 45.1 FL (ref 37–54)
EGFRCR SERPLBLD CKD-EPI 2021: 96.2 ML/MIN/1.73
EOSINOPHIL # BLD AUTO: 0.34 10*3/MM3 (ref 0–0.4)
EOSINOPHIL NFR BLD AUTO: 2.5 % (ref 0.3–6.2)
ERYTHROCYTE [DISTWIDTH] IN BLOOD BY AUTOMATED COUNT: 13.8 % (ref 12.3–15.4)
GLOBULIN UR ELPH-MCNC: 3.6 GM/DL
GLUCOSE SERPL-MCNC: 136 MG/DL (ref 65–99)
GLUCOSE UR STRIP-MCNC: NEGATIVE MG/DL
HCT VFR BLD AUTO: 40.1 % (ref 34–46.6)
HGB BLD-MCNC: 13.2 G/DL (ref 12–15.9)
HGB UR QL STRIP.AUTO: NEGATIVE
HOLD SPECIMEN: NORMAL
HOLD SPECIMEN: NORMAL
HYALINE CASTS UR QL AUTO: NORMAL /LPF
IMM GRANULOCYTES # BLD AUTO: 0.04 10*3/MM3 (ref 0–0.05)
IMM GRANULOCYTES NFR BLD AUTO: 0.3 % (ref 0–0.5)
KETONES UR QL STRIP: NEGATIVE
LEUKOCYTE ESTERASE UR QL STRIP.AUTO: ABNORMAL
LIPASE SERPL-CCNC: 6 U/L (ref 13–60)
LYMPHOCYTES # BLD AUTO: 2.27 10*3/MM3 (ref 0.7–3.1)
LYMPHOCYTES NFR BLD AUTO: 16.9 % (ref 19.6–45.3)
MCH RBC QN AUTO: 29.5 PG (ref 26.6–33)
MCHC RBC AUTO-ENTMCNC: 32.9 G/DL (ref 31.5–35.7)
MCV RBC AUTO: 89.5 FL (ref 79–97)
MONOCYTES # BLD AUTO: 1.76 10*3/MM3 (ref 0.1–0.9)
MONOCYTES NFR BLD AUTO: 13.1 % (ref 5–12)
NEUTROPHILS NFR BLD AUTO: 66.9 % (ref 42.7–76)
NEUTROPHILS NFR BLD AUTO: 9 10*3/MM3 (ref 1.7–7)
NITRITE UR QL STRIP: POSITIVE
NRBC BLD AUTO-RTO: 0.2 /100 WBC (ref 0–0.2)
PH UR STRIP.AUTO: 7.5 [PH] (ref 5–8)
PLATELET # BLD AUTO: 390 10*3/MM3 (ref 140–450)
PMV BLD AUTO: 10 FL (ref 6–12)
POTASSIUM SERPL-SCNC: 3.8 MMOL/L (ref 3.5–5.2)
PROT SERPL-MCNC: 7.6 G/DL (ref 6–8.5)
PROT UR QL STRIP: NEGATIVE
RBC # BLD AUTO: 4.48 10*6/MM3 (ref 3.77–5.28)
RBC # UR STRIP: NORMAL /HPF
REF LAB TEST METHOD: NORMAL
SODIUM SERPL-SCNC: 141 MMOL/L (ref 136–145)
SP GR UR STRIP: >1.03 (ref 1–1.03)
SQUAMOUS #/AREA URNS HPF: NORMAL /HPF
UROBILINOGEN UR QL STRIP: ABNORMAL
WBC # UR STRIP: NORMAL /HPF
WBC NRBC COR # BLD AUTO: 13.45 10*3/MM3 (ref 3.4–10.8)
WHOLE BLOOD HOLD COAG: NORMAL
WHOLE BLOOD HOLD SPECIMEN: NORMAL

## 2024-10-19 PROCEDURE — 83605 ASSAY OF LACTIC ACID: CPT | Performed by: EMERGENCY MEDICINE

## 2024-10-19 PROCEDURE — 25010000002 MORPHINE PER 10 MG: Performed by: INTERNAL MEDICINE

## 2024-10-19 PROCEDURE — 25810000003 SODIUM CHLORIDE 0.9 % SOLUTION: Performed by: INTERNAL MEDICINE

## 2024-10-19 PROCEDURE — 25010000002 PROCHLORPERAZINE 10 MG/2ML SOLUTION: Performed by: INTERNAL MEDICINE

## 2024-10-19 PROCEDURE — 25510000001 IOPAMIDOL PER 1 ML: Performed by: EMERGENCY MEDICINE

## 2024-10-19 PROCEDURE — 25010000002 ONDANSETRON PER 1 MG: Performed by: INTERNAL MEDICINE

## 2024-10-19 PROCEDURE — 87086 URINE CULTURE/COLONY COUNT: CPT | Performed by: EMERGENCY MEDICINE

## 2024-10-19 PROCEDURE — 99223 1ST HOSP IP/OBS HIGH 75: CPT | Performed by: INTERNAL MEDICINE

## 2024-10-19 PROCEDURE — 25810000003 SODIUM CHLORIDE 0.9 % SOLUTION: Performed by: EMERGENCY MEDICINE

## 2024-10-19 PROCEDURE — 25010000002 CEFEPIME PER 500 MG: Performed by: EMERGENCY MEDICINE

## 2024-10-19 PROCEDURE — 99285 EMERGENCY DEPT VISIT HI MDM: CPT

## 2024-10-19 PROCEDURE — 25010000002 CEFEPIME PER 500 MG: Performed by: INTERNAL MEDICINE

## 2024-10-19 PROCEDURE — 25010000002 ONDANSETRON PER 1 MG: Performed by: EMERGENCY MEDICINE

## 2024-10-19 PROCEDURE — 25010000002 ENOXAPARIN PER 10 MG: Performed by: INTERNAL MEDICINE

## 2024-10-19 PROCEDURE — 25010000002 HYDROMORPHONE 1 MG/ML SOLUTION: Performed by: EMERGENCY MEDICINE

## 2024-10-19 PROCEDURE — 87040 BLOOD CULTURE FOR BACTERIA: CPT | Performed by: EMERGENCY MEDICINE

## 2024-10-19 PROCEDURE — 81001 URINALYSIS AUTO W/SCOPE: CPT | Performed by: EMERGENCY MEDICINE

## 2024-10-19 PROCEDURE — 85025 COMPLETE CBC W/AUTO DIFF WBC: CPT | Performed by: EMERGENCY MEDICINE

## 2024-10-19 PROCEDURE — 80053 COMPREHEN METABOLIC PANEL: CPT | Performed by: EMERGENCY MEDICINE

## 2024-10-19 PROCEDURE — 36415 COLL VENOUS BLD VENIPUNCTURE: CPT | Performed by: EMERGENCY MEDICINE

## 2024-10-19 PROCEDURE — 74177 CT ABD & PELVIS W/CONTRAST: CPT

## 2024-10-19 PROCEDURE — 83690 ASSAY OF LIPASE: CPT | Performed by: EMERGENCY MEDICINE

## 2024-10-19 RX ORDER — IOPAMIDOL 755 MG/ML
100 INJECTION, SOLUTION INTRAVASCULAR
Status: COMPLETED | OUTPATIENT
Start: 2024-10-19 | End: 2024-10-19

## 2024-10-19 RX ORDER — SODIUM CHLORIDE 0.9 % (FLUSH) 0.9 %
10 SYRINGE (ML) INJECTION AS NEEDED
Status: DISCONTINUED | OUTPATIENT
Start: 2024-10-19 | End: 2024-10-21 | Stop reason: HOSPADM

## 2024-10-19 RX ORDER — ONDANSETRON 2 MG/ML
4 INJECTION INTRAMUSCULAR; INTRAVENOUS EVERY 6 HOURS PRN
Status: DISCONTINUED | OUTPATIENT
Start: 2024-10-19 | End: 2024-10-20

## 2024-10-19 RX ORDER — NICOTINE POLACRILEX 4 MG
15 LOZENGE BUCCAL
Status: DISCONTINUED | OUTPATIENT
Start: 2024-10-19 | End: 2024-10-21 | Stop reason: HOSPADM

## 2024-10-19 RX ORDER — HYDROCODONE BITARTRATE AND ACETAMINOPHEN 5; 325 MG/1; MG/1
1 TABLET ORAL EVERY 6 HOURS PRN
Status: DISCONTINUED | OUTPATIENT
Start: 2024-10-19 | End: 2024-10-21 | Stop reason: HOSPADM

## 2024-10-19 RX ORDER — SODIUM CHLORIDE 0.9 % (FLUSH) 0.9 %
10 SYRINGE (ML) INJECTION EVERY 12 HOURS SCHEDULED
Status: DISCONTINUED | OUTPATIENT
Start: 2024-10-19 | End: 2024-10-21 | Stop reason: HOSPADM

## 2024-10-19 RX ORDER — DEXTROSE MONOHYDRATE 25 G/50ML
25 INJECTION, SOLUTION INTRAVENOUS
Status: DISCONTINUED | OUTPATIENT
Start: 2024-10-19 | End: 2024-10-21 | Stop reason: HOSPADM

## 2024-10-19 RX ORDER — IBUPROFEN 600 MG/1
1 TABLET ORAL
Status: DISCONTINUED | OUTPATIENT
Start: 2024-10-19 | End: 2024-10-21 | Stop reason: HOSPADM

## 2024-10-19 RX ORDER — ENOXAPARIN SODIUM 100 MG/ML
40 INJECTION SUBCUTANEOUS DAILY
Status: DISCONTINUED | OUTPATIENT
Start: 2024-10-19 | End: 2024-10-21 | Stop reason: HOSPADM

## 2024-10-19 RX ORDER — ONDANSETRON 2 MG/ML
4 INJECTION INTRAMUSCULAR; INTRAVENOUS ONCE
Status: COMPLETED | OUTPATIENT
Start: 2024-10-19 | End: 2024-10-19

## 2024-10-19 RX ORDER — POLYETHYLENE GLYCOL 3350 17 G/17G
17 POWDER, FOR SOLUTION ORAL DAILY
COMMUNITY

## 2024-10-19 RX ORDER — SODIUM CHLORIDE 9 MG/ML
100 INJECTION, SOLUTION INTRAVENOUS CONTINUOUS
Status: ACTIVE | OUTPATIENT
Start: 2024-10-19 | End: 2024-10-20

## 2024-10-19 RX ORDER — PROCHLORPERAZINE EDISYLATE 5 MG/ML
5 INJECTION INTRAMUSCULAR; INTRAVENOUS EVERY 6 HOURS PRN
Status: DISCONTINUED | OUTPATIENT
Start: 2024-10-19 | End: 2024-10-21 | Stop reason: HOSPADM

## 2024-10-19 RX ADMIN — IOPAMIDOL 100 ML: 755 INJECTION, SOLUTION INTRAVENOUS at 13:50

## 2024-10-19 RX ADMIN — Medication 10 ML: at 17:35

## 2024-10-19 RX ADMIN — ONDANSETRON 4 MG: 2 INJECTION INTRAMUSCULAR; INTRAVENOUS at 12:21

## 2024-10-19 RX ADMIN — SODIUM CHLORIDE 100 ML/HR: 9 INJECTION, SOLUTION INTRAVENOUS at 18:44

## 2024-10-19 RX ADMIN — ONDANSETRON 4 MG: 2 INJECTION INTRAMUSCULAR; INTRAVENOUS at 18:27

## 2024-10-19 RX ADMIN — MORPHINE SULFATE 4 MG: 4 INJECTION, SOLUTION INTRAMUSCULAR; INTRAVENOUS at 20:13

## 2024-10-19 RX ADMIN — ENOXAPARIN SODIUM 40 MG: 100 INJECTION SUBCUTANEOUS at 17:35

## 2024-10-19 RX ADMIN — PROCHLORPERAZINE EDISYLATE 5 MG: 5 INJECTION INTRAMUSCULAR; INTRAVENOUS at 18:45

## 2024-10-19 RX ADMIN — Medication 10 ML: at 20:17

## 2024-10-19 RX ADMIN — CEFEPIME 2000 MG: 2 INJECTION, POWDER, FOR SOLUTION INTRAVENOUS at 20:14

## 2024-10-19 RX ADMIN — HYDROMORPHONE HYDROCHLORIDE 1 MG: 1 INJECTION, SOLUTION INTRAMUSCULAR; INTRAVENOUS; SUBCUTANEOUS at 12:21

## 2024-10-19 RX ADMIN — CEFEPIME 2000 MG: 2 INJECTION, POWDER, FOR SOLUTION INTRAVENOUS at 12:30

## 2024-10-19 RX ADMIN — SODIUM CHLORIDE 1000 ML: 0.9 INJECTION, SOLUTION INTRAVENOUS at 12:26

## 2024-10-19 NOTE — PLAN OF CARE
Goal Outcome Evaluation:   N/V treated per MAR. B/P remains soft. 8 out 10 pain. Treated per MAR.HR remains elevated high 90's.  Patient resting with family at bedside. Plan of care continued .

## 2024-10-19 NOTE — ED PROVIDER NOTES
Time: 12:24 PM EDT  Date of encounter:  10/19/2024  Independent Historian/Clinical History and Information was obtained by:   Patient and Family    History is limited by: N/A    Chief Complaint: UTI, fevers, vomiting, right flank pain          History of Present Illness:  Patient is a 49 y.o. year old female with history of recurrent UTIs who presents to the emergency department for evaluation of worsening symptoms despite being on Keflex antibiotics this week for a recent UTI that ended up getting her septic and bacteremic with Klebsiella and she was admitted to [facility] last week.    She was discharged home on Keflex and linezolid but states she was just called by her urologist, Dr. Parker, based off of recent urine culture results 5 days ago showing Pseudomonas UTI and was prescribed ciprofloxacin just yesterday.    She only took 1 dose of ciprofloxacin so far and she is still feeling poorly and cold chills and subjective fevers and has been vomiting and having right sided flank pain.        Patient Care Team  Primary Care Provider: Justin Daniels APRN    Past Medical History:     Allergies   Allergen Reactions    Other Other (See Comments)     Sent patient into kidney failure, heart stopped, in ICU for two days.    Parathyroid Hormone (Recomb) Other (See Comments) and Unknown - High Severity     Sent patient into kidney failure, heart stopped, in ICU for two days.    Romosozumab Hives     Other reaction(s): Hives    Other reaction(s): Hives   Other reaction(s): Hives    Teriparatide Anaphylaxis and Other (See Comments)     Other reaction(s): Unknown    Tramadol Anaphylaxis and Other (See Comments)     Other reaction(s): Unknown, Unknown    Nifedipine Other (See Comments)     Rapid heart beat        Vancomycin Hives, Itching and Rash     vancomycin-infusion reaction (VIR, formerly Ivett's syndrome): give over longer infusion time     Past Medical History:   Diagnosis Date    Adrenal insufficiency      Allergic Unsure    Foods, Ulram, Vancomycin    Anemia     Anxiety     Arthritis     Asthma Unsure    Bleeding disorder     Cholelithiasis Unsure    Cirrhosis     Clotting disorder Unsure    Colon polyp     Crohn's disease     Depression     Diabetes mellitus     Gastroparesis     GERD (gastroesophageal reflux disease)     History of MRSA infection     History of transfusion     Hyperlipidemia     Hypertension     Hypothyroidism     Interstitial cystitis     Irritable bowel syndrome Unsure    Liver disease     Low back pain     Lumbosacral disc disease     Migraines     MRSA (methicillin resistant Staphylococcus aureus)     Pancreatitis     Urinary incontinence     Urinary tract infection Unsure    Interstitial Cystitis    Vaginal infection      Past Surgical History:   Procedure Laterality Date    ABSCESS DRAINAGE  12/24/2019    Fluoroscopically guided abscess drainage, [facility]    ADENOIDECTOMY      BACK SURGERY      L4 L5    BLADDER SURGERY      CHOLECYSTECTOMY N/A     COLONOSCOPY  04/24/2019    NBIH, 3 mm polyp    CYSTOSCOPY      DILATATION AND CURETTAGE      ENDOMETRIAL ABLATION      ENDOSCOPY N/A 04/24/2019    Normal    ENTEROSCOPY SMALL BOWEL      EPIDURAL BLOCK      ERCP  2019    GASTRIC STIMULATOR IMPLANT SURGERY      GASTROJEJUNOSTOMY W/ JEJUNOSTOMY TUBE      removed    HYSTERECTOMY      LYMPH NODE BIOPSY      OOPHORECTOMY      OTHER SURGICAL HISTORY      gastric stimulator battery replaced    PANCREATECTOMY  12/2019    TPIAT    PELVIC FLOOR REPAIR      SINUS SURGERY      SPLENECTOMY      TONSILLECTOMY      VENOUS ACCESS DEVICE (PORT) INSERTION       Family History   Problem Relation Age of Onset    Heart disease Father     Hypothyroidism Father     Anxiety disorder Father     Depression Father     Hypertension Father     Kidney disease Father     Thyroid disease Father     Anxiety disorder Mother     Hypothyroidism Mother     Breast cancer Mother     Colon polyps Mother     Arthritis Mother      Cancer Mother     Depression Mother     Thyroid disease Mother     Alcohol abuse Paternal Grandfather     Heart disease Paternal Grandfather     Depression Brother     Hypertension Brother        Home Medications:  Prior to Admission medications    Medication Sig Start Date End Date Taking? Authorizing Provider   ciprofloxacin (Cipro) 500 MG tablet Take 1 tablet by mouth 2 (Two) Times a Day for 7 days. 10/18/24 10/25/24  Shari Parker MD   Creon 61563-085932 units capsule delayed-release particles capsule TAKE THREE CAPSULES BY MOUTH WITH MEALS AND 2 CAPSULES WITH snacks 8/7/24   Pollo Ray MD   dextrose (GLUTOSE) 40 % gel Take 15 g by mouth Every 15 (Fifteen) Minutes As Needed for Low Blood Sugar (Per Glu[employer]ander). 6/21/24   Gary Hemphill MD   docusate sodium (COLACE) 100 MG capsule Take 1 capsule by mouth 2 (Two) Times a Day. 4/16/19   Pollo Ray MD   Enulose 10 GM/15ML solution solution (encephalopathy) Take 45 mL by mouth As Needed.    Pollo Ray MD   fluconazole (DIFLUCAN) 150 MG tablet TAKE ONE TABLET BY MOUTH AS A single DOSE    Pollo Ray MD   glucagon (GLUCAGEN) 1 MG injection Inject 1 mg into the appropriate muscle as directed by prescriber Every 15 (Fifteen) Minutes As Needed for Low Blood Sugar (per [employer]). 6/21/24   Gary Hemphill MD   hydrocortisone (CORTEF) 5 MG tablet Take 3 tablets by mouth 2 (Two) Times a Day. 5/4/24   Pollo Ray MD   hydrOXYzine (ATARAX) 50 MG tablet Take 1 tablet by mouth Every Night.    Pollo Ray MD   Insulin Disposable Pump (Omnipod 5 G6 Pods, Gen 5,) [facility]  7/1/24   Pollo Ray MD   Insulin Lispro (humaLOG) 100 UNIT/ML injection Use in insulin pump approximately 100 units per day 6/20/24   Pollo Ray MD   lansoprazole (PREVACID) 30 MG capsule TAKE ONE CAPSULE BY MOUTH TWICE DAILY 9/6/24   Mariela Gr APRN   Lantarynus SoloStar 100 UNIT/ML injection pen Inject 47 Units  under the skin into the appropriate area as directed Daily. Only for insulin pump failure    Pollo Ray MD   levothyroxine (SYNTHROID, LEVOTHROID) 125 MCG tablet Take 1 tablet by mouth Daily. 9/1/20   Pollo Ray MD   lisinopril (PRINIVIL,ZESTRIL) 40 MG tablet Take 1 tablet by mouth 2 (Two) Times a Day.    Pollo Ray MD   NovoLOG 100 UNIT/ML injection Inject  under the skin into the appropriate area as directed. Type of Insulin: Lispro 100 units/ml  Type of Pump:Omnipod  Bolus: 1u 5-7g max bolus 2.5units/hr  Basal: 9864-5698 1.5u/hr max basal 10units/hr  Correction factor: 40mg/dl  Insulin to carbohydrate ratio: 7-5 g of carb  Next fill date: 06/23/24    Date of last site change: 06/19/24  Location of pod: right upper arm    Frequency of refill: 2-3 days  Last refill date: 06/03/24    Prescriber: Dr. Chelsea Silver  Phone number: [phone] 5/12/22   Pollo Ray MD   ondansetron (ZOFRAN) 8 MG tablet Take 1 tablet by mouth Every 4 (Four) to 6 (Six) Hours As Needed for Nausea or Vomiting.    Pollo Ray MD   prochlorperazine (COMPAZINE) 10 MG tablet Take 1 tablet by mouth Every 8 (Eight) Hours As Needed for Nausea or Vomiting. 6/6/23   Patrizia Sena APRN   promethazine (PHENERGAN) 25 MG tablet 1 tablet.    Pollo Ray MD   propranolol LA (INDERAL LA) 60 MG 24 hr capsule Take 1 capsule by mouth Daily.    Pollo Ray MD   QUEtiapine (SEROquel) 50 MG tablet Take 1 tablet by mouth Every Night.    Pollo Ray MD   Qulipta 60 MG tablet Take 1 tablet by mouth Daily. 4/27/24   Pollo Ray MD   riFAXIMin (XIFAXAN) 550 MG tablet Take 1 tablet by mouth Every 12 (Twelve) Hours. 5/9/24 5/9/25  Mariela Gr APRN   traZODone (DESYREL) 50 MG tablet Take 1 tablet by mouth Every Night.    Pollo Ray MD   Ubrelvy 100 MG tablet Take 1 tablet by mouth 1 (One) Time As Needed (Migraine). 12/7/23   Dulce Camejo  "OSMIN, LAZARO   VITAMIN E 400 UNIT capsule Take 2 capsules by mouth Daily.    Provider, MD Pollo   Vortioxetine HBr (Trintellix) 5 MG tablet tablet Take 1 tablet by mouth Every Night for 30 days. 12/12/23 10/17/24  Kayla Graham PA-C   Vortioxetine HBr (Trintellix) 5 MG tablet tablet Take 1 tablet by mouth every night at bedtime.    Provider, Pollo, MD        Social History:   Social History     Tobacco Use    Smoking status: Never     Passive exposure: Never    Smokeless tobacco: Never   Vaping Use    Vaping status: Never Used   Substance Use Topics    Alcohol use: Never    Drug use: Never         Review of Systems:  Review of Systems   I performed a 10 point review of systems which was all negative, except for the positives found in the HPI above.      Physical Exam:  /69   Pulse 102   Temp 99 °F (37.2 °C) (Oral)   Resp 18   Ht 170.2 cm (67\")   Wt 86.2 kg (190 lb 0.6 oz)   SpO2 91%   BMI 29.76 kg/m²         Physical Exam   General: Awake alert and in mild distress, looks to be feeling unwell, holding emesis bag    HEENT: Head normocephalic atraumatic, eyes PERRLA EOMI, nose normal, oropharynx normal.  Mucous membranes look dry, dehydrated    Neck: Supple full range of motion, no meningismus, no lymphadenopathy    Heart: Regular rate and rhythm, no murmurs or rubs, 2+ radial pulses bilaterally    Lungs: Clear to auscultation bilaterally without wheezes or crackles, no respiratory distress    Abdomen: Soft, mild suprapubic tenderness but no peritonitis, nondistended, no rebound or guarding    Back exam: Mild right CVA tenderness    Skin: Warm, dry, no rash    Musculoskeletal: Normal range of motion, no lower extremity edema    Neurologic: Oriented x3, no motor deficits no sensory deficits    Psychiatric: Mood appears stable, no psychosis          Procedures:  Procedures      Medical Decision Making:      Comorbidities that affect care:    Recurrent resistant UTIs, recent bacteremia    External " Notes reviewed:    Previous Clinic Note: I reviewed her recent urology clinic note from 5 days ago by Dr. Parker, regarding her continued chills and urinary symptoms and repeated urinalysis and culture was performed at that time.      The following orders were placed and all results were independently analyzed by me:  Orders Placed This Encounter   Procedures    Blood Culture - Blood,    Blood Culture - Blood,    [place]lain Draw    Comprehensive Metabolic Panel    Lipase    Urinalysis With Microscopic If Indicated (No Culture) - Urine, Clean Catch    CBC Auto Differential    Lactic Acid, Plasma    Urinalysis With Culture If Indicated -    NPO Diet NPO Type: Strict NPO    Undress & Gown    Hospitalist (on-call MD unless specified)    Insert Peripheral IV    CBC & Differential    Green Top (Gel)    Lavender Top    Gold Top - SST    Light Blue Top       Medications Given in the Emergency Department:  Medications   sodium chloride 0.9 % flush 10 mL (has no administration in time range)   sodium chloride 0.9 % bolus 1,000 mL (1,000 mL Intravenous New Bag 10/19/24 1226)   HYDROmorphone (DILAUDID) injection 1 mg (1 mg Intravenous Given 10/19/24 1221)   ondansetron (ZOFRAN) injection 4 mg (4 mg Intravenous Given 10/19/24 1221)   cefepime 2000 mg IVPB in 100 mL NS (VTB) (2,000 mg Intravenous New Bag 10/19/24 1230)        ED Course:         Labs:    Lab Results (last 24 hours)       Procedure Component Value Units Date/Time    CBC & Differential [[id number]]  (Abnormal) Collected: 10/19/24 1133    Specimen: Blood from Arm, Right Updated: 10/19/24 1145    Narrative:      The following orders were created for panel order CBC & Differential.  Procedure                               Abnormality         Status                     ---------                               -----------         ------                     CBC Auto Differential[[id number]]        Abnormal            Final result                 Please view results for these  tests on the individual orders.    Comprehensive Metabolic Panel [[id number]]  (Abnormal) Collected: 10/19/24 1133    Specimen: Blood from Arm, Right Updated: 10/19/24 1205     Glucose 136 mg/dL      BUN 12 mg/dL      Creatinine 0.76 mg/dL      Sodium 141 mmol/L      Potassium 3.8 mmol/L      Chloride 101 mmol/L      CO2 24.8 mmol/L      Calcium 9.2 mg/dL      Total Protein 7.6 g/dL      Albumin 4.0 g/dL      ALT (SGPT) 27 U/L      AST (SGOT) 32 U/L      Alkaline Phosphatase 141 U/L      Total Bilirubin 0.7 mg/dL      Globulin 3.6 gm/dL      A/G Ratio 1.1 g/dL      BUN/Creatinine Ratio 15.8     Anion Gap 15.2 mmol/L      eGFR 96.2 mL/min/1.73     Narrative:      GFR Normal >60  Chronic Kidney Disease <60  Kidney Failure <15      Lipase [[id number]]  (Abnormal) Collected: 10/19/24 1133    Specimen: Blood from Arm, Right Updated: 10/19/24 1205     Lipase 6 U/L     CBC Auto Differential [[id number]]  (Abnormal) Collected: 10/19/24 1133    Specimen: Blood from Arm, Right Updated: 10/19/24 1145     WBC 13.45 10*3/mm3      RBC 4.48 10*6/mm3      Hemoglobin 13.2 g/dL      Hematocrit 40.1 %      MCV 89.5 fL      MCH 29.5 pg      MCHC 32.9 g/dL      RDW 13.8 %      RDW-SD 45.1 fl      MPV 10.0 fL      Platelets 390 10*3/mm3      Neutrophil % 66.9 %      Lymphocyte % 16.9 %      Monocyte % 13.1 %      Eosinophil % 2.5 %      Basophil % 0.3 %      Immature Grans % 0.3 %      Neutrophils, Absolute 9.00 10*3/mm3      Lymphocytes, Absolute 2.27 10*3/mm3      Monocytes, Absolute 1.76 10*3/mm3      Eosinophils, Absolute 0.34 10*3/mm3      Basophils, Absolute 0.04 10*3/mm3      Immature Grans, Absolute 0.04 10*3/mm3      nRBC 0.2 /100 WBC     Lactic Acid, Plasma [[id number]]  (Normal) Collected: 10/19/24 1207    Specimen: Blood from Arm, Left Updated: 10/19/24 1231     Lactate 1.7 mmol/L     Blood Culture - Blood, Arm, Left [[id number]] Collected: 10/19/24 1207    Specimen: Blood from Arm, Left Updated: 10/19/24 1211    Blood Culture -  Blood, Arm, Right [[id number]] Collected: 10/19/24 1207    Specimen: Blood from Arm, Right Updated: 10/19/24 1211             Imaging:    No Radiology Exams Resulted Within Past 24 Hours      Differential Diagnosis and Discussion:    Abdominal Pain: Based on the patient's signs and symptoms, I considered abdominal aortic aneurysm, small bowel obstruction, pancreatitis, acute cholecystitis, acute appendecitis, peptic ulcer disease, gastritis, colitis, endocrine disorders, irritable bowel syndrome and other differential diagnosis an etiology of the patient's abdominal pain.  Dysuria: Differential diagnosis includes but is not limited to urethritis, cystitis, pyelonephritis, ureteral calculi, neoplasm, chemical irritant, urethral stricture, and trauma  Flank Pain: Differential diagnosis includes but is not limited to kidney stones, pyelonephritis, musculoskeletal disorders, renal infarction, urinary tract infection, hydronephrosis, radiculopathy, aortic aneurysm, renal cell carcinoma.    All labs were reviewed and interpreted by me.    MDM           This patient is a 49-year-old female with multiple medical comorbidities including insulin-dependent diabetes mellitus, pancreatectomy, splenomegaly, Crohn's disease, IC, frequent UTIs just released from outside hospital for urosepsis with bacteremia now on Keflex and Zyvox.    She just saw her urologist several days ago for continued symptoms and urine culture came back positive for Pseudomonas UTI and she was transitioned to oral ciprofloxacin yesterday but now presenting with vomiting and unable to keep down antibiotics and feeling generally worse.    Elevated white blood cell count of 13.5 noted and I think patient is failing outpatient management and needs to be admitted for IV antibiotics and fluids and antiemetics.                  Patient Care Considerations:    CT ABDOMEN AND PELVIS: I considered ordering a CT scan of the abdomen and pelvis however patient just had  CT scan abdomen pelvis about 10 days ago at outside facility and no kidney stones or hydronephrosis noted.  She has a benign abdominal exam today.      Consultants/Shared Management Plan:    Hospitalist: I have discussed the case with the admitting hospitalist who agrees to accept the patient for admission.    Social Determinants of Health:    Patient is independent, reliable, and has access to care.       Disposition and Care Coordination:    Admit:   Through independent evaluation of the patient's history, physical, and imperical data, the patient meets criteria for inpatient admission to the hospital.        Final diagnoses:   Acute pyelonephritis   Acute right flank pain   Urinary tract infection due to Pseudomonas aeruginosa   Sepsis with acute organ dysfunction without septic shock, due to unspecified organism, unspecified organ dysfunction type        ED Disposition       ED Disposition   Decision to Admit    Condition   --    Comment   --               This medical record created using voice recognition software.             Aman Salcedo MD  10/19/24 2473       Aman Salcedo MD  10/19/24 9287

## 2024-10-19 NOTE — H&P
[facility]  Date: 10/19/2024   Patient Name: Kayla Gan  : 1975  MRN: [id number]  Primary Care Physician:  Justin Daniels, LAZARO  Date of admission: 10/19/2024    Subjective   Subjective     Chief Complaint: Pseudomonal UTI    HPI:    Kayla Gan is a 49 y.o. 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  She was still having symptoms so she followed up with her urologist and urinalysis showed Pseudomonas.  She has had chills.  Subjective fevers.  And is not felt well.  As result she presented to ER for further evaluation.    In the emergency department the patient's vital signs are follows: Temperature 99, pulse 102, [place] is 18, blood pressure 111/69, 94% room air.  CBC shows a white count of 13,000.  CMP shows no acute concerning abnormalities.  Blood cultures were sent.  After seeing the patient and knowing her complicated anatomy asked him to get a CT scan to better stand why she had a recent Klebsiella UTI with bacteremia followed by Pseudomonas UTI with bacteremia.    All systems reviewed abdomen as noted    Personal History     Past Medical History:  GERD  Gastroparesis  Diabetes  Crohn's disease  MRSA infection  Diabetes  Cirrhosis of the liver due to an autoimmune process from placing islet cells in the liver  Interstitial cystitis      Past Surgical History:  Pancreatectomy due to autoimmune pancreatitis  Islet cells placed in the liver  Port placement  Pelvic floor repair  Spleenectomy    Family History:   Hypertension  Hypothyroidism  Kidney disease    Social History:   No smoking.   No alcohol.    Home Medications:  Atogepant, Insulin Aspart, Insulin Glargine, Omnipod 5 G6 Pods (Gen 5), Pancrelipase (Lip-Prot-Amyl), QUEtiapine, Vortioxetine HBr, ciprofloxacin, docusate sodium, fluconazole, glucagon, hydrOXYzine, hydrocortisone, lactulose, lansoprazole, levothyroxine, lisinopril, ondansetron, polyethylene glycol, prochlorperazine, promethazine, propranolol LA, riFAXIMin, traZODone, ubrogepant, and vitamin E    Allergies:  Allergies   Allergen Reactions    Other Other (See Comments)     Sent patient into kidney failure, heart stopped, in ICU for two days.    Parathyroid Hormone (Recomb) Other (See Comments) and Unknown - High Severity     Sent patient into kidney failure, heart stopped, in ICU for two days.    Romosozumab Hives     Other reaction(s): Hives    Other reaction(s): Hives   Other reaction(s): Hives    Teriparatide Anaphylaxis and Other (See Comments)     Other reaction(s): Unknown    Tramadol Anaphylaxis and Other (See Comments)     Other reaction(s): Unknown, Unknown    Nifedipine Other (See Comments)     Rapid heart beat        Vancomycin Hives, Itching and Rash     vancomycin-infusion reaction (VIR, formerly Ivett's syndrome): give over longer infusion time         Objective   Objective     Vitals:   Temp:  [99 °F (37.2 °C)] 99 °F (37.2 °C)  Heart Rate:  [] 102  Resp:  [18] 18  BP: (105-111)/(69-71) 111/69    Physical Exam    Constitutional: Awake, alert, no acute distress   Eyes: Pupils equal, sclerae anicteric, no conjunctival injection   HENT: NCAT, mucous membranes moist   Neck: Supple, no thyromegaly, no lymphadenopathy, trachea midline   Respiratory: Clear to auscultation bilaterally, nonlabored respirations    Cardiovascular: RRR, no murmurs, rubs, or gallops, palpable pedal pulses bilaterally   Gastrointestinal: Right-sided abdominal pain that goes into her back   Musculoskeletal: No bilateral ankle edema, no clubbing or cyanosis to extremities   Psychiatric:  Appropriate affect, cooperative   Neurologic: Oriented x 3, strength symmetric in all extremities, Cranial Nerves grossly intact to confrontation, speech clear   Skin: No rashes     Result Review    Result Review:  I have personally reviewed the results from the time of this admission to 10/19/2024 14:34 EDT and agree with these findings:  Systems and labs reviewed      Assessment & Plan   Assessment / Plan     Assessment/Plan:   Sepsis due to pseudomonal UTI with concern for bacteremia present on admission,   Type 2 diabetes  Hypothyroidism  Cirrhosis of the liver with history of HE  Hypertension    Plan  --Admit to hospital service  -- Will continue cefepime for pseudomonal UTI  -- Patient received 1 L of normal saline in the emergency department  -- Follow blood culture  -- Urine cultures already positive for Pseudomonas  -- Patient can use own supplies for insulin pump  -- CT of the abdomen pelvis did not show any acute abnormalities  -- Restart home medications once reconciled      VTE Prophylaxis:  Lovenox        CODE STATUS:    Level Of Support Discussed With: Patient  Code Status (Patient has no pulse and is not breathing): CPR (Attempt to Resuscitate)  Medical Interventions (Patient has pulse or is breathing): Full Support      Admission Status:  I believe this patient meets admission status.    Electronically signed by Kurt Erickson MD, 10/19/24, 1:05 PM EDT.

## 2024-10-20 LAB
ALBUMIN SERPL-MCNC: 3.6 G/DL (ref 3.5–5.2)
ALBUMIN/GLOB SERPL: 1.1 G/DL
ALP SERPL-CCNC: 123 U/L (ref 39–117)
ALT SERPL W P-5'-P-CCNC: 23 U/L (ref 1–33)
ANION GAP SERPL CALCULATED.3IONS-SCNC: 11.1 MMOL/L (ref 5–15)
AST SERPL-CCNC: 25 U/L (ref 1–32)
BASOPHILS # BLD AUTO: 0.03 10*3/MM3 (ref 0–0.2)
BASOPHILS NFR BLD AUTO: 0.4 % (ref 0–1.5)
BILIRUB SERPL-MCNC: 0.7 MG/DL (ref 0–1.2)
BUN SERPL-MCNC: 7 MG/DL (ref 6–20)
BUN/CREAT SERPL: 11.5 (ref 7–25)
CALCIUM SPEC-SCNC: 9.3 MG/DL (ref 8.6–10.5)
CHLORIDE SERPL-SCNC: 102 MMOL/L (ref 98–107)
CO2 SERPL-SCNC: 24.9 MMOL/L (ref 22–29)
CREAT SERPL-MCNC: 0.61 MG/DL (ref 0.57–1)
DEPRECATED RDW RBC AUTO: 45.1 FL (ref 37–54)
EGFRCR SERPLBLD CKD-EPI 2021: 109.8 ML/MIN/1.73
EOSINOPHIL # BLD AUTO: 0.37 10*3/MM3 (ref 0–0.4)
EOSINOPHIL NFR BLD AUTO: 5.2 % (ref 0.3–6.2)
ERYTHROCYTE [DISTWIDTH] IN BLOOD BY AUTOMATED COUNT: 13.9 % (ref 12.3–15.4)
GLOBULIN UR ELPH-MCNC: 3.4 GM/DL
GLUCOSE SERPL-MCNC: 129 MG/DL (ref 65–99)
HCT VFR BLD AUTO: 36.4 % (ref 34–46.6)
HGB BLD-MCNC: 11.9 G/DL (ref 12–15.9)
IMM GRANULOCYTES # BLD AUTO: 0.02 10*3/MM3 (ref 0–0.05)
IMM GRANULOCYTES NFR BLD AUTO: 0.3 % (ref 0–0.5)
LYMPHOCYTES # BLD AUTO: 2.37 10*3/MM3 (ref 0.7–3.1)
LYMPHOCYTES NFR BLD AUTO: 33.3 % (ref 19.6–45.3)
MAGNESIUM SERPL-MCNC: 2.2 MG/DL (ref 1.6–2.6)
MCH RBC QN AUTO: 29.2 PG (ref 26.6–33)
MCHC RBC AUTO-ENTMCNC: 32.7 G/DL (ref 31.5–35.7)
MCV RBC AUTO: 89.2 FL (ref 79–97)
MONOCYTES # BLD AUTO: 1.34 10*3/MM3 (ref 0.1–0.9)
MONOCYTES NFR BLD AUTO: 18.8 % (ref 5–12)
NEUTROPHILS NFR BLD AUTO: 2.98 10*3/MM3 (ref 1.7–7)
NEUTROPHILS NFR BLD AUTO: 42 % (ref 42.7–76)
NRBC BLD AUTO-RTO: 0.4 /100 WBC (ref 0–0.2)
PLATELET # BLD AUTO: 358 10*3/MM3 (ref 140–450)
PMV BLD AUTO: 10.3 FL (ref 6–12)
POTASSIUM SERPL-SCNC: 3.9 MMOL/L (ref 3.5–5.2)
PROT SERPL-MCNC: 7 G/DL (ref 6–8.5)
QTC INTERVAL: NORMAL MS
RBC # BLD AUTO: 4.08 10*6/MM3 (ref 3.77–5.28)
SODIUM SERPL-SCNC: 138 MMOL/L (ref 136–145)
WBC NRBC COR # BLD AUTO: 7.11 10*3/MM3 (ref 3.4–10.8)

## 2024-10-20 PROCEDURE — 80053 COMPREHEN METABOLIC PANEL: CPT | Performed by: INTERNAL MEDICINE

## 2024-10-20 PROCEDURE — 85025 COMPLETE CBC W/AUTO DIFF WBC: CPT | Performed by: INTERNAL MEDICINE

## 2024-10-20 PROCEDURE — 25010000002 PROCHLORPERAZINE 10 MG/2ML SOLUTION: Performed by: INTERNAL MEDICINE

## 2024-10-20 PROCEDURE — 99232 SBSQ HOSP IP/OBS MODERATE 35: CPT | Performed by: INTERNAL MEDICINE

## 2024-10-20 PROCEDURE — 25010000002 MORPHINE PER 10 MG: Performed by: INTERNAL MEDICINE

## 2024-10-20 PROCEDURE — 25010000002 ENOXAPARIN PER 10 MG: Performed by: INTERNAL MEDICINE

## 2024-10-20 PROCEDURE — 83735 ASSAY OF MAGNESIUM: CPT | Performed by: INTERNAL MEDICINE

## 2024-10-20 PROCEDURE — 93005 ELECTROCARDIOGRAM TRACING: CPT | Performed by: INTERNAL MEDICINE

## 2024-10-20 PROCEDURE — 25010000002 CEFEPIME PER 500 MG: Performed by: INTERNAL MEDICINE

## 2024-10-20 RX ORDER — HYDROXYZINE HYDROCHLORIDE 25 MG/1
50 TABLET, FILM COATED ORAL NIGHTLY
Status: DISCONTINUED | OUTPATIENT
Start: 2024-10-20 | End: 2024-10-21 | Stop reason: HOSPADM

## 2024-10-20 RX ORDER — QUETIAPINE FUMARATE 25 MG/1
100 TABLET, FILM COATED ORAL NIGHTLY
Status: DISCONTINUED | OUTPATIENT
Start: 2024-10-20 | End: 2024-10-21 | Stop reason: HOSPADM

## 2024-10-20 RX ORDER — DOCUSATE SODIUM 100 MG/1
100 CAPSULE, LIQUID FILLED ORAL 2 TIMES DAILY
Status: DISCONTINUED | OUTPATIENT
Start: 2024-10-20 | End: 2024-10-21 | Stop reason: HOSPADM

## 2024-10-20 RX ORDER — LEVOTHYROXINE SODIUM 125 UG/1
125 TABLET ORAL DAILY
Status: DISCONTINUED | OUTPATIENT
Start: 2024-10-20 | End: 2024-10-21 | Stop reason: HOSPADM

## 2024-10-20 RX ORDER — HYDROCORTISONE 10 MG/1
15 TABLET ORAL 2 TIMES DAILY
Status: DISCONTINUED | OUTPATIENT
Start: 2024-10-20 | End: 2024-10-21 | Stop reason: HOSPADM

## 2024-10-20 RX ORDER — POLYETHYLENE GLYCOL 3350 17 G/17G
17 POWDER, FOR SOLUTION ORAL DAILY
Status: DISCONTINUED | OUTPATIENT
Start: 2024-10-20 | End: 2024-10-21 | Stop reason: HOSPADM

## 2024-10-20 RX ORDER — PROPRANOLOL HCL 60 MG
60 CAPSULE, EXTENDED RELEASE 24HR ORAL DAILY
Status: DISCONTINUED | OUTPATIENT
Start: 2024-10-20 | End: 2024-10-21 | Stop reason: HOSPADM

## 2024-10-20 RX ORDER — FLUCONAZOLE 150 MG/1
150 TABLET ORAL DAILY
Status: COMPLETED | OUTPATIENT
Start: 2024-10-20 | End: 2024-10-20

## 2024-10-20 RX ORDER — TRAZODONE HYDROCHLORIDE 50 MG/1
50 TABLET, FILM COATED ORAL NIGHTLY
Status: DISCONTINUED | OUTPATIENT
Start: 2024-10-20 | End: 2024-10-21 | Stop reason: HOSPADM

## 2024-10-20 RX ADMIN — CEFEPIME 2000 MG: 2 INJECTION, POWDER, FOR SOLUTION INTRAVENOUS at 21:24

## 2024-10-20 RX ADMIN — FLUCONAZOLE 150 MG: 150 TABLET ORAL at 09:40

## 2024-10-20 RX ADMIN — MORPHINE SULFATE 4 MG: 4 INJECTION, SOLUTION INTRAMUSCULAR; INTRAVENOUS at 08:58

## 2024-10-20 RX ADMIN — PROCHLORPERAZINE EDISYLATE 5 MG: 5 INJECTION INTRAMUSCULAR; INTRAVENOUS at 18:38

## 2024-10-20 RX ADMIN — PROCHLORPERAZINE EDISYLATE 5 MG: 5 INJECTION INTRAMUSCULAR; INTRAVENOUS at 08:58

## 2024-10-20 RX ADMIN — POLYETHYLENE GLYCOL 3350 17 G: 17 POWDER, FOR SOLUTION ORAL at 09:41

## 2024-10-20 RX ADMIN — QUETIAPINE FUMARATE 100 MG: 25 TABLET ORAL at 21:26

## 2024-10-20 RX ADMIN — HYDROCODONE BITARTRATE AND ACETAMINOPHEN 1 TABLET: 5; 325 TABLET ORAL at 00:52

## 2024-10-20 RX ADMIN — LEVOTHYROXINE SODIUM 125 MCG: 125 TABLET ORAL at 09:42

## 2024-10-20 RX ADMIN — HYDROCODONE BITARTRATE AND ACETAMINOPHEN 1 TABLET: 5; 325 TABLET ORAL at 18:38

## 2024-10-20 RX ADMIN — PROPRANOLOL HYDROCHLORIDE 60 MG: 60 CAPSULE, EXTENDED RELEASE ORAL at 09:41

## 2024-10-20 RX ADMIN — ENOXAPARIN SODIUM 40 MG: 100 INJECTION SUBCUTANEOUS at 08:23

## 2024-10-20 RX ADMIN — DOCUSATE SODIUM 100 MG: 100 CAPSULE, LIQUID FILLED ORAL at 21:26

## 2024-10-20 RX ADMIN — Medication 10 ML: at 21:27

## 2024-10-20 RX ADMIN — HYDROXYZINE HYDROCHLORIDE 50 MG: 25 TABLET, FILM COATED ORAL at 21:26

## 2024-10-20 RX ADMIN — RIFAXIMIN 550 MG: 550 TABLET ORAL at 09:42

## 2024-10-20 RX ADMIN — Medication 800 UNITS: at 09:41

## 2024-10-20 RX ADMIN — CEFEPIME 2000 MG: 2 INJECTION, POWDER, FOR SOLUTION INTRAVENOUS at 04:04

## 2024-10-20 RX ADMIN — Medication 10 ML: at 08:24

## 2024-10-20 RX ADMIN — DOCUSATE SODIUM 100 MG: 100 CAPSULE, LIQUID FILLED ORAL at 09:42

## 2024-10-20 RX ADMIN — HYDROCORTISONE 15 MG: 10 TABLET ORAL at 21:25

## 2024-10-20 RX ADMIN — PANCRELIPASE 108000 UNITS OF LIPASE: 60000; 12000; 38000 CAPSULE, DELAYED RELEASE PELLETS ORAL at 12:31

## 2024-10-20 RX ADMIN — TRAZODONE HYDROCHLORIDE 50 MG: 50 TABLET ORAL at 21:26

## 2024-10-20 RX ADMIN — RIFAXIMIN 550 MG: 550 TABLET ORAL at 21:25

## 2024-10-20 RX ADMIN — PROCHLORPERAZINE EDISYLATE 5 MG: 5 INJECTION INTRAMUSCULAR; INTRAVENOUS at 00:52

## 2024-10-20 RX ADMIN — CEFEPIME 2000 MG: 2 INJECTION, POWDER, FOR SOLUTION INTRAVENOUS at 12:30

## 2024-10-20 RX ADMIN — HYDROCORTISONE 15 MG: 10 TABLET ORAL at 09:41

## 2024-10-20 RX ADMIN — VORTIOXETINE 5 MG: 5 TABLET, FILM COATED ORAL at 21:26

## 2024-10-20 NOTE — PROGRESS NOTES
[facility]   Hospitalist Progress Note  Date: 10/20/2024  Patient Name: Kayla Gan  : 1975  MRN: [id number]  Date of admission: 10/19/2024      Subjective   Subjective     Chief Complaint: Follow up for UTI    Summary: 49 F with autoimmune pancreatitis requiring pancreatectomy, type 2 diabetes on insulin pump, hypothyroidism, cirrhosis of the liver with portal hypertension, and history of gastroparesis, recent discharge from [facility] for Klebsiella bacteremia (sent home on Keflex and Zyvox).  CT abdomen pelvis no acute finding.  On cefepime    Interval Followup:   No fevers.  Blood pressure control  Does report chills, dysuria, right flank pain but she was having this prior to admission and it is not as bad as it has been  No vomiting, abdominal pain otherwise, hematuria    Objective   Objective     Vitals:   Temp:  [97.7 °F (36.5 °C)-99 °F (37.2 °C)] 97.7 °F (36.5 °C)  Heart Rate:  [] 89  Resp:  [16-18] 16  BP: (105-145)/(64-96) 145/96  Physical Exam    Constitutional: conversant, NAD   Respiratory:  nonlabored respirations    Cardiovascular:  RRR, no edema   Gastrointestinal: soft, nondistended   Neurologic: Alert, speech clear   Skin: Extremities warm    Result Review    Result Review:  I have personally reviewed the following over the last 24 hours (07:00 to 07:00) and agree with the following findings  [x]  Laboratory  CBC          10/17/2024    09:33 10/19/2024    11:33 10/20/2024    04:07   CBC   WBC 8.17  13.45  7.11    RBC 4.51  4.48  4.08    Hemoglobin 13.2  13.2  11.9    Hematocrit 40.5  40.1  36.4    MCV 89.8  89.5  89.2    MCH 29.3  29.5  29.2    MCHC 32.6  32.9  32.7    RDW 14.1  13.8  13.9    Platelets 433  390  358      CMP          10/17/2024    09:33 10/19/2024    11:33 10/20/2024    04:07   CMP   Glucose 127  136  129    BUN 11  12  7    Creatinine 0.66  0.76  0.61    EGFR 107.7  96.2  109.8    Sodium 142  141  138    Potassium 4.1  3.8  3.9    Chloride 105  101   102    Calcium 9.5  9.2  9.3    Total Protein 7.8  7.6  7.0    Albumin 4.0  4.0  3.6    Globulin 3.8  3.6  3.4    Total Bilirubin 0.3  0.7  0.7    Alkaline Phosphatase 130  141  123    AST (SGOT) 24  32  25    ALT (SGPT) 21  27  23    Albumin/Globulin Ratio 1.1  1.1  1.1    BUN/Creatinine Ratio 16.7  15.8  11.5    Anion Gap 10.6  15.2  11.1        [x]  Microbiology  Blood cultures pending    Urine culture   50,000 CFU/mL Pseudomonas aeruginos   Susceptibility     Pseudomonas aeruginosa     CASEY     Cefepime 2 ug/ml Susceptible     Ceftazidime 2 ug/ml Susceptible     Ciprofloxacin <=0.25 ug/ml Susceptible     Levofloxacin 0.5 ug/ml Susceptible     Piperacillin + Tazobactam 8 ug/ml Susceptible     Tobramycin <=1 ug/ml Susceptible         [x]  Radiology  CT Abdomen Pelvis With Contrast    Result Date: 10/19/2024  CT ABDOMEN PELVIS W CONTRAST Date of Exam: 10/19/2024 1:48 PM EDT Indication: eval R flank pain, recurrent UTI, sepsis. Comparison: 9/20/2024 Technique: Axial CT images were obtained of the abdomen and pelvis after the uneventful intravenous administration of iodinated contrast. Reconstructed coronal and sagittal images were also obtained. Automated exposure control and iterative construction methods were used. Findings: There is minimal left lower lobe atelectasis. There are geographic areas of low-attenuation within the liver compatible with fatty infiltration. Again seen are small amounts of pneumobilia. Hepatic veins and portal vein appear patent. Patient is status post cholecystectomy. Patient also appears to be status post pancreatectomy and splenectomy. There is a gastric pacemaker in place. The upper GI tract is otherwise unremarkable. There is an area of fat necrosis with calcification along left lateral wall of the stomach. Bilateral adrenal glands are within normal limits. There is no evidence of renal or ureteral stone or hydronephrosis. No abdominal or retroperitoneal adenopathy. Pelvis: Urinary  bladder is within normal limits. Patient is status post hysterectomy. GI tract appears within normal limits. The appendix is not visualized. No pelvic or inguinal adenopathy. No free intraperitoneal fluid. No lytic or sclerotic bony lesions are seen.     Impression: 1. No acute process seen within the abdomen or pelvis. No evidence of right-sided hydronephrosis, renal, or ureteral stone. 2. Status post hysterectomy, cholecystectomy, splenectomy and pancreatectomy. 3. Hepatic steatosis. Electronically Signed: Clovis Collins MD  10/19/2024 2:05 PM EDT  Workstation ID: [id number]    [x]  EKG/Telemetry monitor personally reviewed and independently interpreted: NSR with PVCs  []  Cardiology/Vascular   []  Pathology  []  Old records  []  Other:    Assessment & Plan   Assessment / Plan     Assessment/Plan:  Sepsis 2/2 Pseudomonas UTI  History of interstitial cystitis  History of autoimmune pancreatitis  Type 2 diabetes diabetes on insulin pump  Autoimmune pancreatitis  History of [place]'s disease  Hypothyroidism       Urine culture from 10/15 50,000 CFU Pseudomonas, susceptibilities noted above  Does report symptoms of UTI  White count 13.4 -> 7.11. No fevers  Blood cultures NGTD  CT abdomen pelvis no acute intra-abdominal process  Continue cefepime 2 g q8 hours.  Likely transition to FQ pending clear Bcx for 48 hours    Blood sugars controlled.  Does have insulin pump running and okay to continue using    Majority of her chronic medicines have been restarted    Check EKG given use of QTc prolonging medication    Continue Lovenox for VTE prophylaxis  CBC, BMP in AM    Discussed plan with RN.      CODE STATUS:   Level Of Support Discussed With: Patient  Code Status (Patient has no pulse and is not breathing): CPR (Attempt to Resuscitate)  Medical Interventions (Patient has pulse or is breathing): Full Support    Electronically signed by Tavo Driver DO, 10/20/24, 2:19 PM EDT.

## 2024-10-20 NOTE — PLAN OF CARE
Goal Outcome Evaluation:      [facility] count down. VSS.  IV ABX cont. No visible signs of distress Pain treated per MAR

## 2024-10-20 NOTE — PLAN OF CARE
Goal Outcome Evaluation:  Plan of Care Reviewed With: patient        Progress: improving  Outcome Evaluation: Patient had c/o nausea and pain, controled by PRN meds. HR and BP improved throughout shift. Patient is now resting comfortably in bed. Will continue to monitor.

## 2024-10-21 ENCOUNTER — HOSPITAL ENCOUNTER (OUTPATIENT)
Dept: INTERVENTIONAL RADIOLOGY/VASCULAR | Facility: HOSPITAL | Age: 49
Discharge: HOME | End: 2024-10-21
Payer: MEDICARE

## 2024-10-21 ENCOUNTER — READMISSION MANAGEMENT (OUTPATIENT)
Dept: CALL CENTER | Facility: HOSPITAL | Age: 49
End: 2024-10-21
Payer: MEDICARE

## 2024-10-21 VITALS
WEIGHT: 192.9 LBS | SYSTOLIC BLOOD PRESSURE: 141 MMHG | RESPIRATION RATE: 16 BRPM | DIASTOLIC BLOOD PRESSURE: 77 MMHG | OXYGEN SATURATION: 97 % | TEMPERATURE: 98.2 F | BODY MASS INDEX: 30.28 KG/M2 | HEART RATE: 74 BPM | HEIGHT: 67 IN

## 2024-10-21 LAB
ANION GAP SERPL CALCULATED.3IONS-SCNC: 11.3 MMOL/L (ref 5–15)
BACTERIA SPEC AEROBE CULT: NO GROWTH
BUN SERPL-MCNC: 11 MG/DL (ref 6–20)
BUN/CREAT SERPL: 20.8 (ref 7–25)
CALCIUM SPEC-SCNC: 9.6 MG/DL (ref 8.6–10.5)
CHLORIDE SERPL-SCNC: 104 MMOL/L (ref 98–107)
CO2 SERPL-SCNC: 24.7 MMOL/L (ref 22–29)
CREAT SERPL-MCNC: 0.53 MG/DL (ref 0.57–1)
DEPRECATED RDW RBC AUTO: 46.6 FL (ref 37–54)
EGFRCR SERPLBLD CKD-EPI 2021: 113.5 ML/MIN/1.73
ERYTHROCYTE [DISTWIDTH] IN BLOOD BY AUTOMATED COUNT: 13.8 % (ref 12.3–15.4)
GLUCOSE BLDC GLUCOMTR-MCNC: 121 MG/DL (ref 70–99)
GLUCOSE BLDC GLUCOMTR-MCNC: 181 MG/DL (ref 70–99)
GLUCOSE SERPL-MCNC: 119 MG/DL (ref 65–99)
HCT VFR BLD AUTO: 38.2 % (ref 34–46.6)
HGB BLD-MCNC: 12.2 G/DL (ref 12–15.9)
MCH RBC QN AUTO: 29.3 PG (ref 26.6–33)
MCHC RBC AUTO-ENTMCNC: 31.9 G/DL (ref 31.5–35.7)
MCV RBC AUTO: 91.6 FL (ref 79–97)
PLATELET # BLD AUTO: 339 10*3/MM3 (ref 140–450)
PMV BLD AUTO: 10 FL (ref 6–12)
POTASSIUM SERPL-SCNC: 4.3 MMOL/L (ref 3.5–5.2)
RBC # BLD AUTO: 4.17 10*6/MM3 (ref 3.77–5.28)
SODIUM SERPL-SCNC: 140 MMOL/L (ref 136–145)
WBC NRBC COR # BLD AUTO: 7.75 10*3/MM3 (ref 3.4–10.8)

## 2024-10-21 PROCEDURE — 25010000002 ENOXAPARIN PER 10 MG: Performed by: INTERNAL MEDICINE

## 2024-10-21 PROCEDURE — 80048 BASIC METABOLIC PNL TOTAL CA: CPT | Performed by: INTERNAL MEDICINE

## 2024-10-21 PROCEDURE — 99239 HOSP IP/OBS DSCHRG MGMT >30: CPT | Performed by: INTERNAL MEDICINE

## 2024-10-21 PROCEDURE — 82948 REAGENT STRIP/BLOOD GLUCOSE: CPT | Performed by: INTERNAL MEDICINE

## 2024-10-21 PROCEDURE — 97161 PT EVAL LOW COMPLEX 20 MIN: CPT

## 2024-10-21 PROCEDURE — 82948 REAGENT STRIP/BLOOD GLUCOSE: CPT

## 2024-10-21 PROCEDURE — 25010000002 CEFEPIME PER 500 MG: Performed by: INTERNAL MEDICINE

## 2024-10-21 PROCEDURE — 85027 COMPLETE CBC AUTOMATED: CPT | Performed by: INTERNAL MEDICINE

## 2024-10-21 RX ORDER — QUETIAPINE FUMARATE 50 MG/1
50 TABLET, FILM COATED ORAL NIGHTLY
Start: 2024-10-21

## 2024-10-21 RX ORDER — CIPROFLOXACIN 500 MG/1
500 TABLET, FILM COATED ORAL 2 TIMES DAILY
Start: 2024-10-21 | End: 2024-10-25

## 2024-10-21 RX ORDER — INSULIN LISPRO 100 [IU]/ML
1000 INJECTION, SOLUTION INTRAVENOUS; SUBCUTANEOUS ONCE
Status: DISCONTINUED | OUTPATIENT
Start: 2024-10-21 | End: 2024-10-21 | Stop reason: HOSPADM

## 2024-10-21 RX ORDER — FLUCONAZOLE 150 MG/1
150 TABLET ORAL
Qty: 3 TABLET | Refills: 0 | Status: SHIPPED | OUTPATIENT
Start: 2024-10-21 | End: 2024-10-28

## 2024-10-21 RX ADMIN — PROPRANOLOL HYDROCHLORIDE 60 MG: 60 CAPSULE, EXTENDED RELEASE ORAL at 08:56

## 2024-10-21 RX ADMIN — POLYETHYLENE GLYCOL 3350 17 G: 17 POWDER, FOR SOLUTION ORAL at 08:56

## 2024-10-21 RX ADMIN — CEFEPIME 2000 MG: 2 INJECTION, POWDER, FOR SOLUTION INTRAVENOUS at 11:53

## 2024-10-21 RX ADMIN — RIFAXIMIN 550 MG: 550 TABLET ORAL at 08:56

## 2024-10-21 RX ADMIN — LEVOTHYROXINE SODIUM 125 MCG: 125 TABLET ORAL at 08:56

## 2024-10-21 RX ADMIN — CEFEPIME 2000 MG: 2 INJECTION, POWDER, FOR SOLUTION INTRAVENOUS at 04:28

## 2024-10-21 RX ADMIN — PANCRELIPASE 108000 UNITS OF LIPASE: 60000; 12000; 38000 CAPSULE, DELAYED RELEASE PELLETS ORAL at 09:02

## 2024-10-21 RX ADMIN — Medication 800 UNITS: at 08:55

## 2024-10-21 RX ADMIN — HYDROCORTISONE 15 MG: 10 TABLET ORAL at 08:55

## 2024-10-21 RX ADMIN — ENOXAPARIN SODIUM 40 MG: 100 INJECTION SUBCUTANEOUS at 08:57

## 2024-10-21 RX ADMIN — DOCUSATE SODIUM 100 MG: 100 CAPSULE, LIQUID FILLED ORAL at 08:56

## 2024-10-21 RX ADMIN — PANCRELIPASE 108000 UNITS OF LIPASE: 60000; 12000; 38000 CAPSULE, DELAYED RELEASE PELLETS ORAL at 11:53

## 2024-10-21 RX ADMIN — Medication 10 ML: at 08:58

## 2024-10-21 NOTE — THERAPY EVALUATION
Acute Care - Physical Therapy Initial Evaluation and Discharge  [facility]     Patient Name: Kayla Gan  : 1975  MRN: [id number]  Today's Date: 10/21/2024      Visit Dx:     ICD-10-CM ICD-9-CM   1. Acute pyelonephritis  N10 590.10   2. Acute right flank pain  R10.9 789.09     338.19   3. Urinary tract infection due to Pseudomonas aeruginosa  N39.0 599.0    B96.5 041.7   4. Sepsis with acute organ dysfunction without septic shock, due to unspecified organism, unspecified organ dysfunction type  A41.9 038.9    R65.20 995.92   5. Recurrent UTI  N39.0 599.0   6. Difficulty in walking  R26.2 719.7     Patient Active Problem List   Diagnosis    Abnormal liver function tests    Obesity with body mass index 30 or greater    Type 2 diabetes mellitus with diabetic autonomic (poly)neuropathy    Hypothyroidism    Adrenal insufficiency    Hypertensive disorder    Deep venous thrombosis    Degeneration of lumbar intervertebral disc    Major depressive disorder    Displacement of lumbar intervertebral disc without myelopathy    Gastroparesis    Gastrostomy complication    H/O: hematuria    Hashimoto's thyroiditis    Hyperglycemia    Incomplete defecation    Increased frequency of urination    Intestinal autonomic neuropathy    Small intestinal bacterial overgrowth (SIBO)    Iron deficiency anemia secondary to inadequate dietary iron intake    Irritable bowel syndrome    Long term current use of systemic steroids    Migraine    Lumbosacral spondylosis without myelopathy    Methicillin resistant Staphylococcus aureus infection    Myalgia    Post-operative nausea and vomiting    Nonalcoholic steatohepatitis (WELDON)    Noninfectious gastroenteritis    HI (obstructive sleep apnea)    Osteoporosis    Disorder of bladder    Other transplanted organ and tissue status    Pelvic floor relaxation    Pancreas divisum    Postoperative pain    Postpancreatectomy hyperglycemia    Prolapse of anterior vaginal wall    Recurrent  urinary tract infection    Female stress incontinence    Ulcerative colitis    Acute cystitis    Admission for fitting and adjustment of vascular catheter    Allergic rhinitis    Anemia    Anxiety and depression    Arthritis    Asthma    Blood coagulation disorder    Chronic fatigue    Chronic interstitial cystitis    Chronic pain disorder    Chronic pancreatitis    Cobalamin deficiency    Complication associated with vascular device    Constipation    Drug-induced osteoporosis    Dysmorphism    Fatty liver    Gastroesophageal reflux disease    Generalized anxiety disorder    Hyperlipidemia    Inflammation of sacroiliac joint    Intolerance of continuous positive airway pressure (CPAP) ventilation    Liver cirrhosis secondary to WELDON    Lumbar spondylosis    Sciatica    Seasonal allergies    Spleen absent    Tear of triangular fibrocartilage    Type 1 diabetes mellitus without complication    Urinary incontinence    Vitamin D deficiency    Portal hypertension    Family history of breast cancer    Dyspnea    Epigastric pain    Influenza B    History of insertion of central venous access port    Fever of unknown origin    Over weight    Sepsis     Past Medical History:   Diagnosis Date    Adrenal insufficiency     Allergic Unsure    Foods, [place], Vancomycin    Anemia     Anxiety     Arthritis     Asthma Unsure    Bleeding disorder     Cholelithiasis Unsure    Cirrhosis     Clotting disorder Unsure    Colon polyp     Crohn's disease     Depression     Diabetes mellitus     Gastroparesis     GERD (gastroesophageal reflux disease)     History of MRSA infection     History of transfusion     Hyperlipidemia     Hypertension     Hypothyroidism     Interstitial cystitis     Irritable bowel syndrome Unsure    Liver disease     Low back pain     Lumbosacral disc disease     Migraines     MRSA (methicillin resistant Staphylococcus aureus)     Pancreatitis     Urinary incontinence     Urinary tract infection Unsure     Interstitial Cystitis    Vaginal infection      Past Surgical History:   Procedure Laterality Date    ABSCESS DRAINAGE  12/24/2019    Fluoroscopically guided abscess drainage, [facility]    ADENOIDECTOMY      BACK SURGERY      L4 L5    BLADDER SURGERY      CHOLECYSTECTOMY N/A     COLONOSCOPY  04/24/2019    NBIH, 3 mm polyp    CYSTOSCOPY      DILATATION AND CURETTAGE      ENDOMETRIAL ABLATION      ENDOSCOPY N/A 04/24/2019    Normal    ENTEROSCOPY SMALL BOWEL      EPIDURAL BLOCK      ERCP  2019    GASTRIC STIMULATOR IMPLANT SURGERY      GASTROJEJUNOSTOMY W/ JEJUNOSTOMY TUBE      removed    HYSTERECTOMY      LYMPH NODE BIOPSY      OOPHORECTOMY      OTHER SURGICAL HISTORY      gastric stimulator battery replaced    PANCREATECTOMY  12/2019    TPIAT    PELVIC FLOOR REPAIR      SINUS SURGERY      SPLENECTOMY      TONSILLECTOMY      VENOUS ACCESS DEVICE (PORT) INSERTION       PT Assessment (Last 12 Hours)       PT Evaluation and Treatment       Row Name 10/21/24 1519          Physical Therapy Time and Intention    Subjective Information complains of;fatigue;weakness;pain (P)   -     Document Type evaluation (P)   -KL     Mode of Treatment individual therapy;physical therapy (P)   -KL     Total Minutes, Physical Therapy 30 (P)   -KL     Patient Effort good (P)   -KL     Symptoms Noted During/After Treatment none (P)   -KL       Row Name 10/21/24 5569          General Information    Patient Profile Reviewed yes (P)   -KL     Patient Observations alert;cooperative;agree to therapy (P)   -KL     Prior Level of Function independent: (P)   -KL     Equipment Currently Used at Home none (P)   -KL     Existing Precautions/Restrictions no known precautions/restrictions (P)   -KL     Risks Reviewed patient: (P)   -KL     Benefits Reviewed patient: (P)   -KL     Barriers to Rehab none identified (P)   -       Row Name 10/21/24 1519          Previous Level of Function/Home Environm    Bathing, Previous Functional Level  independent (P)   -KL     Grooming, Previous Functional Level independent (P)   -KL     Dressing, Previous Functional Level independent (P)   -KL     Eating/Feeding, Previous Functional Level independent (P)   -KL     Toileting, Previous Functional Level independent (P)   -KL     BADLs, Previous Functional Level independent (P)   -KL     IADLs, Previous Functional Level independent (P)   -KL     Bed Mobility, Previous Functional Level independent (P)   -KL     Transfers, Previous Functional Level independent (P)   -KL     Household Ambulation, Previous Functional Level independent (P)   -KL     Stairs, Previous Functional Level independent (P)   -KL     Community Ambulation, Previous Functional Level independent (P)   -KL       Row Name 10/21/24 1517          Living Environment    Current Living Arrangements home (P)   -KL     People in Home child(rhonda), dependent (P)   -KL     Primary Care Provided by self (P)   -       Row Name 10/21/24 1517          Home Use of Assistive/Adaptive Equipment    Equipment Currently Used at Home cpap (P)   -       Row Name 10/21/24 1517          Range of Motion (ROM)    Range of Motion ROM is WNL (P)   -       Row Name 10/21/24 1517          Strength (Manual Muscle Testing)    Strength (Manual Muscle Testing) other (see comments) (P)   Pt scored 5/5 for bilateral hip flex, knee flex, knee ext, and ankle DF MMTs  -       Row Name 10/21/24 1517          Bed Mobility    Bed Mobility bed mobility (all) activities (P)   -KL     All Activities, [place] (Bed Mobility) contact guard (P)   -       Row Name 10/21/24 1517          Transfers    Transfers sit-stand transfer;stand-sit transfer (P)   -     Maintains Weight-bearing Status (Transfers) able to maintain (P)   -       Row Name 10/21/24 1517          Sit-Stand Transfer    Sit-Stand [place] (Transfers) contact guard (P)   -     Assistive Device (Sit-Stand Transfers) walker, front-wheeled (P)   -       Row Name  10/21/24 1517          Stand-Sit Transfer    Stand-Sit [place] (Transfers) contact guard (P)   -KL     Assistive Device (Stand-Sit Transfers) walker, front-wheeled (P)   -KL       Row Name 10/21/24 1517          Gait/Stairs (Locomotion)    Gait/Stairs Locomotion gait/ambulation independence (P)   -KL     [place] Level (Gait) contact guard (P)   -KL     Assistive Device (Gait) walker, front-wheeled (P)   -KL     Patient was able to Ambulate yes (P)   -KL     Distance in Feet (Gait) 400 (P)   -KL     Pattern (Gait) 2-point (P)   -KL       Row Name 10/21/24 1517          Safety Issues/Impairments Affecting Functional Mobility    Impairments Affecting Function (Mobility) balance;coordination;motor control;pain;range of motion (ROM);shortness of breath;strength (P)   -KL       Row Name 10/21/24 1517          Balance    Balance Assessment standing dynamic balance (P)   -KL     Dynamic Standing Balance contact guard (P)   -KL     Position/Device Used, Standing Balance walker, front-wheeled (P)   -KL     Balance Interventions standing (P)   -KL       Row Name 10/21/24 1517          Plan of Care Review    Plan of Care Reviewed With patient (P)   -KL     Progress improving (P)   -KL     Outcome Evaluation Pt presents to treatment with complaints of fatigue and weakness in BLE. Was able to ambulate with minimal difficulty and slight complaints of fatigue. Recommend discharge to home at this time. (P)   -KL       Row Name 10/21/24 1517          Positioning and Restraints    Pre-Treatment Position in bed (P)   -KL     Post Treatment Position bed (P)   -KL     In Bed supine (P)   -KL       Row Name 10/21/24 1517          Therapy Assessment/Plan (PT)    Rehab Potential (PT) good (P)   -KL     Criteria for Skilled Interventions Met (PT) no (P)   -KL     Therapy Frequency (PT) evaluation only (P)   -KL     Problem List (PT) problems related to;balance;cognition;coordination;mobility;range of motion  (ROM);strength;pain;postural control (P)   -       Row Name 10/21/24 1517          Therapy Plan Review/Discharge Plan (PT)    Therapy Plan Review (PT) evaluation/treatment results reviewed (P)   -       Row Name 10/21/24 1517          Physical Therapy Goals    Problem Specific Goal Selection (PT) problem specific goal 1, PT (P)   -       Row Name 10/21/24 1517          Problem Specific Goal 1 (PT)    Problem Specific Goal 1 (PT) Ambulate without pain (P)   -     Time Frame (Problem Specific Goal 1, PT) by discharge (P)   -     Progress/Outcome (Problem Specific Goal 1, PT) goal met (P)   -               User Key  (r) = Recorded By, (t) = Taken By, (c) = Cosigned By      Initials Name Provider Type    Yoon Márquez, PT Student PT Student                    Physical Therapy Education       Title: PT OT SLP Therapies (Done)       Topic: Physical Therapy (Done)       Point: Mobility training (Done)       Learning Progress Summary            Patient Acceptance, E,TB, VU by  at 10/21/2024 1520                      Point: Home exercise program (Done)       Learning Progress Summary            Patient Acceptance, E,TB, VU by  at 10/21/2024 1520                      Point: Body mechanics (Done)       Learning Progress Summary            Patient Acceptance, E,TB, VU by  at 10/21/2024 1520                      Point: Precautions (Done)       Learning Progress Summary            Patient Acceptance, E,TB, VU by  at 10/21/2024 1520                                      User Key       Initials Effective Dates Name Provider Type [facility] 08/27/24 -  Yoon Zimmerman, PT Student PT Student PT                  PT Recommendation and Plan  Anticipated Discharge Disposition (PT): (P) home  Therapy Frequency (PT): (P) evaluation only  Plan of Care Reviewed With: (P) patient  Progress: (P) improving  Outcome Evaluation: (P) Pt presents to treatment with complaints of fatigue and weakness in BLE. Was able to  ambulate with minimal difficulty and slight complaints of fatigue. Recommend discharge to home at this time.   Outcome Measures       Row Name 10/21/24 1500             How much help from another person do you currently need...    Turning from your back to your side while in flat bed without using bedrails? 4 (P)   -KL      Moving from lying on back to sitting on the side of a flat bed without bedrails? 4 (P)   -KL      Moving to and from a bed to a chair (including a wheelchair)? 4 (P)   -KL      Standing up from a chair using your arms (e.g., wheelchair, bedside chair)? 4 (P)   -KL      Climbing 3-5 steps with a railing? 4 (P)   -KL      To walk in hospital room? 4 (P)   -KL      AM-PAC 6 Clicks Score (PT) 24 (P)   -KL         Functional Assessment    Outcome Measure Options AM-PAC 6 Clicks Basic Mobility (PT) (P)   -KL                User Key  (r) = Recorded By, (t) = Taken By, (c) = Cosigned By      Initials Name Provider Type    Yoon Márquez PT Student PT Student                     Time Calculation:    PT Charges       Row Name 10/21/24 1517             Time Calculation    PT Received On 10/21/24 (P)   -KL         Time Calculation- PT    Total Timed Code Minutes- PT 30 minute(s) (P)   -KL         Untimed Charges    PT Eval/Re-eval Minutes 30 (P)   -KL         Total Minutes    Untimed Charges Total Minutes 30 (P)   -KL       Total Minutes 30 (P)   -                User Key  (r) = Recorded By, (t) = Taken By, (c) = Cosigned By      Initials Name Provider Type    Yoon Márquez PT Student PT Student                  Therapy Charges for Today       Code Description Service Date Service Provider Modifiers Qty    [id number] HC PT EVAL LOW COMPLEXITY 2 10/21/2024 Yoon Zimmerman PT Student GP 1            PT G-Codes  Outcome Measure Options: (P) AM-PAC 6 Clicks Basic Mobility (PT)  AM-PAC 6 Clicks Score (PT): (P) 24    JUANITA Arora  10/21/2024

## 2024-10-21 NOTE — OUTREACH NOTE
Prep Survey      Flowsheet Row Responses   [facility]   Is LACE score < 7 ? No   Eligibility Readm Mgmt   Discharge diagnosis Sepsis secondary to urinary tract infection   Does the patient have one of the following disease processes/diagnoses(primary or secondary)? Sepsis   Does the patient have Home health ordered? No   Is there a DME ordered? No   Prep survey completed? Yes            Giovanna MONTERROSO - Registered Nurse

## 2024-10-21 NOTE — CASE MANAGEMENT/SOCIAL WORK
Discharge Planning Assessment   Gu[place]     Patient Name: Kayla Gan  MRN: [id number]  Today's Date: 10/21/2024    Admit Date: 10/19/2024    Plan: Pt lives with her daughter. Pt denies concerns affording groceries, utilities or medications. PCP: LUIS DANIEL Daniels Pharm: COLEMAN Guerra. Pt plans to return home at discharge. SW will continue to follow for needs.   Discharge Needs Assessment       Row Name 10/21/24 1042       Living Environment    People in Home child(rhonda), adult    Current Living Arrangements home    Potentially Unsafe Housing Conditions none    In the past 12 months has the electric, gas, oil, or water company threatened to shut off services in your home? No    Primary Care Provided by self    Provides Primary Care For no one    Family Caregiver if Needed none    Quality of Family Relationships helpful;involved    Able to Return to Prior Arrangements yes       Resource/Environmental Concerns    Resource/Environmental Concerns none    Transportation Concerns none       Transportation Needs    In the past 12 months, has lack of transportation kept you from medical appointments or from getting medications? no    In the past 12 months, has lack of transportation kept you from meetings, work, or from getting things needed for daily living? No       Food Insecurity    Within the past 12 months, you worried that your food would run out before you got the money to buy more. Never true    Within the past 12 months, the food you bought just didn't last and you didn't have money to get more. Never true       Transition Planning    Patient/Family Anticipates Transition to home with family    Patient/Family Anticipated Services at Transition none       Discharge Needs Assessment    Readmission Within the Last 30 Days no previous admission in last 30 days    Equipment Currently Used at Home cpap    Concerns to be Addressed discharge planning    Do you want help finding or keeping work or a job? I do not need or want help     Do you want help with school or training? For example, starting or completing job training or getting a high school diploma, GED or equivalent No    Anticipated Changes Related to Illness none    Equipment Needed After Discharge none    Discharge Coordination/Progress Pt lives with her daughter. Pt denies concerns affording groceries, utilities or medications. PCP: LUIS DANIEL Daniels Pharm: COLEMAN Guerra. Pt plans to return home at discharge. SW will continue to follow for needs.                   Discharge Plan       Row Name 10/21/24 1046       Plan    Plan Pt lives with her daughter. Pt denies concerns affording groceries, utilities or medications. PCP: LUIS DANIEL Daniels Pharm: COLEMAN Guerra. Pt plans to return home at discharge. SW will continue to follow for needs.                  Continued Care and Services - Admitted Since 10/19/2024    No active coordination exists for this encounter.          Demographic Summary       Row Name 10/21/24 1041       General Information    Admission Type inpatient    Arrived From emergency department    Referral Source admission list    Reason for Consult discharge planning    Preferred Language English       Contact Information    Permission Granted to Share Info With permission denied                   Functional Status       Row Name 10/21/24 1041       Functional Status    Usual Activity Tolerance good    Current Activity Tolerance good       Physical Activity    On average, how many days per week do you engage in moderate to strenuous exercise (like a brisk walk)? 0 days    On average, how many minutes do you engage in exercise at this level? 0 min    Number of minutes of exercise per week 0       Assessment of Health Literacy    How often do you have someone help you read hospital materials? Never    How often do you have problems learning about your medical condition because of difficulty understanding written information? Never    How often do you have a problem understanding what is told to  you about your medical condition? Never    How confident are you filling out medical forms by yourself? Quite a bit    Health Literacy Good       Functional Status, IADL    Medications independent    Housekeeping independent    Laundry independent    Shopping independent    If for any reason you need help with day-to-day activities such as bathing, preparing meals, shopping, managing finances, etc., do you get the help you need? I don't need any help       Mental Status    General Appearance WDL WDL       Mental Status Summary    Recent Changes in Mental Status/Cognitive Functioning no changes       Employment/    Employment Status disabled                   Psychosocial    No documentation.                  Abuse/Neglect    No documentation.                  Legal       Row Name 10/21/24 1042       Financial Resource Strain    How hard is it for you to pay for the very basics like food, housing, medical care, and heating? Not hard       Financial/Legal    Source of Income disability    Application for Public Assistance not applied       Legal    Criminal Activity/Legal Involvement none                   Substance Abuse    No documentation.                  Patient Forms    No documentation.                     Kayla Gallego

## 2024-10-21 NOTE — PLAN OF CARE
Goal Outcome Evaluation:  Plan of Care Reviewed With: (P) patient        Progress: (P) improving  Outcome Evaluation: (P) Pt presents to treatment with complaints of fatigue and weakness in BLE. Was able to ambulate with minimal difficulty and slight complaints of fatigue. Recommend discharge to home at this time.    Anticipated Discharge Disposition (PT): (P) home

## 2024-10-21 NOTE — DISCHARGE SUMMARY
[facility]         HOSPITALIST  DISCHARGE SUMMARY    Patient Name: Kayla Gan  : 1975  MRN: [id number]    Date of Admission: 10/19/2024  Date of Discharge:  10/21/24  Primary Care Physician: Justin Daniels APRN    Consults       Date and Time Order Name Status Description    10/19/2024 12:37 PM Hospitalist (on-call MD unless specified)              Active and Resolved Hospital Problems:  Sepsis secondary to urinary tract infection  Urinary tract infection secondary to Pseudomonas  History of interstitial cystitis  Vulvovaginal candidiasis  History of autoimmune pancreatitis  diabetes on insulin pump  Autoimmune pancreatitis  History of [place]'s disease  Hypothyroidism    Hospital Course     Hospital Course:  Kayla Gan is a 49 y.o. female with autoimmune pancreatitis requiring pancreatectomy, type 2 diabetes on insulin pump, hypothyroidism, cirrhosis of the liver with portal hypertension, and history of gastroparesis, recent discharge from [facility] for Klebsiella bacteremia with antibiotics completed on 10/19 who presented to the hospital after urine culture grew 50,000 CFU Pseudomonas.  She did report malaise, subjective fevers, dysuria, right-sided flank pain.  On presentation met sepsis criteria with tachycardia and WBC 13.45.  Blood pressure stable.  CT abdomen pelvis no acute finding.  Started on cefepime.  No fevers.  White count normalized on hospital day 2.  Symptoms of UTI improved.  Both repeat urine and blood cultures x2 no growth.  Had already been prescribed ciprofloxacin by her urologist to complete additional 4 days.  Discharged on fluconazole for a yeast infection and educated to hold Seroquel until antibiotics were completed. Recommended close follow-up.  Discharged home in stable condition.    Day of Discharge     Vital Signs:  Temp:  [97.3 °F (36.3 °C)-98.8 °F (37.1 °C)] 98.2 °F (36.8 °C)  Heart Rate:  [] 74  Resp:  [16] 16  BP:  (100171)/(60-96) 141/77  Physical Exam:   GENERAL: conversant and nontoxic.  HEART: No edema  LUNGS: nonlabored  ABDOMEN: Soft, nondistended  NEUROLOGIC: Alert, CN intact        Discharge Details        Discharge Medications        Changes to Medications        Instructions Start Date   fluconazole 150 MG tablet  Commonly known as: DIFLUCAN  What changed: when to take this   150 mg, Oral, Every 72 Hours      QUEtiapine 50 MG tablet  Commonly known as: SEROquel  What changed: additional instructions   50 mg, Oral, Nightly, HOLD WHILE TAKING CIPROFLOXACIN AND FLUCONAZOLE             Continue These Medications        Instructions Start Date   ciprofloxacin 500 MG tablet  Commonly known as: Cipro   500 mg, Oral, 2 Times Daily      Creon 65943-949982 units capsule delayed-release particles capsule  Generic drug: Pancrelipase (Lip-Prot-Amyl)   Take 3 capsules by mouth 3 (Three) Times a Day With Meals. TAKE THREE CAPSULES BY MOUTH WITH MEALS AND 2 CAPSULES WITH snacks      Pancrelipase (Lip-Prot-Amyl) 67408-553326 units capsule delayed-release particles capsule  Commonly known as: CREON   2 capsules, 2 Times Daily      docusate sodium 100 MG capsule  Commonly known as: COLACE   Take 1 capsule by mouth 2 (Two) Times a Day.      Enulose 10 GM/15ML solution solution (encephalopathy)  Generic drug: lactulose   Take 45 mL by mouth As Needed.      glucagon 1 MG injection  Commonly known as: GLUCAGEN   1 mg, Intramuscular, Every 15 Minutes PRN      hydrocortisone 5 MG tablet  Commonly known as: CORTEF   15 mg, 2 Times Daily      hydrOXYzine 50 MG tablet  Commonly known as: ATARAX   50 mg, Nightly      lansoprazole 30 MG capsule  Commonly known as: PREVACID   30 mg, Oral, 2 Times Daily      Lantus SoloStar 100 UNIT/ML injection pen  Generic drug: Insulin Glargine   47 Units, Daily      levothyroxine 125 MCG tablet  Commonly known as: SYNTHROID, LEVOTHROID   125 mcg, Daily      lisinopril 40 MG tablet  Commonly known as:  PRINIVIL,ZESTRIL   40 mg, Daily      MiraLax 17 GM/SCOOP powder  Generic drug: polyethylene glycol   17 g, Daily      NovoLOG 100 UNIT/ML injection  Generic drug: Insulin Aspart   Inject  under the skin into the appropriate area as directed. Type of Insulin: Lispro 100 units/ml  Type of Pump:Omnipod  Bolus: 1u 5-7g max bolus 2.5units/hr  Basal: 1633-1281 1.5u/hr max basal 10units/hr  Correction factor: 40mg/dl  Insulin to carbohydrate ratio: 7-5 g of carb  Next fill date: 10/21/2024    Date of last site change: 10/18/2024  Location of pod: right upper arm    Frequency of refill: 2-3 days  Last refill date: 10/18/2024    Prescriber: Dr. Chelsea Silver  Phone number: [phone]      Omnipod 5 G6 Pods (Gen 5) misc       ondansetron 8 MG tablet  Commonly known as: ZOFRAN   8 mg, Every 4 to 6 Hours PRN      prochlorperazine 10 MG tablet  Commonly known as: COMPAZINE   10 mg, Oral, Every 8 Hours PRN      promethazine 25 MG tablet  Commonly known as: PHENERGAN   1 tablet.      propranolol LA 60 MG 24 hr capsule  Commonly known as: INDERAL LA   1 capsule, Daily      Qulipta 60 MG tablet  Generic drug: Atogepant   1 tablet, Daily      riFAXIMin 550 MG tablet  Commonly known as: XIFAXAN   550 mg, Oral, Every 12 Hours Scheduled      traZODone 50 MG tablet  Commonly known as: DESYREL   50 mg, Nightly      Ubrelvy 100 MG tablet  Generic drug: ubrogepant   100 mg, Oral, Once As Needed      vitamin E 400 UNIT capsule   800 Units, Daily      Vortioxetine HBr 5 MG tablet tablet  Commonly known as: Trintellix   5 mg, Oral, Nightly               Allergies   Allergen Reactions    Other Other (See Comments)     Sent patient into kidney failure, heart stopped, in ICU for two days.    Parathyroid Hormone (Recomb) Other (See Comments) and Unknown - High Severity     Sent patient into kidney failure, heart stopped, in ICU for two days.    Romosozumab Hives     Other reaction(s): Hives    Other reaction(s): Hives   Other  reaction(s): Hives    Teriparatide Anaphylaxis and Other (See Comments)     Other reaction(s): Unknown    Tramadol Anaphylaxis and Other (See Comments)     Other reaction(s): Unknown, Unknown    Nifedipine Other (See Comments)     Rapid heart beat        Vancomycin Hives, Itching and Rash     vancomycin-infusion reaction (VIR, formerly Ivett's syndrome): give over longer infusion time       Discharge Disposition:  Home or Self Care    Diet:  Hospital:  Diet Order   Procedures    Diet: Cardiac, Diabetic; Healthy Heart (2-3 Na+); Consistent Carbohydrate; Fluid Consistency: Thin (IDDSI 0)       Discharge Activity:   Activity Instructions       Activity as Tolerated              CODE STATUS:  Code Status and Medical Interventions: CPR (Attempt to Resuscitate); Full Support   Ordered at: 10/19/24 1424     Level Of Support Discussed With:    Patient     Code Status (Patient has no pulse and is not breathing):    CPR (Attempt to Resuscitate)     Medical Interventions (Patient has pulse or is breathing):    Full Support         Future Appointments   Date Time Provider Department Center   7/9/2025 11:15 AM Sujit Andersen MD [facility] SLEEP CT [facility]       Additional Instructions for the Follow-ups that You Need to Schedule       Discharge Follow-up with Specified Provider: Dr Parker; 2 Days   As directed      To: Dr Parker   Follow Up: 2 Days                Pertinent  and/or Most Recent Results     PROCEDURES:   NONE    LAB RESULTS:      Lab 10/21/24  0408 10/20/24  0407 10/19/24  1207 10/19/24  1133 10/17/24  0933   WBC 7.75 7.11  --  13.45* 8.17   HEMOGLOBIN 12.2 11.9*  --  13.2 13.2   HEMATOCRIT 38.2 36.4  --  40.1 40.5   PLATELETS 339 358  --  390 433   NEUTROS ABS  --  2.98  --  9.00* 2.54   IMMATURE GRANS (ABS)  --  0.02  --  0.04 0.01   LYMPHS ABS  --  2.37  --  2.27 4.20*   MONOS ABS  --  1.34*  --  1.76* 1.01*   EOS ABS  --  0.37  --  0.34 0.36   MCV 91.6 89.2  --  89.5 89.8   LACTATE  --   --  1.7  --   --    PROTIME   --   --   --   --  13.6   APTT  --   --   --   --  25.6         Lab 10/21/24  0556 10/20/24  0407 10/19/24  1133 10/17/24  0933   SODIUM 140 138 141 142   POTASSIUM 4.3 3.9 3.8 4.1   CHLORIDE 104 102 101 105   CO2 24.7 24.9 24.8 26.4   ANION GAP 11.3 11.1 15.2* 10.6   BUN 11 7 12 11   CREATININE 0.53* 0.61 0.76 0.66   EGFR 113.5 109.8 96.2 107.7   GLUCOSE 119* 129* 136* 127*   CALCIUM 9.6 9.3 9.2 9.5   MAGNESIUM  --  2.2  --   --          Lab 10/20/24  0407 10/19/24  1133 10/17/24  0933   TOTAL PROTEIN 7.0 7.6 7.8   ALBUMIN 3.6 4.0 4.0   GLOBULIN 3.4 3.6 3.8   ALT (SGPT) 23 27 21   AST (SGOT) 25 32 24   BILIRUBIN 0.7 0.7 0.3   ALK PHOS 123* 141* 130*   LIPASE  --  6*  --          Lab 10/17/24  0933   PROTIME 13.6   INR 1.02                 Brief Urine Lab Results  (Last result in the past 365 days)        Color   Clarity   Blood   Leuk Est   Nitrite   Protein   CREAT   Urine HCG        10/19/24 1551 Dark Yellow   Clear   Negative   Trace   Positive   Negative                 Microbiology Results (last 10 days)       Procedure Component Value - Date/Time    Urine Culture - Urine, Urine, Clean Catch [[id number]]  (Normal) Collected: 10/19/24 1551    Lab Status: Final result Specimen: Urine, Clean Catch Updated: 10/21/24 1142     Urine Culture No growth    Blood Culture - Blood, Arm, Left [[id number]]  (Normal) Collected: 10/19/24 1207    Lab Status: Preliminary result Specimen: Blood from Arm, Left Updated: 10/21/24 1215     Blood Culture No growth at 2 days    Blood Culture - Blood, Arm, Right [[id number]]  (Normal) Collected: 10/19/24 1207    Lab Status: Preliminary result Specimen: Blood from Arm, Right Updated: 10/21/24 1215     Blood Culture No growth at 2 days    Urine Culture - Urine, Urine, Clean Catch [[id number]]  (Abnormal)  (Susceptibility) Collected: 10/15/24 1203    Lab Status: Final result Specimen: Urine, Clean Catch Updated: 10/18/24 1043     Urine Culture 50,000 CFU/mL Pseudomonas aeruginosa     Narrative:      Colonization of the urinary tract without infection is common. Treatment is discouraged unless the patient is symptomatic, pregnant, or undergoing an invasive urologic procedure.    Susceptibility        Pseudomonas aeruginosa      CASEY      Cefepime Susceptible      Ceftazidime Susceptible      Ciprofloxacin Susceptible      Levofloxacin Susceptible      Piperacillin + Tazobactam Susceptible      Tobramycin Susceptible                                   CT Abdomen Pelvis With Contrast    Result Date: 10/19/2024  Impression: 1. No acute process seen within the abdomen or pelvis. No evidence of right-sided hydronephrosis, renal, or ureteral stone. 2. Status post hysterectomy, cholecystectomy, splenectomy and pancreatectomy. 3. Hepatic steatosis. Electronically Signed: Clovis Collins MD  10/19/2024 2:05 PM EDT  Workstation ID: [id number]      Results for orders placed during the hospital encounter of 05/31/24    Duplex Venous Upper Extremity - Right CV-READ    Interpretation Summary    Normal right upper extremity venous duplex scan.      Results for orders placed during the hospital encounter of 05/31/24    Duplex Venous Upper Extremity - Right CV-READ    Interpretation Summary    Normal right upper extremity venous duplex scan.          Labs Pending at Discharge:  Pending Labs       Order Current Status    Blood Culture - Blood, Arm, Left Preliminary result    Blood Culture - Blood, Arm, Right Preliminary result              Time spent on Discharge including face to face service:  >30 minutes    Electronically signed by Tavo Driver DO, 10/21/24, 1:39 PM EDT.

## 2024-10-22 ENCOUNTER — PROCEDURE VISIT (OUTPATIENT)
Dept: UROLOGY | Facility: CLINIC | Age: 49
End: 2024-10-22
Payer: MEDICARE

## 2024-10-22 DIAGNOSIS — N30.10 INTERSTITIAL CYSTITIS: Primary | ICD-10-CM

## 2024-10-22 DIAGNOSIS — N39.0 RECURRENT UTI: ICD-10-CM

## 2024-10-22 LAB
BILIRUB BLD-MCNC: NEGATIVE MG/DL
CLARITY, POC: CLEAR
COLOR UR: ABNORMAL
EXPIRATION DATE: ABNORMAL
GLUCOSE UR STRIP-MCNC: NEGATIVE MG/DL
KETONES UR QL: NEGATIVE
LEUKOCYTE EST, POC: NEGATIVE
Lab: ABNORMAL
NITRITE UR-MCNC: NEGATIVE MG/ML
PH UR: 5.5 [PH] (ref 5–8)
PROT UR STRIP-MCNC: ABNORMAL MG/DL
RBC # UR STRIP: NEGATIVE /UL
SP GR UR: 1.03 (ref 1–1.03)
UROBILINOGEN UR QL: ABNORMAL

## 2024-10-22 NOTE — PROGRESS NOTES
Cystoscopy    Date/Time: 10/22/2024 11:13 AM    Performed by: Shari Parker MD  Authorized by: Shari Parker MD  Preparation: Patient was prepped and draped in the usual sterile fashion.  Local anesthesia used: yes    Anesthesia:  Local anesthesia used: yes  Local Anesthetic: lidocaine 2% without epinephrine  Anesthetic total: 12 mL    Sedation:  Patient sedated: no        Indication.  Interstitial cystitis.    Recurrent UTI      Patient was placed in lithotomy position.  Thorough scrubbing of lower abdomen and external genitalia was performed with Hibiclens.  Flexible Olympus cystoscope was inserted into the urethra.  Urethra looks fine and bladder neck is normal.  Both ureteral orifices are normal.  Bladder was checked very carefully and there is no tumor present.  Bladder neck and trigone was slightly congested.  Patient has no obvious cystourethrocele.  No definite changes of interstitial cystitis.  I went ahead with removal of flask per cystoscope.    Because she has typical symptoms of interstitial cystitis and requested bladder cocktail, I went ahead with insertion of 14 Malagasy plastic catheter in the bladder and bladder was drained.  Bladder cocktail was injected at this time which consists of heparin 20,000 units, Solu-Cortef 100 mg, 2% Xylocaine 22 mL and 2% Marcaine 10 cc.  Patient tolerated the procedure very well.

## 2024-10-23 RX ORDER — HYDROCORTISONE 5 MG/1
15 TABLET ORAL 2 TIMES DAILY
Qty: 180 TABLET | Refills: 1 | Status: SHIPPED | OUTPATIENT
Start: 2024-10-23 | End: 2024-12-22

## 2024-10-24 LAB
BACTERIA SPEC AEROBE CULT: NORMAL
BACTERIA SPEC AEROBE CULT: NORMAL

## 2024-10-28 ENCOUNTER — PROCEDURE VISIT (OUTPATIENT)
Dept: UROLOGY | Facility: CLINIC | Age: 49
End: 2024-10-28
Payer: MEDICARE

## 2024-10-28 DIAGNOSIS — N30.10 INTERSTITIAL CYSTITIS: Primary | ICD-10-CM

## 2024-10-28 LAB
BILIRUB BLD-MCNC: NEGATIVE MG/DL
CLARITY, POC: CLEAR
COLOR UR: NORMAL
EXPIRATION DATE: NORMAL
GLUCOSE UR STRIP-MCNC: NEGATIVE MG/DL
KETONES UR QL: NEGATIVE
LEUKOCYTE EST, POC: NEGATIVE
Lab: NORMAL
NITRITE UR-MCNC: NEGATIVE MG/ML
PH UR: 6 [PH] (ref 5–8)
PROT UR STRIP-MCNC: NEGATIVE MG/DL
RBC # UR STRIP: NEGATIVE /UL
SP GR UR: 1.03 (ref 1–1.03)
UROBILINOGEN UR QL: NORMAL

## 2024-10-28 PROCEDURE — 81003 URINALYSIS AUTO W/O SCOPE: CPT | Performed by: NURSE PRACTITIONER

## 2024-10-28 PROCEDURE — 87086 URINE CULTURE/COLONY COUNT: CPT | Performed by: NURSE PRACTITIONER

## 2024-10-28 PROCEDURE — 51700 IRRIGATION OF BLADDER: CPT | Performed by: NURSE PRACTITIONER

## 2024-10-28 RX ORDER — METHYLPREDNISOLONE SODIUM SUCCINATE 125 MG/2ML
125 INJECTION, POWDER, LYOPHILIZED, FOR SOLUTION INTRAMUSCULAR; INTRAVENOUS ONCE
Status: SHIPPED | OUTPATIENT
Start: 2024-10-28

## 2024-10-28 NOTE — PROGRESS NOTES
Procedure   Bladder Instillation Treatment  DX:Interstitial Cystitis    Irrigation of Bladder    Date/Time: 10/28/2024 9:08 AM    Performed by: Kaye Coker APRN  Authorized by: Kaye Coker APRN  Preparation: Patient was prepped and draped in the usual sterile fashion.  Local anesthesia used: yes    Anesthesia:  Local anesthesia used: yes  Local Anesthetic: topical anesthetic (lidocaine jelly 2%)  Anesthetic total: 6 mL    Sedation:  Patient sedated: no    Patient tolerance: patient tolerated the procedure well with no immediate complications  Comments:   Urethral catheterization was performed without difficulty.  14 Vietnamese catheter was used.  Heparin 20,000 units, Solu-medrol 125 mg, gentamicin 80 mg, lidocaine 2% 10cc, lidocaine jelly 2% 6 cc, 20cc Sterile Water was mixed and instilled into the urinary bladder. Patient is to leave the solution remaining in bladder for a minimum of 1 hour or until next void

## 2024-10-29 LAB — BACTERIA SPEC AEROBE CULT: NO GROWTH

## 2024-10-30 ENCOUNTER — READMISSION MANAGEMENT (OUTPATIENT)
Dept: CALL CENTER | Facility: HOSPITAL | Age: 49
End: 2024-10-30
Payer: MEDICARE

## 2024-10-30 NOTE — OUTREACH NOTE
Sepsis Week 2 Survey      Flowsheet Row Responses   [facility] patient discharged from? [facility]   Does the patient have one of the following disease processes/diagnoses(primary or secondary)? Sepsis   Week 2 attempt successful? Yes   Call start time 0919   Call end time 0920   Discharge diagnosis Sepsis secondary to urinary tract infection   Meds reviewed with patient/caregiver? Yes   Is the patient having any side effects they believe may be caused by any medication additions or changes? No   Does the patient have all medications related to this admission filled (includes all antibiotics, inhalers, nebulizers,steroids,etc.) Yes   Is the patient taking all medications as directed (includes completed medication regime)? Yes   Does the patient have a primary care provider?  Yes   Does the patient have an appointment with their PCP within 7 days of discharge? Yes   Has the patient kept scheduled appointments due by today? Yes   Has home health visited the patient within 72 hours of discharge? N/A   Psychosocial issues? No   Did the patient receive a copy of their discharge instructions? Yes   Nursing interventions Reviewed instructions with patient   What is the patient's perception of their health status since discharge? Improving   Is the patient/caregiver able to teach back TIME? T emperature - higher or lower than normal, M ental Decline - confused, sleepy, difficult to arouse, E xtremely Ill - severe pain, discomfort, shortness of breath   Is patient/caregiver able to teach back steps to recovery at home? Rest and regain strength, Eat a balanced diet, Set small, achievable goals for return to baseline health   If the patient is a current smoker, are they able to teach back resources for cessation? Not a smoker   Is the patient/caregiver able to teach back the hierarchy of who to call/visit for symptoms/problems? PCP, Specialist, Home health nurse, Urgent Care, ED, 911 Yes   Week 2 call completed? Yes    Graduated Yes   Wrap up additional comments Pt reports doing ok. Saw  for f/u.   Call end time 0976            Blank MONTERROSO - Registered Nurse

## 2024-11-18 ENCOUNTER — TELEPHONE (OUTPATIENT)
Dept: GASTROENTEROLOGY | Facility: CLINIC | Age: 49
End: 2024-11-18

## 2024-11-18 ENCOUNTER — PROCEDURE VISIT (OUTPATIENT)
Dept: UROLOGY | Facility: CLINIC | Age: 49
End: 2024-11-18
Payer: MEDICARE

## 2024-11-18 VITALS
DIASTOLIC BLOOD PRESSURE: 79 MMHG | WEIGHT: 194 LBS | HEIGHT: 67 IN | HEART RATE: 64 BPM | TEMPERATURE: 98 F | BODY MASS INDEX: 30.45 KG/M2 | SYSTOLIC BLOOD PRESSURE: 146 MMHG

## 2024-11-18 DIAGNOSIS — R21 RASH OF GENITAL AREA: Primary | ICD-10-CM

## 2024-11-18 DIAGNOSIS — N30.10 INTERSTITIAL CYSTITIS: ICD-10-CM

## 2024-11-18 LAB
BILIRUB BLD-MCNC: NEGATIVE MG/DL
CLARITY, POC: CLEAR
COLOR UR: YELLOW
EXPIRATION DATE: NORMAL
GLUCOSE UR STRIP-MCNC: NEGATIVE MG/DL
KETONES UR QL: NEGATIVE
LEUKOCYTE EST, POC: NEGATIVE
Lab: NORMAL
NITRITE UR-MCNC: NEGATIVE MG/ML
PH UR: 5.5 [PH] (ref 5–8)
PROT UR STRIP-MCNC: NEGATIVE MG/DL
RBC # UR STRIP: NEGATIVE /UL
SP GR UR: 1.02 (ref 1–1.03)
UROBILINOGEN UR QL: NORMAL

## 2024-11-18 RX ORDER — NYSTATIN AND TRIAMCINOLONE ACETONIDE 100000; 1 [USP'U]/G; MG/G
OINTMENT TOPICAL
Qty: 30 G | Refills: 1 | Status: SHIPPED | OUTPATIENT
Start: 2024-11-18

## 2024-11-18 RX ORDER — CETIRIZINE HYDROCHLORIDE 10 MG/1
1 TABLET ORAL DAILY
COMMUNITY
Start: 2024-11-08

## 2024-11-18 RX ORDER — LEVOTHYROXINE SODIUM 125 UG/1
1 TABLET ORAL DAILY
COMMUNITY
Start: 2024-11-05

## 2024-11-18 RX ORDER — QUETIAPINE FUMARATE 100 MG/1
100 TABLET, FILM COATED ORAL
COMMUNITY

## 2024-11-18 RX ORDER — FLUCONAZOLE 150 MG/1
TABLET ORAL
COMMUNITY
Start: 2024-11-15

## 2024-11-18 RX ORDER — INSULIN LISPRO 100 [IU]/ML
INJECTION, SOLUTION INTRAVENOUS; SUBCUTANEOUS
COMMUNITY
Start: 2024-10-23

## 2024-11-18 RX ORDER — TRAZODONE HYDROCHLORIDE 50 MG/1
TABLET, FILM COATED ORAL
COMMUNITY

## 2024-11-18 NOTE — PROGRESS NOTES
"Chief Complaint  Interstitial cystitis, Vaginitis (Patient states she just got back from [place] and ended up with yeast infection. Raw and irritated. Patient states her bladder feels raw and crampy.), and Pelvic Pain    Subjective no acute distress        Kayla Gan presents to [facility]  History of Present Illness    49-year-old white female continues to have dysuria and cramps in the bladder area.  She has genital rash because of excessive antibiotics and rash around her breast area with a lot of itching.  Patient is on steroids and hydroxyzine at this time.  She is also on fluconazole for yeast infection.    Objective no acute distress  Vital Signs:   /79 (BP Location: Left arm, Patient Position: Sitting, Cuff Size: Adult)   Pulse 64   Temp 98 °F (36.7 °C) (Temporal)   Ht 170.2 cm (67.01\")   Wt 88 kg (194 lb)   BMI 30.38 kg/m²     Allergies   Allergen Reactions    Other Other (See Comments)     Sent patient into kidney failure, heart stopped, in ICU for two days.    Parathyroid Hormone (Recomb) Other (See Comments) and Unknown - High Severity     Sent patient into kidney failure, heart stopped, in ICU for two days.    Romosozumab Hives     Other reaction(s): Hives    Other reaction(s): Hives   Other reaction(s): Hives    Teriparatide Anaphylaxis and Other (See Comments)     Other reaction(s): Unknown    Tramadol Anaphylaxis and Other (See Comments)     Other reaction(s): Unknown, Unknown    Nifedipine Other (See Comments)     Rapid heart beat        Vancomycin Hives, Itching and Rash     vancomycin-infusion reaction (VIR, formerly Ivett's syndrome): give over longer infusion time      Past medical history:  has a past medical history of Adrenal insufficiency, Allergic (Unsure), Anemia, Anxiety, Arthritis, Asthma (Unsure), Bleeding disorder, Cholelithiasis (Unsure), Cirrhosis, Clotting disorder (Unsure), Colon polyp, Crohn's disease, Depression, Diabetes mellitus, " Gastroparesis, GERD (gastroesophageal reflux disease), History of MRSA infection, History of transfusion, Hyperlipidemia, Hypertension, Hypothyroidism, Interstitial cystitis, Irritable bowel syndrome (Unsure), Liver disease, Low back pain, Lumbosacral disc disease, Migraines, MRSA (methicillin resistant Staphylococcus aureus), Pancreatitis, Urinary incontinence, Urinary tract infection (Unsure), and Vaginal infection.   Past surgical history:  has a past surgical history that includes Cholecystectomy (N/A); Hysterectomy; Venous Access Device (Port); Back surgery; Abscess drainage (12/24/2019); Dilation and curettage of uterus; Sinus surgery; Pelvic floor repair; Tonsillectomy; Pancreatectomy (12/2019); Esophagogastroduodenoscopy (N/A, 04/24/2019); ERCP (2019); Splenectomy, total; Gastrojejunostomy w/ jejunostomy tube; Small bowel enteroscopy; Colonoscopy (04/24/2019); Gastric stimulator implant surgery; Adenoidectomy; Endometrial ablation; Lymph node biopsy; Epidural block injection; Oophorectomy; Bladder surgery; Cystoscopy; and Other surgical history.  Personal history: family history includes Alcohol abuse in her paternal grandfather; Anxiety disorder in her father and mother; Arthritis in her mother; Breast cancer in her mother; Cancer in her mother; Colon polyps in her mother; Depression in her brother, father, and mother; Heart disease in her father and paternal grandfather; Hypertension in her brother and father; Hypothyroidism in her father and mother; Kidney disease in her father; Thyroid disease in her father and mother.  Social history:  reports that she has never smoked. She has never been exposed to tobacco smoke. She has never used smokeless tobacco. She reports that she does not drink alcohol and does not use drugs.    Review of Systems    Please see past medical and surgical history    Physical Exam  Constitutional:       General: She is not in acute distress.     Appearance: Normal appearance. She  is obese. She is not ill-appearing or toxic-appearing.   HENT:      Head: Normocephalic and atraumatic.      Ears:      Comments: No loss of hearing  Cardiovascular:      Comments: Rash between the breasts  Pulmonary:      Effort: Pulmonary effort is normal. No respiratory distress.   Abdominal:      Palpations: There is no mass.      Tenderness: There is no abdominal tenderness. There is no right CVA tenderness or left CVA tenderness.      Comments: Tenderness suprapubic area   Skin:     General: Skin is warm.      Coloration: Skin is not jaundiced.   Neurological:      General: No focal deficit present.      Mental Status: She is alert and oriented to person, place, and time.      Motor: No weakness.      Gait: Gait normal.   Psychiatric:         Mood and Affect: Mood normal.         Behavior: Behavior normal.         Thought Content: Thought content normal.         Judgment: Judgment normal.        Result Review :                 Assessment and Plan    Diagnoses and all orders for this visit:    1. Rash of genital area (Primary)  -     nystatin-triamcinolone (MYCOLOG) 442475-0.1 UNIT/GM-% ointment; Apply to affected area 2 times daily  Dispense: 30 g; Refill: 1    2. Interstitial cystitis  -     POC Urinalysis Dipstick, Automated    Will try Mycolog cream and bring her back next week for intravesical cocktail treatment.     Brief Urine Lab Results  (Last result in the past 365 days)        Color   Clarity   Blood   Leuk Est   Nitrite   Protein   CREAT   Urine HCG        11/18/24 1523 Yellow   Clear   Negative   Negative   Negative   Negative                    Follow Up   No follow-ups on file.  Patient was given instructions and counseling regarding her condition or for health maintenance advice. Please see specific information pulled into the AVS if appropriate.     Shari Parker MD

## 2024-11-18 NOTE — TELEPHONE ENCOUNTER
Pt has DM+Gastroparesis. [facility] was contacted to treat her, they are sending this statement to PCP and GI providers with their plan, which depends on what is determined after pt's next appt with Dr Arias ( [facility]).

## 2024-11-18 NOTE — TELEPHONE ENCOUNTER
The [facility] received a fax that requires your attention. The document has been indexed to the patient’s chart for your review.        Documents Description: GASTROPARESIS NOTE 10-     Name of Sender:   [facility]  KIN HUSSEIN  [phone]     Date Indexed: 11-     Notes (if needed): INDEXED UNDER PROGRESS NOTE.

## 2024-12-05 ENCOUNTER — TRANSCRIBE ORDERS (OUTPATIENT)
Dept: ADMINISTRATIVE | Facility: HOSPITAL | Age: 49
End: 2024-12-05
Payer: MEDICARE

## 2024-12-05 DIAGNOSIS — T85.110A: Primary | ICD-10-CM

## 2024-12-10 ENCOUNTER — HOSPITAL ENCOUNTER (OUTPATIENT)
Dept: CT IMAGING | Facility: HOSPITAL | Age: 49
Discharge: HOME | End: 2024-12-10
Payer: MEDICARE

## 2024-12-10 ENCOUNTER — LAB (OUTPATIENT)
Dept: LAB | Facility: HOSPITAL | Age: 49
End: 2024-12-10
Payer: MEDICARE

## 2024-12-10 ENCOUNTER — HOSPITAL ENCOUNTER (OUTPATIENT)
Dept: INTERVENTIONAL RADIOLOGY/VASCULAR | Facility: HOSPITAL | Age: 49
Discharge: HOME | End: 2024-12-10
Payer: MEDICARE

## 2024-12-10 VITALS
DIASTOLIC BLOOD PRESSURE: 85 MMHG | HEIGHT: 67 IN | SYSTOLIC BLOOD PRESSURE: 158 MMHG | BODY MASS INDEX: 29.82 KG/M2 | OXYGEN SATURATION: 97 % | WEIGHT: 190 LBS | HEART RATE: 75 BPM | RESPIRATION RATE: 16 BRPM

## 2024-12-10 LAB
APTT PPP: 25.3 SECONDS (ref 24.2–34.2)
INR PPP: 1.01 (ref 0.86–1.15)
PLATELET # BLD AUTO: 417 10*3/MM3 (ref 140–450)
PROTHROMBIN TIME: 13.5 SECONDS (ref 11.8–14.9)

## 2024-12-10 PROCEDURE — 25010000002 LIDOCAINE 2% SOLUTION: Performed by: PHYSICIAN ASSISTANT

## 2024-12-10 PROCEDURE — 77003 FLUOROGUIDE FOR SPINE INJECT: CPT

## 2024-12-10 PROCEDURE — 25510000001 IOPAMIDOL 41 % SOLUTION: Performed by: PHYSICIAN ASSISTANT

## 2024-12-10 PROCEDURE — 85730 THROMBOPLASTIN TIME PARTIAL: CPT | Performed by: PHYSICIAN ASSISTANT

## 2024-12-10 PROCEDURE — 85610 PROTHROMBIN TIME: CPT | Performed by: PHYSICIAN ASSISTANT

## 2024-12-10 PROCEDURE — 72240 MYELOGRAPHY NECK SPINE: CPT

## 2024-12-10 PROCEDURE — 62304 MYELOGRAPHY LUMBAR INJECTION: CPT

## 2024-12-10 PROCEDURE — 62284 INJECTION FOR MYELOGRAM: CPT

## 2024-12-10 PROCEDURE — 72132 CT LUMBAR SPINE W/DYE: CPT

## 2024-12-10 PROCEDURE — 85049 AUTOMATED PLATELET COUNT: CPT | Performed by: PHYSICIAN ASSISTANT

## 2024-12-10 RX ORDER — IOPAMIDOL 408 MG/ML
20 INJECTION, SOLUTION INTRATHECAL
Status: COMPLETED | OUTPATIENT
Start: 2024-12-10 | End: 2024-12-10

## 2024-12-10 RX ORDER — LIDOCAINE HYDROCHLORIDE 20 MG/ML
20 INJECTION, SOLUTION INFILTRATION; PERINEURAL ONCE
Status: COMPLETED | OUTPATIENT
Start: 2024-12-10 | End: 2024-12-10

## 2024-12-10 RX ORDER — IBUPROFEN 200 MG
200 TABLET ORAL ONCE
Status: DISCONTINUED | OUTPATIENT
Start: 2024-12-10 | End: 2024-12-11 | Stop reason: HOSPADM

## 2024-12-10 RX ADMIN — SODIUM BICARBONATE 1 ML: 84 INJECTION, SOLUTION INTRAVENOUS at 08:30

## 2024-12-10 RX ADMIN — IOPAMIDOL 15 ML: 408 INJECTION, SOLUTION INTRATHECAL at 08:30

## 2024-12-10 RX ADMIN — LIDOCAINE HYDROCHLORIDE 9 ML: 20 INJECTION, SOLUTION INFILTRATION; PERINEURAL at 08:30

## 2024-12-10 NOTE — DISCHARGE INSTR - ACTIVITY
No heavy lifting or strenuous activity for one week. No driving or staying by yourself for 24 hours. Elevated head and no bending over for 24 hours after procedure- 2-3 pillows or use a recliner

## 2024-12-10 NOTE — DISCHARGE INSTRUCTIONS
Go to er or contact dr for developing of symptoms. Stiff neck, severe headache, sensitivity to light, numbness or tingling in extremities, shortness of breath or difficulty breathing, fever over 102, loss of normal bowel and bladder function

## 2024-12-10 NOTE — DISCHARGE INSTR - DIET
Resume diet as tolerated. Drink extra fluids 10-12 glasses of fluid(no alcohol) and caffeine as needed.

## 2024-12-17 ENCOUNTER — OFFICE VISIT (OUTPATIENT)
Dept: NEUROSURGERY | Facility: CLINIC | Age: 49
End: 2024-12-17
Payer: MEDICARE

## 2024-12-17 VITALS
OXYGEN SATURATION: 100 % | DIASTOLIC BLOOD PRESSURE: 85 MMHG | BODY MASS INDEX: 30.78 KG/M2 | HEIGHT: 67 IN | HEART RATE: 70 BPM | WEIGHT: 196.1 LBS | SYSTOLIC BLOOD PRESSURE: 147 MMHG

## 2024-12-17 DIAGNOSIS — Z98.890 HISTORY OF LUMBAR SURGERY: ICD-10-CM

## 2024-12-17 DIAGNOSIS — M47.816 FACET ARTHRITIS OF LUMBAR REGION: ICD-10-CM

## 2024-12-17 DIAGNOSIS — M51.362 DEGENERATION OF INTERVERTEBRAL DISC OF LUMBAR REGION WITH DISCOGENIC BACK PAIN AND LOWER EXTREMITY PAIN: ICD-10-CM

## 2024-12-17 DIAGNOSIS — M48.061 SPINAL STENOSIS, LUMBAR REGION, WITHOUT NEUROGENIC CLAUDICATION: Primary | ICD-10-CM

## 2024-12-17 NOTE — PROGRESS NOTES
Patient being seen for today for Back Pain (Follow up- ct myelogram)  .    Subjective    Kayla Gan is a 49 y.o. female that presents with Back Pain (Follow up- ct myelogram)  .    HPI  Previously: Last seen on 9/6/2024 for pain in back and right leg.  At that time there is a CT of the lumbar spine showing post operative changes from left laminotomy at L4-L5.  There is mild multilevel spondylosis and facet arthritis with no high-grade central canal or foraminal narrowing.  There is at least left foraminal narrowing to moderate degree at L4-L5.  She was unable to have MRI.  There was an order for CT myelogram of the lumbar spine to assess the spinal pathology further.  There was referral to pain management to discuss lumbar epidural steroid injection trial.    Today: She reports lower back pain and right greater than left leg pain. The pain goes to the toes on the right foot, non-specific, but stops about the ankle on the left leg.    She reports having the lumbar epidural injections with [facility] since her last visit here, and this did offer a significant amount of benefit for her pain, but only lasted for about 1-1.5 months.     reports that she has never smoked. She has never been exposed to tobacco smoke. She has never used smokeless tobacco.    Review of Systems   Musculoskeletal:  Positive for back pain (right > left leg pain).       Objective   Vitals:    12/17/24 0848   BP: 147/85   Pulse: 70   SpO2: 100%        Physical Exam  Constitutional:       Appearance: Normal appearance. She is obese.   Pulmonary:      Effort: Pulmonary effort is normal.   Musculoskeletal:         General: No tenderness.      Comments: SLR negative bilaterally   Neurological:      General: No focal deficit present.      Mental Status: She is alert and oriented to person, place, and time.      Sensory: No sensory deficit.      Motor: No weakness.      Deep Tendon Reflexes: Reflexes normal.   Psychiatric:         Mood and  Affect: Mood normal.         Behavior: Behavior normal.          Result Review   I have personally interpreted the CT of the lumbar spine with intrathecal contrast from 12/10/2024 which shows large disc protrusion at L3-L4 with severe central canal narrowing.  There is left disc bulging at L4-L5 she is status post left hemilaminectomy at this level.     Assessment and Plan {CC Problem List  Visit Diagnosis  ROS  Review (Popup)  [facility]  Quality  BestPractice  Medications  SmartSets  SnapShot Encounters  Media :23}   Diagnoses and all orders for this visit:    1. Spinal stenosis, lumbar region, without neurogenic claudication (Primary)  -     Case Request; Standing  -     Follow Anesthesia Guidelines / Protocol; Future  -     Follow Anesthesia Guidelines / Protocol; Standing  -     SCD (sequential compression device)- to be placed on patient in Pre-op; Standing  -     Verify / Perform Chlorhexidine Skin Prep; Standing  -     ceFAZolin (ANCEF) 2,000 mg in sodium chloride 0.9 % 100 mL IVPB  -     Case Request    2. Degeneration of intervertebral disc of lumbar region with discogenic back pain and lower extremity pain    3. Facet arthritis of lumbar region    4. History of lumbar surgery    She continues to have back and right leg pain to all the toes and the left leg pain to about the ankle.    On CT myelogram of the lumbar spine there is severe disc bulging and canal stenosis at L3-L4.    She can expect that surgery may be helpful for leg pain, but I do not expect surgery to be likely to help lower back pain.    She may consider conservative treatment with PT or spinal injections. My expectation is that these are less likely to be helpful for leg pain in cases of severe canal stenosis. She did have a recent LESI which was helpful, but only lasted for 1-1.5 months - well below average.    She would like to discuss a possible surgical approach with Dr. Langley.    Follow Up {Instructions Charge  Capture  Follow-up Communications :23}   Return for Surgery and postop.    She has severe stenosis at L3-4 with a right central disc herniation. She has leg pain greater than back pain.

## 2024-12-20 ENCOUNTER — HOSPITAL ENCOUNTER (OUTPATIENT)
Dept: CT IMAGING | Facility: HOSPITAL | Age: 49
Discharge: HOME | End: 2024-12-20
Payer: MEDICARE

## 2024-12-20 DIAGNOSIS — T85.110A: ICD-10-CM

## 2024-12-20 LAB
CREAT BLDA-MCNC: 0.7 MG/DL (ref 0.6–1.3)
EGFRCR SERPLBLD CKD-EPI 2021: 106.2 ML/MIN/1.73

## 2024-12-20 PROCEDURE — 74178 CT ABD&PLV WO CNTR FLWD CNTR: CPT

## 2024-12-20 PROCEDURE — 82565 ASSAY OF CREATININE: CPT

## 2024-12-20 PROCEDURE — 25510000001 IOPAMIDOL PER 1 ML: Performed by: TRANSPLANT SURGERY

## 2024-12-20 RX ORDER — IOPAMIDOL 755 MG/ML
100 INJECTION, SOLUTION INTRAVASCULAR
Status: COMPLETED | OUTPATIENT
Start: 2024-12-20 | End: 2024-12-20

## 2024-12-20 RX ADMIN — IOPAMIDOL 100 ML: 755 INJECTION, SOLUTION INTRAVENOUS at 13:22

## 2025-01-06 DIAGNOSIS — N30.10 INTERSTITIAL CYSTITIS: ICD-10-CM

## 2025-01-06 DIAGNOSIS — N39.0 RECURRENT UTI: ICD-10-CM

## 2025-01-07 RX ORDER — HYDROCORTISONE 5 MG/1
15 TABLET ORAL 2 TIMES DAILY
Qty: 180 TABLET | Refills: 1 | OUTPATIENT
Start: 2025-01-07

## 2025-01-23 NOTE — PRE-PROCEDURE INSTRUCTIONS
ATIENT INSTRUCTED TO BE:    - NOTHING TO EAT AFTER MIDNIGHT OR CHEW, EXCEPT CAN HAVE CLEAR LIQUIDS 2 HOURS PRIOR TO SURGERY ARRIVAL TIME , NO MORE THAN 8 OZ. (NOTHING RED)     - TO HOLD ALL VITAMINS, SUPPLEMENTS, NSAIDS FOR ONE WEEK PRIOR TO THEIR SURGICAL PROCEDURE    - DO NOT TAKE ______________________ 7 DAYS PRIOR TO PROCEDURE PER ANESTHESIA RECOMMENDATIONS/INSTRUCTIONS     - INSTRUCTED PT TO USE SURGICAL SOAP 1 TIME THE NIGHT PRIOR TO SURGERY ___________ OR THE AM OF SURGERY _____________   USE THE SOAP FROM NECK TO TOES, AVOID THEIR FACE, HAIR, AND PRIVATE PARTS. IF USE THE SOAP THE NIGHT PRIOR TO SURGERY, CHANGE BED LINENS AND NO PETS IN THE BED.     INSTRUCTED NO LOTIONS, JEWELRY, PIERCINGS,  NAIL POLISH, OR DEODORANT DAY OF SURGERY    - IF DIABETIC, CHECK BLOOD GLUCOSE IF LESS THAN 70 OR HAVING SYMPTOMS CALL THE PREOP AREA FOR INSTRUCTIONS ON AM OF SURGERY ([phone]   -INSTRUCTED TO TAKE THE FOLLOWING MEDICATIONS THE DAY OF SURGERY WITH SIPS OF WATER:   1/23/25 TONIGHT AT MIDNIGHT DECREASE BASAL RATE OF INSULIN PUMP BY 10 %  TAKE WITH SIP OF WATER AM DOS:  OMEPRAZOLE, LEVOTHYROXINE, PROPRANOLOL, XIFAXAN        - DO NOT BRING ANY MEDICATIONS WITH YOU TO THE HOSPITAL THE DAY OF SURGERY, EXCEPT IF USE INHALERS. BRING INHALERS DAY OF SURGERY       - BRING CPAP OR BIPAP TO THE HOSPITAL ONLY IF YOU ARE SPENDING THE NIGHT    - DO NOT SMOKE OR VAPE 24 HOURS PRIOR TO PROCEDURE PER ANESTHESIA REQUEST     -MAKE SURE YOU HAVE A RIDE HOME OR SOMEONE TO STAY WITH YOU THE DAY OF THE PROCEDURE AFTER YOU GO HOME     - FOLLOW ANY OTHER INSTRUCTIONS GIVEN TO YOU BY YOUR SURGEON'S OFFICE.     - DAY OF SURGERY ____________, COME TO ELEVATOR A, THIRD FLOOR, CHECK IN AT THE DESK FOR REGISTRATION/SURGERY   - YOU WILL RECEIVE A PHONE CALL THE DAY PRIOR TO SURGERY BETWEEN 1PM AND 4 PM WITH ARRIVAL TIME, IF YOUR SURGERY IS ON A MONDAY YOU WILL RECEIVE A CALL THE FRIDAY PRIOR TO SURGERY DATE    - BRING CASH OR CREDIT CARD FOR  COPAYMENT OF MEDICATIONS AFTER SURGERY IF YOU USE THE HOSPITAL PHARMACY (MEDS TO BED)    - PREADMISSION TESTING NURSE LOY KINSEY [phone] IF HAVE ANY QUESTIONS     -PATIENT PROVIDED THE NUMBER FOR PREOP SURGICAL DEPT IF HAD QUESTIONS AFTER HOURS PRIOR TO SURGERY ([phone] .  INFORMED PT IF NO ANSWER, LEAVE A MESSAGE AND SOMEONE WILL RETURN THEIR CALL       PATIENT VERBALIZED UNDERSTANDING

## 2025-01-24 ENCOUNTER — HOSPITAL ENCOUNTER (OUTPATIENT)
Facility: HOSPITAL | Age: 50
Setting detail: HOSPITAL OUTPATIENT SURGERY
Discharge: HOME | End: 2025-01-24
Attending: NEUROLOGICAL SURGERY | Admitting: NEUROLOGICAL SURGERY
Payer: MEDICARE

## 2025-01-24 ENCOUNTER — APPOINTMENT (OUTPATIENT)
Dept: GENERAL RADIOLOGY | Facility: HOSPITAL | Age: 50
End: 2025-01-24
Payer: MEDICARE

## 2025-01-24 ENCOUNTER — ANESTHESIA (OUTPATIENT)
Dept: PERIOP | Facility: HOSPITAL | Age: 50
End: 2025-01-24
Payer: MEDICARE

## 2025-01-24 ENCOUNTER — ANESTHESIA EVENT (OUTPATIENT)
Dept: PERIOP | Facility: HOSPITAL | Age: 50
End: 2025-01-24
Payer: MEDICARE

## 2025-01-24 VITALS
HEART RATE: 77 BPM | SYSTOLIC BLOOD PRESSURE: 154 MMHG | DIASTOLIC BLOOD PRESSURE: 80 MMHG | BODY MASS INDEX: 30.35 KG/M2 | TEMPERATURE: 98.8 F | OXYGEN SATURATION: 98 % | HEIGHT: 67 IN | RESPIRATION RATE: 16 BRPM | WEIGHT: 193.34 LBS

## 2025-01-24 DIAGNOSIS — K74.60 LIVER CIRRHOSIS SECONDARY TO NASH: Primary | ICD-10-CM

## 2025-01-24 DIAGNOSIS — K75.81 LIVER CIRRHOSIS SECONDARY TO NASH: Primary | ICD-10-CM

## 2025-01-24 DIAGNOSIS — M48.061 SPINAL STENOSIS, LUMBAR REGION, WITHOUT NEUROGENIC CLAUDICATION: ICD-10-CM

## 2025-01-24 LAB
ALBUMIN SERPL-MCNC: 4.2 G/DL (ref 3.5–5.2)
ALBUMIN/GLOB SERPL: 1.2 G/DL
ALP SERPL-CCNC: 197 U/L (ref 39–117)
ALT SERPL W P-5'-P-CCNC: 59 U/L (ref 1–33)
ANION GAP SERPL CALCULATED.3IONS-SCNC: 13 MMOL/L (ref 5–15)
APTT PPP: 25.4 SECONDS (ref 24.2–34.2)
AST SERPL-CCNC: 55 U/L (ref 1–32)
BASOPHILS # BLD AUTO: 0.06 10*3/MM3 (ref 0–0.2)
BASOPHILS NFR BLD AUTO: 0.5 % (ref 0–1.5)
BILIRUB SERPL-MCNC: 0.2 MG/DL (ref 0–1.2)
BUN SERPL-MCNC: 17 MG/DL (ref 6–20)
BUN/CREAT SERPL: 29.3 (ref 7–25)
BURR CELLS BLD QL SMEAR: NORMAL
CALCIUM SPEC-SCNC: 8.9 MG/DL (ref 8.6–10.5)
CHLORIDE SERPL-SCNC: 105 MMOL/L (ref 98–107)
CO2 SERPL-SCNC: 24 MMOL/L (ref 22–29)
CREAT SERPL-MCNC: 0.58 MG/DL (ref 0.57–1)
DACRYOCYTES BLD QL SMEAR: NORMAL
DEPRECATED RDW RBC AUTO: 50.3 FL (ref 37–54)
EGFRCR SERPLBLD CKD-EPI 2021: 111.1 ML/MIN/1.73
EOSINOPHIL # BLD AUTO: 0.28 10*3/MM3 (ref 0–0.4)
EOSINOPHIL NFR BLD AUTO: 2.3 % (ref 0.3–6.2)
ERYTHROCYTE [DISTWIDTH] IN BLOOD BY AUTOMATED COUNT: 14.7 % (ref 12.3–15.4)
GLOBULIN UR ELPH-MCNC: 3.4 GM/DL
GLUCOSE BLDC GLUCOMTR-MCNC: 170 MG/DL (ref 70–99)
GLUCOSE SERPL-MCNC: 153 MG/DL (ref 65–99)
HCT VFR BLD AUTO: 43.1 % (ref 34–46.6)
HGB BLD-MCNC: 13.7 G/DL (ref 12–15.9)
IMM GRANULOCYTES # BLD AUTO: 0.04 10*3/MM3 (ref 0–0.05)
IMM GRANULOCYTES NFR BLD AUTO: 0.3 % (ref 0–0.5)
INR PPP: 1.06 (ref 0.86–1.15)
LYMPHOCYTES # BLD AUTO: 5.3 10*3/MM3 (ref 0.7–3.1)
LYMPHOCYTES NFR BLD AUTO: 43.3 % (ref 19.6–45.3)
MCH RBC QN AUTO: 29.3 PG (ref 26.6–33)
MCHC RBC AUTO-ENTMCNC: 31.8 G/DL (ref 31.5–35.7)
MCV RBC AUTO: 92.1 FL (ref 79–97)
MONOCYTES # BLD AUTO: 1.38 10*3/MM3 (ref 0.1–0.9)
MONOCYTES NFR BLD AUTO: 11.3 % (ref 5–12)
NEUTROPHILS NFR BLD AUTO: 42.3 % (ref 42.7–76)
NEUTROPHILS NFR BLD AUTO: 5.17 10*3/MM3 (ref 1.7–7)
NRBC BLD AUTO-RTO: 0 /100 WBC (ref 0–0.2)
PLATELET # BLD AUTO: 411 10*3/MM3 (ref 140–450)
PMV BLD AUTO: 10.5 FL (ref 6–12)
POIKILOCYTOSIS BLD QL SMEAR: NORMAL
POTASSIUM SERPL-SCNC: 4.2 MMOL/L (ref 3.5–5.2)
PROT SERPL-MCNC: 7.6 G/DL (ref 6–8.5)
PROTHROMBIN TIME: 14 SECONDS (ref 11.8–14.9)
RBC # BLD AUTO: 4.68 10*6/MM3 (ref 3.77–5.28)
SMALL PLATELETS BLD QL SMEAR: ADEQUATE
SMUDGE CELLS BLD QL SMEAR: NORMAL
SODIUM SERPL-SCNC: 142 MMOL/L (ref 136–145)
TARGETS BLD QL SMEAR: NORMAL
WBC NRBC COR # BLD AUTO: 12.23 10*3/MM3 (ref 3.4–10.8)

## 2025-01-24 PROCEDURE — 82948 REAGENT STRIP/BLOOD GLUCOSE: CPT

## 2025-01-24 PROCEDURE — 63047 LAM FACETEC & FORAMOT LUMBAR: CPT | Performed by: NEUROLOGICAL SURGERY

## 2025-01-24 PROCEDURE — 25010000002 SUGAMMADEX 200 MG/2ML SOLUTION: Performed by: ANESTHESIOLOGY

## 2025-01-24 PROCEDURE — 25810000003 LACTATED RINGERS PER 1000 ML: Performed by: ANESTHESIOLOGY

## 2025-01-24 PROCEDURE — 25010000002 LIDOCAINE PF 2% 2 % SOLUTION: Performed by: ANESTHESIOLOGY

## 2025-01-24 PROCEDURE — 25010000002 PROPOFOL 10 MG/ML EMULSION: Performed by: ANESTHESIOLOGY

## 2025-01-24 PROCEDURE — 85025 COMPLETE CBC W/AUTO DIFF WBC: CPT | Performed by: NEUROLOGICAL SURGERY

## 2025-01-24 PROCEDURE — 76000 FLUOROSCOPY <1 HR PHYS/QHP: CPT

## 2025-01-24 PROCEDURE — 85730 THROMBOPLASTIN TIME PARTIAL: CPT | Performed by: NEUROLOGICAL SURGERY

## 2025-01-24 PROCEDURE — S0260 H&P FOR SURGERY: HCPCS | Performed by: NEUROLOGICAL SURGERY

## 2025-01-24 PROCEDURE — 25010000002 CEFAZOLIN PER 500 MG: Performed by: NEUROLOGICAL SURGERY

## 2025-01-24 PROCEDURE — 25010000002 DEXAMETHASONE PER 1 MG: Performed by: ANESTHESIOLOGY

## 2025-01-24 PROCEDURE — 85007 BL SMEAR W/DIFF WBC COUNT: CPT | Performed by: NEUROLOGICAL SURGERY

## 2025-01-24 PROCEDURE — 25010000002 ONDANSETRON PER 1 MG: Performed by: ANESTHESIOLOGY

## 2025-01-24 PROCEDURE — 85610 PROTHROMBIN TIME: CPT | Performed by: NEUROLOGICAL SURGERY

## 2025-01-24 PROCEDURE — 80053 COMPREHEN METABOLIC PANEL: CPT | Performed by: ANESTHESIOLOGY

## 2025-01-24 PROCEDURE — 25010000002 FENTANYL CITRATE (PF) 50 MCG/ML SOLUTION: Performed by: ANESTHESIOLOGY

## 2025-01-24 PROCEDURE — 25010000002 LIDOCAINE 1% - EPINEPHRINE 1:100000 1 %-1:100000 SOLUTION: Performed by: NEUROLOGICAL SURGERY

## 2025-01-24 PROCEDURE — 25010000002 MIDAZOLAM PER 1MG: Performed by: ANESTHESIOLOGY

## 2025-01-24 RX ORDER — MIDAZOLAM HYDROCHLORIDE 2 MG/2ML
2 INJECTION, SOLUTION INTRAMUSCULAR; INTRAVENOUS ONCE
Status: COMPLETED | OUTPATIENT
Start: 2025-01-24 | End: 2025-01-24

## 2025-01-24 RX ORDER — SODIUM CHLORIDE, SODIUM LACTATE, POTASSIUM CHLORIDE, CALCIUM CHLORIDE 600; 310; 30; 20 MG/100ML; MG/100ML; MG/100ML; MG/100ML
9 INJECTION, SOLUTION INTRAVENOUS CONTINUOUS PRN
Status: DISCONTINUED | OUTPATIENT
Start: 2025-01-24 | End: 2025-01-24 | Stop reason: HOSPADM

## 2025-01-24 RX ORDER — PROMETHAZINE HYDROCHLORIDE 25 MG/1
25 TABLET ORAL ONCE AS NEEDED
Status: DISCONTINUED | OUTPATIENT
Start: 2025-01-24 | End: 2025-01-24 | Stop reason: HOSPADM

## 2025-01-24 RX ORDER — DEXMEDETOMIDINE HYDROCHLORIDE 100 UG/ML
INJECTION, SOLUTION INTRAVENOUS AS NEEDED
Status: DISCONTINUED | OUTPATIENT
Start: 2025-01-24 | End: 2025-01-24 | Stop reason: SURG

## 2025-01-24 RX ORDER — EPHEDRINE SULFATE 50 MG/ML
INJECTION INTRAVENOUS AS NEEDED
Status: DISCONTINUED | OUTPATIENT
Start: 2025-01-24 | End: 2025-01-24 | Stop reason: SURG

## 2025-01-24 RX ORDER — LIDOCAINE HYDROCHLORIDE AND EPINEPHRINE 10; 10 MG/ML; UG/ML
INJECTION, SOLUTION INFILTRATION; PERINEURAL AS NEEDED
Status: DISCONTINUED | OUTPATIENT
Start: 2025-01-24 | End: 2025-01-24 | Stop reason: HOSPADM

## 2025-01-24 RX ORDER — SCOPOLAMINE 1 MG/3D
1 PATCH, EXTENDED RELEASE TRANSDERMAL ONCE
Status: DISCONTINUED | OUTPATIENT
Start: 2025-01-24 | End: 2025-01-24 | Stop reason: HOSPADM

## 2025-01-24 RX ORDER — FENTANYL CITRATE 50 UG/ML
INJECTION, SOLUTION INTRAMUSCULAR; INTRAVENOUS AS NEEDED
Status: DISCONTINUED | OUTPATIENT
Start: 2025-01-24 | End: 2025-01-24 | Stop reason: SURG

## 2025-01-24 RX ORDER — ONDANSETRON 2 MG/ML
4 INJECTION INTRAMUSCULAR; INTRAVENOUS ONCE AS NEEDED
Status: DISCONTINUED | OUTPATIENT
Start: 2025-01-24 | End: 2025-01-24 | Stop reason: HOSPADM

## 2025-01-24 RX ORDER — HYDROCODONE BITARTRATE AND ACETAMINOPHEN 5; 325 MG/1; MG/1
1 TABLET ORAL EVERY 6 HOURS PRN
Qty: 20 TABLET | Refills: 0 | Status: SHIPPED | OUTPATIENT
Start: 2025-01-24 | End: 2025-02-01 | Stop reason: SDUPTHER

## 2025-01-24 RX ORDER — MAGNESIUM HYDROXIDE 1200 MG/15ML
LIQUID ORAL AS NEEDED
Status: DISCONTINUED | OUTPATIENT
Start: 2025-01-24 | End: 2025-01-24 | Stop reason: HOSPADM

## 2025-01-24 RX ORDER — OXYCODONE HYDROCHLORIDE 5 MG/1
5 TABLET ORAL
Status: COMPLETED | OUTPATIENT
Start: 2025-01-24 | End: 2025-01-24

## 2025-01-24 RX ORDER — ROCURONIUM BROMIDE 10 MG/ML
INJECTION, SOLUTION INTRAVENOUS AS NEEDED
Status: DISCONTINUED | OUTPATIENT
Start: 2025-01-24 | End: 2025-01-24 | Stop reason: SURG

## 2025-01-24 RX ORDER — ACETAMINOPHEN 500 MG
1000 TABLET ORAL ONCE
Status: COMPLETED | OUTPATIENT
Start: 2025-01-24 | End: 2025-01-24

## 2025-01-24 RX ORDER — PROMETHAZINE HYDROCHLORIDE 25 MG/1
25 SUPPOSITORY RECTAL ONCE AS NEEDED
Status: DISCONTINUED | OUTPATIENT
Start: 2025-01-24 | End: 2025-01-24 | Stop reason: HOSPADM

## 2025-01-24 RX ORDER — MEPERIDINE HYDROCHLORIDE 25 MG/ML
12.5 INJECTION INTRAMUSCULAR; INTRAVENOUS; SUBCUTANEOUS
Status: DISCONTINUED | OUTPATIENT
Start: 2025-01-24 | End: 2025-01-24 | Stop reason: HOSPADM

## 2025-01-24 RX ORDER — LIDOCAINE HYDROCHLORIDE 20 MG/ML
INJECTION, SOLUTION EPIDURAL; INFILTRATION; INTRACAUDAL; PERINEURAL AS NEEDED
Status: DISCONTINUED | OUTPATIENT
Start: 2025-01-24 | End: 2025-01-24 | Stop reason: SURG

## 2025-01-24 RX ORDER — ONDANSETRON 2 MG/ML
INJECTION INTRAMUSCULAR; INTRAVENOUS AS NEEDED
Status: DISCONTINUED | OUTPATIENT
Start: 2025-01-24 | End: 2025-01-24 | Stop reason: SURG

## 2025-01-24 RX ORDER — PROPOFOL 10 MG/ML
VIAL (ML) INTRAVENOUS AS NEEDED
Status: DISCONTINUED | OUTPATIENT
Start: 2025-01-24 | End: 2025-01-24 | Stop reason: SURG

## 2025-01-24 RX ORDER — DEXAMETHASONE SODIUM PHOSPHATE 4 MG/ML
INJECTION, SOLUTION INTRA-ARTICULAR; INTRALESIONAL; INTRAMUSCULAR; INTRAVENOUS; SOFT TISSUE AS NEEDED
Status: DISCONTINUED | OUTPATIENT
Start: 2025-01-24 | End: 2025-01-24 | Stop reason: SURG

## 2025-01-24 RX ADMIN — DEXMEDETOMIDINE HYDROCHLORIDE 8 MCG: 100 INJECTION, SOLUTION, CONCENTRATE INTRAVENOUS at 09:43

## 2025-01-24 RX ADMIN — ROCURONIUM BROMIDE 20 MG: 50 INJECTION INTRAVENOUS at 10:20

## 2025-01-24 RX ADMIN — DEXMEDETOMIDINE HYDROCHLORIDE 8 MCG: 100 INJECTION, SOLUTION, CONCENTRATE INTRAVENOUS at 10:18

## 2025-01-24 RX ADMIN — OXYCODONE 5 MG: 5 TABLET ORAL at 11:54

## 2025-01-24 RX ADMIN — SODIUM CHLORIDE, POTASSIUM CHLORIDE, SODIUM LACTATE AND CALCIUM CHLORIDE: 600; 310; 30; 20 INJECTION, SOLUTION INTRAVENOUS at 11:06

## 2025-01-24 RX ADMIN — SCOLOPAMINE TRANSDERMAL SYSTEM 1 PATCH: 1 PATCH, EXTENDED RELEASE TRANSDERMAL at 09:19

## 2025-01-24 RX ADMIN — MIDAZOLAM HYDROCHLORIDE 2 MG: 1 INJECTION, SOLUTION INTRAMUSCULAR; INTRAVENOUS at 09:29

## 2025-01-24 RX ADMIN — DEXAMETHASONE SODIUM PHOSPHATE 4 MG: 4 INJECTION, SOLUTION INTRAMUSCULAR; INTRAVENOUS at 10:02

## 2025-01-24 RX ADMIN — FENTANYL CITRATE 50 MCG: 50 INJECTION, SOLUTION INTRAMUSCULAR; INTRAVENOUS at 09:43

## 2025-01-24 RX ADMIN — DEXMEDETOMIDINE HYDROCHLORIDE 8 MCG: 100 INJECTION, SOLUTION, CONCENTRATE INTRAVENOUS at 10:07

## 2025-01-24 RX ADMIN — LIDOCAINE HYDROCHLORIDE 80 MG: 20 INJECTION, SOLUTION EPIDURAL; INFILTRATION; INTRACAUDAL; PERINEURAL at 09:43

## 2025-01-24 RX ADMIN — EPHEDRINE SULFATE 10 MG: 50 INJECTION INTRAVENOUS at 10:51

## 2025-01-24 RX ADMIN — EPHEDRINE SULFATE 10 MG: 50 INJECTION INTRAVENOUS at 10:36

## 2025-01-24 RX ADMIN — OXYCODONE 5 MG: 5 TABLET ORAL at 12:36

## 2025-01-24 RX ADMIN — DEXMEDETOMIDINE HYDROCHLORIDE 8 MCG: 100 INJECTION, SOLUTION, CONCENTRATE INTRAVENOUS at 10:36

## 2025-01-24 RX ADMIN — FENTANYL CITRATE 25 MCG: 50 INJECTION, SOLUTION INTRAMUSCULAR; INTRAVENOUS at 10:22

## 2025-01-24 RX ADMIN — SODIUM CHLORIDE 2000 MG: 9 INJECTION, SOLUTION INTRAVENOUS at 09:39

## 2025-01-24 RX ADMIN — ROCURONIUM BROMIDE 70 MG: 50 INJECTION INTRAVENOUS at 09:44

## 2025-01-24 RX ADMIN — PROPOFOL 150 MG: 10 INJECTION, EMULSION INTRAVENOUS at 09:43

## 2025-01-24 RX ADMIN — ONDANSETRON 4 MG: 2 INJECTION INTRAMUSCULAR; INTRAVENOUS at 11:02

## 2025-01-24 RX ADMIN — SUGAMMADEX 200 MG: 100 INJECTION, SOLUTION INTRAVENOUS at 11:17

## 2025-01-24 RX ADMIN — FENTANYL CITRATE 25 MCG: 50 INJECTION, SOLUTION INTRAMUSCULAR; INTRAVENOUS at 10:16

## 2025-01-24 RX ADMIN — SODIUM CHLORIDE, POTASSIUM CHLORIDE, SODIUM LACTATE AND CALCIUM CHLORIDE 9 ML/HR: 600; 310; 30; 20 INJECTION, SOLUTION INTRAVENOUS at 09:19

## 2025-01-24 RX ADMIN — ACETAMINOPHEN 1000 MG: 500 TABLET ORAL at 09:19

## 2025-01-24 NOTE — OP NOTE
LUMBAR LAMINECTOMY DISCECTOMY MINIMALLY INVASIVE  Procedure Report    Patient Name:  Kayla Gan  YOB: 1975    Date of Surgery:  1/24/2025     Indications: Lumbar spinal stenosis with disc protrusion    Pre-op Diagnosis:   Spinal stenosis, lumbar region, without neurogenic claudication [M48.061]       Post-Op Diagnosis Codes:     * Spinal stenosis, lumbar region, without neurogenic claudication [M48.061]    Procedure/CPT® Codes:  11913    Procedure(s):  MINIMALLY INVASIVE LUMBAR LAMINECTOMY WITH DISCECTOMY, RIGHT APPROACH, LUMBAR THREE-LUMBAR FOUR        Staff:  Surgeon(s):  Joe Langley MD    Assistant: Camille Hitchcock RN CSA    Anesthesia: General    Estimated Blood Loss: 30 mL      Specimen:          None        Findings: Lateral recess stenosis with firm central disc protrusion    Complications: Apparent intraoperative complications    Description of Procedure:  After informed consent was obtained, the patient was brought to the operating room.  After induction of adequate general endotracheal anesthesia the patient was placed in the prone position on the Shivam table utilizing the Yann frame.  All pressure points were padded.  The back was prepped and draped in the typical fashion.  The midline was marked and a line approximately 2-1/2 cm to the right of midline was drawn.  On this line the L3-4 interspace was localized using a spinal needle and the C arm.  A 2 cm incision was then made over the level.  The Boss tubular retractor system was used to dilate the tissue docking at L3-4.  The level was again confirmed with fluoroscopy.  The microscope was brought in and used for the remainder of the case for improved magnification and illumination.  The lamina was cleared of soft tissue and the Kerrison punches were used to remove the lamina above the insertion of the ligamentum flavum.  The ligamentum flavum was then resected inferiorly and laterally undercutting the medial facet.   After undercutting the medial facet and ligament, the ball probe passed freely along the right L4 nerve root.  The tubular retractor was tilted medially to allow exposure of the patient's right lateral recess.  The ligament was removed from its insertion at L4 and the left lateral recess was then undercut using the Kerrison punches until a ball probe passed freely along the left L4 nerve root.  After decompressing the bilateral lateral recesses the right L4 nerve root was retracted.  The epidural vessels were coagulated.  There was a firm central disc protrusion.  The ligament was incised and the disc material removed using a small straight and up-biting pituitary until a ball probe passed freely under the L4 nerve root.  After decompression was felt to be adequate, hemostasis was obtained using the bipolar electrocautery.  The wound was irrigated with normal saline.    The tubular retractor was removed and hemostasis assured in the soft tissue.  The incision was reapproximated using interrupted 0 Vicryl in the fascia and a 2-0 Vicryl in the subcutaneous tissue.  The wound was dressed with Mastisol, Steri-Strips, Telfa and a Tegaderm.      Assistant: Camille Hitchcock RN CSA  was responsible for performing the following activities: Retraction, Suction, Irrigation, Closing, and Placing Dressing and their skilled assistance was necessary for the success of this case.    Joe Langley MD     Date: 1/24/2025  Time: 11:18 EST

## 2025-01-24 NOTE — ANESTHESIA POSTPROCEDURE EVALUATION
Patient: Kayla Gan    Procedure Summary       Date: 01/24/25 Room / Location: [facility] OR 05 / [facility] MAIN OR    Anesthesia Start: 0939 Anesthesia Stop: 1133    Procedure: MINIMALLY INVASIVE LUMBAR LAMINECTOMY WITH DISCECTOMY, RIGHT APPROACH, LUMBAR THREE-LUMBAR FOUR  OPERATION ON THE LOWER BACK TO SHAVE OFF BONE AND DISC COMPRESSING THE NERVES TO THE LEGS. (Right: Back) Diagnosis:       Spinal stenosis, lumbar region, without neurogenic claudication      (Spinal stenosis, lumbar region, without neurogenic claudication [M48.061])    Surgeons: Joe Langley MD Provider: Niraj Gee MD    Anesthesia Type: general ASA Status: 3            Anesthesia Type: general    Vitals  Vitals Value Taken Time   /82 01/24/25 1152   Temp     Pulse 56 01/24/25 1153   Resp     SpO2 94 % 01/24/25 1153   Vitals shown include unfiled device data.        Post Anesthesia Care and Evaluation    Patient location during evaluation: bedside  Patient participation: complete - patient participated  Level of consciousness: awake and alert  Pain management: adequate    Airway patency: patent  Anesthetic complications: No anesthetic complications  PONV Status: none  Cardiovascular status: acceptable  Respiratory status: acceptable  Hydration status: acceptable    Comments: An Anesthesiologist personally participated in the most demanding procedures (including induction and emergence if applicable) in the anesthesia plan, monitored the course of anesthesia administration at frequent intervals and remained physically present and available for immediate diagnosis and treatment of emergencies.

## 2025-01-24 NOTE — H&P
[facility]   HISTORY AND PHYSICAL    Patient Name: Kayla Gan  : 1975  MRN: [id number]  Primary Care Physician:  Justin Daniels, LAZARO  Date of admission: 2025    Subjective   Subjective     Chief Complaint: Back pain and right leg pain    History of Present Illness  49-year-old female with back pain and right leg pain.  She has spinal stenosis with disc protrusion at lumbar 3-lumbar 4.  This seems to correlate with her leg symptoms.  She has opted for decompression in an attempt to reduce the leg pain.      Review of Systems   Musculoskeletal:  Positive for back pain (Right leg pain).        Personal History     Past Medical History:   Diagnosis Date    Adrenal insufficiency     Allergic Unsure    Foods, Ulram, Vancomycin    Anemia     Anxiety     Arthritis     Asthma Unsure    Bleeding disorder     Brain concussion     Cholelithiasis Unsure    Cirrhosis     NO CURRENT S/S    Colon polyp     Crohn's disease     Depression     Diabetes mellitus     Gastroparesis     GERD (gastroesophageal reflux disease)     History of MRSA infection     History of transfusion     Hyperlipidemia     Hypertension     Hypothyroidism     Interstitial cystitis     Irritable bowel syndrome Unsure    Liver disease     Low back pain     Lumbosacral disc disease     Migraines     MRSA (methicillin resistant Staphylococcus aureus)     NO CURRENT ISSUES    Pancreatitis     NO CURRENT ISSUES    Sleep apnea     USES CPAP    Urinary incontinence     Urinary tract infection Unsure    Interstitial Cystitis    Vaginal infection        Past Surgical History:   Procedure Laterality Date    ABSCESS DRAINAGE  2019    Fluoroscopically guided abscess drainage, [facility]    ADENOIDECTOMY      BACK SURGERY      L4 L5    BLADDER SURGERY      CHOLECYSTECTOMY N/A     COLONOSCOPY  2019    NBIH, 3 mm polyp    CYSTOSCOPY      DILATATION AND CURETTAGE      ENDOMETRIAL ABLATION      ENDOSCOPY N/A 2019    Normal     ENTEROSCOPY SMALL BOWEL      EPIDURAL BLOCK      ERCP  2019    GASTRIC STIMULATOR IMPLANT SURGERY      IN PLACE NOW, PT DOES NOT HAVE ANY REMOTE FOR THIS DEVICE.    GASTROJEJUNOSTOMY W/ JEJUNOSTOMY TUBE      removed    HYSTERECTOMY      LYMPH NODE BIOPSY      BENIGN    OOPHORECTOMY      OTHER SURGICAL HISTORY      gastric stimulator battery replaced    PANCREATECTOMY  12/2019    TPIAT    PELVIC FLOOR REPAIR      SINUS SURGERY      SPLENECTOMY      TONSILLECTOMY      VENOUS ACCESS DEVICE (PORT) INSERTION      REMOVED       Family History: family history includes Alcohol abuse in her paternal grandfather; Anxiety disorder in her father and mother; Arthritis in her mother; Breast cancer in her mother; Cancer in her mother; Colon polyps in her mother; Depression in her brother, father, and mother; Heart disease in her father and paternal grandfather; Hypertension in her brother and father; Hypothyroidism in her father and mother; Kidney disease in her father; Thyroid disease in her father and mother. Otherwise pertinent FHx was reviewed and not pertinent to current issue.    Social History:  reports that she has never smoked. She has never been exposed to tobacco smoke. She has never used smokeless tobacco. She reports that she does not drink alcohol and does not use drugs.    Home Medications:  Atogepant, Insulin Aspart, Insulin Glargine, Insulin Lispro, Omnipod 5 AkeK5A1 Pods Gen 5, Pancrelipase (Lip-Prot-Amyl), QUEtiapine, Vitamin E, Vortioxetine HBr, docusate sodium, glucagon, hydrOXYzine, lactulose, lansoprazole, levothyroxine, lisinopril, ondansetron, polyethylene glycol, prochlorperazine, promethazine, propranolol LA, riFAXIMin, traZODone, and ubrogepant    Allergies:  Allergies   Allergen Reactions    Parathyroid Hormone (Recomb) Other (See Comments) and Unknown - High Severity     Sent patient into kidney failure, heart stopped, in ICU for two days.    Romosozumab Hives     Other reaction(s): Hives    Other  reaction(s): Hives   Other reaction(s): Hives    Teriparatide Anaphylaxis and Other (See Comments)     Other reaction(s): Unknown    Tramadol Anaphylaxis and Other (See Comments)     Other reaction(s): Unknown, Unknown    Nifedipine Other (See Comments)     Rapid heart beat        Vancomycin Hives, Itching and Rash     vancomycin-infusion reaction (VIR, formerly Ivett's syndrome): give over longer infusion time       Objective    Objective     Vitals:   Temp:  [97 °F (36.1 °C)] 97 °F (36.1 °C)  Heart Rate:  [79] 79  Resp:  [18] 18  BP: (112)/(72) 112/72    Physical Exam  Constitutional:       Comments: BMI 30   Cardiovascular:      Comments: No notable edema  Pulmonary:      Effort: Pulmonary effort is normal.   Skin:     General: Skin is warm and dry.      Comments: Well-healed surgical scar left lower back   Neurological:      Mental Status: She is alert.   Psychiatric:         Mood and Affect: Mood normal.       Assessment & Plan   Assessment / Plan     Brief Patient Summary:  Kayla Gan is a 49 y.o. female who has spinal stenosis with a disc retrusion at lumbar 3-lumbar 4.    Active Hospital Problems:  Active Hospital Problems    Diagnosis     **Spinal stenosis, lumbar region, without neurogenic claudication      Plan:   OR today for minimally invasive lumbar laminectomy with discectomy, right approach, lumbar 3-lumbar 4.  Risks and benefits discussed with patient.  Desires to proceed.    VTE Prophylaxis:  Mechanical VTE prophylaxis orders are present.        CODE STATUS:       Admission Status:  I believe this patient meets outpatient status.    Joe Langley MD

## 2025-01-24 NOTE — DISCHARGE INSTRUCTIONS
DISCHARGE INSTRUCTIONS  SURGICAL / AMBULATORY  PROCEDURES      For your surgery you had:  General anesthesia (you may have a sore throat for the first 24 hours)  IV sedation.  Local anesthesia  You have received an anesthesia medication today that can cause hormonal forms of birth control to be ineffective. You should use a different form of birth control (to prevent pregnancy) for 7 days.   You may experience dizziness, drowsiness, or light-headedness for several hours following surgery/procedure.  Do not stay alone today or tonight.  Limit your activity for 24 hours.  Resume your diet slowly.  Follow whatever special dietary instructions you may have been given by your doctor.  You should not drive or operate machinery, drink alcohol, or sign legally binding documents for 24 hours or while you are taking pain medication.  Last dose of pain medication was given at:   .  NOTIFY YOUR DOCTOR IF YOU EXPERIENCE ANY OF THE FOLLOWING:  Temperature greater than 101 degrees Fahrenheit  Shaking Chills  Redness or excessive drainage from incision  Nausea, vomiting and/or pain that is not controlled by prescribed medications  Increase in bleeding or bleeding that is excessive  Unable to urinate in 6 hours after surgery  If unable to reach your doctor, please go to the closest Emergency Room  You may begin dressing changes:     [] in 24 hours   [] in 48 hours   [] Other:    You may remove Band-Aid/dressing tomorrow.  You may shower or bathe   .  Apply an ice pack 24-48 hours.  Medications per physician instructions as indicated on Discharge Medication Information Sheet.  You should see   for follow-up care   on   .  Phone number:       SPECIAL INSTRUCTIONS:

## 2025-01-24 NOTE — NURSING NOTE
Pt and family asking to go home and wanting Dr. Langley to call them, message was sent via Voalte pt was assisted out of [facility] via wheelchair with family to drive her home via car.

## 2025-01-24 NOTE — ANESTHESIA PREPROCEDURE EVALUATION
Anesthesia Evaluation     Patient summary reviewed and Nursing notes reviewed   history of anesthetic complications:  PONV  NPO Solid Status: > 8 hours  NPO Liquid Status: > 2 hours           Airway   Mallampati: I  TM distance: >3 FB  Neck ROM: full  No difficulty expected  Dental - normal exam     Pulmonary - normal exam    breath sounds clear to auscultation  (+) asthma,shortness of breath, sleep apnea  Cardiovascular - normal exam  Exercise tolerance: good (4-7 METS)    Patient on routine beta blocker and Beta blocker given within 24 hours of surgery  Rhythm: regular  Rate: normal    (+) hypertension (propranolol, lisinopril), DVT, hyperlipidemia      Neuro/Psych  (+) headaches, numbness, psychiatric history Anxiety, Depression and PTSD  GI/Hepatic/Renal/Endo    (+) GERD, hepatitis, liver disease cirrhosis, diabetes mellitus (insulin pump) well controlled using insulin, thyroid problem hypothyroidism    Musculoskeletal     Abdominal   (+) obese   Substance History - negative use     OB/GYN negative ob/gyn ROS         Other   arthritis,     ROS/Med Hx Other: PAT Nursing Notes unavailable.     01/24/25 08:11  WBC: 12.23 (H) (P)  RBC: 4.68 (P)  Hemoglobin: 13.7 (P)  Hematocrit: 43.1 (P)  Platelets: 411 (P)  RDW: 14.7 (P)  MCV: 92.1 (P)  MCH: 29.3 (P)  MCHC: 31.8 (P)  MPV: 10.5 (P)  RDW-SD: 50.3 (P)    12/10/24 07:27  Protime: 13.5  INR: 1.01  PTT: 25.3                    Anesthesia Plan    ASA 3     general     (Patient understands anesthesia not responsible for dental damage.)  intravenous induction     Anesthetic plan, risks, benefits, and alternatives have been provided, discussed and informed consent has been obtained with: patient.    Use of blood products discussed with patient .    Plan discussed with CRNA.        CODE STATUS:

## 2025-01-31 DIAGNOSIS — M48.061 SPINAL STENOSIS, LUMBAR REGION, WITHOUT NEUROGENIC CLAUDICATION: ICD-10-CM

## 2025-01-31 RX ORDER — HYDROCODONE BITARTRATE AND ACETAMINOPHEN 5; 325 MG/1; MG/1
1 TABLET ORAL EVERY 6 HOURS PRN
Qty: 20 TABLET | Refills: 0 | Status: CANCELLED | OUTPATIENT
Start: 2025-01-31

## 2025-02-01 ENCOUNTER — APPOINTMENT (OUTPATIENT)
Dept: CT IMAGING | Facility: HOSPITAL | Age: 50
End: 2025-02-01
Payer: MEDICARE

## 2025-02-01 ENCOUNTER — HOSPITAL ENCOUNTER (EMERGENCY)
Facility: HOSPITAL | Age: 50
Discharge: HOME | End: 2025-02-01
Attending: EMERGENCY MEDICINE
Payer: MEDICARE

## 2025-02-01 ENCOUNTER — HOSPITAL ENCOUNTER (EMERGENCY)
Facility: HOSPITAL | Age: 50
Discharge: HOME | End: 2025-02-02
Attending: EMERGENCY MEDICINE
Payer: MEDICARE

## 2025-02-01 ENCOUNTER — APPOINTMENT (OUTPATIENT)
Dept: GENERAL RADIOLOGY | Facility: HOSPITAL | Age: 50
End: 2025-02-01
Payer: MEDICARE

## 2025-02-01 VITALS
BODY MASS INDEX: 30.17 KG/M2 | HEART RATE: 74 BPM | OXYGEN SATURATION: 99 % | WEIGHT: 192.24 LBS | RESPIRATION RATE: 18 BRPM | HEIGHT: 67 IN | SYSTOLIC BLOOD PRESSURE: 149 MMHG | TEMPERATURE: 98.1 F | DIASTOLIC BLOOD PRESSURE: 95 MMHG

## 2025-02-01 DIAGNOSIS — M54.50 ACUTE MIDLINE LOW BACK PAIN WITHOUT SCIATICA: Primary | ICD-10-CM

## 2025-02-01 DIAGNOSIS — Z98.890 STATUS POST LAMINECTOMY: ICD-10-CM

## 2025-02-01 DIAGNOSIS — R11.2 NAUSEA AND VOMITING, UNSPECIFIED VOMITING TYPE: Primary | ICD-10-CM

## 2025-02-01 LAB
ALBUMIN SERPL-MCNC: 3.7 G/DL (ref 3.5–5.2)
ALBUMIN/GLOB SERPL: 1.2 G/DL
ALP SERPL-CCNC: 274 U/L (ref 39–117)
ALT SERPL W P-5'-P-CCNC: 150 U/L (ref 1–33)
AMMONIA BLD-SCNC: 32 UMOL/L (ref 11–51)
ANION GAP SERPL CALCULATED.3IONS-SCNC: 15.4 MMOL/L (ref 5–15)
AST SERPL-CCNC: 304 U/L (ref 1–32)
BASOPHILS # BLD AUTO: 0.03 10*3/MM3 (ref 0–0.2)
BASOPHILS NFR BLD AUTO: 0.3 % (ref 0–1.5)
BILIRUB SERPL-MCNC: 0.6 MG/DL (ref 0–1.2)
BILIRUB UR QL STRIP: NEGATIVE
BUN SERPL-MCNC: 12 MG/DL (ref 6–20)
BUN/CREAT SERPL: 17.6 (ref 7–25)
CALCIUM SPEC-SCNC: 9.1 MG/DL (ref 8.6–10.5)
CHLORIDE SERPL-SCNC: 103 MMOL/L (ref 98–107)
CLARITY UR: CLEAR
CO2 SERPL-SCNC: 20.6 MMOL/L (ref 22–29)
COLOR UR: YELLOW
CREAT SERPL-MCNC: 0.68 MG/DL (ref 0.57–1)
DEPRECATED RDW RBC AUTO: 47.8 FL (ref 37–54)
EGFRCR SERPLBLD CKD-EPI 2021: 106.9 ML/MIN/1.73
EOSINOPHIL # BLD AUTO: 0.1 10*3/MM3 (ref 0–0.4)
EOSINOPHIL NFR BLD AUTO: 0.9 % (ref 0.3–6.2)
ERYTHROCYTE [DISTWIDTH] IN BLOOD BY AUTOMATED COUNT: 14.6 % (ref 12.3–15.4)
GEN 5 1HR TROPONIN T REFLEX: <6 NG/L
GLOBULIN UR ELPH-MCNC: 3.2 GM/DL
GLUCOSE SERPL-MCNC: 141 MG/DL (ref 65–99)
GLUCOSE UR STRIP-MCNC: NEGATIVE MG/DL
HCT VFR BLD AUTO: 43.8 % (ref 34–46.6)
HGB BLD-MCNC: 14.3 G/DL (ref 12–15.9)
HGB UR QL STRIP.AUTO: NEGATIVE
HOLD SPECIMEN: NORMAL
IMM GRANULOCYTES # BLD AUTO: 0.05 10*3/MM3 (ref 0–0.05)
IMM GRANULOCYTES NFR BLD AUTO: 0.5 % (ref 0–0.5)
INR PPP: 1.13 (ref 0.86–1.15)
KETONES UR QL STRIP: NEGATIVE
LEUKOCYTE ESTERASE UR QL STRIP.AUTO: NEGATIVE
LIPASE SERPL-CCNC: 5 U/L (ref 13–60)
LYMPHOCYTES # BLD AUTO: 1.8 10*3/MM3 (ref 0.7–3.1)
LYMPHOCYTES NFR BLD AUTO: 16.9 % (ref 19.6–45.3)
MCH RBC QN AUTO: 29.5 PG (ref 26.6–33)
MCHC RBC AUTO-ENTMCNC: 32.6 G/DL (ref 31.5–35.7)
MCV RBC AUTO: 90.5 FL (ref 79–97)
MONOCYTES # BLD AUTO: 0.19 10*3/MM3 (ref 0.1–0.9)
MONOCYTES NFR BLD AUTO: 1.8 % (ref 5–12)
NEUTROPHILS NFR BLD AUTO: 79.6 % (ref 42.7–76)
NEUTROPHILS NFR BLD AUTO: 8.49 10*3/MM3 (ref 1.7–7)
NITRITE UR QL STRIP: NEGATIVE
NRBC BLD AUTO-RTO: 0 /100 WBC (ref 0–0.2)
PH UR STRIP.AUTO: 6 [PH] (ref 5–8)
PLATELET # BLD AUTO: 355 10*3/MM3 (ref 140–450)
PMV BLD AUTO: 10.2 FL (ref 6–12)
POTASSIUM SERPL-SCNC: 4.3 MMOL/L (ref 3.5–5.2)
PROT SERPL-MCNC: 6.9 G/DL (ref 6–8.5)
PROT UR QL STRIP: NEGATIVE
PROTHROMBIN TIME: 14.7 SECONDS (ref 11.8–14.9)
QT INTERVAL: 333 MS
QTC INTERVAL: 405 MS
RBC # BLD AUTO: 4.84 10*6/MM3 (ref 3.77–5.28)
SODIUM SERPL-SCNC: 139 MMOL/L (ref 136–145)
SP GR UR STRIP: 1.02 (ref 1–1.03)
TROPONIN T NUMERIC DELTA: NORMAL
TROPONIN T SERPL HS-MCNC: <6 NG/L
UROBILINOGEN UR QL STRIP: NORMAL
WBC NRBC COR # BLD AUTO: 10.66 10*3/MM3 (ref 3.4–10.8)
WHOLE BLOOD HOLD COAG: NORMAL
WHOLE BLOOD HOLD SPECIMEN: NORMAL

## 2025-02-01 PROCEDURE — 82140 ASSAY OF AMMONIA: CPT | Performed by: EMERGENCY MEDICINE

## 2025-02-01 PROCEDURE — 96375 TX/PRO/DX INJ NEW DRUG ADDON: CPT

## 2025-02-01 PROCEDURE — 99284 EMERGENCY DEPT VISIT MOD MDM: CPT

## 2025-02-01 PROCEDURE — 96374 THER/PROPH/DIAG INJ IV PUSH: CPT

## 2025-02-01 PROCEDURE — 72100 X-RAY EXAM L-S SPINE 2/3 VWS: CPT

## 2025-02-01 PROCEDURE — 96376 TX/PRO/DX INJ SAME DRUG ADON: CPT

## 2025-02-01 PROCEDURE — 93005 ELECTROCARDIOGRAM TRACING: CPT

## 2025-02-01 PROCEDURE — 93010 ELECTROCARDIOGRAM REPORT: CPT | Performed by: INTERNAL MEDICINE

## 2025-02-01 PROCEDURE — 82948 REAGENT STRIP/BLOOD GLUCOSE: CPT

## 2025-02-01 PROCEDURE — 36415 COLL VENOUS BLD VENIPUNCTURE: CPT

## 2025-02-01 PROCEDURE — 25010000002 PROCHLORPERAZINE 10 MG/2ML SOLUTION: Performed by: EMERGENCY MEDICINE

## 2025-02-01 PROCEDURE — 81003 URINALYSIS AUTO W/O SCOPE: CPT | Performed by: EMERGENCY MEDICINE

## 2025-02-01 PROCEDURE — 85610 PROTHROMBIN TIME: CPT | Performed by: EMERGENCY MEDICINE

## 2025-02-01 PROCEDURE — 25810000003 SODIUM CHLORIDE 0.9 % SOLUTION: Performed by: EMERGENCY MEDICINE

## 2025-02-01 PROCEDURE — 25010000002 METHYLPREDNISOLONE PER 125 MG: Performed by: EMERGENCY MEDICINE

## 2025-02-01 PROCEDURE — 80053 COMPREHEN METABOLIC PANEL: CPT

## 2025-02-01 PROCEDURE — 84484 ASSAY OF TROPONIN QUANT: CPT | Performed by: EMERGENCY MEDICINE

## 2025-02-01 PROCEDURE — 93005 ELECTROCARDIOGRAM TRACING: CPT | Performed by: EMERGENCY MEDICINE

## 2025-02-01 PROCEDURE — 99283 EMERGENCY DEPT VISIT LOW MDM: CPT

## 2025-02-01 PROCEDURE — 72131 CT LUMBAR SPINE W/O DYE: CPT

## 2025-02-01 PROCEDURE — 25010000002 HYDROMORPHONE 1 MG/ML SOLUTION: Performed by: EMERGENCY MEDICINE

## 2025-02-01 PROCEDURE — 85025 COMPLETE CBC W/AUTO DIFF WBC: CPT

## 2025-02-01 PROCEDURE — 83690 ASSAY OF LIPASE: CPT

## 2025-02-01 RX ORDER — PROCHLORPERAZINE EDISYLATE 5 MG/ML
10 INJECTION INTRAMUSCULAR; INTRAVENOUS ONCE
Status: COMPLETED | OUTPATIENT
Start: 2025-02-01 | End: 2025-02-01

## 2025-02-01 RX ORDER — SODIUM CHLORIDE 0.9 % (FLUSH) 0.9 %
10 SYRINGE (ML) INJECTION AS NEEDED
Status: DISCONTINUED | OUTPATIENT
Start: 2025-02-01 | End: 2025-02-02 | Stop reason: HOSPADM

## 2025-02-01 RX ORDER — HYDROCODONE BITARTRATE AND ACETAMINOPHEN 7.5; 325 MG/1; MG/1
1 TABLET ORAL ONCE
Status: DISCONTINUED | OUTPATIENT
Start: 2025-02-01 | End: 2025-02-01

## 2025-02-01 RX ORDER — METHYLPREDNISOLONE SODIUM SUCCINATE 125 MG/2ML
125 INJECTION, POWDER, LYOPHILIZED, FOR SOLUTION INTRAMUSCULAR; INTRAVENOUS ONCE
Status: COMPLETED | OUTPATIENT
Start: 2025-02-01 | End: 2025-02-01

## 2025-02-01 RX ORDER — METHOCARBAMOL 500 MG/1
1000 TABLET, FILM COATED ORAL ONCE
Status: COMPLETED | OUTPATIENT
Start: 2025-02-01 | End: 2025-02-01

## 2025-02-01 RX ORDER — HYDROCODONE BITARTRATE AND ACETAMINOPHEN 5; 325 MG/1; MG/1
1 TABLET ORAL EVERY 6 HOURS PRN
Qty: 12 TABLET | Refills: 0 | Status: SHIPPED | OUTPATIENT
Start: 2025-02-01 | End: 2025-02-04

## 2025-02-01 RX ADMIN — METHOCARBAMOL 1000 MG: 500 TABLET ORAL at 14:41

## 2025-02-01 RX ADMIN — METHYLPREDNISOLONE SODIUM SUCCINATE 125 MG: 125 INJECTION, POWDER, FOR SOLUTION INTRAMUSCULAR; INTRAVENOUS at 23:44

## 2025-02-01 RX ADMIN — PROCHLORPERAZINE EDISYLATE 10 MG: 5 INJECTION INTRAMUSCULAR; INTRAVENOUS at 22:06

## 2025-02-01 RX ADMIN — SODIUM CHLORIDE 1000 ML: 9 INJECTION, SOLUTION INTRAVENOUS at 23:45

## 2025-02-01 RX ADMIN — HYDROMORPHONE HYDROCHLORIDE 0.5 MG: 1 INJECTION, SOLUTION INTRAMUSCULAR; INTRAVENOUS; SUBCUTANEOUS at 14:56

## 2025-02-01 RX ADMIN — HYDROMORPHONE HYDROCHLORIDE 0.5 MG: 1 INJECTION, SOLUTION INTRAMUSCULAR; INTRAVENOUS; SUBCUTANEOUS at 17:06

## 2025-02-01 NOTE — ED PROVIDER NOTES
"SHARED VISIT NOTE:    Patient is 49 y.o. year old female that presents to the ED for evaluation of back pain.     Physical Exam    ED Course:    /95   Pulse 74   Temp 98.1 °F (36.7 °C) (Oral)   Resp 18   Ht 170.2 cm (67\")   Wt 87.2 kg (192 lb 3.9 oz)   SpO2 99%   BMI 30.11 kg/m²   Results for orders placed or performed during the hospital encounter of 02/01/25   Urinalysis With Culture If Indicated - Urine, Clean Catch    Collection Time: 02/01/25  1:27 PM    Specimen: Urine, Clean Catch   Result Value Ref Range    Color, UA Yellow Yellow, Straw    Appearance, UA Clear Clear    pH, UA 6.0 5.0 - 8.0    Specific Gravity, UA 1.025 1.005 - 1.030    Glucose, UA Negative Negative    Ketones, UA Negative Negative    Bilirubin, UA Negative Negative    Blood, UA Negative Negative    Protein, UA Negative Negative    Leuk Esterase, UA Negative Negative    Nitrite, UA Negative Negative    Urobilinogen, UA 1.0 E.U./dL 0.2 - 1.0 E.U./dL     Medications   HYDROmorphone (DILAUDID) injection 0.5 mg (has no administration in time range)   methocarbamol (ROBAXIN) tablet 1,000 mg (1,000 mg Oral Given 2/1/25 1441)   HYDROmorphone (DILAUDID) injection 0.5 mg (0.5 mg Intravenous Given 2/1/25 1456)     CT Lumbar Spine Without Contrast    Result Date: 2/1/2025  Narrative: CT LUMBAR SPINE WO CONTRAST Date of Exam: 2/1/2025 3:30 PM EST Indication: Back pain prev l3/l4 laminectomy pain. Surgery 1 week ago. Comparison: Lumbar spine radiograph 2/1/2025, CT lumbar spine myelogram 12/10/2024 Technique: Axial CT images were obtained of the lumbar spine without contrast administration.  Reconstructed coronal and sagittal images were also obtained. Automated exposure control and iterative construction methods were used. Findings: The bone mineral density is normal. There has been a right-sided laminotomy at the L3-L4 level. There has been laminectomy at the L4-L5 level and facetectomy on the left. The alignment is anatomic. Severe disc " height loss L4-5. Limited evaluation of central canal and neural foraminal narrowing on CT. MRI study of choice to evaluate for central canal or neural foraminal narrowing. L2-L3 there is mild disc bulge and mild facet degenerative change and ligamentum flavum hypertrophy. Mild central canal narrowing suspected. L3-L4 postsurgical changes. Moderate disc bulge. Mild central canal narrowing. Mild to moderate bilateral neural foraminal narrowing suspected. L4-5: Postsurgical changes. Moderate disc bulge. Ligamentum flavum hypertrophy. Moderate central canal narrowing suspected. Moderate left mild to moderate right neural foraminal narrowing suspected. L5-S1 has mild diffuse broad-based disc bulge and mild facet degenerative change. No significant central canal or neural foraminal narrowing.     Impression: Impression: Postsurgical changes L3-L4 and L4-L5. Limited evaluation of central canal and neural foramina narrowing on CT. MRI lumbar spine is study of choice. There appears to be moderate central canal narrowing at L4-L5 with moderate left and mild to moderate right neural foraminal narrowing. L3-L4 there is postsurgical change with mild central canal narrowing and mild to moderate bilateral neural foraminal narrowing. Electronically Signed: Rayna Canchola MD  2/1/2025 4:09 PM EST  Workstation ID: [id number]    XR Spine Lumbar 2 or 3 View    Result Date: 2/1/2025  Narrative: XR SPINE LUMBAR 2 OR 3 VW Date of Exam: 2/1/2025 1:30 PM EST Indication: Back pain s/p L3/L4 laminectomy Comparison: CT lumbar spine December 10, 2024 Findings: There are degenerative changes at the L4-5 level. There is facet arthropathy. Alignment of the vertebra seems reasonable. A disc protrusion at the L3-4 level on previous CT cannot be assessed on this exam.     Impression: Impression: Evidence for degenerative change at the L4-5 level. This has been noted. Electronically Signed: Deep Finn MD  2/1/2025 1:49 PM EST  Workstation ID:  [id number]    FL Surgery Fluoro    Result Date: 1/24/2025  Narrative: This procedure was auto-finalized with no dictation required.     MDM:    Procedures              SHARED VISIT ATTESTATION:    This visit was performed by both myself and an APC.  I performed the substantive portion of the medical decision making. The management plan was made or approved by me, and I take responsibility for patient management.           Devin Dalton MD  16:48 EST  02/01/25         Devin Dalton MD  02/01/25 1648

## 2025-02-01 NOTE — ED PROVIDER NOTES
Time: 1:23 PM EST  Date of encounter:  2/1/2025  Independent Historian/Clinical History and Information was obtained by:   Patient    History is limited by: N/A    Chief Complaint: Back pain      History of Present Illness:  Patient is a 49 y.o. year old female who presents to the emergency department for evaluation of back pain.  Patient reports she had a laminectomy performed by Dr. Langley on 1/24 on L3-L4.  States that she has had persistent pain since then.  Reports new electric shock type sensations that shoot down both of her legs.  Reports numbness and tingling to both feet.  Reports muscle spasms in both legs.  Patient reports medication she is taking is not helping.  States that she has called Dr. Langley's office multiple times for guidance and has not received a call back.  Also reports that she feels she may have a UTI, having urinary urgency and accidentally urinating on herself at times.  Does not feel like she can get to the restroom fast enough.  Reports suprapubic pain and some burning.  Denies saddle anesthesia, fall, injury, fever, chest pain or shortness of breath.  She has run out of pain medicine that was prescribed to her.      Patient Care Team  Primary Care Provider: Justin Daniels APRN    Past Medical History:     Allergies   Allergen Reactions    Parathyroid Hormone (Recomb) Other (See Comments) and Unknown - High Severity     Sent patient into kidney failure, heart stopped, in ICU for two days.    Romosozumab Hives     Other reaction(s): Hives    Other reaction(s): Hives   Other reaction(s): Hives    Teriparatide Anaphylaxis and Other (See Comments)     Other reaction(s): Unknown    Tramadol Anaphylaxis and Other (See Comments)     Other reaction(s): Unknown, Unknown    Nifedipine Other (See Comments)     Rapid heart beat        Vancomycin Hives, Itching and Rash     vancomycin-infusion reaction (VIR, formerly Ivett's syndrome): give over longer infusion time     Past Medical  History:   Diagnosis Date    Adrenal insufficiency     Allergic Unsure    Foods, Ulram, Vancomycin    Anemia     Anxiety     Arthritis     Asthma Unsure    Bleeding disorder     Brain concussion     Cholelithiasis Unsure    Cirrhosis     NO CURRENT S/S    Colon polyp     Crohn's disease     Depression     Diabetes mellitus     Gastroparesis     GERD (gastroesophageal reflux disease)     History of MRSA infection     History of transfusion     Hyperlipidemia     Hypertension     Hypothyroidism     Interstitial cystitis     Irritable bowel syndrome Unsure    Liver disease     Low back pain     Lumbosacral disc disease     Migraines     MRSA (methicillin resistant Staphylococcus aureus)     NO CURRENT ISSUES    Pancreatitis     NO CURRENT ISSUES    Sleep apnea     USES CPAP    Urinary incontinence     Urinary tract infection Unsure    Interstitial Cystitis    Vaginal infection      Past Surgical History:   Procedure Laterality Date    ABSCESS DRAINAGE  12/24/2019    Fluoroscopically guided abscess drainage, [facility]    ADENOIDECTOMY      BACK SURGERY      L4 L5    BLADDER SURGERY      CHOLECYSTECTOMY N/A     COLONOSCOPY  04/24/2019    NBIH, 3 mm polyp    CYSTOSCOPY      DILATATION AND CURETTAGE      ENDOMETRIAL ABLATION      ENDOSCOPY N/A 04/24/2019    Normal    ENTEROSCOPY SMALL BOWEL      EPIDURAL BLOCK      ERCP  2019    GASTRIC STIMULATOR IMPLANT SURGERY      IN PLACE NOW, PT DOES NOT HAVE ANY REMOTE FOR THIS DEVICE.    GASTROJEJUNOSTOMY W/ JEJUNOSTOMY TUBE      removed    HYSTERECTOMY      LUMBAR LAMINECTOMY Right 1/24/2025    Procedure: MINIMALLY INVASIVE LUMBAR LAMINECTOMY WITH DISCECTOMY, RIGHT APPROACH, LUMBAR THREE-LUMBAR FOUR  OPERATION ON THE LOWER BACK TO SHAVE OFF BONE AND DISC COMPRESSING THE NERVES TO THE LEGS.;  Surgeon: Joe Langley MD;  Location: [facility];  Service: Neurosurgery;  Laterality: Right;    LYMPH NODE BIOPSY      BENIGN    OOPHORECTOMY      OTHER SURGICAL HISTORY       gastric stimulator battery replaced    PANCREATECTOMY  12/2019    TPIAT    PELVIC FLOOR REPAIR      SINUS SURGERY      SPLENECTOMY      TONSILLECTOMY      VENOUS ACCESS DEVICE (PORT) INSERTION      REMOVED     Family History   Problem Relation Age of Onset    Anxiety disorder Mother     Hypothyroidism Mother     Breast cancer Mother     Colon polyps Mother     Arthritis Mother     Cancer Mother         Breast    Depression Mother     Thyroid disease Mother     Heart disease Father     Hypothyroidism Father     Anxiety disorder Father     Depression Father     Hypertension Father     Kidney disease Father     Thyroid disease Father     Depression Brother     Hypertension Brother     Alcohol abuse Paternal Grandfather     Heart disease Paternal Grandfather     Malig Hyperthermia Neg Hx        Home Medications:  Prior to Admission medications    Medication Sig Start Date End Date Taking? Authorizing Provider   docusate sodium (COLACE) 100 MG capsule Take 1 capsule by mouth 2 (Two) Times a Day. 4/16/19   Pollo Ray MD   E-400 180 MG (400 UNIT) capsule capsule Take 2 capsules by mouth Daily. 11/8/24   Pollo Ray MD   Enulose 10 GM/15ML solution solution (encephalopathy) Take 45 mL by mouth As Needed.    Pollo Ray MD   glucagon (GLUCAGEN) 1 MG injection Inject 1 mg into the appropriate muscle as directed by prescriber Every 15 (Fifteen) Minutes As Needed for Low Blood Sugar (per Glucommander). 6/21/24   Gary Hemphill MD   HYDROcodone-acetaminophen (NORCO) 5-325 MG per tablet Take 1 tablet by mouth Every 6 (Six) Hours As Needed (Pain). 1/24/25   Joe Langley MD   hydrOXYzine (ATARAX) 50 MG tablet Take 1 tablet by mouth Every Night.    Pollo Ray MD   Insulin Disposable Pump (Omnipod 5 G6 Pods, Gen 5,) misc  7/1/24   Pollo aRy MD   Insulin Lispro (humaLOG) 100 UNIT/ML injection USE IN insulin pump approximately 100 UNITS DAILY 10/23/24   Pollo Ray MD    lansoprazole (PREVACID) 30 MG capsule TAKE ONE CAPSULE BY MOUTH TWICE DAILY 9/6/24   Mariela Gr APRN   Lantus SoloStar 100 UNIT/ML injection pen Inject 47 Units under the skin into the appropriate area as directed Daily As Needed (ONLY FOR INSULIN PUMP FAILURE). Only for insulin pump failure    Pollo Ray MD   levothyroxine (SYNTHROID, LEVOTHROID) 125 MCG tablet Take 1 tablet by mouth Daily. 11/5/24   Pollo Ray MD   lisinopril (PRINIVIL,ZESTRIL) 40 MG tablet Take 1 tablet by mouth Daily.    Pollo Ray MD   NovoLOG 100 UNIT/ML injection Inject  under the skin into the appropriate area as directed. Type of Insulin: Lispro 100 units/ml  Type of Pump:Omnipod  Bolus: 1u 5-7g max bolus 2.5units/hr  Basal: 9358-8784 1.5u/hr max basal 10units/hr  Correction factor: 40mg/dl  Insulin to carbohydrate ratio: 7-5 g of carb  Next fill date: 10/21/2024    Date of last site change: 10/18/2024  Location of pod: right upper arm    Frequency of refill: 2-3 days  Last refill date: 10/18/2024    Prescriber: Dr. Chelsea Silver  Phone number: [phone] 5/12/22   Pollo Ray MD   ondansetron (ZOFRAN) 8 MG tablet Take 1 tablet by mouth Every 4 (Four) to 6 (Six) Hours As Needed for Nausea or Vomiting.    Pollo Ray MD   Pancrelipase, Lip-Prot-Amyl, (CREON) 69174-091341 units capsule delayed-release particles capsule Take 2 capsules by mouth 2 (Two) Times a Day. 3 CAPS TID WITH MEALS AND 2 CAPS BID WITH SNACKS    Pollo Ray MD   polyethylene glycol (MiraLax) 17 GM/SCOOP powder Take 17 g by mouth Daily.    Pollo aRy MD   prochlorperazine (COMPAZINE) 10 MG tablet Take 1 tablet by mouth Every 8 (Eight) Hours As Needed for Nausea or Vomiting. 6/6/23   Patrizia Sena APRN   promethazine (PHENERGAN) 25 MG tablet Take 1 tablet by mouth Every 6 (Six) Hours As Needed.    Pollo Ray MD   propranolol LA (INDERAL LA) 60 MG 24 hr capsule  Take 1 capsule by mouth Daily.    Provider, MD Pollo   QUEtiapine (SEROquel) 100 MG tablet Take 1 tablet by mouth every night at bedtime.    ProviderPollo MD   Qulipta 60 MG tablet Take 1 tablet by mouth Every Night. 4/27/24   ProviderPollo MD   riFAXIMin (XIFAXAN) 550 MG tablet Take 1 tablet by mouth Every 12 (Twelve) Hours. 5/9/24 5/9/25  Mariela Gr APRN   traZODone (DESYREL) 50 MG tablet Take 1 tablet by mouth Every Night.    ProviderPollo MD   Ubrelvy 100 MG tablet Take 1 tablet by mouth 1 (One) Time As Needed (Migraine). 12/7/23   Dulce Camejo APRN   Vortioxetine HBr (Trintellix) 5 MG tablet tablet Take 1 tablet by mouth Every Night for 30 days. 12/12/23 12/17/24  Kayla Graham PA-C        Social History:   Social History     Tobacco Use    Smoking status: Never     Passive exposure: Never    Smokeless tobacco: Never   Vaping Use    Vaping status: Never Used   Substance Use Topics    Alcohol use: Never    Drug use: Never         Review of Systems:  Review of Systems   Constitutional:  Positive for activity change. Negative for appetite change, chills, fatigue and fever.   HENT:  Negative for congestion, ear pain, postnasal drip and rhinorrhea.    Eyes:  Negative for photophobia and visual disturbance.   Respiratory:  Negative for cough, shortness of breath and wheezing.    Cardiovascular:  Negative for chest pain, palpitations and leg swelling.   Gastrointestinal:  Positive for abdominal pain. Negative for diarrhea, nausea and vomiting.   Genitourinary:  Positive for dysuria and urgency.   Musculoskeletal:  Positive for back pain and gait problem. Negative for arthralgias.   Skin:  Negative for color change and rash.   Neurological:  Positive for weakness and numbness. Negative for dizziness, tremors, seizures, syncope, facial asymmetry, speech difficulty, light-headedness and headaches.   Hematological:  Negative for adenopathy.   Psychiatric/Behavioral:   "Negative for agitation and confusion.         Physical Exam:  /95   Pulse 74   Temp 98.1 °F (36.7 °C) (Oral)   Resp 18   Ht 170.2 cm (67\")   Wt 87.2 kg (192 lb 3.9 oz)   SpO2 99%   BMI 30.11 kg/m²     Physical Exam  Vitals and nursing note reviewed.   HENT:      Head: Normocephalic and atraumatic.      Right Ear: External ear normal.      Left Ear: External ear normal.      Nose: Nose normal.      Mouth/Throat:      Mouth: Mucous membranes are moist.   Eyes:      Extraocular Movements: Extraocular movements intact.      Conjunctiva/sclera: Conjunctivae normal.      Pupils: Pupils are equal, round, and reactive to light.   Cardiovascular:      Rate and Rhythm: Normal rate and regular rhythm.   Pulmonary:      Effort: Pulmonary effort is normal.   Abdominal:      General: There is no distension.      Palpations: Abdomen is soft.      Tenderness: There is no abdominal tenderness. There is no guarding or rebound.   Musculoskeletal:         General: Tenderness present. Normal range of motion.      Cervical back: Normal range of motion and neck supple. No rigidity or tenderness.      Comments: TTP over L3/L4 laminectomy site.  Positive straight leg raise bilaterally.  Decreased strength bilateral lower extremities secondary to pain.  Sensation intact.  Good pulses bilaterally.  Good cap refill.   Skin:     General: Skin is warm and dry.      Capillary Refill: Capillary refill takes less than 2 seconds.      Comments: Surgical site with mild swelling, skin healing appropriately, no erythema   Neurological:      General: No focal deficit present.      Mental Status: She is alert and oriented to person, place, and time.      Cranial Nerves: No cranial nerve deficit.      Sensory: No sensory deficit.      Motor: No weakness.      Gait: Gait normal.      Deep Tendon Reflexes: Reflexes normal.      Comments: Bilateral equal patellar reflexes    Psychiatric:         Mood and Affect: Mood normal.         Behavior: " Behavior normal.                    Medical Decision Making:      Comorbidities that affect care:    Spinal stenosis, s/p laminectomy, WELDON cirrhosis    External Notes reviewed:    Previous Operation Note: 1/24 OR note, no complications outpatient status      The following orders were placed and all results were independently analyzed by me:  Orders Placed This Encounter   Procedures    XR Spine Lumbar 2 or 3 View    CT Lumbar Spine Without Contrast    Urinalysis With Culture If Indicated - Urine, Clean Catch    IP General Consult (Use specialty-specific consult if known)       Medications Given in the Emergency Department:  Medications   methocarbamol (ROBAXIN) tablet 1,000 mg (1,000 mg Oral Given 2/1/25 1441)   HYDROmorphone (DILAUDID) injection 0.5 mg (0.5 mg Intravenous Given 2/1/25 1456)   HYDROmorphone (DILAUDID) injection 0.5 mg (0.5 mg Intravenous Given 2/1/25 1706)        ED Course:    ED Course as of 02/01/25 1906   Sat Feb 01, 2025   1338 Urinalysis With Culture If Indicated - Urine, Clean Catch  neg [AS]   1419 XR Spine Lumbar 2 or 3 View  Degenerative changes [AS]   1622 Pt reports she feels slightly better on re-examination  [AS]   1632 Spoke with Dr. Langley on the phone, given the patient does have reflexes, is able to walk, no saddle anesthesia, he is not concern for serious pathology including cauda equina.  States she can call the office Monday and they will move up her appointment. [AS]      ED Course User Index  [AS] Bisi Calvo, PARennyC       Labs:    Lab Results (last 24 hours)       Procedure Component Value Units Date/Time    Urinalysis With Culture If Indicated - Urine, Clean Catch [[id number]]  (Normal) Collected: 02/01/25 1327    Specimen: Urine, Clean Catch Updated: 02/01/25 1337     Color, UA Yellow     Appearance, UA Clear     pH, UA 6.0     Specific Gravity, UA 1.025     Glucose, UA Negative     Ketones, UA Negative     Bilirubin, UA Negative     Blood, UA Negative     Protein, UA  Negative     Leuk Esterase, UA Negative     Nitrite, UA Negative     Urobilinogen, UA 1.0 E.U./dL    Narrative:      In absence of clinical symptoms, the presence of pyuria, bacteria, and/or nitrites on the urinalysis result does not correlate with infection.  Urine microscopic not indicated.             Imaging:    CT Lumbar Spine Without Contrast    Result Date: 2/1/2025  CT LUMBAR SPINE WO CONTRAST Date of Exam: 2/1/2025 3:30 PM EST Indication: Back pain prev l3/l4 laminectomy pain. Surgery 1 week ago. Comparison: Lumbar spine radiograph 2/1/2025, CT lumbar spine myelogram 12/10/2024 Technique: Axial CT images were obtained of the lumbar spine without contrast administration.  Reconstructed coronal and sagittal images were also obtained. Automated exposure control and iterative construction methods were used. Findings: The bone mineral density is normal. There has been a right-sided laminotomy at the L3-L4 level. There has been laminectomy at the L4-L5 level and facetectomy on the left. The alignment is anatomic. Severe disc height loss L4-5. Limited evaluation of central canal and neural foraminal narrowing on CT. MRI study of choice to evaluate for central canal or neural foraminal narrowing. L2-L3 there is mild disc bulge and mild facet degenerative change and ligamentum flavum hypertrophy. Mild central canal narrowing suspected. L3-L4 postsurgical changes. Moderate disc bulge. Mild central canal narrowing. Mild to moderate bilateral neural foraminal narrowing suspected. L4-5: Postsurgical changes. Moderate disc bulge. Ligamentum flavum hypertrophy. Moderate central canal narrowing suspected. Moderate left mild to moderate right neural foraminal narrowing suspected. L5-S1 has mild diffuse broad-based disc bulge and mild facet degenerative change. No significant central canal or neural foraminal narrowing.     Impression: Postsurgical changes L3-L4 and L4-L5. Limited evaluation of central canal and neural  foramina narrowing on CT. MRI lumbar spine is study of choice. There appears to be moderate central canal narrowing at L4-L5 with moderate left and mild to moderate right neural foraminal narrowing. L3-L4 there is postsurgical change with mild central canal narrowing and mild to moderate bilateral neural foraminal narrowing. Electronically Signed: Rayna Canchola MD  2/1/2025 4:09 PM EST  Workstation ID: [id number]    XR Spine Lumbar 2 or 3 View    Result Date: 2/1/2025  XR SPINE LUMBAR 2 OR 3 VW Date of Exam: 2/1/2025 1:30 PM EST Indication: Back pain s/p L3/L4 laminectomy Comparison: CT lumbar spine December 10, 2024 Findings: There are degenerative changes at the L4-5 level. There is facet arthropathy. Alignment of the vertebra seems reasonable. A disc protrusion at the L3-4 level on previous CT cannot be assessed on this exam.     Impression: Evidence for degenerative change at the L4-5 level. This has been noted. Electronically Signed: Deep Finn MD  2/1/2025 1:49 PM EST  Workstation ID: [id number]       Differential Diagnosis and Discussion:    Back Pain: The patient presents with back pain. My differential diagnosis includes but is not limited to acute spinal epidural abscess, acute spinal epidural bleed, cauda equina syndrome, abdominal aortic aneurysm, aortic dissection, kidney stone, pyelonephritis, musculoskeletal back pain, spinal fracture, and osteoarthritis.     PROCEDURES:    Labs were collected in the emergency department and all labs were reviewed and interpreted by me.  X-ray were performed in the emergency department and all X-ray impressions were independently interpreted by me.  CT scan was performed in the emergency department and the CT scan radiology impression was interpreted by me.    No orders to display       Procedures    MDM     Amount and/or Complexity of Data Reviewed  Clinical lab tests: reviewed and ordered  Tests in the radiology section of CPT®: reviewed and ordered  Review and  summarize past medical records: yes  Discuss the patient with other providers: yes  Independent visualization of images, tracings, or specimens: yes    Risk of Complications, Morbidity, and/or Mortality  Presenting problems: low  Diagnostic procedures: moderate  Management options: moderate    Patient Progress  Patient progress: improved                       Patient Care Considerations:    SEPSIS was considered but is NOT present in the emergency department as SIRS criteria is not present. MRI: I considered ordering an MRI however patient has no neurological deficit, bilateral reflexes, no saddle anesthesia, no cauda equina symptoms      Consultants/Shared Management Plan:    Consultant: I have discussed the case with Dr. Langley who states physical exam is reassuring, no need for MRI at this time, can call office Monday to have appointment moved up.    Social Determinants of Health:    Patient is independent, reliable, and has access to care.       Disposition and Care Coordination:    Discharged: The patient is suitable and stable for discharge with no need for consideration of admission.    I have explained the patient´s condition, diagnoses and treatment plan based on the information available to me at this time. I have answered questions and addressed any concerns. The patient has a good  understanding of the patient´s diagnosis, condition, and treatment plan as can be expected at this point. The vital signs have been stable. The patient´s condition is stable and appropriate for discharge from the emergency department.      The patient will pursue further outpatient evaluation with the primary care physician or other designated or consulting physician as outlined in the discharge instructions. They are agreeable to this plan of care and follow-up instructions have been explained in detail. The patient has received these instructions in written format and has expressed an understanding of the discharge  instructions. The patient is aware that any significant change in condition or worsening of symptoms should prompt an immediate return to this or the closest emergency department or call to 911.  I have explained discharge medications and the need for follow up with the patient/caretakers. This was also printed in the discharge instructions. Patient was discharged with the following medications and follow up:      Medication List        Changed      HYDROcodone-acetaminophen 5-325 MG per tablet  Commonly known as: NORCO  Take 1 tablet by mouth Every 6 (Six) Hours As Needed for Moderate Pain or Severe Pain for up to 3 days.  What changed: reasons to take this               Where to Get Your Medications        These medications were sent to [facility]/pharmacy #[id number] - [place], [place] - [address] - [phone] [facility] [phone] FX  [address] [place] [address]      Hours: 24-hours Phone: [phone]   HYDROcodone-acetaminophen 5-325 MG per tablet      No follow-up provider specified.     Final diagnoses:   Acute midline low back pain without sciatica   Status post laminectomy        ED Disposition       ED Disposition   Discharge    Condition   Stable    Comment   --               This medical record created using voice recognition software.             Bisi Calvo PA-C  02/01/25 [id number]

## 2025-02-02 VITALS
HEART RATE: 118 BPM | WEIGHT: 192.24 LBS | RESPIRATION RATE: 18 BRPM | SYSTOLIC BLOOD PRESSURE: 125 MMHG | TEMPERATURE: 97.7 F | HEIGHT: 67 IN | OXYGEN SATURATION: 97 % | DIASTOLIC BLOOD PRESSURE: 74 MMHG | BODY MASS INDEX: 30.17 KG/M2

## 2025-02-02 RX ORDER — PROCHLORPERAZINE MALEATE 10 MG
10 TABLET ORAL EVERY 8 HOURS PRN
Qty: 60 TABLET | Refills: 0 | Status: SHIPPED | OUTPATIENT
Start: 2025-02-02

## 2025-02-02 NOTE — ED PROVIDER NOTES
Time: 9:09 PM EST  Date of encounter:  2/1/2025  Independent Historian/Clinical History and Information was obtained by:   Patient and Family    History is limited by: N/A    Chief Complaint: Nausea and vomiting      History of Present Illness:  Patient is a 49 y.o. year old female who presents to the emergency department for evaluation of nausea and vomiting    The patient's had persistent pain and now complicated by nausea and vomiting.  States that she was discharged from the emergency department earlier today but her symptoms are persistent.  Mother is at bedside is concerned that the patient is developing hepatic encephalopathy due to her liver disease and now seems increasingly disoriented and confused.  Patient has received Zofran and Phenergan via EMS without relief.          Patient Care Team  Primary Care Provider: Justin Daniels APRN    Past Medical History:     Allergies   Allergen Reactions    Parathyroid Hormone (Recomb) Other (See Comments) and Unknown - High Severity     Sent patient into kidney failure, heart stopped, in ICU for two days.    Romosozumab Hives     Other reaction(s): Hives    Other reaction(s): Hives   Other reaction(s): Hives    Teriparatide Anaphylaxis and Other (See Comments)     Other reaction(s): Unknown    Tramadol Anaphylaxis and Other (See Comments)     Other reaction(s): Unknown, Unknown    Nifedipine Other (See Comments)     Rapid heart beat        Vancomycin Hives, Itching and Rash     vancomycin-infusion reaction (VIR, formerly Ivett's syndrome): give over longer infusion time     Past Medical History:   Diagnosis Date    Adrenal insufficiency     Allergic Unsure    Foods, Ulram, Vancomycin    Anemia     Anxiety     Arthritis     Asthma Unsure    Bleeding disorder     Brain concussion     Cholelithiasis Unsure    Cirrhosis     NO CURRENT S/S    Colon polyp     Crohn's disease     Depression     Diabetes mellitus     Gastroparesis     GERD (gastroesophageal reflux  disease)     History of MRSA infection     History of transfusion     Hyperlipidemia     Hypertension     Hypothyroidism     Interstitial cystitis     Irritable bowel syndrome Unsure    Liver disease     Low back pain     Lumbosacral disc disease     Migraines     MRSA (methicillin resistant Staphylococcus aureus)     NO CURRENT ISSUES    Pancreatitis     NO CURRENT ISSUES    Sleep apnea     USES CPAP    Urinary incontinence     Urinary tract infection Unsure    Interstitial Cystitis    Vaginal infection      Past Surgical History:   Procedure Laterality Date    ABSCESS DRAINAGE  12/24/2019    Fluoroscopically guided abscess drainage, [facility]    ADENOIDECTOMY      BACK SURGERY      L4 L5    BLADDER SURGERY      CHOLECYSTECTOMY N/A     COLONOSCOPY  04/24/2019    NBIH, 3 mm polyp    CYSTOSCOPY      DILATATION AND CURETTAGE      ENDOMETRIAL ABLATION      ENDOSCOPY N/A 04/24/2019    Normal    ENTEROSCOPY SMALL BOWEL      EPIDURAL BLOCK      ERCP  2019    GASTRIC STIMULATOR IMPLANT SURGERY      IN PLACE NOW, PT DOES NOT HAVE ANY REMOTE FOR THIS DEVICE.    GASTROJEJUNOSTOMY W/ JEJUNOSTOMY TUBE      removed    HYSTERECTOMY      LUMBAR LAMINECTOMY Right 1/24/2025    Procedure: MINIMALLY INVASIVE LUMBAR LAMINECTOMY WITH DISCECTOMY, RIGHT APPROACH, LUMBAR THREE-LUMBAR FOUR  OPERATION ON THE LOWER BACK TO SHAVE OFF BONE AND DISC COMPRESSING THE NERVES TO THE LEGS.;  Surgeon: Joe Langley MD;  Location: [facility] OR;  Service: Neurosurgery;  Laterality: Right;    LYMPH NODE BIOPSY      BENIGN    OOPHORECTOMY      OTHER SURGICAL HISTORY      gastric stimulator battery replaced    PANCREATECTOMY  12/2019    TPIAT    PELVIC FLOOR REPAIR      SINUS SURGERY      SPLENECTOMY      TONSILLECTOMY      VENOUS ACCESS DEVICE (PORT) INSERTION      REMOVED     Family History   Problem Relation Age of Onset    Anxiety disorder Mother     Hypothyroidism Mother     Breast cancer Mother     Colon polyps Mother     Arthritis Mother      Cancer Mother         Breast    Depression Mother     Thyroid disease Mother     Heart disease Father     Hypothyroidism Father     Anxiety disorder Father     Depression Father     Hypertension Father     Kidney disease Father     Thyroid disease Father     Depression Brother     Hypertension Brother     Alcohol abuse Paternal Grandfather     Heart disease Paternal Grandfather     Malig Hyperthermia Neg Hx        Home Medications:  Prior to Admission medications    Medication Sig Start Date End Date Taking? Authorizing Provider   docusate sodium (COLACE) 100 MG capsule Take 1 capsule by mouth 2 (Two) Times a Day. 4/16/19   Pollo Ray MD   E-400 180 MG (400 UNIT) capsule capsule Take 2 capsules by mouth Daily. 11/8/24   Pollo Ray MD   Enulose 10 GM/15ML solution solution (encephalopathy) Take 45 mL by mouth As Needed.    Pollo Ray MD   glucagon (GLUCAGEN) 1 MG injection Inject 1 mg into the appropriate muscle as directed by prescriber Every 15 (Fifteen) Minutes As Needed for Low Blood Sugar (per Glucommander). 6/21/24   Gary Hemphill MD   HYDROcodone-acetaminophen (NORCO) 5-325 MG per tablet Take 1 tablet by mouth Every 6 (Six) Hours As Needed for Moderate Pain or Severe Pain for up to 3 days. 2/1/25 2/4/25  Devin Dalton MD   hydrOXYzine (ATARAX) 50 MG tablet Take 1 tablet by mouth Every Night.    Pollo Ray MD   Insulin Disposable Pump (Omnipod 5 G6 Pods, Gen 5,) [facility] [place]  7/1/24   Pollo Ray MD   Insulin Lispro (humaLOG) 100 UNIT/ML injection USE IN insulin pump approximately 100 UNITS DAILY 10/23/24   Pollo Ray MD   lansoprazole (PREVACID) 30 MG capsule TAKE ONE CAPSULE BY MOUTH TWICE DAILY 9/6/24   Mariela Gr APRN   Lantus SoloStar 100 UNIT/ML injection pen Inject 47 Units under the skin into the appropriate area as directed Daily As Needed (ONLY FOR INSULIN PUMP FAILURE). Only for insulin pump failure    Pollo Ray MD    levothyroxine (SYNTHROID, LEVOTHROID) 125 MCG tablet Take 1 tablet by mouth Daily. 11/5/24   Pollo Ray MD   lisinopril (PRINIVIL,ZESTRIL) 40 MG tablet Take 1 tablet by mouth Daily.    Pollo Ray MD   NovoLOG 100 UNIT/ML injection Inject  under the skin into the appropriate area as directed. Type of Insulin: Lispro 100 units/ml  Type of Pump:Omnipod  Bolus: 1u 5-7g max bolus 2.5units/hr  Basal: 1178-5220 1.5u/hr max basal 10units/hr  Correction factor: 40mg/dl  Insulin to carbohydrate ratio: 7-5 g of carb  Next fill date: 10/21/2024    Date of last site change: 10/18/2024  Location of pod: right upper arm    Frequency of refill: 2-3 days  Last refill date: 10/18/2024    Prescriber: Dr. Chelsea Silver  Phone number: [phone] 5/12/22   Pollo Ray MD   ondansetron (ZOFRAN) 8 MG tablet Take 1 tablet by mouth Every 4 (Four) to 6 (Six) Hours As Needed for Nausea or Vomiting.    Pollo Ray MD   Pancrelipase, Lip-Prot-Amyl, (CREON) 23860-086438 units capsule delayed-release particles capsule Take 2 capsules by mouth 2 (Two) Times a Day. 3 CAPS TID WITH MEALS AND 2 CAPS BID WITH SNACKS    Pollo Ray MD   polyethylene glycol (MiraLax) 17 GM/SCOOP powder Take 17 g by mouth Daily.    Pollo Ray MD   prochlorperazine (COMPAZINE) 10 MG tablet Take 1 tablet by mouth Every 8 (Eight) Hours As Needed for Nausea or Vomiting. 6/6/23   Nathen, Patrizia G APRN   promethazine (PHENERGAN) 25 MG tablet Take 1 tablet by mouth Every 6 (Six) Hours As Needed.    Pollo Ray MD   propranolol LA (INDERAL LA) 60 MG 24 hr capsule Take 1 capsule by mouth Daily.    Pollo Ray MD   QUEtiapine (SEROquel) 100 MG tablet Take 1 tablet by mouth every night at bedtime.    Pollo Ray MD   Qulipta 60 MG tablet Take 1 tablet by mouth Every Night. 4/27/24   Pollo Ray MD   riFAXIMin (XIFAXAN) 550 MG tablet Take 1 tablet by mouth Every  "12 (Twelve) Hours. 5/9/24 5/9/25  Mariela Gr APRN   traZODone (DESYREL) 50 MG tablet Take 1 tablet by mouth Every Night.    Provider, MD Pollo   Ubrelvy 100 MG tablet Take 1 tablet by mouth 1 (One) Time As Needed (Migraine). 12/7/23   Dulce Camejo APRN   Vortioxetine HBr (Trintellix) 5 MG tablet tablet Take 1 tablet by mouth Every Night for 30 days. 12/12/23 12/17/24  Kayla Graham PA-C   HYDROcodone-acetaminophen (NORCO) 5-325 MG per tablet Take 1 tablet by mouth Every 6 (Six) Hours As Needed (Pain). 1/24/25 2/1/25  Joe Langley MD        Social History:   Social History     Tobacco Use    Smoking status: Never     Passive exposure: Never    Smokeless tobacco: Never   Vaping Use    Vaping status: Never Used   Substance Use Topics    Alcohol use: Never    Drug use: Never         Review of Systems:  Review of Systems   Constitutional:  Negative for chills and fever.   HENT:  Negative for congestion, ear pain and sore throat.    Eyes:  Negative for pain.   Respiratory:  Negative for cough, chest tightness and shortness of breath.    Cardiovascular:  Negative for chest pain.   Gastrointestinal:  Positive for nausea and vomiting. Negative for abdominal pain and diarrhea.   Genitourinary:  Negative for flank pain and hematuria.   Musculoskeletal:  Positive for back pain. Negative for joint swelling.   Skin:  Negative for pallor.   Neurological:  Negative for seizures and headaches.   Psychiatric/Behavioral:  Positive for confusion.    All other systems reviewed and are negative.       Physical Exam:  /74   Pulse 118   Temp 97.7 °F (36.5 °C) (Oral)   Resp 18   Ht 170.2 cm (67.01\")   Wt 87.2 kg (192 lb 3.9 oz)   SpO2 97%   BMI 30.10 kg/m²     Physical Exam  Vitals and nursing note reviewed.   Constitutional:       General: She is not in acute distress.     Appearance: Normal appearance. She is ill-appearing. She is not toxic-appearing.   HENT:      Head: Normocephalic and atraumatic. "      Jaw: There is normal jaw occlusion.   Eyes:      General: Lids are normal.      Extraocular Movements: Extraocular movements intact.      Conjunctiva/sclera: Conjunctivae normal.      Pupils: Pupils are equal, round, and reactive to light.   Cardiovascular:      Rate and Rhythm: Normal rate and regular rhythm.      Pulses: Normal pulses.      Heart sounds: Normal heart sounds.   Pulmonary:      Effort: Pulmonary effort is normal. No respiratory distress.      Breath sounds: Normal breath sounds. No wheezing or rhonchi.   Abdominal:      General: Abdomen is flat.      Palpations: Abdomen is soft.      Tenderness: There is no abdominal tenderness. There is no guarding or rebound.   Musculoskeletal:         General: Normal range of motion.      Cervical back: Normal range of motion and neck supple.      Right lower leg: No edema.      Left lower leg: No edema.   Skin:     General: Skin is warm and dry.   Neurological:      Mental Status: She is alert and oriented to person, place, and time. Mental status is at baseline.   Psychiatric:         Mood and Affect: Mood normal.              Medical Decision Making:      Comorbidities that affect care:    Crohn's, migraine, hypothyroidism, gastroparesis, interstitial cystitis, diabetes, hypertension, adrenal insufficiency, pancreatitis, cirrhosis, asthma    External Notes reviewed:    Previous Operation Note: Minimally invasive lumbar laminectomy with Dr. Langley 1/24/2025 and Previous ED Note: ED visit earlier today for acute midline low back pain without sciatica status post laminectomy 2/1/2025      The following orders were placed and all results were independently analyzed by me:  Orders Placed This Encounter   Procedures    [place] Draw    Comprehensive Metabolic Panel    Lipase    High Sensitivity Troponin T    CBC Auto Differential    High Sensitivity Troponin T 1Hr    Ammonia    Protime-INR    Undress & Gown    Continuous Pulse Oximetry    Vital Signs    ECG 12  Lead ED Triage Standing Order; Abdominal Pain (Upper)    CBC & Differential    Green Top (Gel)    Lavender Top    Gold Top - SST    Light Blue Top    Extra Tubes    Green Top (Gel)       Medications Given in the Emergency Department:  Medications   prochlorperazine (COMPAZINE) injection 10 mg (10 mg Intravenous Given 2/1/25 2206)   sodium chloride 0.9 % bolus 1,000 mL (0 mL Intravenous Stopped 2/2/25 0127)   methylPREDNISolone sodium succinate (SOLU-Medrol) injection 125 mg (125 mg Intravenous Given 2/1/25 2344)        ED Course:         Labs:    Lab Results (last 24 hours)       Procedure Component Value Units Date/Time    Urinalysis With Culture If Indicated - Urine, Clean Catch [[id number]]  (Normal) Collected: 02/01/25 1327    Specimen: Urine, Clean Catch Updated: 02/01/25 1337     Color, UA Yellow     Appearance, UA Clear     pH, UA 6.0     Specific Gravity, UA 1.025     Glucose, UA Negative     Ketones, UA Negative     Bilirubin, UA Negative     Blood, UA Negative     Protein, UA Negative     Leuk Esterase, UA Negative     Nitrite, UA Negative     Urobilinogen, UA 1.0 E.U./dL    Narrative:      In absence of clinical symptoms, the presence of pyuria, bacteria, and/or nitrites on the urinalysis result does not correlate with infection.  Urine microscopic not indicated.    CBC & Differential [[id number]]  (Abnormal) Collected: 02/01/25 2015    Specimen: Blood Updated: 02/01/25 2021    Narrative:      The following orders were created for panel order CBC & Differential.  Procedure                               Abnormality         Status                     ---------                               -----------         ------                     CBC Auto Differential[[id number]]        Abnormal            Final result                 Please view results for these tests on the individual orders.    Comprehensive Metabolic Panel [[id number]]  (Abnormal) Collected: 02/01/25 2015    Specimen: Blood Updated: 02/01/25 2043      Glucose 141 mg/dL      BUN 12 mg/dL      Creatinine 0.68 mg/dL      Sodium 139 mmol/L      Potassium 4.3 mmol/L      Comment: Specimen hemolyzed.  Result may be falsely elevated.        Chloride 103 mmol/L      CO2 20.6 mmol/L      Calcium 9.1 mg/dL      Total Protein 6.9 g/dL      Albumin 3.7 g/dL      ALT (SGPT) 150 U/L      Comment: Specimen hemolyzed.  Result may  be falsely elevated.        AST (SGOT) 304 U/L      Comment: Specimen hemolyzed.  Result may be falsely elevated.        Alkaline Phosphatase 274 U/L      Total Bilirubin 0.6 mg/dL      Globulin 3.2 gm/dL      A/G Ratio 1.2 g/dL      BUN/Creatinine Ratio 17.6     Anion Gap 15.4 mmol/L      eGFR 106.9 mL/min/1.73     Narrative:      GFR Categories in Chronic Kidney Disease (CKD)      GFR Category          GFR (mL/min/1.73)    Interpretation  G1                     90 or greater         Normal or high (1)  G2                      60-89                Mild decrease (1)  G3a                   45-59                Mild to moderate decrease  G3b                   30-44                Moderate to severe decrease  G4                    15-29                Severe decrease  G5                    14 or less           Kidney failure          (1)In the absence of evidence of kidney disease, neither GFR category G1 or G2 fulfill the criteria for CKD.    eGFR calculation 2021 CKD-EPI creatinine equation, which does not include race as a factor    Lipase [[id number]]  (Abnormal) Collected: 02/01/25 2015    Specimen: Blood Updated: 02/01/25 2041     Lipase 5 U/L     CBC Auto Differential [[id number]]  (Abnormal) Collected: 02/01/25 2015    Specimen: Blood Updated: 02/01/25 2021     WBC 10.66 10*3/mm3      RBC 4.84 10*6/mm3      Hemoglobin 14.3 g/dL      Hematocrit 43.8 %      MCV 90.5 fL      MCH 29.5 pg      MCHC 32.6 g/dL      RDW 14.6 %      RDW-SD 47.8 fl      MPV 10.2 fL      Platelets 355 10*3/mm3      Neutrophil % 79.6 %      Lymphocyte % 16.9 %      Monocyte %  1.8 %      Eosinophil % 0.9 %      Basophil % 0.3 %      Immature Grans % 0.5 %      Neutrophils, Absolute 8.49 10*3/mm3      Lymphocytes, Absolute 1.80 10*3/mm3      Monocytes, Absolute 0.19 10*3/mm3      Eosinophils, Absolute 0.10 10*3/mm3      Basophils, Absolute 0.03 10*3/mm3      Immature Grans, Absolute 0.05 10*3/mm3      nRBC 0.0 /100 WBC     Protime-INR [[id number]]  (Normal) Collected: 02/01/25 2015    Specimen: Blood Updated: 02/01/25 2154     Protime 14.7 Seconds      INR 1.13    Narrative:      Suggested Therapeutic Ranges For Oral Anticoagulant Therapy:  Level of Therapy                      INR Target Range  Standard Dose                            2.0-3.0  High Dose                                2.5-3.5  Patients not receiving anticoagulant  Therapy Normal Range                     0.86-1.15    High Sensitivity Troponin T [[id number]]  (Normal) Collected: 02/01/25 2112    Specimen: Blood Updated: 02/01/25 2138     HS Troponin T <6 ng/L     Narrative:      High Sensitive Troponin T Reference Range:  <14.0 ng/L- Negative Female for AMI  <22.0 ng/L- Negative Male for AMI  >=14 - Abnormal Female indicating possible myocardial injury.  >=22 - Abnormal Male indicating possible myocardial injury.   Clinicians would have to utilize clinical acumen, EKG, Troponin, and serial changes to determine if it is an Acute Myocardial Infarction or myocardial injury due to an underlying chronic condition.         Ammonia [[id number]]  (Normal) Collected: 02/01/25 2204    Specimen: Blood Updated: 02/01/25 2234     Ammonia 32 umol/L     High Sensitivity Troponin T 1Hr [[id number]] Collected: 02/01/25 2212    Specimen: Blood Updated: 02/01/25 2232     HS Troponin T <6 ng/L      Troponin T Numeric Delta --     Comment: Unable to calculate.       Narrative:      High Sensitive Troponin T Reference Range:  <14.0 ng/L- Negative Female for AMI  <22.0 ng/L- Negative Male for AMI  >=14 - Abnormal Female indicating possible myocardial  injury.  >=22 - Abnormal Male indicating possible myocardial injury.   Clinicians would have to utilize clinical acumen, EKG, Troponin, and serial changes to determine if it is an Acute Myocardial Infarction or myocardial injury due to an underlying chronic condition.                  Imaging:    CT Lumbar Spine Without Contrast    Result Date: 2/1/2025  CT LUMBAR SPINE WO CONTRAST Date of Exam: 2/1/2025 3:30 PM EST Indication: Back pain prev l3/l4 laminectomy pain. Surgery 1 week ago. Comparison: Lumbar spine radiograph 2/1/2025, CT lumbar spine myelogram 12/10/2024 Technique: Axial CT images were obtained of the lumbar spine without contrast administration.  Reconstructed coronal and sagittal images were also obtained. Automated exposure control and iterative construction methods were used. Findings: The bone mineral density is normal. There has been a right-sided laminotomy at the L3-L4 level. There has been laminectomy at the L4-L5 level and facetectomy on the left. The alignment is anatomic. Severe disc height loss L4-5. Limited evaluation of central canal and neural foraminal narrowing on CT. MRI study of choice to evaluate for central canal or neural foraminal narrowing. L2-L3 there is mild disc bulge and mild facet degenerative change and ligamentum flavum hypertrophy. Mild central canal narrowing suspected. L3-L4 postsurgical changes. Moderate disc bulge. Mild central canal narrowing. Mild to moderate bilateral neural foraminal narrowing suspected. L4-5: Postsurgical changes. Moderate disc bulge. Ligamentum flavum hypertrophy. Moderate central canal narrowing suspected. Moderate left mild to moderate right neural foraminal narrowing suspected. L5-S1 has mild diffuse broad-based disc bulge and mild facet degenerative change. No significant central canal or neural foraminal narrowing.     Impression: Postsurgical changes L3-L4 and L4-L5. Limited evaluation of central canal and neural foramina narrowing on CT.  MRI lumbar spine is study of choice. There appears to be moderate central canal narrowing at L4-L5 with moderate left and mild to moderate right neural foraminal narrowing. L3-L4 there is postsurgical change with mild central canal narrowing and mild to moderate bilateral neural foraminal narrowing. Electronically Signed: Rayna Canchola MD  2/1/2025 4:09 PM EST  Workstation ID: [id number]    XR Spine Lumbar 2 or 3 View    Result Date: 2/1/2025  XR SPINE LUMBAR 2 OR 3 VW Date of Exam: 2/1/2025 1:30 PM EST Indication: Back pain s/p L3/L4 laminectomy Comparison: CT lumbar spine December 10, 2024 Findings: There are degenerative changes at the L4-5 level. There is facet arthropathy. Alignment of the vertebra seems reasonable. A disc protrusion at the L3-4 level on previous CT cannot be assessed on this exam.     Impression: Evidence for degenerative change at the L4-5 level. This has been noted. Electronically Signed: Deep Finn MD  2/1/2025 1:49 PM EST  Workstation ID: [id number]       Differential Diagnosis and Discussion:    Vomiting: Differential diagnosis includes but is not limited to migraine, labyrinthine disorders, psychogenic, metabolic and endocrine causes, peptic ulcer, gastric outlet obstruction, gastritis, gastroenteritis, appendicitis, intestinal obstruction, paralytic ileus, food poisoning, cholecystitis, acute hepatitis, acute pancreatitis, acute febrile illness, and myocardial infarction.    PROCEDURES:    Labs were collected in the emergency department and all labs were reviewed and interpreted by me.    ECG 12 Lead ED Triage Standing Order; Abdominal Pain (Upper)   Preliminary Result   HEART RATE=89  bpm   RR Brpnrpdj=368  ms   DC Wimdbnyl=351  ms   P Horizontal Axis=23  deg   P Front Axis=150  deg   QRSD Pwphbbdj=696  ms   QT Zvwfoloi=109  ms   FYgQ=247  ms   QRS Axis=97  deg   T Wave Axis=174  deg   - ABNORMAL ECG -   Right and left arm electrode reversal, interpretation assumes no reversal   Sinus or  ectopic atrial rhythm   Multiform ventricular premature complexes   Borderline prolonged WI interval   Right atrial enlargement   Left ventricular hypertrophy   Abnrm T, probable ischemia, anterolateral lds   Tall T waves suggest hyperkalemia   Date and Time of Study:2025-02-01 20:29:14          Procedures    MDM     Amount and/or Complexity of Data Reviewed  Decide to obtain previous medical records or to obtain history from someone other than the patient: yes                       Patient Care Considerations:    NARCOTICS: I considered prescribing opiate pain medication as an outpatient, however pre-existing narcotic prescriptions.      Consultants/Shared Management Plan:    None    Social Determinants of Health:    Patient has presented with family members who are responsible, reliable and will ensure follow up care.      Disposition and Care Coordination:    Discharged: The patient is suitable and stable for discharge with no need for consideration of admission.    I have explained the patient´s condition, diagnoses and treatment plan based on the information available to me at this time. I have answered questions and addressed any concerns. The patient has a good  understanding of the patient´s diagnosis, condition, and treatment plan as can be expected at this point. The vital signs have been stable. The patient´s condition is stable and appropriate for discharge from the emergency department.      The patient will pursue further outpatient evaluation with the primary care physician or other designated or consulting physician as outlined in the discharge instructions. They are agreeable to this plan of care and follow-up instructions have been explained in detail. The patient has received these instructions in written format and has expressed an understanding of the discharge instructions. The patient is aware that any significant change in condition or worsening of symptoms should prompt an immediate return  to this or the closest emergency department or call to 911.  I have explained discharge medications and the need for follow up with the patient/caretakers. This was also printed in the discharge instructions. Patient was discharged with the following medications and follow up:      Where to Get Your Medications        These medications were sent to [facility]/pharmacy #[id number] - [place], [place] - [address] - [phone]  - [phone] FX  [address] [place] [address]      Hours: 24-hours Phone: [phone]   prochlorperazine 10 MG tablet          Medication List      No changes were made to your prescriptions during this visit.      Justin Daniels, APRN  [address] [place] [facility] [id number] [phone]    Schedule an appointment as soon as possible for a visit          Final diagnoses:   Nausea and vomiting, unspecified vomiting type        ED Disposition       ED Disposition   Discharge    Condition   Stable    Comment   --               This medical record created using voice recognition software.             Kalyan Scott MD  02/02/25 0070

## 2025-02-03 LAB — GLUCOSE BLDC GLUCOMTR-MCNC: 131 MG/DL (ref 70–99)

## 2025-02-04 ENCOUNTER — OFFICE VISIT (OUTPATIENT)
Dept: NEUROSURGERY | Facility: CLINIC | Age: 50
End: 2025-02-04
Payer: MEDICARE

## 2025-02-04 VITALS
DIASTOLIC BLOOD PRESSURE: 86 MMHG | HEIGHT: 67 IN | HEART RATE: 67 BPM | WEIGHT: 191 LBS | BODY MASS INDEX: 29.98 KG/M2 | SYSTOLIC BLOOD PRESSURE: 150 MMHG

## 2025-02-04 DIAGNOSIS — M48.061 SPINAL STENOSIS, LUMBAR REGION, WITHOUT NEUROGENIC CLAUDICATION: Primary | ICD-10-CM

## 2025-02-04 DIAGNOSIS — Z98.890 STATUS POST LUMBAR SURGERY: ICD-10-CM

## 2025-02-04 PROCEDURE — 99024 POSTOP FOLLOW-UP VISIT: CPT | Performed by: PHYSICIAN ASSISTANT

## 2025-02-04 PROCEDURE — 1160F RVW MEDS BY RX/DR IN RCRD: CPT | Performed by: PHYSICIAN ASSISTANT

## 2025-02-04 PROCEDURE — 3079F DIAST BP 80-89 MM HG: CPT | Performed by: PHYSICIAN ASSISTANT

## 2025-02-04 PROCEDURE — 1159F MED LIST DOCD IN RCRD: CPT | Performed by: PHYSICIAN ASSISTANT

## 2025-02-04 PROCEDURE — 3077F SYST BP >= 140 MM HG: CPT | Performed by: PHYSICIAN ASSISTANT

## 2025-02-04 NOTE — PROGRESS NOTES
Patient being seen for today for Post-op  .    Subjective    Kayla Gan is a 49 y.o. female that presents with Post-op  .    HPI  Previously:    Today:     reports that she has never smoked. She has never been exposed to tobacco smoke. She has never used smokeless tobacco.    Review of Systems    Objective   There were no vitals filed for this visit.     Physical Exam     Result Review        Assessment and Plan {CC Problem List  Visit Diagnosis  ROS  Review (Popup)  Health Maintenance  Quality  BestPractice  Medications  SmartSets  SnapShot Encounters  Media :23}   There are no diagnoses linked to this encounter.    Follow Up {Instructions Charge Capture  Follow-up Communications :23}   No follow-ups on file.

## 2025-02-04 NOTE — PROGRESS NOTES
"Patient being seen for today for Post-op  .    Subjective    Kayla Gan is a 49 y.o. female that presents with Post-op  .    Post-op Follow-up  Previously: She is status post middle invasive lumbar laminectomy with discectomy using a right approach at L3-L4 on 1/24/2025.  She appears to complain of back and right leg pain to all toes, left leg pain to the ankle.    Today: She reports initially after surgery having more leg pain. She reports having a \"big dose\" of steroid in the ER and her pain is better, rated 5/10.    She reports pain in the back and the legs, both hips. No major pain in the right leg now, but she has continued to have left leg pain, eased up since getting the steroid from the ER.     reports that she has never smoked. She has never been exposed to tobacco smoke. She has never used smokeless tobacco.    Review of Systems   Musculoskeletal:  Positive for back pain (leg pain).       Objective   Vitals:    02/04/25 1308   BP: 150/86   Pulse: 67        Physical Exam  Constitutional:       Appearance: Normal appearance.   Pulmonary:      Effort: Pulmonary effort is normal.   Musculoskeletal:         General: Tenderness present.      Comments: SLR positive on the left   Skin:     Comments: Right lumbar incision is well-healed without erythema or discharge   Neurological:      General: No focal deficit present.      Mental Status: She is alert and oriented to person, place, and time.      Sensory: No sensory deficit.      Motor: No weakness.      Deep Tendon Reflexes: Reflexes normal.   Psychiatric:         Mood and Affect: Mood normal.         Behavior: Behavior normal.        Result Review   None.     Assessment and Plan {CC Problem List  Visit Diagnosis  ROS  Review (Popup)  Health Maintenance  Quality  BestPractice  Medications  SmartSets  SnapShot Encounters  Media :23}   Diagnoses and all orders for this visit:    1. Spinal stenosis, lumbar region, without neurogenic claudication " (Primary)    2. Status post lumbar surgery    She had some worse pain in the left leg after surgery and went to the ER. The pain has significantly improved since having steroid shot at the ER, but not fully resolved.    Right leg pain is improved.    She will monitor for new or worsening complaints and notify us of change.    I am recommending the following restrictions: No heavy lifting greater than 10 lb, limit twisting and bending at the waist, avoidance of strenuous activity.    She will follow-up on 1/7/25 (or thereabout) to reassess at the 6 week carole after surgery, sooner if needed.    Follow Up {Instructions Charge Capture  Follow-up Communications :23}   Return in about 31 days (around 3/7/2025).

## 2025-02-06 ENCOUNTER — TRANSCRIBE ORDERS (OUTPATIENT)
Dept: ADMINISTRATIVE | Facility: HOSPITAL | Age: 50
End: 2025-02-06
Payer: MEDICARE

## 2025-02-06 ENCOUNTER — LAB (OUTPATIENT)
Facility: HOSPITAL | Age: 50
End: 2025-02-06
Payer: MEDICARE

## 2025-02-06 DIAGNOSIS — K75.81 LIVER CIRRHOSIS SECONDARY TO NASH: ICD-10-CM

## 2025-02-06 DIAGNOSIS — K74.60 HEPATIC CIRRHOSIS, UNSPECIFIED HEPATIC CIRRHOSIS TYPE, UNSPECIFIED WHETHER ASCITES PRESENT: ICD-10-CM

## 2025-02-06 DIAGNOSIS — R79.89 OTHER SPECIFIED ABNORMAL FINDINGS OF BLOOD CHEMISTRY: Primary | ICD-10-CM

## 2025-02-06 DIAGNOSIS — K74.60 LIVER CIRRHOSIS SECONDARY TO NASH: ICD-10-CM

## 2025-02-06 LAB
ALBUMIN SERPL-MCNC: 4 G/DL (ref 3.5–5.2)
ALBUMIN/GLOB SERPL: 0.9 G/DL
ALP SERPL-CCNC: 228 U/L (ref 39–117)
ALPHA-FETOPROTEIN: 4.94 NG/ML (ref 0–8.3)
ALT SERPL W P-5'-P-CCNC: 94 U/L (ref 1–33)
ANION GAP SERPL CALCULATED.3IONS-SCNC: 14.4 MMOL/L (ref 5–15)
AST SERPL-CCNC: 59 U/L (ref 1–32)
BASOPHILS # BLD AUTO: 0.05 10*3/MM3 (ref 0–0.2)
BASOPHILS NFR BLD AUTO: 0.5 % (ref 0–1.5)
BILIRUB SERPL-MCNC: 0.3 MG/DL (ref 0–1.2)
BUN SERPL-MCNC: 12 MG/DL (ref 6–20)
BUN/CREAT SERPL: 16.7 (ref 7–25)
CALCIUM SPEC-SCNC: 9.3 MG/DL (ref 8.6–10.5)
CHLORIDE SERPL-SCNC: 97 MMOL/L (ref 98–107)
CO2 SERPL-SCNC: 22.6 MMOL/L (ref 22–29)
CREAT SERPL-MCNC: 0.72 MG/DL (ref 0.57–1)
DEPRECATED RDW RBC AUTO: 44.1 FL (ref 37–54)
EGFRCR SERPLBLD CKD-EPI 2021: 102.6 ML/MIN/1.73
EOSINOPHIL # BLD AUTO: 0.23 10*3/MM3 (ref 0–0.4)
EOSINOPHIL NFR BLD AUTO: 2.5 % (ref 0.3–6.2)
ERYTHROCYTE [DISTWIDTH] IN BLOOD BY AUTOMATED COUNT: 13.9 % (ref 12.3–15.4)
GLOBULIN UR ELPH-MCNC: 4.3 GM/DL
GLUCOSE SERPL-MCNC: 175 MG/DL (ref 65–99)
HCT VFR BLD AUTO: 41.8 % (ref 34–46.6)
HGB BLD-MCNC: 14.1 G/DL (ref 12–15.9)
IMM GRANULOCYTES # BLD AUTO: 0.09 10*3/MM3 (ref 0–0.05)
IMM GRANULOCYTES NFR BLD AUTO: 1 % (ref 0–0.5)
INR PPP: 1.02 (ref 0.86–1.15)
LYMPHOCYTES # BLD AUTO: 3.31 10*3/MM3 (ref 0.7–3.1)
LYMPHOCYTES NFR BLD AUTO: 36.3 % (ref 19.6–45.3)
MCH RBC QN AUTO: 29.9 PG (ref 26.6–33)
MCHC RBC AUTO-ENTMCNC: 33.7 G/DL (ref 31.5–35.7)
MCV RBC AUTO: 88.7 FL (ref 79–97)
MONOCYTES # BLD AUTO: 0.96 10*3/MM3 (ref 0.1–0.9)
MONOCYTES NFR BLD AUTO: 10.5 % (ref 5–12)
NEUTROPHILS NFR BLD AUTO: 4.48 10*3/MM3 (ref 1.7–7)
NEUTROPHILS NFR BLD AUTO: 49.2 % (ref 42.7–76)
NRBC BLD AUTO-RTO: 0 /100 WBC (ref 0–0.2)
PLATELET # BLD AUTO: 440 10*3/MM3 (ref 140–450)
PMV BLD AUTO: 11.9 FL (ref 6–12)
POTASSIUM SERPL-SCNC: 4.3 MMOL/L (ref 3.5–5.2)
PROT SERPL-MCNC: 8.3 G/DL (ref 6–8.5)
PROTHROMBIN TIME: 13.8 SECONDS (ref 11.8–14.9)
RBC # BLD AUTO: 4.71 10*6/MM3 (ref 3.77–5.28)
SODIUM SERPL-SCNC: 134 MMOL/L (ref 136–145)
WBC NRBC COR # BLD AUTO: 9.12 10*3/MM3 (ref 3.4–10.8)

## 2025-02-06 PROCEDURE — 85025 COMPLETE CBC W/AUTO DIFF WBC: CPT

## 2025-02-06 PROCEDURE — 85610 PROTHROMBIN TIME: CPT

## 2025-02-06 PROCEDURE — 82105 ALPHA-FETOPROTEIN SERUM: CPT

## 2025-02-06 PROCEDURE — 80053 COMPREHEN METABOLIC PANEL: CPT

## 2025-02-06 PROCEDURE — 36415 COLL VENOUS BLD VENIPUNCTURE: CPT

## 2025-02-08 LAB
QT INTERVAL: 333 MS
QTC INTERVAL: 405 MS

## 2025-02-25 NOTE — TELEPHONE ENCOUNTER
LOV 09-12-23  NOV 04-01-25   Subjective   Chief Complaint   Patient presents with   • Pelvic Pain     Patient states that she is having extreme pain on right lower quadrant; History of vaginal hysterectomy and LSO       Siomara Key is a 60 y.o. year old  presenting to be seen for complaint of right lower quadrant pain and right mid abdominal pain which started 6 months ago.  Describes pain as a throbbing sensation and has been present almost on a daily basis. She reports pain is relieved somewhat with being on feet and moving around and is worsened by sitting.  Pain is worsened by laying on her right side too    She reports having a hysterectomy and LSO several years ago in [place] then 6-7 years ago had [facility] [place].    She denies any bowel changes. She has not has any urinary symptoms or changes and is voiding well.  No hematuria noted. Has no history of kidney stones.  Has had no fever or chills and appetite normal. No nausea or vomiting  Reports her last colonoscopy was  or  and normal  Patient voices concern that pain may be related to previous bladder repair   A pelvic ultrasound is done today and notes normal appearing though small right ovary and no free fluid or pelvic masses       Past Medical History:   Diagnosis Date   • Anxiety    • Gastric reflux    • History of stomach ulcers    • Hypertension         Current Outpatient Medications:   •  lisinopril-hydrochlorothiazide (PRINZIDE,ZESTORETIC) 20-12.5 MG per tablet, , Disp: , Rfl:   •  omeprazole (priLOSEC) 20 MG capsule, , Disp: , Rfl:    No Known Allergies   Past Surgical History:   Procedure Laterality Date   • BACK SURGERY     • BLADDER SUSPENSION     • LAPAROSCOPIC CHOLECYSTECTOMY     • OOPHORECTOMY Left    • VAGINAL HYSTERECTOMY        Social History     Socioeconomic History   • Marital status:      Spouse name: Not on file   • Number of children: Not on file   • Years of education: Not on file   • Highest education level: Not on  "file   Social Needs   • Financial resource strain: Not on file   • Food insecurity - worry: Not on file   • Food insecurity - inability: Not on file   • Transportation needs - medical: Not on file   • Transportation needs - non-medical: Not on file   Occupational History   • Not on file   Tobacco Use   • Smoking status: Never Smoker   • Smokeless tobacco: Never Used   Substance and Sexual Activity   • Alcohol use: No     Frequency: Never   • Drug use: No   • Sexual activity: No     Birth control/protection: Surgical   Other Topics Concern   • Not on file   Social History Narrative   • Not on file      Family History   Problem Relation Age of Onset   • Hyperlipidemia Father    • Colon cancer Father    • Hyperlipidemia Mother    • Hypertension Brother    • Coronary artery disease Brother    • Hypertension Sister        Review of Systems   Constitutional: Negative.    Gastrointestinal: Positive for abdominal pain. Negative for constipation, diarrhea, nausea and vomiting.   Endocrine: Positive for heat intolerance.   Genitourinary: Positive for pelvic pain. Negative for difficulty urinating, dysuria, hematuria, vaginal bleeding and vaginal discharge.   Psychiatric/Behavioral: Positive for sleep disturbance.   All other systems reviewed and are negative.          Objective   /72   Ht 149.9 cm (59\")   Wt 68.8 kg (151 lb 9.6 oz)   Breastfeeding? No   BMI 30.62 kg/m²     Physical Exam   Constitutional: She appears well-developed and well-nourished. She is cooperative. No distress.   Eyes: Conjunctivae, EOM and lids are normal.   Cardiovascular: Normal rate, regular rhythm and normal heart sounds.   Pulmonary/Chest: Effort normal and breath sounds normal.   Abdominal: Soft. Normal appearance and bowel sounds are normal. She exhibits no distension. There is no hepatosplenomegaly. There is no tenderness. There is no rigidity, no guarding and no CVA tenderness.   Neurological: She is alert.            Assessment and " Leandro Stringer was seen today for pelvic pain.    Diagnoses and all orders for this visit:    Right sided abdominal pain  -     Cancel: US Non-ob Transvaginal  -     POC Urinalysis Dipstick, Automated      Patient Instructions   Patient reassured right ovary looks within normal limits.  Vaginal exam demonstrates no evidence of abnormalities  Will request records from previous bladder repair  If pain worsens or develops further symptoms with bowel changes, nausea, vomiting fever or chills to RTO.              This note was electronically signed.    Soledad Ozuna PA-C   January 25, 2019

## 2025-02-28 ENCOUNTER — LAB (OUTPATIENT)
Dept: LAB | Facility: HOSPITAL | Age: 50
End: 2025-02-28
Payer: MEDICARE

## 2025-02-28 ENCOUNTER — HOSPITAL ENCOUNTER (OUTPATIENT)
Dept: CARDIOLOGY | Facility: HOSPITAL | Age: 50
Discharge: HOME | End: 2025-02-28
Payer: MEDICARE

## 2025-02-28 ENCOUNTER — TRANSCRIBE ORDERS (OUTPATIENT)
Dept: LAB | Facility: HOSPITAL | Age: 50
End: 2025-02-28
Payer: MEDICARE

## 2025-02-28 DIAGNOSIS — K31.84 GASTROPARESIS: Primary | ICD-10-CM

## 2025-02-28 DIAGNOSIS — K31.84 GASTROPARESIS: ICD-10-CM

## 2025-02-28 LAB
ABO GROUP BLD: NORMAL
ANION GAP SERPL CALCULATED.3IONS-SCNC: 10 MMOL/L (ref 5–15)
BASOPHILS # BLD AUTO: 0.05 10*3/MM3 (ref 0–0.2)
BASOPHILS NFR BLD AUTO: 0.6 % (ref 0–1.5)
BLD GP AB SCN SERPL QL: NEGATIVE
BUN SERPL-MCNC: 10 MG/DL (ref 6–20)
BUN/CREAT SERPL: 15.4 (ref 7–25)
CALCIUM SPEC-SCNC: 9.4 MG/DL (ref 8.6–10.5)
CHLORIDE SERPL-SCNC: 102 MMOL/L (ref 98–107)
CO2 SERPL-SCNC: 28 MMOL/L (ref 22–29)
CREAT SERPL-MCNC: 0.65 MG/DL (ref 0.57–1)
DEPRECATED RDW RBC AUTO: 45.4 FL (ref 37–54)
EGFRCR SERPLBLD CKD-EPI 2021: 108.1 ML/MIN/1.73
EOSINOPHIL # BLD AUTO: 0.27 10*3/MM3 (ref 0–0.4)
EOSINOPHIL NFR BLD AUTO: 3 % (ref 0.3–6.2)
ERYTHROCYTE [DISTWIDTH] IN BLOOD BY AUTOMATED COUNT: 13.6 % (ref 12.3–15.4)
GLUCOSE SERPL-MCNC: 151 MG/DL (ref 65–99)
HCT VFR BLD AUTO: 41.3 % (ref 34–46.6)
HGB BLD-MCNC: 13.8 G/DL (ref 12–15.9)
IMM GRANULOCYTES # BLD AUTO: 0.02 10*3/MM3 (ref 0–0.05)
IMM GRANULOCYTES NFR BLD AUTO: 0.2 % (ref 0–0.5)
LYMPHOCYTES # BLD AUTO: 3.9 10*3/MM3 (ref 0.7–3.1)
LYMPHOCYTES NFR BLD AUTO: 44 % (ref 19.6–45.3)
MCH RBC QN AUTO: 30.4 PG (ref 26.6–33)
MCHC RBC AUTO-ENTMCNC: 33.4 G/DL (ref 31.5–35.7)
MCV RBC AUTO: 91 FL (ref 79–97)
MONOCYTES # BLD AUTO: 1.32 10*3/MM3 (ref 0.1–0.9)
MONOCYTES NFR BLD AUTO: 14.9 % (ref 5–12)
NEUTROPHILS NFR BLD AUTO: 3.3 10*3/MM3 (ref 1.7–7)
NEUTROPHILS NFR BLD AUTO: 37.3 % (ref 42.7–76)
NRBC BLD AUTO-RTO: 0 /100 WBC (ref 0–0.2)
PLATELET # BLD AUTO: 388 10*3/MM3 (ref 140–450)
PMV BLD AUTO: 11.4 FL (ref 6–12)
POTASSIUM SERPL-SCNC: 3.9 MMOL/L (ref 3.5–5.2)
RBC # BLD AUTO: 4.54 10*6/MM3 (ref 3.77–5.28)
RH BLD: NEGATIVE
SODIUM SERPL-SCNC: 140 MMOL/L (ref 136–145)
T&S EXPIRATION DATE: NORMAL
WBC NRBC COR # BLD AUTO: 8.86 10*3/MM3 (ref 3.4–10.8)

## 2025-02-28 PROCEDURE — 86900 BLOOD TYPING SEROLOGIC ABO: CPT

## 2025-02-28 PROCEDURE — 86850 RBC ANTIBODY SCREEN: CPT

## 2025-02-28 PROCEDURE — 80048 BASIC METABOLIC PNL TOTAL CA: CPT

## 2025-02-28 PROCEDURE — 86901 BLOOD TYPING SEROLOGIC RH(D): CPT

## 2025-02-28 PROCEDURE — 93005 ELECTROCARDIOGRAM TRACING: CPT | Performed by: TRANSPLANT SURGERY

## 2025-02-28 PROCEDURE — 85025 COMPLETE CBC W/AUTO DIFF WBC: CPT

## 2025-02-28 PROCEDURE — 36415 COLL VENOUS BLD VENIPUNCTURE: CPT

## 2025-03-02 ENCOUNTER — APPOINTMENT (OUTPATIENT)
Dept: CT IMAGING | Facility: HOSPITAL | Age: 50
End: 2025-03-02
Payer: MEDICARE

## 2025-03-02 ENCOUNTER — HOSPITAL ENCOUNTER (EMERGENCY)
Facility: HOSPITAL | Age: 50
Discharge: HOME | End: 2025-03-02
Attending: EMERGENCY MEDICINE | Admitting: EMERGENCY MEDICINE
Payer: MEDICARE

## 2025-03-02 ENCOUNTER — APPOINTMENT (OUTPATIENT)
Dept: GENERAL RADIOLOGY | Facility: HOSPITAL | Age: 50
End: 2025-03-02
Payer: MEDICARE

## 2025-03-02 VITALS
BODY MASS INDEX: 29.45 KG/M2 | SYSTOLIC BLOOD PRESSURE: 146 MMHG | HEIGHT: 67 IN | TEMPERATURE: 97.9 F | OXYGEN SATURATION: 92 % | RESPIRATION RATE: 16 BRPM | HEART RATE: 89 BPM | WEIGHT: 187.61 LBS | DIASTOLIC BLOOD PRESSURE: 83 MMHG

## 2025-03-02 DIAGNOSIS — K52.9 ENTEROCOLITIS: ICD-10-CM

## 2025-03-02 DIAGNOSIS — R10.11 RIGHT UPPER QUADRANT ABDOMINAL PAIN: Primary | ICD-10-CM

## 2025-03-02 LAB
ALBUMIN SERPL-MCNC: 4.1 G/DL (ref 3.5–5.2)
ALBUMIN/GLOB SERPL: 1 G/DL
ALP SERPL-CCNC: 192 U/L (ref 39–117)
ALT SERPL W P-5'-P-CCNC: 35 U/L (ref 1–33)
ANION GAP SERPL CALCULATED.3IONS-SCNC: 15.5 MMOL/L (ref 5–15)
AST SERPL-CCNC: 39 U/L (ref 1–32)
BASOPHILS # BLD AUTO: 0.03 10*3/MM3 (ref 0–0.2)
BASOPHILS NFR BLD AUTO: 0.2 % (ref 0–1.5)
BILIRUB SERPL-MCNC: 0.4 MG/DL (ref 0–1.2)
BUN SERPL-MCNC: 13 MG/DL (ref 6–20)
BUN/CREAT SERPL: 16.5 (ref 7–25)
CALCIUM SPEC-SCNC: 9.2 MG/DL (ref 8.6–10.5)
CHLORIDE SERPL-SCNC: 99 MMOL/L (ref 98–107)
CO2 SERPL-SCNC: 23.5 MMOL/L (ref 22–29)
CREAT SERPL-MCNC: 0.79 MG/DL (ref 0.57–1)
DEPRECATED RDW RBC AUTO: 46.4 FL (ref 37–54)
EGFRCR SERPLBLD CKD-EPI 2021: 91.8 ML/MIN/1.73
EOSINOPHIL # BLD AUTO: 0.24 10*3/MM3 (ref 0–0.4)
EOSINOPHIL NFR BLD AUTO: 1.6 % (ref 0.3–6.2)
ERYTHROCYTE [DISTWIDTH] IN BLOOD BY AUTOMATED COUNT: 14.3 % (ref 12.3–15.4)
GEN 5 1HR TROPONIN T REFLEX: <6 NG/L
GLOBULIN UR ELPH-MCNC: 4 GM/DL
GLUCOSE SERPL-MCNC: 162 MG/DL (ref 65–99)
HCT VFR BLD AUTO: 44.3 % (ref 34–46.6)
HGB BLD-MCNC: 14.6 G/DL (ref 12–15.9)
HOLD SPECIMEN: NORMAL
HOLD SPECIMEN: NORMAL
IMM GRANULOCYTES # BLD AUTO: 0.06 10*3/MM3 (ref 0–0.05)
IMM GRANULOCYTES NFR BLD AUTO: 0.4 % (ref 0–0.5)
LIPASE SERPL-CCNC: 6 U/L (ref 13–60)
LYMPHOCYTES # BLD AUTO: 2.63 10*3/MM3 (ref 0.7–3.1)
LYMPHOCYTES NFR BLD AUTO: 17 % (ref 19.6–45.3)
MAGNESIUM SERPL-MCNC: 1.9 MG/DL (ref 1.6–2.6)
MCH RBC QN AUTO: 29.5 PG (ref 26.6–33)
MCHC RBC AUTO-ENTMCNC: 33 G/DL (ref 31.5–35.7)
MCV RBC AUTO: 89.5 FL (ref 79–97)
MONOCYTES # BLD AUTO: 2.23 10*3/MM3 (ref 0.1–0.9)
MONOCYTES NFR BLD AUTO: 14.4 % (ref 5–12)
NEUTROPHILS NFR BLD AUTO: 10.25 10*3/MM3 (ref 1.7–7)
NEUTROPHILS NFR BLD AUTO: 66.4 % (ref 42.7–76)
NRBC BLD AUTO-RTO: 0 /100 WBC (ref 0–0.2)
NT-PROBNP SERPL-MCNC: 84.5 PG/ML (ref 0–450)
PLATELET # BLD AUTO: 373 10*3/MM3 (ref 140–450)
PMV BLD AUTO: 10.6 FL (ref 6–12)
POTASSIUM SERPL-SCNC: 3.6 MMOL/L (ref 3.5–5.2)
PROT SERPL-MCNC: 8.1 G/DL (ref 6–8.5)
QT INTERVAL: 347 MS
QTC INTERVAL: 465 MS
RBC # BLD AUTO: 4.95 10*6/MM3 (ref 3.77–5.28)
SODIUM SERPL-SCNC: 138 MMOL/L (ref 136–145)
TROPONIN T NUMERIC DELTA: NORMAL
TROPONIN T SERPL HS-MCNC: <6 NG/L
WBC NRBC COR # BLD AUTO: 15.44 10*3/MM3 (ref 3.4–10.8)
WHOLE BLOOD HOLD COAG: NORMAL
WHOLE BLOOD HOLD SPECIMEN: NORMAL

## 2025-03-02 PROCEDURE — 83880 ASSAY OF NATRIURETIC PEPTIDE: CPT | Performed by: EMERGENCY MEDICINE

## 2025-03-02 PROCEDURE — 25010000002 METOCLOPRAMIDE PER 10 MG: Performed by: EMERGENCY MEDICINE

## 2025-03-02 PROCEDURE — 93005 ELECTROCARDIOGRAM TRACING: CPT

## 2025-03-02 PROCEDURE — 84484 ASSAY OF TROPONIN QUANT: CPT | Performed by: EMERGENCY MEDICINE

## 2025-03-02 PROCEDURE — 83690 ASSAY OF LIPASE: CPT | Performed by: EMERGENCY MEDICINE

## 2025-03-02 PROCEDURE — 99285 EMERGENCY DEPT VISIT HI MDM: CPT

## 2025-03-02 PROCEDURE — 25010000002 DIPHENHYDRAMINE PER 50 MG: Performed by: EMERGENCY MEDICINE

## 2025-03-02 PROCEDURE — 87040 BLOOD CULTURE FOR BACTERIA: CPT | Performed by: EMERGENCY MEDICINE

## 2025-03-02 PROCEDURE — 71045 X-RAY EXAM CHEST 1 VIEW: CPT

## 2025-03-02 PROCEDURE — 25010000002 HYDROMORPHONE 1 MG/ML SOLUTION: Performed by: EMERGENCY MEDICINE

## 2025-03-02 PROCEDURE — 85025 COMPLETE CBC W/AUTO DIFF WBC: CPT | Performed by: EMERGENCY MEDICINE

## 2025-03-02 PROCEDURE — 96375 TX/PRO/DX INJ NEW DRUG ADDON: CPT

## 2025-03-02 PROCEDURE — 83735 ASSAY OF MAGNESIUM: CPT | Performed by: EMERGENCY MEDICINE

## 2025-03-02 PROCEDURE — 80053 COMPREHEN METABOLIC PANEL: CPT | Performed by: EMERGENCY MEDICINE

## 2025-03-02 PROCEDURE — 25510000001 IOPAMIDOL PER 1 ML: Performed by: EMERGENCY MEDICINE

## 2025-03-02 PROCEDURE — 96374 THER/PROPH/DIAG INJ IV PUSH: CPT

## 2025-03-02 PROCEDURE — 93005 ELECTROCARDIOGRAM TRACING: CPT | Performed by: EMERGENCY MEDICINE

## 2025-03-02 PROCEDURE — 74177 CT ABD & PELVIS W/CONTRAST: CPT

## 2025-03-02 PROCEDURE — 36415 COLL VENOUS BLD VENIPUNCTURE: CPT

## 2025-03-02 RX ORDER — SODIUM CHLORIDE 0.9 % (FLUSH) 0.9 %
10 SYRINGE (ML) INJECTION AS NEEDED
Status: DISCONTINUED | OUTPATIENT
Start: 2025-03-02 | End: 2025-03-02 | Stop reason: HOSPADM

## 2025-03-02 RX ORDER — ASPIRIN 81 MG/1
324 TABLET, CHEWABLE ORAL ONCE
Status: DISCONTINUED | OUTPATIENT
Start: 2025-03-02 | End: 2025-03-02 | Stop reason: ALTCHOICE

## 2025-03-02 RX ORDER — HYDROCODONE BITARTRATE AND ACETAMINOPHEN 7.5; 325 MG/1; MG/1
1 TABLET ORAL EVERY 6 HOURS PRN
Qty: 12 TABLET | Refills: 0 | Status: SHIPPED | OUTPATIENT
Start: 2025-03-02 | End: 2025-03-07

## 2025-03-02 RX ORDER — IOPAMIDOL 755 MG/ML
100 INJECTION, SOLUTION INTRAVASCULAR
Status: COMPLETED | OUTPATIENT
Start: 2025-03-02 | End: 2025-03-02

## 2025-03-02 RX ORDER — METOCLOPRAMIDE HYDROCHLORIDE 5 MG/ML
10 INJECTION INTRAMUSCULAR; INTRAVENOUS ONCE
Status: COMPLETED | OUTPATIENT
Start: 2025-03-02 | End: 2025-03-02

## 2025-03-02 RX ORDER — ONDANSETRON 2 MG/ML
4 INJECTION INTRAMUSCULAR; INTRAVENOUS ONCE
Status: DISCONTINUED | OUTPATIENT
Start: 2025-03-02 | End: 2025-03-02

## 2025-03-02 RX ORDER — DIPHENHYDRAMINE HYDROCHLORIDE 50 MG/ML
25 INJECTION INTRAMUSCULAR; INTRAVENOUS ONCE
Status: COMPLETED | OUTPATIENT
Start: 2025-03-02 | End: 2025-03-02

## 2025-03-02 RX ADMIN — AMOXICILLIN AND CLAVULANATE POTASSIUM 1 TABLET: 875; 125 TABLET, FILM COATED ORAL at 17:40

## 2025-03-02 RX ADMIN — METOCLOPRAMIDE 10 MG: 5 INJECTION, SOLUTION INTRAMUSCULAR; INTRAVENOUS at 13:57

## 2025-03-02 RX ADMIN — IOPAMIDOL 100 ML: 755 INJECTION, SOLUTION INTRAVENOUS at 15:53

## 2025-03-02 RX ADMIN — DIPHENHYDRAMINE HYDROCHLORIDE 25 MG: 50 INJECTION, SOLUTION INTRAMUSCULAR; INTRAVENOUS at 13:59

## 2025-03-02 RX ADMIN — HYDROMORPHONE HYDROCHLORIDE 1 MG: 1 INJECTION, SOLUTION INTRAMUSCULAR; INTRAVENOUS; SUBCUTANEOUS at 14:02

## 2025-03-02 NOTE — ED PROVIDER NOTES
Time: 1:49 PM EST  Date of encounter:  3/2/2025  Independent Historian/Clinical History and Information was obtained by:   Patient  Chief Complaint: Upper quadrant abdominal pain    History is limited by: N/A    History of Present Illness:  Patient is a 49 y.o. year old female who presents to the emergency department for evaluation of upper quadrant abdominal pain.  Patient reports symptoms began around 8 AM this morning.  Patient endorses nausea but denies vomiting.  Patient reports she is feels very weak.  Patient reports the symptoms are worse with deep breathing and nothing is made the pain better.  Patient also complains of some tightness in her chest and some feeling of palpitations.  Patient reports this feels similar to when she had pancreatitis however she has already had a pancreatectomy.  Patient also has known gastroparesis however her gastric stimulator is not working and she is scheduled for surgical replacement on 3/13/2025.    Patient Care Team  Primary Care Provider: Justin Daniels APRN    Past Medical History:     Allergies   Allergen Reactions    Parathyroid Hormone (Recomb) Other (See Comments) and Unknown - High Severity     Sent patient into kidney failure, heart stopped, in ICU for two days.    Romosozumab Hives     Other reaction(s): Hives    Other reaction(s): Hives   Other reaction(s): Hives    Teriparatide Anaphylaxis and Other (See Comments)     Other reaction(s): Unknown    Tramadol Anaphylaxis and Other (See Comments)     Other reaction(s): Unknown, Unknown    Nifedipine Other (See Comments)     Rapid heart beat        Vancomycin Hives, Itching and Rash     vancomycin-infusion reaction (VIR, formerly Ivett's syndrome): give over longer infusion time     Past Medical History:   Diagnosis Date    Adrenal insufficiency     Allergic Unsure    Foods, Ulram, Vancomycin    Anemia     Anxiety     Arthritis     Asthma Unsure    Bleeding disorder     Brain concussion     Cholelithiasis  Unsure    Cirrhosis     NO CURRENT S/S    Colon polyp     Crohn's disease     Depression     Diabetes mellitus     Gastroparesis     GERD (gastroesophageal reflux disease)     History of MRSA infection     History of transfusion     Hyperlipidemia     Hypertension     Hypothyroidism     Interstitial cystitis     Irritable bowel syndrome Unsure    Liver disease     Low back pain     Lumbosacral disc disease     Migraines     MRSA (methicillin resistant Staphylococcus aureus)     NO CURRENT ISSUES    Pancreatitis     NO CURRENT ISSUES    Sleep apnea     USES CPAP    Urinary incontinence     Urinary tract infection Unsure    Interstitial Cystitis    Vaginal infection      Past Surgical History:   Procedure Laterality Date    ABSCESS DRAINAGE  12/24/2019    Fluoroscopically guided abscess drainage, [facility]    ADENOIDECTOMY      BACK SURGERY      L4 L5    BLADDER SURGERY      CHOLECYSTECTOMY N/A     COLONOSCOPY  04/24/2019    NBIH, 3 mm polyp    CYSTOSCOPY      DILATATION AND CURETTAGE      ENDOMETRIAL ABLATION      ENDOSCOPY N/A 04/24/2019    Normal    ENTEROSCOPY SMALL BOWEL      EPIDURAL BLOCK      ERCP  2019    GASTRIC STIMULATOR IMPLANT SURGERY      IN PLACE NOW, PT DOES NOT HAVE ANY REMOTE FOR THIS DEVICE.    GASTROJEJUNOSTOMY W/ JEJUNOSTOMY TUBE      removed    HYSTERECTOMY      LUMBAR LAMINECTOMY Right 1/24/2025    Procedure: MINIMALLY INVASIVE LUMBAR LAMINECTOMY WITH DISCECTOMY, RIGHT APPROACH, LUMBAR THREE-LUMBAR FOUR  OPERATION ON THE LOWER BACK TO SHAVE OFF BONE AND DISC COMPRESSING THE NERVES TO THE LEGS.;  Surgeon: Joe Langley MD;  Location: [facility] MAIN OR;  Service: Neurosurgery;  Laterality: Right;    LYMPH NODE BIOPSY      BENIGN    OOPHORECTOMY      OTHER SURGICAL HISTORY      gastric stimulator battery replaced    PANCREATECTOMY  12/2019    TPIAT    PELVIC FLOOR REPAIR      SINUS SURGERY      SPLENECTOMY      TONSILLECTOMY      VENOUS ACCESS DEVICE (PORT) INSERTION      REMOVED     Family  History   Problem Relation Age of Onset    Anxiety disorder Mother     Hypothyroidism Mother     Breast cancer Mother     Colon polyps Mother     Arthritis Mother     Cancer Mother         Breast    Depression Mother     Thyroid disease Mother     Heart disease Father     Hypothyroidism Father     Anxiety disorder Father     Depression Father     Hypertension Father     Kidney disease Father     Thyroid disease Father     Depression Brother     Hypertension Brother     Alcohol abuse Paternal Grandfather     Heart disease Paternal Grandfather     Malig Hyperthermia Neg Hx        Home Medications:  Prior to Admission medications    Medication Sig Start Date End Date Taking? Authorizing Provider   docusate sodium (COLACE) 100 MG capsule Take 1 capsule by mouth 2 (Two) Times a Day. 4/16/19   Pollo Ray MD   E-400 180 MG (400 UNIT) capsule capsule Take 2 capsules by mouth Daily. 11/8/24   Pollo Ray MD   Enulose 10 GM/15ML solution solution (encephalopathy) Take 45 mL by mouth As Needed.    Pollo Ray MD   glucagon (GLUCAGEN) 1 MG injection Inject 1 mg into the appropriate muscle as directed by prescriber Every 15 (Fifteen) Minutes As Needed for Low Blood Sugar (per [employer]).  Patient not taking: Reported on 2/4/2025 6/21/24   Gary Hemphill MD   hydrOXYzine (ATARAX) 50 MG tablet Take 1 tablet by mouth Every Night.    Pollo Ray MD   Insulin Disposable Pump (Omnipod 5 G6 Pods, Gen 5,) misc  7/1/24   Pollo Ray MD   Insulin Lispro (humaLOG) 100 UNIT/ML injection USE IN insulin pump approximately 100 UNITS DAILY 10/23/24   Pollo Ray MD   lansoprazole (PREVACID) 30 MG capsule TAKE ONE CAPSULE BY MOUTH TWICE DAILY 9/6/24   Mariela Gr APRN   Lantus SoloStar 100 UNIT/ML injection pen Inject 47 Units under the skin into the appropriate area as directed Daily As Needed (ONLY FOR INSULIN PUMP FAILURE). Only for insulin pump failure    Flor  MD Pollo   levothyroxine (SYNTHROID, LEVOTHROID) 125 MCG tablet Take 1 tablet by mouth Daily. 11/5/24   Pollo Ray MD   lisinopril (PRINIVIL,ZESTRIL) 40 MG tablet Take 1 tablet by mouth Daily.    Pollo Ray MD   NovoLOG 100 UNIT/ML injection Inject  under the skin into the appropriate area as directed. Type of Insulin: Lispro 100 units/ml  Type of Pump:Omnipod  Bolus: 1u 5-7g max bolus 2.5units/hr  Basal: 9465-0439 1.5u/hr max basal 10units/hr  Correction factor: 40mg/dl  Insulin to carbohydrate ratio: 7-5 g of carb  Next fill date: 10/21/2024    Date of last site change: 10/18/2024  Location of pod: right upper arm    Frequency of refill: 2-3 days  Last refill date: 10/18/2024    Prescriber: Dr. Chelsea Silver  Phone number: [phone] 5/12/22   Pollo Ray MD   ondansetron (ZOFRAN) 8 MG tablet Take 1 tablet by mouth Every 4 (Four) to 6 (Six) Hours As Needed for Nausea or Vomiting.    Pollo Ray MD   Pancrelipase, Lip-Prot-Amyl, (CREON) 07462-471259 units capsule delayed-release particles capsule Take 2 capsules by mouth 2 (Two) Times a Day. 3 CAPS TID WITH MEALS AND 2 CAPS BID WITH SNACKS    Pollo Ray MD   polyethylene glycol (MiraLax) 17 GM/SCOOP powder Take 17 g by mouth Daily.    Polol Ray MD   prochlorperazine (COMPAZINE) 10 MG tablet Take 1 tablet by mouth Every 8 (Eight) Hours As Needed for Nausea or Vomiting. 2/2/25   Kalyan Scott MD   promethazine (PHENERGAN) 25 MG tablet Take 1 tablet by mouth Every 6 (Six) Hours As Needed.    Pollo Ray MD   propranolol LA (INDERAL LA) 60 MG 24 hr capsule Take 1 capsule by mouth Daily.    Pollo Ray MD   QUEtiapine (SEROquel) 100 MG tablet Take 1 tablet by mouth every night at bedtime.    Pollo Ray MD   Qulipta 60 MG tablet Take 1 tablet by mouth Every Night. 4/27/24   Pollo Ray MD   riFAXIMin (XIFAXAN) 550 MG tablet Take 1 tablet by  "mouth Every 12 (Twelve) Hours. 2/25/25 2/25/26  Patrizia Sena APRN   traZODone (DESYREL) 50 MG tablet Take 1 tablet by mouth Every Night.    Provider, Historical, MD   Ubrelvy 100 MG tablet Take 1 tablet by mouth 1 (One) Time As Needed (Migraine). 12/7/23   Dulce Camejo APRN   Vortioxetine HBr (Trintellix) 5 MG tablet tablet Take 1 tablet by mouth Every Night for 30 days. 12/12/23 12/17/24  Kayla Graham PA-C        Social History:   Social History     Tobacco Use    Smoking status: Never     Passive exposure: Never    Smokeless tobacco: Never   Vaping Use    Vaping status: Never Used   Substance Use Topics    Alcohol use: Never    Drug use: Never         Review of Systems:  Review of Systems   Constitutional:  Negative for chills and fever.   HENT:  Negative for congestion, ear pain and sore throat.    Eyes:  Negative for pain.   Respiratory:  Positive for chest tightness. Negative for cough and shortness of breath.    Cardiovascular:  Negative for chest pain.   Gastrointestinal:  Positive for abdominal pain and nausea. Negative for diarrhea and vomiting.   Genitourinary:  Negative for flank pain and hematuria.   Musculoskeletal:  Negative for joint swelling.   Skin:  Negative for pallor.   Neurological:  Negative for seizures and headaches.   All other systems reviewed and are negative.       Physical Exam:  /87 (BP Location: Left arm, Patient Position: Lying)   Pulse 91   Temp 97.9 °F (36.6 °C) (Oral)   Resp 16   Ht 170.2 cm (67\")   Wt 85.1 kg (187 lb 9.8 oz)   SpO2 92%   BMI 29.38 kg/m²     Physical Exam    Vital signs were reviewed under triage note.  General appearance - Patient appears well-developed and well-nourished.  Patient is in no acute distress.  Head - Normocephalic, atraumatic.  Pupils - Equal, round, reactive to light.  Extraocular muscles are intact.  Conjunctiva is clear.  Nasal - Normal inspection.  No evidence of trauma or epistaxis.  Tympanic membranes - Gray, " intact without erythema or retractions.  Oral mucosa - Pink and moist without lesions or erythema.  Uvula is midline.  Chest wall - Atraumatic.  Chest wall is nontender.  There are no vesicular rashes noted.  Neck - Supple.  Trachea was midline.  There is no palpable lymphadenopathy or thyromegaly.  There are no meningeal signs  Lungs - Clear to auscultation and percussion bilaterally.  Heart - Regular rate and rhythm without any murmurs, clicks, or gallops.  Abdomen - Soft.  Bowel sounds are present.  There is right upper quadrant palpable tenderness.  There is no rebound, guarding, or rigidity.  There are no palpable masses.  There are no pulsatile masses.  Back - Spine is straight and midline.  There is no CVA tenderness.  Extremities - Intact x4 with full range of motion.  There is no palpable edema.  Pulses are intact x4 and equal.  Neurologic - Patient is awake, alert, and oriented x3.  Cranial nerves II through XII are grossly intact.  Motor and sensory functions grossly intact.  Cerebellar function was normal.  Integument - There are no rashes.  There are no petechia or purpura lesions noted.  There are no vesicular lesions noted.             Procedures:  Procedures      Medical Decision Making:      Comorbidities that affect care:    Crohn's disease, GERD, anemia, migraines, hypothyroidism, sleep apnea, diabetes, hypertension, depression, adrenal insufficiency, hyperlipidemia, pancreatitis, cirrhosis, asthma, IBS    External Notes reviewed:    Previous Clinic Note: Office visit with Paulie Mcleod PA-C on 2/4/2025 was reviewed by me.      The following orders were placed and all results were independently analyzed by me:  Orders Placed This Encounter   Procedures    Blood Culture - Blood,    Blood Culture - Blood,    XR Chest 1 View    CT Abdomen Pelvis With Contrast    Cunningham Draw    High Sensitivity Troponin T    Comprehensive Metabolic Panel    Lipase    BNP    Magnesium    CBC Auto Differential     High Sensitivity Troponin T 1Hr    NPO Diet NPO Type: Strict NPO    Undress & Gown    Continuous Pulse Oximetry    Oxygen Therapy- Nasal Cannula; Titrate 1-6 LPM Per SpO2; 90 - 95%    ECG 12 Lead ED Triage Standing Order; Chest Pain    ECG 12 Lead ED Triage Standing Order; Chest Pain    Insert Peripheral IV    CBC & Differential    Green Top (Gel)    Lavender Top    Gold Top - SST    Light Blue Top       Medications Given in the Emergency Department:  Medications   sodium chloride 0.9 % flush 10 mL (has no administration in time range)   amoxicillin-clavulanate (AUGMENTIN) 875-125 MG per tablet 1 tablet (has no administration in time range)   HYDROmorphone (DILAUDID) injection 1 mg (1 mg Intravenous Given 3/2/25 1402)   diphenhydrAMINE (BENADRYL) injection 25 mg (25 mg Intravenous Given 3/2/25 1359)   metoclopramide (REGLAN) injection 10 mg (10 mg Intravenous Given 3/2/25 1357)   iopamidol (ISOVUE-370) 76 % injection 100 mL (100 mL Intravenous Given 3/2/25 1553)        ED Course:    ED Course as of 03/02/25 1644   Sun Mar 02, 2025   1348 EKG performed at 1303 was interpreted by me to show a sinus tachycardia with ventricular 108 bpm.  The AK interval was 181 ms.  P waves were normal.  QRS interval is normal.  Axis was leftward at -48 degrees.  There was no acute ischemic ST or T wave change identified.  QT corrected was 465 ms. [TB]      ED Course User Index  [TB] Hudson Garcia DO       The patient was seen and evaluated in the ED by me.  The above history and physical examination was performed as documented.  Diagnostic data was obtained.  Results reviewed.  Findings were discussed with the patient.  The patient does show some thickening of her small bowel but no discrete abscess.  There is no sign of obstruction.  Rest of her workup was unremarkable with exception she did have a leukocytosis of unclear etiology.  Because of her history I did send off blood cultures empirically.  I will put her on Augmentin to treat  a possible bacterial enterocolitis.  Advised patient follow-up with her GI specialist as scheduled on the 13th sooner if needed.  Return to the ER as well as needed.  Patient was agreeable to this treatment plan.    Labs:    Lab Results (last 24 hours)       Procedure Component Value Units Date/Time    High Sensitivity Troponin T [[id number]]  (Normal) Collected: 03/02/25 1309    Specimen: Blood from Arm, Left Updated: 03/02/25 1336     HS Troponin T <6 ng/L     Narrative:      High Sensitive Troponin T Reference Range:  <14.0 ng/L- Negative Female for AMI  <22.0 ng/L- Negative Male for AMI  >=14 - Abnormal Female indicating possible myocardial injury.  >=22 - Abnormal Male indicating possible myocardial injury.   Clinicians would have to utilize clinical acumen, EKG, Troponin, and serial changes to determine if it is an Acute Myocardial Infarction or myocardial injury due to an underlying chronic condition.         CBC & Differential [[id number]]  (Abnormal) Collected: 03/02/25 1309    Specimen: Blood from Arm, Left Updated: 03/02/25 1402    Narrative:      The following orders were created for panel order CBC & Differential.  Procedure                               Abnormality         Status                     ---------                               -----------         ------                     CBC Auto Differential[[id number]]        Abnormal            Final result                 Please view results for these tests on the individual orders.    Comprehensive Metabolic Panel [[id number]]  (Abnormal) Collected: 03/02/25 1309    Specimen: Blood from Arm, Left Updated: 03/02/25 1336     Glucose 162 mg/dL      BUN 13 mg/dL      Creatinine 0.79 mg/dL      Sodium 138 mmol/L      Potassium 3.6 mmol/L      Chloride 99 mmol/L      CO2 23.5 mmol/L      Calcium 9.2 mg/dL      Total Protein 8.1 g/dL      Albumin 4.1 g/dL      ALT (SGPT) 35 U/L      AST (SGOT) 39 U/L      Alkaline Phosphatase 192 U/L      Total Bilirubin 0.4  mg/dL      Globulin 4.0 gm/dL      A/G Ratio 1.0 g/dL      BUN/Creatinine Ratio 16.5     Anion Gap 15.5 mmol/L      eGFR 91.8 mL/min/1.73     Narrative:      GFR Categories in Chronic Kidney Disease (CKD)      GFR Category          GFR (mL/min/1.73)    Interpretation  G1                     90 or greater         Normal or high (1)  G2                      60-89                Mild decrease (1)  G3a                   45-59                Mild to moderate decrease  G3b                   30-44                Moderate to severe decrease  G4                    15-29                Severe decrease  G5                    14 or less           Kidney failure          (1)In the absence of evidence of kidney disease, neither GFR category G1 or G2 fulfill the criteria for CKD.    eGFR calculation 2021 CKD-EPI creatinine equation, which does not include race as a factor    Lipase [[id number]]  (Abnormal) Collected: 03/02/25 1309    Specimen: Blood from Arm, Left Updated: 03/02/25 1336     Lipase 6 U/L     BNP [[id number]]  (Normal) Collected: 03/02/25 1309    Specimen: Blood from Arm, Left Updated: 03/02/25 1334     proBNP 84.5 pg/mL     Narrative:      This assay is used as an aid in the diagnosis of individuals suspected of having heart failure. It can be used as an aid in the diagnosis of acute decompensated heart failure (ADHF) in patients presenting with signs and symptoms of ADHF to the emergency department (ED). In addition, NT-proBNP of <300 pg/mL indicates ADHF is not likely.    Age Range Result Interpretation  NT-proBNP Concentration (pg/mL:      <50             Positive            >450                   Gray                 300-450                    Negative             <300    50-75           Positive            >900                  Gray                300-900                  Negative            <300      >75             Positive            >1800                  Gray                300-1800                   Negative            <300    Magnesium [[id number]]  (Normal) Collected: 03/02/25 1309    Specimen: Blood from Arm, Left Updated: 03/02/25 1336     Magnesium 1.9 mg/dL     CBC Auto Differential [[id number]]  (Abnormal) Collected: 03/02/25 1309    Specimen: Blood from Arm, Left Updated: 03/02/25 1402     WBC 15.44 10*3/mm3      RBC 4.95 10*6/mm3      Hemoglobin 14.6 g/dL      Hematocrit 44.3 %      MCV 89.5 fL      MCH 29.5 pg      MCHC 33.0 g/dL      RDW 14.3 %      RDW-SD 46.4 fl      MPV 10.6 fL      Platelets 373 10*3/mm3      Neutrophil % 66.4 %      Lymphocyte % 17.0 %      Monocyte % 14.4 %      Eosinophil % 1.6 %      Basophil % 0.2 %      Immature Grans % 0.4 %      Neutrophils, Absolute 10.25 10*3/mm3      Lymphocytes, Absolute 2.63 10*3/mm3      Monocytes, Absolute 2.23 10*3/mm3      Eosinophils, Absolute 0.24 10*3/mm3      Basophils, Absolute 0.03 10*3/mm3      Immature Grans, Absolute 0.06 10*3/mm3      nRBC 0.0 /100 WBC     High Sensitivity Troponin T 1Hr [[id number]] Collected: 03/02/25 1514    Specimen: Blood from Arm, Left Updated: 03/02/25 1606     HS Troponin T <6 ng/L      Troponin T Numeric Delta --     Comment: Unable to calculate.       Narrative:      High Sensitive Troponin T Reference Range:  <14.0 ng/L- Negative Female for AMI  <22.0 ng/L- Negative Male for AMI  >=14 - Abnormal Female indicating possible myocardial injury.  >=22 - Abnormal Male indicating possible myocardial injury.   Clinicians would have to utilize clinical acumen, EKG, Troponin, and serial changes to determine if it is an Acute Myocardial Infarction or myocardial injury due to an underlying chronic condition.                  Imaging:    CT Abdomen Pelvis With Contrast    Result Date: 3/2/2025  CT ABDOMEN PELVIS W CONTRAST Date of Exam: 3/2/2025 3:40 PM EST Indication: Right upper quadrant abdominal pain. Comparison: CT abdomen pelvis 12/20/2024 Technique: Axial CT images were obtained of the abdomen and pelvis after the  uneventful intravenous administration of iodinated contrast. Reconstructed coronal and sagittal images were also obtained. Automated exposure control and iterative construction methods were used. Findings: Calcified coronary atherosclerotic disease. Heart size normal. No signs of active infection in the lower lungs. Minimal atelectatic change. Irregular slightly nodular peripheral hepatic contour. Widening along falciform ligament. Perfusion related enhancement versus benign fatty sparing within hepatic segment 7 similar to prior. There is underlying hepatic steatosis likely mild to moderate severity. Stable nonspecific hypodensity in the medial aspect of hepatic segment 2 over multiple prior comparisons. No suspicious liver lesion. Portal vasculature appears patent. Left intrahepatic portal vasculature appears diminutive similar to prior without visible thrombus. Mild perigastric varices. Cholecystectomy. Pancreatectomy and Rebuen-en-Y gastric bypass. Hepatobiliary limb decompressed. Benign central pneumobilia. No intrahepatic duct dilation. Splenectomy. No suspicious adrenal nodule. Symmetric renal size, contour and enhancement. No solid appearing mass or hydronephrosis. Urinary bladder unremarkable. Hysterectomy. No adnexal mass. No evidence of bowel obstruction or active inflammation. Nonspecific eccentric wall thickening in the distal Reuben limb near the jejunojejunal anastomosis, without surrounding inflammation. Mild to moderate formed stool with fecalized distal small bowel, which could reflect slow bowel transit. No evidence of acute appendicitis. Minimal intra-abdominal atherosclerotic disease. Negative for abdominal aortic aneurysm. No suspicious adenopathy. No ascites, free air or drainable fluid collection. Stimulator device noted in the abdomen. Leads appear intact. Linear scarring from previous laparotomy. Few loops of bowel approximate anterior abdominal wall which could reflect adhesions. Degenerative  changes of the lower lumbar spine without acute displaced fracture or aggressive lesion.     Impression: 1. No acute CT findings in the abdomen. 2. Extensive surgical changes detailed above stable from prior. Nonspecific eccentric wall thickening in the distal Reuben limb near surgical anastomosis without surrounding inflammation or obstruction. 3. Hepatic steatosis. Morphologic changes of the liver concerning for chronic liver disease. Mild perigastric varices. 4. Mild to moderate formed colonic stool with fecalized distal small bowel. Findings could reflect slow bowel transit. 5. Other ancillary findings as above. Electronically Signed: Kalyan Otero MD  3/2/2025 4:08 PM EST  Workstation ID: [id number]    XR Chest 1 View    Result Date: 3/2/2025  XR CHEST 1 VW Date of Exam: 3/2/2025 1:30 PM EST Indication: Chest Pain Triage Protocol Comparison: CT chest 9/20/2024, AP chest x-ray 9/20/2024 Findings: Lungs are well expanded and appear clear. No pneumothorax or large pleural effusion is seen. Heart size is normal.     Impression: No acute cardiopulmonary abnormality is identified. Electronically Signed: Morenita Hyatt  3/2/2025 1:35 PM EST  Workstation ID: [id number]       Differential Diagnosis and Discussion:    Abdominal Pain: Based on the patient's signs and symptoms, I considered abdominal aortic aneurysm, small bowel obstruction, pancreatitis, acute cholecystitis, acute appendecitis, peptic ulcer disease, gastritis, colitis, endocrine disorders, irritable bowel syndrome and other differential diagnosis an etiology of the patient's abdominal pain.    Labs were collected in the emergency department and all labs were reviewed and interpreted by me.  X-ray were performed in the emergency department and all X-ray impressions were independently interpreted by me.  An EKG was performed and the EKG was interpreted by me.  CT scan was performed in the emergency department and the CT scan radiology impression was interpreted  by me.    MDM     Amount and/or Complexity of Data Reviewed  Clinical lab tests: reviewed  Tests in the radiology section of CPT®: reviewed  Tests in the medicine section of CPT®: reviewed  Decide to obtain previous medical records or to obtain history from someone other than the patient: yes             Patient Care Considerations:    SEPSIS was considered but is NOT present in the emergency department as SIRS criteria is not present.      Consultants/Shared Management Plan:    None    Social Determinants of Health:    Patient is independent, reliable, and has access to care.       Disposition and Care Coordination:    Discharged: I considered escalation of care by admitting this patient to the hospital, however there were no acute findings to warrant inpatient admission.    I have explained the patient´s condition, diagnoses and treatment plan based on the information available to me at this time. I have answered questions and addressed any concerns. The patient has a good  understanding of the patient´s diagnosis, condition, and treatment plan as can be expected at this point. The vital signs have been stable. The patient´s condition is stable and appropriate for discharge from the emergency department.      The patient will pursue further outpatient evaluation with the primary care physician or other designated or consulting physician as outlined in the discharge instructions. They are agreeable to this plan of care and follow-up instructions have been explained in detail. The patient has received these instructions in written format and has expressed an understanding of the discharge instructions. The patient is aware that any significant change in condition or worsening of symptoms should prompt an immediate return to this or the closest emergency department or call to 911.  I have explained discharge medications and the need for follow up with the patient/caretakers. This was also printed in the discharge  instructions. Patient was discharged with the following medications and follow up:      Medication List        New Prescriptions      amoxicillin-clavulanate 875-125 MG per tablet  Commonly known as: AUGMENTIN  Take 1 tablet by mouth Every 12 (Twelve) Hours.     HYDROcodone-acetaminophen 7.5-325 MG per tablet  Commonly known as: NORCO  Take 1 tablet by mouth Every 6 (Six) Hours As Needed for Moderate Pain.               Where to Get Your Medications        These medications were sent to [facility] #[id number] - [place] - [address] - [phone]  - [phone]   [address] [place] [address]      Hours: 24-hours Phone: [phone]   amoxicillin-clavulanate 875-125 MG per tablet  HYDROcodone-acetaminophen 7.5-325 MG per tablet      Justin Daniels, APRN  [address] [facility] [place] [address] [phone]    In 5 days  If symptoms fail to resolve or improve      Follow-up with your GI specialist on 13 March as scheduled.          Final diagnoses:   Right upper quadrant abdominal pain   Enterocolitis        ED Disposition       ED Disposition   Discharge    Condition   Stable    Comment   --               This medical record created using voice recognition software.             Hudson Garcia DO  03/04/25 [id number]

## 2025-03-02 NOTE — DISCHARGE INSTRUCTIONS
Clear liquid diet.  Drink plenty fluids.  Sure adequate hydration.  Advance diet slowly as tolerated.  Home medications as prescribed.  Take the antibiotics as directed.  Follow-up with your GI specialist on March 13 as scheduled.  Return to the ER for any change or worsening pain, intractable vomiting, fever greater than 101, or any other concerns issues that may arise.

## 2025-03-03 LAB
QT INTERVAL: 394 MS
QTC INTERVAL: 442 MS

## 2025-03-07 ENCOUNTER — OFFICE VISIT (OUTPATIENT)
Dept: NEUROSURGERY | Facility: CLINIC | Age: 50
End: 2025-03-07
Payer: MEDICARE

## 2025-03-07 VITALS
HEIGHT: 67 IN | WEIGHT: 186 LBS | SYSTOLIC BLOOD PRESSURE: 142 MMHG | DIASTOLIC BLOOD PRESSURE: 93 MMHG | HEART RATE: 92 BPM | BODY MASS INDEX: 29.19 KG/M2

## 2025-03-07 DIAGNOSIS — Z98.890 STATUS POST LUMBAR SURGERY: ICD-10-CM

## 2025-03-07 DIAGNOSIS — M48.061 SPINAL STENOSIS, LUMBAR REGION, WITHOUT NEUROGENIC CLAUDICATION: Primary | ICD-10-CM

## 2025-03-07 DIAGNOSIS — M54.50 CHRONIC BILATERAL LOW BACK PAIN WITHOUT SCIATICA: ICD-10-CM

## 2025-03-07 DIAGNOSIS — G89.29 CHRONIC BILATERAL LOW BACK PAIN WITHOUT SCIATICA: ICD-10-CM

## 2025-03-07 LAB
BACTERIA SPEC AEROBE CULT: NORMAL
BACTERIA SPEC AEROBE CULT: NORMAL

## 2025-03-07 PROCEDURE — 99024 POSTOP FOLLOW-UP VISIT: CPT | Performed by: PHYSICIAN ASSISTANT

## 2025-03-07 PROCEDURE — 3075F SYST BP GE 130 - 139MM HG: CPT | Performed by: PHYSICIAN ASSISTANT

## 2025-03-07 PROCEDURE — 3080F DIAST BP >= 90 MM HG: CPT | Performed by: PHYSICIAN ASSISTANT

## 2025-03-07 NOTE — PROGRESS NOTES
"Patient being seen for today for Follow-up and Back Pain  .    Subjective    Kayla Gan is a 49 y.o. female that presents with Follow-up and Back Pain  .    HPI  Previously: Last seen on 2/4/2025 status post minimally invasive lumbar laminectomy with discectomy using right approach at L3-L4 on 1/24/2025.  She was reporting worse pain in the left leg after surgery and went to the ER, but the pain had significant proved since having a steroid shot at the ER, not fully resolved.  She reported total improvement in the right leg pain after surgery.  There is plan to continue restrictions and monitor for new or worsening complaints and follow-up to reassess.    Today: She reports total improvement of pain in both legs.    She reports worse pain in the lower back especially on the left. She rates the pain 6/10. The pain is better when resting on a heating pad. Getting up and any movement or walking makes the lower back pain worse, although she feels it is \"tolerable\". It does keep her from maintaining a normal level of activity.     reports that she has never smoked. She has never been exposed to tobacco smoke. She has never used smokeless tobacco.    Review of Systems   Musculoskeletal:  Positive for back pain.       Objective   Vitals:    03/07/25 1059   BP: 142/93   Pulse: 92        Physical Exam  Constitutional:       Appearance: Normal appearance.   Pulmonary:      Effort: Pulmonary effort is normal.   Musculoskeletal:         General: No tenderness.      Comments: SLR causes pain in the back on both side   Neurological:      General: No focal deficit present.      Mental Status: She is alert and oriented to person, place, and time.      Sensory: No sensory deficit.      Motor: No weakness.      Deep Tendon Reflexes: Reflexes normal.   Psychiatric:         Mood and Affect: Mood normal.         Behavior: Behavior normal.          Result Review   None.     Assessment and Plan {CC Problem List  Visit Diagnosis  " ROS  Review (Popup)  [facility]  BestPractice  Medications  SmartSets  SnapShot Encounters  Media :23}   Diagnoses and all orders for this visit:    1. Spinal stenosis, lumbar region, without neurogenic claudication (Primary)  -     Ambulatory Referral to Physical Therapy for Evaluation & Treatment  -     Ambulatory Referral to Pain Management    2. Status post lumbar surgery  -     Ambulatory Referral to Physical Therapy for Evaluation & Treatment  -     Ambulatory Referral to Pain Management    3. Chronic bilateral low back pain without sciatica  -     Ambulatory Referral to Physical Therapy for Evaluation & Treatment  -     Ambulatory Referral to Pain Management    She has primarily pain in the lower back, more to the left.    She does appear to have facet arthritis on previous CT scan, which may explain back pain. Back pain can also be related to DDD and possibly muscle involvement.    I will refer for PT targeting the lumbar spine and pain management to discuss possible MBBs/RFA trial.    The patient was counseled on basic recommendations for the reduction and prevention of back, neck, or spine pain in association with spinal disorders, including: cessation/avoidance of nicotine use, maintenance of a healthy BMI and weight, focusing on building/maintaining core strength through core exercise, and avoidance of activities which worsen the pain. The patient will monitor for changes in symptoms and notify our clinic of these changes as needed.  Follow Up {Instructions Charge Capture  Follow-up Communications :23}   Return if symptoms worsen or fail to improve.

## 2025-03-13 ENCOUNTER — LAB REQUISITION (OUTPATIENT)
Dept: LAB | Facility: HOSPITAL | Age: 50
End: 2025-03-13
Payer: MEDICARE

## 2025-03-13 DIAGNOSIS — K31.84 GASTROPARESIS: ICD-10-CM

## 2025-03-13 LAB
ANION GAP SERPL CALCULATED.3IONS-SCNC: 12.5 MMOL/L (ref 5–15)
BASOPHILS # BLD AUTO: 0.04 10*3/MM3 (ref 0–0.2)
BASOPHILS NFR BLD AUTO: 0.3 % (ref 0–1.5)
BUN SERPL-MCNC: 14 MG/DL (ref 6–20)
BUN/CREAT SERPL: 18.2 (ref 7–25)
CALCIUM SPEC-SCNC: 9.6 MG/DL (ref 8.6–10.5)
CHLORIDE SERPL-SCNC: 102 MMOL/L (ref 98–107)
CO2 SERPL-SCNC: 24.5 MMOL/L (ref 22–29)
CREAT SERPL-MCNC: 0.77 MG/DL (ref 0.57–1)
DEPRECATED RDW RBC AUTO: 51.3 FL (ref 37–54)
EGFRCR SERPLBLD CKD-EPI 2021: 94.7 ML/MIN/1.73
EOSINOPHIL # BLD AUTO: 0.01 10*3/MM3 (ref 0–0.4)
EOSINOPHIL NFR BLD AUTO: 0.1 % (ref 0.3–6.2)
ERYTHROCYTE [DISTWIDTH] IN BLOOD BY AUTOMATED COUNT: 14.8 % (ref 12.3–15.4)
GLUCOSE SERPL-MCNC: 174 MG/DL (ref 65–99)
HCT VFR BLD AUTO: 41.6 % (ref 34–46.6)
HGB BLD-MCNC: 13.2 G/DL (ref 12–15.9)
IMM GRANULOCYTES # BLD AUTO: 0.05 10*3/MM3 (ref 0–0.05)
IMM GRANULOCYTES NFR BLD AUTO: 0.3 % (ref 0–0.5)
LYMPHOCYTES # BLD AUTO: 1.96 10*3/MM3 (ref 0.7–3.1)
LYMPHOCYTES NFR BLD AUTO: 13.5 % (ref 19.6–45.3)
MCH RBC QN AUTO: 29.9 PG (ref 26.6–33)
MCHC RBC AUTO-ENTMCNC: 31.7 G/DL (ref 31.5–35.7)
MCV RBC AUTO: 94.3 FL (ref 79–97)
MONOCYTES # BLD AUTO: 1.21 10*3/MM3 (ref 0.1–0.9)
MONOCYTES NFR BLD AUTO: 8.3 % (ref 5–12)
NEUTROPHILS NFR BLD AUTO: 11.24 10*3/MM3 (ref 1.7–7)
NEUTROPHILS NFR BLD AUTO: 77.5 % (ref 42.7–76)
NRBC BLD AUTO-RTO: 0 /100 WBC (ref 0–0.2)
PLATELET # BLD AUTO: 325 10*3/MM3 (ref 140–450)
PMV BLD AUTO: 11.2 FL (ref 6–12)
POTASSIUM SERPL-SCNC: 4.4 MMOL/L (ref 3.5–5.2)
RBC # BLD AUTO: 4.41 10*6/MM3 (ref 3.77–5.28)
SODIUM SERPL-SCNC: 139 MMOL/L (ref 136–145)
WBC NRBC COR # BLD AUTO: 14.51 10*3/MM3 (ref 3.4–10.8)

## 2025-03-13 PROCEDURE — 80048 BASIC METABOLIC PNL TOTAL CA: CPT | Performed by: TRANSPLANT SURGERY

## 2025-03-13 PROCEDURE — 85025 COMPLETE CBC W/AUTO DIFF WBC: CPT | Performed by: TRANSPLANT SURGERY

## 2025-03-15 ENCOUNTER — LAB REQUISITION (OUTPATIENT)
Dept: LAB | Facility: HOSPITAL | Age: 50
End: 2025-03-15
Payer: MEDICARE

## 2025-03-15 DIAGNOSIS — R10.9 UNSPECIFIED ABDOMINAL PAIN: ICD-10-CM

## 2025-03-15 DIAGNOSIS — K31.84 GASTROPARESIS: ICD-10-CM

## 2025-03-15 DIAGNOSIS — D64.9 ANEMIA, UNSPECIFIED: ICD-10-CM

## 2025-03-15 LAB
ANION GAP SERPL CALCULATED.3IONS-SCNC: 8.1 MMOL/L (ref 5–15)
ANISOCYTOSIS BLD QL: ABNORMAL
BASOPHILS # BLD AUTO: 0.02 10*3/MM3 (ref 0–0.2)
BASOPHILS NFR BLD AUTO: 0.1 % (ref 0–1.5)
BUN SERPL-MCNC: 11 MG/DL (ref 6–20)
BUN/CREAT SERPL: 17.2 (ref 7–25)
CALCIUM SPEC-SCNC: 8.6 MG/DL (ref 8.6–10.5)
CHLORIDE SERPL-SCNC: 109 MMOL/L (ref 98–107)
CO2 SERPL-SCNC: 26.9 MMOL/L (ref 22–29)
CREAT SERPL-MCNC: 0.64 MG/DL (ref 0.57–1)
DEPRECATED RDW RBC AUTO: 51.9 FL (ref 37–54)
DEPRECATED RDW RBC AUTO: 52.4 FL (ref 37–54)
EGFRCR SERPLBLD CKD-EPI 2021: 108.5 ML/MIN/1.73
EOSINOPHIL # BLD AUTO: 0.68 10*3/MM3 (ref 0–0.4)
EOSINOPHIL # BLD MANUAL: 0.18 10*3/MM3 (ref 0–0.4)
EOSINOPHIL NFR BLD AUTO: 3.8 % (ref 0.3–6.2)
EOSINOPHIL NFR BLD MANUAL: 1 % (ref 0.3–6.2)
ERYTHROCYTE [DISTWIDTH] IN BLOOD BY AUTOMATED COUNT: 15 % (ref 12.3–15.4)
ERYTHROCYTE [DISTWIDTH] IN BLOOD BY AUTOMATED COUNT: 15 % (ref 12.3–15.4)
GLUCOSE SERPL-MCNC: 118 MG/DL (ref 65–99)
HCT VFR BLD AUTO: 21.2 % (ref 34–46.6)
HCT VFR BLD AUTO: 22.2 % (ref 34–46.6)
HCT VFR BLD AUTO: 27.4 % (ref 34–46.6)
HGB BLD-MCNC: 6.8 G/DL (ref 12–15.9)
HGB BLD-MCNC: 7 G/DL (ref 12–15.9)
HGB BLD-MCNC: 8.7 G/DL (ref 12–15.9)
IMM GRANULOCYTES # BLD AUTO: 0.11 10*3/MM3 (ref 0–0.05)
IMM GRANULOCYTES NFR BLD AUTO: 0.6 % (ref 0–0.5)
LYMPHOCYTES # BLD AUTO: 5.49 10*3/MM3 (ref 0.7–3.1)
LYMPHOCYTES # BLD MANUAL: 8.46 10*3/MM3 (ref 0.7–3.1)
LYMPHOCYTES NFR BLD AUTO: 30.6 % (ref 19.6–45.3)
LYMPHOCYTES NFR BLD MANUAL: 4 % (ref 5–12)
MCH RBC QN AUTO: 29.9 PG (ref 26.6–33)
MCH RBC QN AUTO: 30.4 PG (ref 26.6–33)
MCHC RBC AUTO-ENTMCNC: 31.5 G/DL (ref 31.5–35.7)
MCHC RBC AUTO-ENTMCNC: 32.1 G/DL (ref 31.5–35.7)
MCV RBC AUTO: 94.6 FL (ref 79–97)
MCV RBC AUTO: 94.9 FL (ref 79–97)
METAMYELOCYTES NFR BLD MANUAL: 2 % (ref 0–0)
MONOCYTES # BLD AUTO: 2.42 10*3/MM3 (ref 0.1–0.9)
MONOCYTES # BLD: 0.74 10*3/MM3 (ref 0.1–0.9)
MONOCYTES NFR BLD AUTO: 13.5 % (ref 5–12)
MYELOCYTES NFR BLD MANUAL: 1 % (ref 0–0)
NEUTROPHILS # BLD AUTO: 8.46 10*3/MM3 (ref 1.7–7)
NEUTROPHILS NFR BLD AUTO: 51.4 % (ref 42.7–76)
NEUTROPHILS NFR BLD AUTO: 9.2 10*3/MM3 (ref 1.7–7)
NEUTROPHILS NFR BLD MANUAL: 43 % (ref 42.7–76)
NEUTS BAND NFR BLD MANUAL: 3 % (ref 0–5)
NRBC BLD AUTO-RTO: 0 /100 WBC (ref 0–0.2)
PATHOLOGY REVIEW: YES
PLAT MORPH BLD: NORMAL
PLATELET # BLD AUTO: 244 10*3/MM3 (ref 140–450)
PLATELET # BLD AUTO: 269 10*3/MM3 (ref 140–450)
PMV BLD AUTO: 11.1 FL (ref 6–12)
PMV BLD AUTO: 11.5 FL (ref 6–12)
POTASSIUM SERPL-SCNC: 4.1 MMOL/L (ref 3.5–5.2)
RBC # BLD AUTO: 2.24 10*6/MM3 (ref 3.77–5.28)
RBC # BLD AUTO: 2.34 10*6/MM3 (ref 3.77–5.28)
SCAN SLIDE: NORMAL
SODIUM SERPL-SCNC: 144 MMOL/L (ref 136–145)
VARIANT LYMPHS NFR BLD MANUAL: 46 % (ref 19.6–45.3)
WBC MORPH BLD: NORMAL
WBC NRBC COR # BLD AUTO: 17.92 10*3/MM3 (ref 3.4–10.8)
WBC NRBC COR # BLD AUTO: 18.39 10*3/MM3 (ref 3.4–10.8)

## 2025-03-15 PROCEDURE — 80048 BASIC METABOLIC PNL TOTAL CA: CPT | Performed by: TRANSPLANT SURGERY

## 2025-03-15 PROCEDURE — 85025 COMPLETE CBC W/AUTO DIFF WBC: CPT | Performed by: TRANSPLANT SURGERY

## 2025-03-15 PROCEDURE — 85014 HEMATOCRIT: CPT

## 2025-03-15 PROCEDURE — 85018 HEMOGLOBIN: CPT

## 2025-03-17 LAB
LAB AP CASE REPORT: NORMAL
PATH REPORT.FINAL DX SPEC: NORMAL

## 2025-03-19 ENCOUNTER — HOSPITAL ENCOUNTER (EMERGENCY)
Facility: HOSPITAL | Age: 50
Discharge: HOME | End: 2025-03-20
Attending: EMERGENCY MEDICINE
Payer: MEDICARE

## 2025-03-19 ENCOUNTER — APPOINTMENT (OUTPATIENT)
Dept: CT IMAGING | Facility: HOSPITAL | Age: 50
End: 2025-03-19
Payer: MEDICARE

## 2025-03-19 DIAGNOSIS — R53.1 WEAKNESS: ICD-10-CM

## 2025-03-19 DIAGNOSIS — R11.2 NAUSEA AND VOMITING, UNSPECIFIED VOMITING TYPE: ICD-10-CM

## 2025-03-19 DIAGNOSIS — R10.84 GENERALIZED ABDOMINAL PAIN: Primary | ICD-10-CM

## 2025-03-19 LAB
ALBUMIN SERPL-MCNC: 4.1 G/DL (ref 3.5–5.2)
ALBUMIN/GLOB SERPL: 1.1 G/DL
ALP SERPL-CCNC: 153 U/L (ref 39–117)
ALT SERPL W P-5'-P-CCNC: 33 U/L (ref 1–33)
ANION GAP SERPL CALCULATED.3IONS-SCNC: 12.6 MMOL/L (ref 5–15)
AST SERPL-CCNC: 42 U/L (ref 1–32)
BASOPHILS # BLD AUTO: 0.07 10*3/MM3 (ref 0–0.2)
BASOPHILS NFR BLD AUTO: 0.4 % (ref 0–1.5)
BILIRUB SERPL-MCNC: 1.4 MG/DL (ref 0–1.2)
BUN SERPL-MCNC: 11 MG/DL (ref 6–20)
BUN/CREAT SERPL: 14.3 (ref 7–25)
CALCIUM SPEC-SCNC: 9.3 MG/DL (ref 8.6–10.5)
CHLORIDE SERPL-SCNC: 98 MMOL/L (ref 98–107)
CO2 SERPL-SCNC: 23.4 MMOL/L (ref 22–29)
CORTIS SERPL-MCNC: 9.14 MCG/DL
CREAT SERPL-MCNC: 0.77 MG/DL (ref 0.57–1)
DEPRECATED RDW RBC AUTO: 50.2 FL (ref 37–54)
EGFRCR SERPLBLD CKD-EPI 2021: 94.7 ML/MIN/1.73
EOSINOPHIL # BLD AUTO: 0.92 10*3/MM3 (ref 0–0.4)
EOSINOPHIL NFR BLD AUTO: 5.5 % (ref 0.3–6.2)
ERYTHROCYTE [DISTWIDTH] IN BLOOD BY AUTOMATED COUNT: 15.1 % (ref 12.3–15.4)
GLOBULIN UR ELPH-MCNC: 3.9 GM/DL
GLUCOSE BLDC GLUCOMTR-MCNC: 120 MG/DL (ref 70–99)
GLUCOSE SERPL-MCNC: 115 MG/DL (ref 65–99)
HCT VFR BLD AUTO: 34.3 % (ref 34–46.6)
HGB BLD-MCNC: 11 G/DL (ref 12–15.9)
HOLD SPECIMEN: NORMAL
HOLD SPECIMEN: NORMAL
IMM GRANULOCYTES # BLD AUTO: 0.18 10*3/MM3 (ref 0–0.05)
IMM GRANULOCYTES NFR BLD AUTO: 1.1 % (ref 0–0.5)
LIPASE SERPL-CCNC: 7 U/L (ref 13–60)
LYMPHOCYTES # BLD AUTO: 4.75 10*3/MM3 (ref 0.7–3.1)
LYMPHOCYTES NFR BLD AUTO: 28.6 % (ref 19.6–45.3)
MCH RBC QN AUTO: 30 PG (ref 26.6–33)
MCHC RBC AUTO-ENTMCNC: 32.1 G/DL (ref 31.5–35.7)
MCV RBC AUTO: 93.5 FL (ref 79–97)
MONOCYTES # BLD AUTO: 2.64 10*3/MM3 (ref 0.1–0.9)
MONOCYTES NFR BLD AUTO: 15.9 % (ref 5–12)
NEUTROPHILS NFR BLD AUTO: 48.5 % (ref 42.7–76)
NEUTROPHILS NFR BLD AUTO: 8.02 10*3/MM3 (ref 1.7–7)
NRBC BLD AUTO-RTO: 0.9 /100 WBC (ref 0–0.2)
PLATELET # BLD AUTO: 512 10*3/MM3 (ref 140–450)
PMV BLD AUTO: 10.5 FL (ref 6–12)
POTASSIUM SERPL-SCNC: 3.9 MMOL/L (ref 3.5–5.2)
PROT SERPL-MCNC: 8 G/DL (ref 6–8.5)
QT INTERVAL: 377 MS
QTC INTERVAL: 453 MS
RBC # BLD AUTO: 3.67 10*6/MM3 (ref 3.77–5.28)
SODIUM SERPL-SCNC: 134 MMOL/L (ref 136–145)
WBC NRBC COR # BLD AUTO: 16.58 10*3/MM3 (ref 3.4–10.8)
WHOLE BLOOD HOLD COAG: NORMAL
WHOLE BLOOD HOLD SPECIMEN: NORMAL

## 2025-03-19 PROCEDURE — 25810000003 SODIUM CHLORIDE 0.9 % SOLUTION: Performed by: EMERGENCY MEDICINE

## 2025-03-19 PROCEDURE — 83690 ASSAY OF LIPASE: CPT

## 2025-03-19 PROCEDURE — 82533 TOTAL CORTISOL: CPT | Performed by: REGISTERED NURSE

## 2025-03-19 PROCEDURE — 99285 EMERGENCY DEPT VISIT HI MDM: CPT

## 2025-03-19 PROCEDURE — 25010000002 ONDANSETRON PER 1 MG: Performed by: EMERGENCY MEDICINE

## 2025-03-19 PROCEDURE — 82948 REAGENT STRIP/BLOOD GLUCOSE: CPT

## 2025-03-19 PROCEDURE — 93005 ELECTROCARDIOGRAM TRACING: CPT

## 2025-03-19 PROCEDURE — 81001 URINALYSIS AUTO W/SCOPE: CPT | Performed by: EMERGENCY MEDICINE

## 2025-03-19 PROCEDURE — 93010 ELECTROCARDIOGRAM REPORT: CPT | Performed by: INTERNAL MEDICINE

## 2025-03-19 PROCEDURE — 93005 ELECTROCARDIOGRAM TRACING: CPT | Performed by: EMERGENCY MEDICINE

## 2025-03-19 PROCEDURE — 36415 COLL VENOUS BLD VENIPUNCTURE: CPT

## 2025-03-19 PROCEDURE — 80053 COMPREHEN METABOLIC PANEL: CPT | Performed by: EMERGENCY MEDICINE

## 2025-03-19 PROCEDURE — 85025 COMPLETE CBC W/AUTO DIFF WBC: CPT

## 2025-03-19 PROCEDURE — 96374 THER/PROPH/DIAG INJ IV PUSH: CPT

## 2025-03-19 RX ORDER — ONDANSETRON 2 MG/ML
4 INJECTION INTRAMUSCULAR; INTRAVENOUS ONCE
Status: COMPLETED | OUTPATIENT
Start: 2025-03-19 | End: 2025-03-19

## 2025-03-19 RX ORDER — SODIUM CHLORIDE 0.9 % (FLUSH) 0.9 %
10 SYRINGE (ML) INJECTION AS NEEDED
Status: DISCONTINUED | OUTPATIENT
Start: 2025-03-19 | End: 2025-03-20 | Stop reason: HOSPADM

## 2025-03-19 RX ADMIN — ONDANSETRON 4 MG: 2 INJECTION INTRAMUSCULAR; INTRAVENOUS at 23:15

## 2025-03-19 RX ADMIN — SODIUM CHLORIDE 1000 ML: 0.9 INJECTION, SOLUTION INTRAVENOUS at 23:15

## 2025-03-20 ENCOUNTER — APPOINTMENT (OUTPATIENT)
Dept: CT IMAGING | Facility: HOSPITAL | Age: 50
End: 2025-03-20
Payer: MEDICARE

## 2025-03-20 VITALS
SYSTOLIC BLOOD PRESSURE: 135 MMHG | RESPIRATION RATE: 17 BRPM | DIASTOLIC BLOOD PRESSURE: 88 MMHG | WEIGHT: 185.19 LBS | TEMPERATURE: 98.6 F | BODY MASS INDEX: 29.07 KG/M2 | HEIGHT: 67 IN | HEART RATE: 83 BPM | OXYGEN SATURATION: 93 %

## 2025-03-20 LAB
BACTERIA UR QL AUTO: NORMAL /HPF
BILIRUB UR QL STRIP: NEGATIVE
CLARITY UR: CLEAR
COLOR UR: YELLOW
GLUCOSE UR STRIP-MCNC: NEGATIVE MG/DL
HGB UR QL STRIP.AUTO: NEGATIVE
HYALINE CASTS UR QL AUTO: NORMAL /LPF
KETONES UR QL STRIP: NEGATIVE
LEUKOCYTE ESTERASE UR QL STRIP.AUTO: ABNORMAL
NITRITE UR QL STRIP: NEGATIVE
PH UR STRIP.AUTO: 6.5 [PH] (ref 5–8)
PROT UR QL STRIP: NEGATIVE
RBC # UR STRIP: NORMAL /HPF
REF LAB TEST METHOD: NORMAL
SP GR UR STRIP: <=1.005 (ref 1–1.03)
SQUAMOUS #/AREA URNS HPF: NORMAL /HPF
UROBILINOGEN UR QL STRIP: ABNORMAL
WBC # UR STRIP: NORMAL /HPF

## 2025-03-20 PROCEDURE — 74177 CT ABD & PELVIS W/CONTRAST: CPT

## 2025-03-20 PROCEDURE — 25010000002 MORPHINE PER 10 MG: Performed by: EMERGENCY MEDICINE

## 2025-03-20 PROCEDURE — 25510000001 IOPAMIDOL PER 1 ML: Performed by: EMERGENCY MEDICINE

## 2025-03-20 PROCEDURE — 96375 TX/PRO/DX INJ NEW DRUG ADDON: CPT

## 2025-03-20 RX ORDER — HYDROCODONE BITARTRATE AND ACETAMINOPHEN 5; 325 MG/1; MG/1
1 TABLET ORAL EVERY 6 HOURS PRN
Qty: 12 TABLET | Refills: 0 | Status: SHIPPED | OUTPATIENT
Start: 2025-03-20

## 2025-03-20 RX ORDER — ONDANSETRON 4 MG/1
4 TABLET, ORALLY DISINTEGRATING ORAL EVERY 6 HOURS PRN
Qty: 20 TABLET | Refills: 0 | Status: SHIPPED | OUTPATIENT
Start: 2025-03-20 | End: 2025-03-20

## 2025-03-20 RX ORDER — ONDANSETRON 4 MG/1
4 TABLET, ORALLY DISINTEGRATING ORAL EVERY 6 HOURS PRN
Qty: 20 TABLET | Refills: 0 | Status: SHIPPED | OUTPATIENT
Start: 2025-03-20

## 2025-03-20 RX ORDER — HYDROCODONE BITARTRATE AND ACETAMINOPHEN 5; 325 MG/1; MG/1
1 TABLET ORAL EVERY 6 HOURS PRN
Qty: 12 TABLET | Refills: 0 | Status: SHIPPED | OUTPATIENT
Start: 2025-03-20 | End: 2025-03-20

## 2025-03-20 RX ORDER — HYDROCODONE BITARTRATE AND ACETAMINOPHEN 5; 325 MG/1; MG/1
1 TABLET ORAL EVERY 6 HOURS PRN
Qty: 12 TABLET | Refills: 0 | Status: CANCELLED | OUTPATIENT
Start: 2025-03-20

## 2025-03-20 RX ORDER — IOPAMIDOL 755 MG/ML
100 INJECTION, SOLUTION INTRAVASCULAR
Status: COMPLETED | OUTPATIENT
Start: 2025-03-20 | End: 2025-03-20

## 2025-03-20 RX ADMIN — IOPAMIDOL 100 ML: 755 INJECTION, SOLUTION INTRAVENOUS at 00:28

## 2025-03-20 RX ADMIN — MORPHINE SULFATE 4 MG: 4 INJECTION, SOLUTION INTRAMUSCULAR; INTRAVENOUS at 01:01

## 2025-03-20 NOTE — ED PROVIDER NOTES
Time: 9:39 PM EDT  Date of encounter:  3/19/2025  Independent Historian/Clinical History and Information was obtained by:   Patient    History is limited by: N/A    Chief Complaint   Patient presents with    Abdominal Pain    Vomiting         History of Present Illness:  Patient is a 49 y.o. year old female who presents to the emergency department for evaluation of abdominal pain, vomiting. Patient had a recent placement of a gastric stimulator on Thursday (3/13/25) at [facility] by Dr. Marcos Riddle. Patient states that she had to be admitted for 4 days due to Adalid's crisis and low hemoglobin. She was discharged on Sunday. States that her hemoglobin went up from 6 to 8 after a unit of blood and was stable. Today, she began vomiting and feeling pain all over. She is worried that she is having another Adalid's crisis. She has been on emergency steroid and has taken her first regular dose of 30mg. Patient states that she also got lightheaded in the waiting room and passed out.    Patient Care Team  Primary Care Provider: Justin Daniels APRN    Past Medical History:     Allergies   Allergen Reactions    Parathyroid Hormone (Recomb) Other (See Comments) and Unknown - High Severity     Sent patient into kidney failure, heart stopped, in ICU for two days.    Romosozumab Hives     Other reaction(s): Hives    Other reaction(s): Hives   Other reaction(s): Hives    Teriparatide Anaphylaxis and Other (See Comments)     Other reaction(s): Unknown    Tramadol Anaphylaxis and Other (See Comments)     Other reaction(s): Unknown, Unknown    Nifedipine Other (See Comments)     Rapid heart beat        Vancomycin Hives, Itching and Rash     vancomycin-infusion reaction (VIR, formerly Ivett's syndrome): give over longer infusion time     Past Medical History:   Diagnosis Date    Abnormal ECG     Adrenal insufficiency     Allergic Unsure    Foods, Ulram, Vancomycin    Anemia     Anxiety     Arthritis     Asthma  Unsure    Bleeding disorder     Brain concussion     Cholelithiasis Unsure    Cirrhosis     NO CURRENT S/S    Colon polyp     Crohn's disease     Depression     Diabetes mellitus     Gastroparesis     GERD (gastroesophageal reflux disease)     History of MRSA infection     History of transfusion     Hyperlipidemia     Hypertension     Hypothyroidism     Interstitial cystitis     Irritable bowel syndrome Unsure    Liver disease     Low back pain     Lumbosacral disc disease     Migraines     MRSA (methicillin resistant Staphylococcus aureus)     NO CURRENT ISSUES    Pancreatitis     NO CURRENT ISSUES    Sleep apnea     USES CPAP    Urinary incontinence     Urinary tract infection Unsure    Interstitial Cystitis    Vaginal infection      Past Surgical History:   Procedure Laterality Date    ABSCESS DRAINAGE  12/24/2019    Fluoroscopically guided abscess drainage, [facility]    ADENOIDECTOMY      BACK SURGERY      L4 L5    BLADDER SURGERY      CHOLECYSTECTOMY N/A     COLONOSCOPY  04/24/2019    NBIH, 3 mm polyp    CYSTOSCOPY      DILATATION AND CURETTAGE      ENDOMETRIAL ABLATION      ENDOSCOPY N/A 04/24/2019    Normal    ENTEROSCOPY SMALL BOWEL      EPIDURAL BLOCK      ERCP  2019    GASTRIC STIMULATOR IMPLANT SURGERY      IN PLACE NOW, PT DOES NOT HAVE ANY REMOTE FOR THIS DEVICE.    GASTROJEJUNOSTOMY W/ JEJUNOSTOMY TUBE      removed    HYSTERECTOMY      LUMBAR LAMINECTOMY Right 1/24/2025    Procedure: MINIMALLY INVASIVE LUMBAR LAMINECTOMY WITH DISCECTOMY, RIGHT APPROACH, LUMBAR THREE-LUMBAR FOUR  OPERATION ON THE LOWER BACK TO SHAVE OFF BONE AND DISC COMPRESSING THE NERVES TO THE LEGS.;  Surgeon: Joe Langley MD;  Location: [facility] OR;  Service: Neurosurgery;  Laterality: Right;    LYMPH NODE BIOPSY      BENIGN    OOPHORECTOMY      OTHER SURGICAL HISTORY      gastric stimulator battery replaced    PANCREATECTOMY  12/2019    TPIAT    PELVIC FLOOR REPAIR      SINUS SURGERY      SPLENECTOMY      TONSILLECTOMY       VENOUS ACCESS DEVICE (PORT) INSERTION      REMOVED     Family History   Problem Relation Age of Onset    Anxiety disorder Mother     Hypothyroidism Mother     Breast cancer Mother     Colon polyps Mother     Arthritis Mother     Cancer Mother         Breast    Depression Mother     Thyroid disease Mother     Heart disease Father     Hypothyroidism Father     Anxiety disorder Father     Depression Father     Hypertension Father     Kidney disease Father     Thyroid disease Father     Depression Brother     Hypertension Brother     Alcohol abuse Paternal Grandfather     Heart disease Paternal Grandfather     Malig Hyperthermia Neg Hx        Home Medications:  Prior to Admission medications    Medication Sig Start Date End Date Taking? Authorizing Provider   amoxicillin-clavulanate (AUGMENTIN) 875-125 MG per tablet Take 1 tablet by mouth Every 12 (Twelve) Hours. 3/2/25   Hudson Garcia DO   docusate sodium (COLACE) 100 MG capsule Take 1 capsule by mouth 2 (Two) Times a Day. 4/16/19   Pollo Ray MD   E-400 180 MG (400 UNIT) capsule capsule Take 2 capsules by mouth Daily. 11/8/24   Pollo Ray MD   Enulose 10 GM/15ML solution solution (encephalopathy) Take 45 mL by mouth As Needed.    Pollo Ray MD   glucagon (GLUCAGEN) 1 MG injection Inject 1 mg into the appropriate muscle as directed by prescriber Every 15 (Fifteen) Minutes As Needed for Low Blood Sugar (per Glu[employer]). 6/21/24   Gary Hemphill MD   hydrOXYzine (ATARAX) 50 MG tablet Take 1 tablet by mouth Every Night.    Pollo Ray MD   Insulin Disposable Pump (Omnipod 5 G6 Pods, Gen 5,) [facility]  7/1/24   Pollo Ray MD   Insulin Lispro (humaLOG) 100 UNIT/ML injection USE IN insulin pump approximately 100 UNITS DAILY 10/23/24   Pollo Ray MD   lansoprazole (PREVACID) 30 MG capsule TAKE ONE CAPSULE BY MOUTH TWICE DAILY 9/6/24   Mariela Gr APRN   Lantus SoloStar 100 UNIT/ML injection pen Inject 47  Units under the skin into the appropriate area as directed Daily As Needed (ONLY FOR INSULIN PUMP FAILURE). Only for insulin pump failure    Pollo Ray MD   levothyroxine (SYNTHROID, LEVOTHROID) 125 MCG tablet Take 1 tablet by mouth Daily. 11/5/24   Pollo Ray MD   lisinopril (PRINIVIL,ZESTRIL) 40 MG tablet Take 1 tablet by mouth Daily.    Pollo Ray MD   NovoLOG 100 UNIT/ML injection Inject  under the skin into the appropriate area as directed. Type of Insulin: Lispro 100 units/ml  Type of Pump:Omnipod  Bolus: 1u 5-7g max bolus 2.5units/hr  Basal: 2637-6522 1.5u/hr max basal 10units/hr  Correction factor: 40mg/dl  Insulin to carbohydrate ratio: 7-5 g of carb  Next fill date: 10/21/2024    Date of last site change: 10/18/2024  Location of pod: right upper arm    Frequency of refill: 2-3 days  Last refill date: 10/18/2024    Prescriber: Dr. Chelsea Silver  Phone number: [phone] 5/12/22   Pollo Ray MD   ondansetron (ZOFRAN) 8 MG tablet Take 1 tablet by mouth Every 4 (Four) to 6 (Six) Hours As Needed for Nausea or Vomiting.    Pollo Ray MD   Pancrelipase, Lip-Prot-Amyl, (CREON) 76076-121981 units capsule delayed-release particles capsule Take 2 capsules by mouth 2 (Two) Times a Day. 3 CAPS TID WITH MEALS AND 2 CAPS BID WITH SNACKS    Pollo Ray MD   polyethylene glycol (MiraLax) 17 GM/SCOOP powder Take 17 g by mouth Daily.    Pollo Ray MD   prochlorperazine (COMPAZINE) 10 MG tablet Take 1 tablet by mouth Every 8 (Eight) Hours As Needed for Nausea or Vomiting. 2/2/25   Kalyan Scott MD   promethazine (PHENERGAN) 25 MG tablet Take 1 tablet by mouth Every 6 (Six) Hours As Needed.    Pollo Ray MD   propranolol LA (INDERAL LA) 60 MG 24 hr capsule Take 1 capsule by mouth Daily.    Pollo Ray MD   QUEtiapine (SEROquel) 100 MG tablet Take 1 tablet by mouth every night at bedtime.    Pollo Ray,  "MD   Qulipta 60 MG tablet Take 1 tablet by mouth Every Night. 4/27/24   Provider, Historical, MD   riFAXIMin (XIFAXAN) 550 MG tablet Take 1 tablet by mouth Every 12 (Twelve) Hours. 2/25/25 2/25/26  Patrizia Sena APRN   traZODone (DESYREL) 50 MG tablet Take 1 tablet by mouth Every Night.    Provider, Pollo, MD   Ubrelvy 100 MG tablet Take 1 tablet by mouth 1 (One) Time As Needed (Migraine). 12/7/23   Dulce Camejo APRN        Social History:   Social History     Tobacco Use    Smoking status: Never     Passive exposure: Never    Smokeless tobacco: Never   Vaping Use    Vaping status: Never Used   Substance Use Topics    Alcohol use: Never    Drug use: Never         Review of Systems:  Review of Systems   Constitutional:  Negative for chills and fever.   HENT:  Negative for ear pain.    Eyes:  Negative for pain.   Respiratory:  Negative for cough and shortness of breath.    Cardiovascular:  Negative for chest pain.   Gastrointestinal:  Positive for abdominal pain and vomiting. Negative for diarrhea and nausea.   Genitourinary:  Negative for dysuria.   Musculoskeletal:  Negative for arthralgias.   Skin:  Negative for rash.   Neurological:  Negative for headaches.        Physical Exam:  /88 (BP Location: Left arm, Patient Position: Lying)   Pulse 83   Temp 98.6 °F (37 °C) (Oral)   Resp 17   Ht 170.2 cm (67\")   Wt 84 kg (185 lb 3 oz)   SpO2 93%   BMI 29.00 kg/m²         Physical Exam  Vitals and nursing note reviewed.   Constitutional:       Appearance: Normal appearance.   HENT:      Head: Normocephalic and atraumatic.      Nose: Nose normal.      Mouth/Throat:      Mouth: Mucous membranes are moist.   Eyes:      Extraocular Movements: Extraocular movements intact.      Conjunctiva/sclera: Conjunctivae normal.      Pupils: Pupils are equal, round, and reactive to light.   Cardiovascular:      Rate and Rhythm: Normal rate and regular rhythm.      Pulses: Normal pulses.      Heart sounds: " Normal heart sounds.   Pulmonary:      Effort: Pulmonary effort is normal.      Breath sounds: Normal breath sounds.   Abdominal:      General: Abdomen is flat. There is no distension.      Palpations: Abdomen is soft.      Tenderness: There is generalized abdominal tenderness.      Comments: Surgical wound are well approximated. No erythema, drainage or swelling.     Musculoskeletal:         General: Normal range of motion.      Cervical back: Normal range of motion and neck supple.   Skin:     General: Skin is warm and dry.   Neurological:      General: No focal deficit present.      Mental Status: She is alert and oriented to person, place, and time.   Psychiatric:         Mood and Affect: Mood normal.         Behavior: Behavior normal.                       Medical Decision Making:      Comorbidities that affect care:    Crohn's disease, DM, GERD, HTN, Anemia, Adrenal insufficiency    External Notes reviewed:    None      The following orders were placed and all results were independently analyzed by me:  Orders Placed This Encounter   Procedures    CT Abdomen Pelvis With Contrast    [place] Draw    Comprehensive Metabolic Panel    Lipase    Urinalysis With Microscopic If Indicated (No Culture) - Urine, Clean Catch    CBC Auto Differential    ACTH    Cortisol    Urinalysis, Microscopic Only - Urine, Clean Catch    NPO Diet NPO Type: Strict NPO    Undress & Gown    Inpatient Hospitalist Consult    Inpatient General Surgery Consult    POC Glucose Once    POC Glucose STAT    ECG 12 Lead Rhythm Change    Insert Peripheral IV    CBC & Differential    Green Top (Gel)    Lavender Top    Gold Top - SST    Light Blue Top       Medications Given in the Emergency Department:  Medications   sodium chloride 0.9 % flush 10 mL (has no administration in time range)   sodium chloride 0.9 % bolus 1,000 mL (0 mL Intravenous Stopped 3/20/25 0132)   ondansetron (ZOFRAN) injection 4 mg (4 mg Intravenous Given 3/19/25 2996)    iopamidol (ISOVUE-370) 76 % injection 100 mL (100 mL Intravenous Given 3/20/25 0028)   morphine injection 4 mg (4 mg Intravenous Given 3/20/25 0101)        ED Course:    The patient was initially evaluated in the triage area where orders were placed. The patient was later dispositioned by DWAIN Condon.      The patient was advised to stay for completion of workup which includes but is not limited to communication of labs and radiological results, reassessment and plan. The patient was advised that leaving prior to disposition by a provider could result in critical findings that are not communicated to the patient.          Labs:    Lab Results (last 24 hours)       Procedure Component Value Units Date/Time    ACTH [[id number]] Updated: 03/20/25 0015    Specimen: Blood     POC Glucose Once [[id number]]  (Abnormal) Collected: 03/19/25 2236    Specimen: Blood Updated: 03/19/25 2238     Glucose 120 mg/dL      Comment: Serial Number: [id number]:  [id number]       CBC & Differential [[id number]]  (Abnormal) Collected: 03/19/25 2247    Specimen: Blood Updated: 03/19/25 2256    Narrative:      The following orders were created for panel order CBC & Differential.  Procedure                               Abnormality         Status                     ---------                               -----------         ------                     CBC Auto Differential[[id number]]        Abnormal            Final result                 Please view results for these tests on the individual orders.    Comprehensive Metabolic Panel [[id number]]  (Abnormal) Collected: 03/19/25 2247    Specimen: Blood Updated: 03/19/25 2320     Glucose 115 mg/dL      BUN 11 mg/dL      Creatinine 0.77 mg/dL      Sodium 134 mmol/L      Potassium 3.9 mmol/L      Chloride 98 mmol/L      CO2 23.4 mmol/L      Calcium 9.3 mg/dL      Total Protein 8.0 g/dL      Albumin 4.1 g/dL      ALT (SGPT) 33 U/L      AST (SGOT) 42 U/L      Alkaline Phosphatase 153 U/L       Total Bilirubin 1.4 mg/dL      Globulin 3.9 gm/dL      A/G Ratio 1.1 g/dL      BUN/Creatinine Ratio 14.3     Anion Gap 12.6 mmol/L      eGFR 94.7 mL/min/1.73     Narrative:      GFR Categories in Chronic Kidney Disease (CKD)      GFR Category          GFR (mL/min/1.73)    Interpretation  G1                     90 or greater         Normal or high (1)  G2                      60-89                Mild decrease (1)  G3a                   45-59                Mild to moderate decrease  G3b                   30-44                Moderate to severe decrease  G4                    15-29                Severe decrease  G5                    14 or less           Kidney failure          (1)In the absence of evidence of kidney disease, neither GFR category G1 or G2 fulfill the criteria for CKD.    eGFR calculation 2021 CKD-EPI creatinine equation, which does not include race as a factor    Lipase [[id number]]  (Abnormal) Collected: 03/19/25 2247    Specimen: Blood Updated: 03/19/25 2317     Lipase 7 U/L     CBC Auto Differential [[id number]]  (Abnormal) Collected: 03/19/25 2247    Specimen: Blood Updated: 03/19/25 2256     WBC 16.58 10*3/mm3      RBC 3.67 10*6/mm3      Hemoglobin 11.0 g/dL      Hematocrit 34.3 %      MCV 93.5 fL      MCH 30.0 pg      MCHC 32.1 g/dL      RDW 15.1 %      RDW-SD 50.2 fl      MPV 10.5 fL      Platelets 512 10*3/mm3      Neutrophil % 48.5 %      Lymphocyte % 28.6 %      Monocyte % 15.9 %      Eosinophil % 5.5 %      Basophil % 0.4 %      Immature Grans % 1.1 %      Neutrophils, Absolute 8.02 10*3/mm3      Lymphocytes, Absolute 4.75 10*3/mm3      Monocytes, Absolute 2.64 10*3/mm3      Eosinophils, Absolute 0.92 10*3/mm3      Basophils, Absolute 0.07 10*3/mm3      Immature Grans, Absolute 0.18 10*3/mm3      nRBC 0.9 /100 WBC     Cortisol [[id number]] Collected: 03/19/25 2247    Specimen: Blood Updated: 03/19/25 2324     Cortisol 9.14 mcg/dL     Narrative:      Cortisol Reference Ranges:    Cortisol 6AM  - 10AM Range: 6.02-18.40 mcg/dl  Cortisol 4PM - 8PM Range: 2.68-10.50 mcg/dl      Results may be falsely increased if patient taking Biotin.      Urinalysis With Microscopic If Indicated (No Culture) - Urine, Clean Catch [[id number]]  (Abnormal) Collected: 03/19/25 2356    Specimen: Urine, Clean Catch Updated: 03/20/25 0022     Color, UA Yellow     Appearance, UA Clear     pH, UA 6.5     Specific Gravity, UA <=1.005     Glucose, UA Negative     Ketones, UA Negative     Bilirubin, UA Negative     Blood, UA Negative     Protein, UA Negative     Leuk Esterase, UA Trace     Nitrite, UA Negative     Urobilinogen, UA 0.2 E.U./dL    Urinalysis, Microscopic Only - Urine, Clean Catch [[id number]] Collected: 03/19/25 2356    Specimen: Urine, Clean Catch Updated: 03/20/25 0022     RBC, UA 0-2 /HPF      WBC, UA 0-2 /HPF      Bacteria, UA None Seen /HPF      Squamous Epithelial Cells, UA 0-2 /HPF      Hyaline Casts, UA None Seen /LPF      Methodology Automated Microscopy             Imaging:    CT Abdomen Pelvis With Contrast  Result Date: 3/20/2025  CT ABDOMEN PELVIS W CONTRAST Date of Exam: 3/20/2025 12:27 AM EDT Indication: Abdominal pain and vomiting. Comparison: 3/2/2025 Technique: Axial CT images were obtained of the abdomen and pelvis after the uneventful intravenous administration of iodinated contrast. Reconstructed coronal and sagittal images were also obtained. Automated exposure control and iterative construction methods were used. Findings: There is some mild atelectatic change in the lung bases. Abdominal aorta is normal in caliber. Gallbladder is surgically absent. Pneumobilia is unchanged. Slightly scalloped hepatic contour is stable. Cirrhosis not excluded. There is probably a mild degree of hepatic steatosis. Chronic low-density change in the left hepatic lobe is stable, possibly some chronic scarring. No suspicious liver mass. The adrenal glands are normal. The kidneys are normal and nonobstructed. Spleen is  surgically absent. Patient is also status post pancreatectomy. Urinary bladder is normal. Uterus is surgically absent. A gastric stimulator is present in the right anterior abdominal wall. It is surrounded by a pocket of fluid and gas that could be secondary to infection, or this may be some blood products and air from recent replacement. The fluid and gas pocket measures roughly 4.7 x 9.4 cm. This is new from prior. There is hyperdense free fluid in the dependent pelvis compatible with blood products. There is a small amount of hyperdense fluid noted in both paracolic gutters. There is a larger volume of blood products in the left upper quadrant adjacent to the stomach, predominantly along the greater curvature and undersurface. This could also be secondary to recent gastric stimulator replacement. Patient is status post gastric bypass. The hepatobiliary limb appears mildly inflamed adjacent to the liver. There is food debris in the stomach. There are multiple gas-filled small bowel loops that may reflect ileus. One of these loops in the abdomen has some fecalized material within it, as do a few small bowel loops in the pelvis. No definite bowel obstruction. No evidence of acute colitis. There is probably mild degree of constipation. There is no evidence of acute appendicitis. The appendix is not definitely seen. No adenopathy. No free air. There is lumbar degenerative disease, particularly at L4-5.     1.Blood products in the abdomen and pelvis as discussed above with the largest amount in the left upper quadrant adjacent to the stomach. This may be secondary to recent gastric stimulator replacement. 2.Fluid and gas pocket around a gastric stimulator in the right ventral abdominal wall. This may reflect blood and air from recent replacement although infection is not excluded. 3.Status post gastric bypass. There may be some mild inflammatory change now noted in the hepatobiliary limb adjacent to the liver. Patient  is status post cholecystectomy, and pneumobilia is unchanged. 4.Evidence of small bowel ileus and probably mild degree of constipation. No bowel obstruction. 5.Hysterectomy, splenectomy, and pancreatectomy. 6.Hepatic steatosis with some scalloping of the hepatic contours that could indicate cirrhosis. NOTIFICATION: Critical Value/emergent results were called by telephone at the time of interpretation on 3/20/2025 12:58 AM EDT to DWAIN PRESTON who verbally acknowledged these results. Electronically Signed: Saturnino Verdugo MD  3/20/2025 1:06 AM EDT  Workstation ID: [id number]        Differential Diagnosis and Discussion:      Abdominal Pain: Based on the patient's signs and symptoms, I considered abdominal aortic aneurysm, small bowel obstruction, pancreatitis, acute cholecystitis, acute appendecitis, peptic ulcer disease, gastritis, colitis, endocrine disorders, irritable bowel syndrome and other differential diagnosis an etiology of the patient's abdominal pain.    PROCEDURES:    Labs were collected in the emergency department and all labs were reviewed and interpreted by me.  CT scan was performed in the emergency department and the CT scan radiology impression was interpreted by me.    ECG 12 Lead Rhythm Change   Preliminary Result   HEART RATE=87  bpm   RR Julgmuyj=011  ms   AR Fvnabbof=780  ms   P Horizontal Axis=18  deg   P Front Axis=0  deg   QRSD Mglbeqqr=876  ms   QT Zbrlewiv=854  ms   EFjQ=084  ms   QRS Axis=-30  deg   T Wave Axis=30  deg   - ABNORMAL ECG -   Sinus rhythm   Inferior infarct, old   Date and Time of Study:2025-03-19 22:37:58           Procedures    MDM     Amount and/or Complexity of Data Reviewed  Clinical lab tests: reviewed  Tests in the radiology section of CPT®: reviewed  Decide to obtain previous medical records or to obtain history from someone other than the patient: yes    Risk of Complications, Morbidity, and/or Mortality  Presenting problems: moderate  Diagnostic procedures:  moderate  Management options: moderate    Patient Progress  Patient progress: stable    Patient presents to the emergency department for evaluation of abdominal pain, vomiting. Patient had a recent placement of a gastric stimulator on Thursday (3/13/25) at [facility] by Dr. Marcos Riddle. Patient states that she had to be admitted for 4 days due to Codington's crisis and low hemoglobin. She was discharged on Sunday. States that her hemoglobin went up from 6 to 8 after a unit of blood and was stable. Today, she began vomiting and feeling pain all over. She is worried that she is having another Codington's crisis. She has been on emergency steroid and has taken her first regular dose of 30mg. Patient states that she also got lightheaded in the waiting room and passed out.    On exam, abd is soft but reports generalized tenderness on palpation. Surgical wound are well approximated. No erythema, drainage or swelling.    CBC is reviewed by me. WBC is elevated at 16.58. Hemoglobin 11. Rest of the CBC is unremarkable. No left shift.    The patient´s CMP that was reviewed and interpreted by me shows no abnormalities of critical concern. Of note, the patient´s sodium and potassium are acceptable. The patient´s renal function (creatinine) is preserved. The patient has a normal anion gap.     Mild elevation to AST and ALP, 42 and 153 respectively. Total bili 1.4.    Lipase 7. Cortisol 9.14.  UA is negative for bacteria.    CT Abdomen Pelvis With Contrast   Final Result   1.Blood products in the abdomen and pelvis as discussed above with the largest amount in the left upper quadrant adjacent to the stomach. This may be secondary to recent gastric stimulator replacement.   2.Fluid and gas pocket around a gastric stimulator in the right ventral abdominal wall. This may reflect blood and air from recent replacement although infection is not excluded.   3.Status post gastric bypass. There may be some mild inflammatory  change now noted in the hepatobiliary limb adjacent to the liver. Patient is status post cholecystectomy, and pneumobilia is unchanged.   4.Evidence of small bowel ileus and probably mild degree of constipation. No bowel obstruction.   5.Hysterectomy, splenectomy, and pancreatectomy.   6.Hepatic steatosis with some scalloping of the hepatic contours that could indicate cirrhosis.      NOTIFICATION: Critical Value/emergent results were called by telephone at the time of interpretation on 3/20/2025 12:58 AM EDT to DWAIN PRESTON who verbally acknowledged these results.            Electronically Signed: Saturnino Verdugo MD     3/20/2025 1:06 AM EDT     Workstation ID: [id number]        Discussed the imaging findings with the patient.    Discussed this patient with Dr. Scott who recommends reaching out to Dr. Marcos Riddle.    After online searching, we were able to contact Dr. Marcos Riddle. We called his number [phone] and got ahold of the on-call provider. Discussed this patient with Dr. Riddle. He states that the blood products in the abdomen could be from the surgery. He states that hemoglobin is stable so unlikely for any active bleeding at this time. He states that the elevated white count could be from her recent steroids. She did have an dennis's crisis and was given steroids at that time. Dr. Riddle recommends a follow up appointment with his office in the next day or two. He does not think patient needs any antibiotics at this time.    Discussed with the patient what Dr. Riddle said. She states that she feels like she is in adrenal crisis. She feels weak and tired. She is wanting to be admitted to our hospital.    With the patient's request, I consulted the hospitalist, Dr. León, for admission. However, he wants me to call General Surgery to see if they can consult on the patient.    Discussed this patient with Dr. Pham, General Surgery. He states that he will not consult on a patient that he did  not work on. States that this is a post-operative patient and will not consult on a post-op complication.    Since the General Surgeon won't consult on the patient, hospitalist declines admission.    Discussed this interaction with the patient and mother. I did offer to call Dr. Riddle again to see if he has privileges in another hospital so that he can consult on the patient, however, patient states that Dr. Riddle will just tell her to go home and follow up outpatient. In the end, patient states that she will go home. She would like pain meds on discharge. Will dc patient with norco.    Follow up with your Primary Care Provider in 3-5 days especially if symptoms worsen.                Patient Care Considerations:    SEPSIS was considered but is NOT present in the emergency department as SIRS criteria is not present.      Consultants/Shared Management Plan:    Discussed this patient with Dr. Scott who recommends reaching out to Dr. Marcos Riddle.    After online searching, we were able to contact Dr. Marcos Riddle. We called his number [phone] and got ahold of the on-call provider. Discussed this patient with Dr. Riddle. He states that the blood products in the abdomen could be from the surgery. He states that hemoglobin is stable so unlikely for any active bleeding at this time. He states that the elevated white count could be from her recent steroids. She did have an dennis's crisis and was given steroids at that time. Dr. Riddle recommends a follow up appointment with his office in the next day or two. He does not think patient needs any antibiotics at this time.    Social Determinants of Health:    Patient is independent, reliable, and has access to care.       Disposition and Care Coordination:    Discharged: The patient is suitable and stable for discharge with no need for consideration of admission.    I have explained the patient´s condition, diagnoses and treatment plan based on the information  available to me at this time. I have answered questions and addressed any concerns. The patient has a good  understanding of the patient´s diagnosis, condition, and treatment plan as can be expected at this point. The vital signs have been stable. The patient´s condition is stable and appropriate for discharge from the emergency department.      The patient will pursue further outpatient evaluation with the primary care physician or other designated or consulting physician as outlined in the discharge instructions. They are agreeable to this plan of care and follow-up instructions have been explained in detail. The patient has received these instructions in written format and has expressed an understanding of the discharge instructions. The patient is aware that any significant change in condition or worsening of symptoms should prompt an immediate return to this or the closest emergency department or call to 911.  I have explained discharge medications and the need for follow up with the patient/caretakers. This was also printed in the discharge instructions. Patient was discharged with the following medications and follow up:      Medication List        New Prescriptions      HYDROcodone-acetaminophen 5-325 MG per tablet  Commonly known as: NORCO  Take 1 tablet by mouth Every 6 (Six) Hours As Needed for Moderate Pain.     ondansetron ODT 4 MG disintegrating tablet  Commonly known as: ZOFRAN-ODT  Take 1 tablet by mouth Every 6 (Six) Hours As Needed for Nausea or Vomiting.               Where to Get Your Medications        These medications were sent to [facility]/pharmacy #[id number] - [place], [place] - [address] - [phone]  - [phone] FX  [address] [place] [address]      Hours: 24-hours Phone: [phone]   HYDROcodone-acetaminophen 5-325 MG per tablet       These medications were sent to [facility] - [place], [place] - [address] - [phone] [facility] [phone] FX  [address] [facility]  [place] [address]      Phone: [phone]   ondansetron ODT 4 MG disintegrating tablet      Jsutin Daniels, APRN  [address]  [facility] [address] [phone]             Final diagnoses:   Generalized abdominal pain   Nausea and vomiting, unspecified vomiting type   Weakness        ED Disposition       ED Disposition   Discharge    Condition   Stable    Comment   --               This medical record created using voice recognition software.             Masood Santiago PA  03/20/25 0251

## 2025-03-20 NOTE — DISCHARGE INSTRUCTIONS
Your lab work today did not show any acute critical findings. Your abdominal imaging does show blood products from your recent surgery. You are being discharged home with Hydrocodone for pain and zofran for nausea.    Follow up with Dr. Marcos Riddle today or tomorrow. We did reach out to him tonight and should be expecting your call in the morning.    Follow up with your Primary Care Provider in 3-5 days especially if symptoms worsen.    Return to the Emergency Department if you develop any uncontrollable fever, intractable pain, nausea, vomiting.

## 2025-03-20 NOTE — ED PROVIDER NOTES
"Patient is 49 y.o. year old female that presents to the ED for evaluation of abdominal pain.     Physical Exam    ED Course:    /88 (BP Location: Left arm, Patient Position: Lying)   Pulse 83   Temp 98.6 °F (37 °C) (Oral)   Resp 17   Ht 170.2 cm (67\")   Wt 84 kg (185 lb 3 oz)   SpO2 93%   BMI 29.00 kg/m²   Results for orders placed or performed during the hospital encounter of 03/19/25   POC Glucose Once    Collection Time: 03/19/25 10:36 PM    Specimen: Blood   Result Value Ref Range    Glucose 120 (H) 70 - 99 mg/dL   ECG 12 Lead Rhythm Change    Collection Time: 03/19/25 10:37 PM   Result Value Ref Range    QT Interval 377 ms    QTC Interval 453 ms   Comprehensive Metabolic Panel    Collection Time: 03/19/25 10:47 PM    Specimen: Blood   Result Value Ref Range    Glucose 115 (H) 65 - 99 mg/dL    BUN 11 6 - 20 mg/dL    Creatinine 0.77 0.57 - 1.00 mg/dL    Sodium 134 (L) 136 - 145 mmol/L    Potassium 3.9 3.5 - 5.2 mmol/L    Chloride 98 98 - 107 mmol/L    CO2 23.4 22.0 - 29.0 mmol/L    Calcium 9.3 8.6 - 10.5 mg/dL    Total Protein 8.0 6.0 - 8.5 g/dL    Albumin 4.1 3.5 - 5.2 g/dL    ALT (SGPT) 33 1 - 33 U/L    AST (SGOT) 42 (H) 1 - 32 U/L    Alkaline Phosphatase 153 (H) 39 - 117 U/L    Total Bilirubin 1.4 (H) 0.0 - 1.2 mg/dL    Globulin 3.9 gm/dL    A/G Ratio 1.1 g/dL    BUN/Creatinine Ratio 14.3 7.0 - 25.0    Anion Gap 12.6 5.0 - 15.0 mmol/L    eGFR 94.7 >60.0 mL/min/1.73   Lipase    Collection Time: 03/19/25 10:47 PM    Specimen: Blood   Result Value Ref Range    Lipase 7 (L) 13 - 60 U/L   CBC Auto Differential    Collection Time: 03/19/25 10:47 PM    Specimen: Blood   Result Value Ref Range    WBC 16.58 (H) 3.40 - 10.80 10*3/mm3    RBC 3.67 (L) 3.77 - 5.28 10*6/mm3    Hemoglobin 11.0 (L) 12.0 - 15.9 g/dL    Hematocrit 34.3 34.0 - 46.6 %    MCV 93.5 79.0 - 97.0 fL    MCH 30.0 26.6 - 33.0 pg    MCHC 32.1 31.5 - 35.7 g/dL    RDW 15.1 12.3 - 15.4 %    RDW-SD 50.2 37.0 - 54.0 fl    MPV 10.5 6.0 - 12.0 fL    " Platelets 512 (H) 140 - 450 10*3/mm3    Neutrophil % 48.5 42.7 - 76.0 %    Lymphocyte % 28.6 19.6 - 45.3 %    Monocyte % 15.9 (H) 5.0 - 12.0 %    Eosinophil % 5.5 0.3 - 6.2 %    Basophil % 0.4 0.0 - 1.5 %    Immature Grans % 1.1 (H) 0.0 - 0.5 %    Neutrophils, Absolute 8.02 (H) 1.70 - 7.00 10*3/mm3    Lymphocytes, Absolute 4.75 (H) 0.70 - 3.10 10*3/mm3    Monocytes, Absolute 2.64 (H) 0.10 - 0.90 10*3/mm3    Eosinophils, Absolute 0.92 (H) 0.00 - 0.40 10*3/mm3    Basophils, Absolute 0.07 0.00 - 0.20 10*3/mm3    Immature Grans, Absolute 0.18 (H) 0.00 - 0.05 10*3/mm3    nRBC 0.9 (H) 0.0 - 0.2 /100 WBC   Cortisol    Collection Time: 03/19/25 10:47 PM    Specimen: Blood   Result Value Ref Range    Cortisol 9.14   mcg/dL   Green Top (Gel)    Collection Time: 03/19/25 10:47 PM   Result Value Ref Range    Extra Tube Hold for add-ons.    Lavender Top    Collection Time: 03/19/25 10:47 PM   Result Value Ref Range    Extra Tube hold for add-on    Gold Top - SST    Collection Time: 03/19/25 10:47 PM   Result Value Ref Range    Extra Tube Hold for add-ons.    Light Blue Top    Collection Time: 03/19/25 10:47 PM   Result Value Ref Range    Extra Tube Hold for add-ons.    Urinalysis With Microscopic If Indicated (No Culture) - Urine, Clean Catch    Collection Time: 03/19/25 11:56 PM    Specimen: Urine, Clean Catch   Result Value Ref Range    Color, UA Yellow Yellow, Straw    Appearance, UA Clear Clear    pH, UA 6.5 5.0 - 8.0    Specific Gravity, UA <=1.005 1.005 - 1.030    Glucose, UA Negative Negative    Ketones, UA Negative Negative    Bilirubin, UA Negative Negative    Blood, UA Negative Negative    Protein, UA Negative Negative    Leuk Esterase, UA Trace (A) Negative    Nitrite, UA Negative Negative    Urobilinogen, UA 0.2 E.U./dL 0.2 - 1.0 E.U./dL   Urinalysis, Microscopic Only - Urine, Clean Catch    Collection Time: 03/19/25 11:56 PM    Specimen: Urine, Clean Catch   Result Value Ref Range    RBC, UA 0-2 None Seen, 0-2 /HPF     WBC, UA 0-2 None Seen, 0-2 /HPF    Bacteria, UA None Seen None Seen /HPF    Squamous Epithelial Cells, UA 0-2 None Seen, 0-2 /HPF    Hyaline Casts, UA None Seen None Seen /LPF    Methodology Automated Microscopy      Medications   sodium chloride 0.9 % flush 10 mL (has no administration in time range)   sodium chloride 0.9 % bolus 1,000 mL (0 mL Intravenous Stopped 3/20/25 0144)   ondansetron (ZOFRAN) injection 4 mg (4 mg Intravenous Given 3/19/25 2315)   iopamidol (ISOVUE-370) 76 % injection 100 mL (100 mL Intravenous Given 3/20/25 0028)   morphine injection 4 mg (4 mg Intravenous Given 3/20/25 0101)     CT Abdomen Pelvis With Contrast  Result Date: 3/20/2025  Narrative: CT ABDOMEN PELVIS W CONTRAST Date of Exam: 3/20/2025 12:27 AM EDT Indication: Abdominal pain and vomiting. Comparison: 3/2/2025 Technique: Axial CT images were obtained of the abdomen and pelvis after the uneventful intravenous administration of iodinated contrast. Reconstructed coronal and sagittal images were also obtained. Automated exposure control and iterative construction methods were used. Findings: There is some mild atelectatic change in the lung bases. Abdominal aorta is normal in caliber. Gallbladder is surgically absent. Pneumobilia is unchanged. Slightly scalloped hepatic contour is stable. Cirrhosis not excluded. There is probably a mild degree of hepatic steatosis. Chronic low-density change in the left hepatic lobe is stable, possibly some chronic scarring. No suspicious liver mass. The adrenal glands are normal. The kidneys are normal and nonobstructed. Spleen is surgically absent. Patient is also status post pancreatectomy. Urinary bladder is normal. Uterus is surgically absent. A gastric stimulator is present in the right anterior abdominal wall. It is surrounded by a pocket of fluid and gas that could be secondary to infection, or this may be some blood products and air from recent replacement. The fluid and gas pocket  measures roughly 4.7 x 9.4 cm. This is new from prior. There is hyperdense free fluid in the dependent pelvis compatible with blood products. There is a small amount of hyperdense fluid noted in both paracolic gutters. There is a larger volume of blood products in the left upper quadrant adjacent to the stomach, predominantly along the greater curvature and undersurface. This could also be secondary to recent gastric stimulator replacement. Patient is status post gastric bypass. The hepatobiliary limb appears mildly inflamed adjacent to the liver. There is food debris in the stomach. There are multiple gas-filled small bowel loops that may reflect ileus. One of these loops in the abdomen has some fecalized material within it, as do a few small bowel loops in the pelvis. No definite bowel obstruction. No evidence of acute colitis. There is probably mild degree of constipation. There is no evidence of acute appendicitis. The appendix is not definitely seen. No adenopathy. No free air. There is lumbar degenerative disease, particularly at L4-5.     Impression: 1.Blood products in the abdomen and pelvis as discussed above with the largest amount in the left upper quadrant adjacent to the stomach. This may be secondary to recent gastric stimulator replacement. 2.Fluid and gas pocket around a gastric stimulator in the right ventral abdominal wall. This may reflect blood and air from recent replacement although infection is not excluded. 3.Status post gastric bypass. There may be some mild inflammatory change now noted in the hepatobiliary limb adjacent to the liver. Patient is status post cholecystectomy, and pneumobilia is unchanged. 4.Evidence of small bowel ileus and probably mild degree of constipation. No bowel obstruction. 5.Hysterectomy, splenectomy, and pancreatectomy. 6.Hepatic steatosis with some scalloping of the hepatic contours that could indicate cirrhosis. NOTIFICATION: Critical Value/emergent results were  called by telephone at the time of interpretation on 3/20/2025 12:58 AM EDT to DWAIN PRESTON who verbally acknowledged these results. Electronically Signed: Saturnino Verdugo MD  3/20/2025 1:06 AM EDT  Workstation ID: [id number]    CT Abdomen Pelvis With Contrast  Result Date: 3/2/2025  Narrative: CT ABDOMEN PELVIS W CONTRAST Date of Exam: 3/2/2025 3:40 PM EST Indication: Right upper quadrant abdominal pain. Comparison: CT abdomen pelvis 12/20/2024 Technique: Axial CT images were obtained of the abdomen and pelvis after the uneventful intravenous administration of iodinated contrast. Reconstructed coronal and sagittal images were also obtained. Automated exposure control and iterative construction methods were used. Findings: Calcified coronary atherosclerotic disease. Heart size normal. No signs of active infection in the lower lungs. Minimal atelectatic change. Irregular slightly nodular peripheral hepatic contour. Widening along falciform ligament. Perfusion related enhancement versus benign fatty sparing within hepatic segment 7 similar to prior. There is underlying hepatic steatosis likely mild to moderate severity. Stable nonspecific hypodensity in the medial aspect of hepatic segment 2 over multiple prior comparisons. No suspicious liver lesion. Portal vasculature appears patent. Left intrahepatic portal vasculature appears diminutive similar to prior without visible thrombus. Mild perigastric varices. Cholecystectomy. Pancreatectomy and Reuben-en-Y gastric bypass. Hepatobiliary limb decompressed. Benign central pneumobilia. No intrahepatic duct dilation. Splenectomy. No suspicious adrenal nodule. Symmetric renal size, contour and enhancement. No solid appearing mass or hydronephrosis. Urinary bladder unremarkable. Hysterectomy. No adnexal mass. No evidence of bowel obstruction or active inflammation. Nonspecific eccentric wall thickening in the distal Reuben limb near the jejunojejunal anastomosis, without  surrounding inflammation. Mild to moderate formed stool with fecalized distal small bowel, which could reflect slow bowel transit. No evidence of acute appendicitis. Minimal intra-abdominal atherosclerotic disease. Negative for abdominal aortic aneurysm. No suspicious adenopathy. No ascites, free air or drainable fluid collection. Stimulator device noted in the abdomen. Leads appear intact. Linear scarring from previous laparotomy. Few loops of bowel approximate anterior abdominal wall which could reflect adhesions. Degenerative changes of the lower lumbar spine without acute displaced fracture or aggressive lesion.     Impression: Impression: 1. No acute CT findings in the abdomen. 2. Extensive surgical changes detailed above stable from prior. Nonspecific eccentric wall thickening in the distal Reuben limb near surgical anastomosis without surrounding inflammation or obstruction. 3. Hepatic steatosis. Morphologic changes of the liver concerning for chronic liver disease. Mild perigastric varices. 4. Mild to moderate formed colonic stool with fecalized distal small bowel. Findings could reflect slow bowel transit. 5. Other ancillary findings as above. Electronically Signed: Kalyan Otero MD  3/2/2025 4:08 PM EST  Workstation ID: [id number]    XR Chest 1 View  Result Date: 3/2/2025  Narrative: XR CHEST 1 VW Date of Exam: 3/2/2025 1:30 PM EST Indication: Chest Pain Triage Protocol Comparison: CT chest 9/20/2024, AP chest x-ray 9/20/2024 Findings: Lungs are well expanded and appear clear. No pneumothorax or large pleural effusion is seen. Heart size is normal.     Impression: Impression: No acute cardiopulmonary abnormality is identified. Electronically Signed: Morenita Hyatt  3/2/2025 1:35 PM EST  Workstation ID: [id number]      MDM:      I have seen and evaluated this patient and agree with the nurse practitioner or physician assistant´s documentation and assessment. All charts, labs, and imaging studies were reviewed.  Documentation of one or more elements of my assessment included in the medical record. I agree with findings, exam, and plan.    Disposition:   ED Disposition       ED Disposition   Discharge    Condition   Stable    Comment   --                 Clincal Impression: Abdominal pain, nausea vomiting, weakness    Kalyan Scott MD  02:32 EDT  03/20/25         Kalyan Scott MD  03/20/25 0611

## 2025-03-25 LAB
QT INTERVAL: 377 MS
QTC INTERVAL: 453 MS

## 2025-03-28 LAB
QT INTERVAL: 347 MS
QTC INTERVAL: 465 MS

## 2025-04-01 ENCOUNTER — OFFICE VISIT (OUTPATIENT)
Dept: CARDIOLOGY | Facility: CLINIC | Age: 50
End: 2025-04-01
Payer: MEDICARE

## 2025-04-01 VITALS
WEIGHT: 184.4 LBS | OXYGEN SATURATION: 98 % | DIASTOLIC BLOOD PRESSURE: 95 MMHG | BODY MASS INDEX: 28.94 KG/M2 | HEIGHT: 67 IN | SYSTOLIC BLOOD PRESSURE: 163 MMHG | HEART RATE: 88 BPM

## 2025-04-01 DIAGNOSIS — R07.9 CHEST PAIN, UNSPECIFIED TYPE: ICD-10-CM

## 2025-04-01 DIAGNOSIS — I10 HYPERTENSION, ESSENTIAL: Primary | ICD-10-CM

## 2025-04-01 DIAGNOSIS — R94.31 ABNORMAL EKG: ICD-10-CM

## 2025-04-01 DIAGNOSIS — E78.2 HYPERLIPEMIA, MIXED: ICD-10-CM

## 2025-04-01 PROCEDURE — 3077F SYST BP >= 140 MM HG: CPT | Performed by: SPECIALIST

## 2025-04-01 PROCEDURE — 99204 OFFICE O/P NEW MOD 45 MIN: CPT | Performed by: SPECIALIST

## 2025-04-01 PROCEDURE — 1160F RVW MEDS BY RX/DR IN RCRD: CPT | Performed by: SPECIALIST

## 2025-04-01 PROCEDURE — 1159F MED LIST DOCD IN RCRD: CPT | Performed by: SPECIALIST

## 2025-04-01 PROCEDURE — 3080F DIAST BP >= 90 MM HG: CPT | Performed by: SPECIALIST

## 2025-04-01 RX ORDER — CARVEDILOL 12.5 MG/1
12.5 TABLET ORAL 2 TIMES DAILY WITH MEALS
Qty: 180 TABLET | Refills: 3 | Status: SHIPPED | OUTPATIENT
Start: 2025-04-01

## 2025-04-01 RX ORDER — HYDROCORTISONE 5 MG/1
TABLET ORAL
COMMUNITY

## 2025-04-01 RX ORDER — CETIRIZINE HYDROCHLORIDE 10 MG/1
1 TABLET ORAL DAILY
COMMUNITY
Start: 2025-02-20

## 2025-04-01 RX ORDER — OXYCODONE HYDROCHLORIDE 5 MG/1
TABLET ORAL
COMMUNITY
Start: 2025-03-13

## 2025-04-01 RX ORDER — VALSARTAN AND HYDROCHLOROTHIAZIDE 160; 12.5 MG/1; MG/1
1 TABLET, FILM COATED ORAL DAILY
Qty: 90 TABLET | Refills: 5 | Status: SHIPPED | OUTPATIENT
Start: 2025-04-01

## 2025-04-01 NOTE — PROGRESS NOTES
[facility]   Cardiology Consult Note    Patient Name: Kayla Gan  : 1975  Referring Physician: Self Referring  Subjective   Subjective     Reason for Consult/ Chief Complaint:   Chief Complaint   Patient presents with    [facility]     New patient     ABNORMAL EKG, CHEST PAIN, FAMILY HX OF HEART DISEASE. SELF REFERRAL.       HPI:  Kayla Gan is a 49 y.o. female with a strong family history of coronary disease.  Past chest pain on and off for several years.  Chest pain is substernal, sharp to aching, nonexertional.  No aggravating leaving factors.  No exertional angina.  No shortness of breath.  No palpitations    Review of Systems:    Constitutional no fever,  no weight loss   Skin no rash   Otolaryngeal no difficulty swallowing   Cardiovascular See HPI   Pulmonary no cough, no sputum production   Gastrointestinal no constipation, no diarrhea   Genitourinary no dysuria, no hematuria   Hematologic no easy bruisability, no abnormal bleeding   Musculoskeletal no muscle pain   Neurologic no dizziness, no falls       Personal History     Past Medical History:  Past Medical History:   Diagnosis Date    Abnormal ECG     Adrenal insufficiency     Allergic Unsure    Foods, Ulram, Vancomycin    Anemia     Angina pectoris Unsure    Anxiety     Arthritis     Asthma Unsure    Vasquez esophagus Unsure    Bleeding disorder     Brain concussion     Cholelithiasis Unsure    Chronic constipation As a child    Chronic diarrhea Unsure    Cirrhosis     NO CURRENT S/S    Colon polyp     Crohn's disease     Depression     Diabetes mellitus     Fatty liver Unsure    Gastroparesis     GERD (gastroesophageal reflux disease)     GI (gastrointestinal bleed)     History of MRSA infection     History of transfusion     Hyperlipidemia     Hypertension     Hypothyroidism     Injury of neck Unsure    Whiplash    Interstitial cystitis     Irritable bowel syndrome Unsure    Liver disease     Low back pain      Lumbosacral disc disease     Migraines     MRSA (methicillin resistant Staphylococcus aureus)     NO CURRENT ISSUES    Obesity     Osteopenia     Osteoporosis Unsure    Multiple broken bones, Osteopenia    Pancreatitis     NO CURRENT ISSUES    Pyelonephritis     Shortness of breath 2019    Sinusitis As a child    Sleep apnea     USES CPAP    Ulcerative colitis 2009    Urinary incontinence     Urinary tract infection     Interstitial Cystitis    Vaginal infection        Family History:   Family History   Problem Relation Age of Onset    Anxiety disorder Mother     Hypothyroidism Mother     Breast cancer Mother     Colon polyps Mother     Arthritis Mother     Cancer Mother         Breast    Depression Mother     Thyroid disease Mother     Heart disease Father     Hypothyroidism Father     Anxiety disorder Father     Depression Father     Hypertension Father     Kidney disease Father     Thyroid disease Father     Anemia Father     Depression Brother     Hypertension Brother     Colon cancer Maternal Grandfather     Alcohol abuse Paternal Grandfather     Heart disease Paternal Grandfather     Malig Hyperthermia Neg Hx        Social History:  reports that she has never smoked. She has never been exposed to tobacco smoke. She has never used smokeless tobacco. She reports that she does not drink alcohol and does not use drugs.    Home Medications:  Atogepant, Insulin Aspart, Insulin Glargine, Insulin Lispro, Omnipod 5 AeiC1D9 Pods Gen 5, Pancrelipase (Lip-Prot-Amyl), QUEtiapine, Vitamin E, cetirizine, docusate sodium, glucagon, hydrOXYzine, hydrocortisone, lactulose, lansoprazole, levothyroxine, ondansetron, ondansetron ODT, oxyCODONE, polyethylene glycol, prochlorperazine, promethazine, propranolol LA, riFAXIMin, traZODone, and ubrogepant    Allergies:  Allergies   Allergen Reactions    Parathyroid Hormone (Recomb) Other (See Comments) and Unknown - High Severity     Sent patient into kidney failure, heart stopped, in  ICU for two days.    Romosozumab Hives     Other reaction(s): Hives    Other reaction(s): Hives   Other reaction(s): Hives    Teriparatide Anaphylaxis and Other (See Comments)     Other reaction(s): Unknown    Tramadol Anaphylaxis and Other (See Comments)     Other reaction(s): Unknown, Unknown    Nifedipine Other (See Comments)     Rapid heart beat        Vancomycin Hives, Itching and Rash     vancomycin-infusion reaction (VIR, formerly Ivett's syndrome): give over longer infusion time       Objective    Objective     Vitals:   Heart Rate:  [88] 88  BP: (163)/(95) 163/95  Body mass index is 28.88 kg/m².  PHYSICAL EXAM:    General Appearance:   well developed  well nourished  HENT:   oropharynx moist  lips not cyanotic  Neck:  thyroid not enlarged  supple  Respiratory:  no respiratory distress  normal breath sounds  no rales  Cardiovascular:  no jugular venous distention  regular rhythm  apical impulse normal  S1 normal, S2 normal  no S3, no S4   no murmur  no rub, no thrill  carotid pulses normal; no bruit  pedal pulses normal  lower extremity edema: none    Skin:   warm, dry  Psychiatric:  judgement and insight appropriate  normal mood and affect    RESULTS:    EKG reviewed by me and shows sinus rhythm cannot exclude old inferior myocardial infarction       Result Review    Result Review:  I have personally reviewed the available results:  [x]  Laboratory  [x]  EKG/Telemetry   [x]  Cardiology/Vascular   [x] Medications  [x]  Old records  Lab Results   Component Value Date    CHOL 137 02/06/2024    CHOL 195 06/15/2021     Lab Results   Component Value Date    TRIG 136 02/06/2024    TRIG 181 (H) 06/02/2022    TRIG 172 (H) 06/15/2021     Lab Results   Component Value Date    HDL 18 (L) 02/06/2024    HDL 47 06/15/2021    HDL 32 (L) 08/26/2019     Lab Results   Component Value Date    LDL 94 02/06/2024     (H) 06/15/2021    LDL 90 08/26/2019     Lab Results   Component Value Date    VLDL 25 02/06/2024    VLDL  30 06/15/2021    VLDL 44 (H) 08/26/2019         Procedures     Impression/Plan  1.  Precordial atypical chest pain/abnormal EKG: Sestamibi stress test to evaluate for significant ischemia.  Echocardiogram.  2.  Mixed hyperlipidemia: Monitor lipid and hepatic profile.  Start statins if LDL is high.  3.  Essential hypertension uncontrolled: Discontinue lisinopril and propranolol.  Start Diovan/hydrochlorothiazide 160/12.5 mg once a day.  Start carvedilol 12.5 mg twice a day.  Monitor blood pressure regularly.  Adjust dose depending upon blood pressure.  3.  Hypothyroidism: Continue Synthroid 125 mcg once a day.  Monitor thyroid profile  4.  Mixed hyperlipidemia: Monitor lipid and hepatic profile.  Low-fat diet        Electronically signed by Kristian Rodriguez MD, 04/01/25, 9:37 AM EDT.

## 2025-04-21 ENCOUNTER — OFFICE VISIT (OUTPATIENT)
Dept: GASTROENTEROLOGY | Facility: CLINIC | Age: 50
End: 2025-04-21
Payer: MEDICARE

## 2025-04-21 VITALS
OXYGEN SATURATION: 99 % | HEIGHT: 67 IN | SYSTOLIC BLOOD PRESSURE: 120 MMHG | HEART RATE: 78 BPM | DIASTOLIC BLOOD PRESSURE: 82 MMHG | TEMPERATURE: 98.2 F | WEIGHT: 185.7 LBS | BODY MASS INDEX: 29.15 KG/M2

## 2025-04-21 DIAGNOSIS — K50.90 CROHN'S DISEASE WITHOUT COMPLICATION, UNSPECIFIED GASTROINTESTINAL TRACT LOCATION: ICD-10-CM

## 2025-04-21 DIAGNOSIS — Z86.0100 HISTORY OF COLON POLYPS: ICD-10-CM

## 2025-04-21 DIAGNOSIS — K58.1 IRRITABLE BOWEL SYNDROME WITH CONSTIPATION: ICD-10-CM

## 2025-04-21 DIAGNOSIS — K21.9 GASTROESOPHAGEAL REFLUX DISEASE WITHOUT ESOPHAGITIS: Primary | ICD-10-CM

## 2025-04-21 DIAGNOSIS — K31.84 GASTROPARESIS: ICD-10-CM

## 2025-04-21 DIAGNOSIS — K59.04 CHRONIC IDIOPATHIC CONSTIPATION: ICD-10-CM

## 2025-04-21 DIAGNOSIS — Z12.11 SCREENING FOR MALIGNANT NEOPLASM OF COLON: ICD-10-CM

## 2025-04-21 DIAGNOSIS — Z98.84 HISTORY OF ROUX-EN-Y GASTRIC BYPASS: ICD-10-CM

## 2025-04-21 DIAGNOSIS — Z94.89 HISTORY OF PANCREATIC ISLET CELL TRANSPLANTATION: ICD-10-CM

## 2025-04-21 PROCEDURE — 99214 OFFICE O/P EST MOD 30 MIN: CPT | Performed by: NURSE PRACTITIONER

## 2025-04-21 PROCEDURE — 1160F RVW MEDS BY RX/DR IN RCRD: CPT | Performed by: NURSE PRACTITIONER

## 2025-04-21 PROCEDURE — 1159F MED LIST DOCD IN RCRD: CPT | Performed by: NURSE PRACTITIONER

## 2025-04-21 PROCEDURE — 3074F SYST BP LT 130 MM HG: CPT | Performed by: NURSE PRACTITIONER

## 2025-04-21 PROCEDURE — 3079F DIAST BP 80-89 MM HG: CPT | Performed by: NURSE PRACTITIONER

## 2025-04-21 RX ORDER — POLYETHYLENE GLYCOL 3350 17 G/17G
17 POWDER, FOR SOLUTION ORAL 2 TIMES DAILY
Qty: 1020 G | Refills: 5 | Status: SHIPPED | OUTPATIENT
Start: 2025-04-21

## 2025-04-21 RX ORDER — LANSOPRAZOLE 30 MG/1
30 CAPSULE, DELAYED RELEASE ORAL 2 TIMES DAILY
Qty: 180 CAPSULE | Refills: 3 | Status: SHIPPED | OUTPATIENT
Start: 2025-04-21

## 2025-04-21 NOTE — PROGRESS NOTES
Chief Complaint   Patient presents with    Follow-up     constipation             GASTROENTEROLOGY SUMMARY    48-year-old female presents today for follow-up.    She was last seen via telemedicine visit on 3/8/2023 with Chandrika WILLIS.    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    Also history of Reuben-en-Y gastric bypass, Hysterectomy and cholecystectomy.   She is followed by Dr Riddle for gastroparesis and has had gastric stimulator recently replaced.   She continues Creon pancreatic enzymes daily.    6/17/2023: EGD with attempted endoFLIP Dr Villalta: Endoscopic appearance of wide patulous pylorus (stomach had deformity from prior  surgery) as well as endoFLIP findings of high distensibility. No botox injected.  Suspect pylorus targeted therapy such as pyloroplasty/ GPOEM/ distal gastrectomy  may not be beneficial for patient.     4/24/2019 colonoscopy Dr. Han.  Good bowel prep.  Unable to intubate the ileum due to restricted mobility of the colon and acute angulation into the ileum, no evidence of active colitis.  Nonbleeding internal hemorrhoids, grade 1  3 mm tubular adenomatous colon polyp in the ascending colon.  Random colon biopsies with no evidence of active inflammation or Crohn's disease.    History of Present Illness  The patient is a 49-year-old female who presents today for follow-up.   She is accompanied by her mother.    Crohn's Disease  She was previously on Humira for Crohn's disease, diagnosed in 2009, but discontinued it in early 2024 due to recurrent infections following her pancreas and spleen removal in 2019.   No Crohn's flare-ups have occurred in several  years, and no current symptoms are reported. Gastrointestinal surgery related to Crohn's disease has not been performed.   She is not currently on any medication for Crohn's disease.    Gastroparesis  Significant issues with gastroparesis are experienced, and she is under the care of Dr. Riddle, who recently replaced her gastric stimulator.   Despite this, severe stomach pain and limited appetite persist, often resulting in the consumption of only one meal per day.   Consideration is being given to discussing the use of a lidocaine cocktail for pain management with her doctor.   A CT scan with Dr. Riddle identified gastric varices, although they were not visually confirmed during a subsequent procedure.   Bleeding occurred during this procedure, which Dr. Riddle attributed to potential damage to the varices.   Additional test results from Dr. Riddle are pending to determine if her condition has worsened, which may necessitate the initiation of CellCept treatment for GP.  - Onset: Persistent issues despite gastric stimulator replacement.  - Location: Stomach.  - Character: Severe stomach pain, limited appetite.  - Alleviating/Aggravating Factors: Considering lidocaine cocktail for pain management.  - Severity: Severe, often resulting in consumption of only one meal per day.    Constipation and Irregular Bowel Movements  Constipation and irregular bowel movements are reported, sometimes going days without a bowel movement and other times having 5 or 6 in a day. Stools are typically soft, but difficulty with pelvic floor function is noted, often requiring stimulation of bowel movements with hot water.   Pelvic floor physical therapy and surgery have been tried without success. Linzess was discontinued due to ineffectiveness, and current management of constipation includes MiraLAX (2 scoops daily), stool softeners, lactulose, and enemas.   An increase in Linzess dosage from 145 mcg to 290 mcg has not been attempted.  -  "Onset: Persistent issues despite various treatments.  - Character: Soft stools, difficulty with pelvic floor function.  - Alleviating/Aggravating Factors: MiraLAX, stool softeners, lactulose, enemas; stimulation with hot water.  - Severity: Severe enough to require multiple treatments.    WELDON Cirrhosis  Cirrhosis is being closely monitored by Dr. Valencia at [facility].   Xifaxan is taken daily for SIBO and hepatic encephalopathy, effectively preventing small intestinal bacterial overgrowth.  - Alleviating/Aggravating Factors: Xifaxan for SIBO and hepatic encephalopathy.  - Severity: Closely monitored.    Sepsis  A history of sepsis includes 5 episodes in 2024, leading to the removal of ports and lines. A new infectious disease doctor has been seen once, with another appointment scheduled for tomorrow.  - Onset: 5 episodes in 2024.  - Character: Recurrent infections.  - Severity: Severe enough to require removal of ports and lines.    Dehydration  Frequent dehydration is experienced, which is only alleviated by fluid administration.   Fluids were received at a clinic 2 weeks ago due to post-surgical dehydration.  - Onset: Frequent.  - Alleviating/Aggravating Factors: Fluid administration.  - Timing: Fluids received at a clinic 2 weeks ago.    Lansoprazole is taken twice daily for acid reflux.    SOCIAL HISTORY  Diet: Eats one meal a day, usually at night.    FAMILY HISTORY  - Maternal grandFather: Colon cancer  - Negative for colon polyps     Result Review :       CT Abdomen Pelvis With & Without Contrast (12/20/2024 13:22)  CT Abdomen Pelvis With Contrast (03/20/2025 00:29)  Vital Signs:   /82   Pulse 78   Temp 98.2 °F (36.8 °C)   Ht 170.2 cm (67\")   Wt 84.2 kg (185 lb 11.2 oz)   SpO2 99%   BMI 29.08 kg/m²     Body mass index is 29.08 kg/m².     Physical Exam  Vitals reviewed.   Constitutional:       General: She is not in acute distress.     Appearance: Normal appearance. She is not " ill-appearing or toxic-appearing.   Eyes:      General: No scleral icterus.  Pulmonary:      Effort: Pulmonary effort is normal. No respiratory distress.   Skin:     Coloration: Skin is not jaundiced.   Neurological:      Mental Status: She is alert and oriented to person, place, and time.   Psychiatric:         Mood and Affect: Mood normal.         Behavior: Behavior normal.         Thought Content: Thought content normal.         Judgment: Judgment normal.         Assessment and Plan        Diagnoses and all orders for this visit:    1. Gastroesophageal reflux disease without esophagitis (Primary)  -     lansoprazole (PREVACID) 30 MG capsule; Take 1 capsule by mouth 2 (Two) Times a Day.  Dispense: 180 capsule; Refill: 3    2. Chronic idiopathic constipation  -     polyethylene glycol (MiraLax) 17 GM/SCOOP powder; Take 17 g by mouth 2 (Two) Times a Day.  Dispense: 1020 g; Refill: 5  -     linaclotide (Linzess) 145 MCG capsule capsule; Take 1 capsule by mouth Every Morning Before Breakfast.  Dispense: 12 capsule; Refill: 0    3. Irritable bowel syndrome with constipation  -     linaclotide (Linzess) 290 MCG capsule capsule; Take 1 capsule by mouth Every Morning Before Breakfast.  Dispense: 12 capsule; Refill: 0       Assessment & Plan  1. Crohn's Disease diagnosed in 2009:  - No flare-up of symptoms in years  -Discontinued Humira 2024 due to repeated infections from immunosuppression and previous pancreatectomy, islet cell transplant and splenectomy.   - CT scan on 03/20/2025 showed no active colitis or Crohn's  - Reconsider treatment options if symptoms re-emerge but multiple factors to consider    2. Gastroparesis:  - Significant issues persist despite recent surgical interventions  - Recommend patient schedule a Referral to Dr. Gary Mcgee at [facility] for specialized management  - Discuss possibility of using a lidocaine cocktail for severe stomach pain with the specialist  - Also discuss IV fluids and  supportive measures for GP and continue following with Dr Riddle for gastric stimulator management    3. Constipation:  - Irregular bowel movements  - Uses MiraLAX, stool softeners, and enemas  - Provide samples of Linzess 145 mcg and 290 mcg  - Take one pill once a day, 30 minutes before the first meal of the day  - Return to MiraLAX if Linzess does not work and inform for prescription    4. Cirrhosis:  - Managed by Dr. Valencia at [facility]  - Takes Xifaxan daily for SIBO and hepatic encephalopathy  - Continue current management plan    5. Recurrent Infections:  - Multiple infections and sepsis episodes likely related to lack of spleen and previous IVIG treatments  - Follow up with new infectious disease doctor in [place]    6. Dehydration:  - Frequent dehydration alleviated by fluid administration  - Address issue at motility clinic at [facility]    7. Acid reflux:  - Refills for lansoprazole provided     8. History of Colon Polyps  Last colonoscopy 2019 with TA  Colonoscopy recommend in 2024 however given significant GP, prep will be poorly tolerated at this time, timing of prep to be determined once GP care with IU is established and better controlled        Patient Instructions   Crohn's Disease: No current medication; reconsider treatment options if symptoms re-emerge.  Gastroparesis: Referral to Dr. Gary Mcgee at [facility] for specialized management; discuss lidocaine cocktail for pain management.  Constipation: Provide samples of Linzess 145 mcg and 290 mcg; take one pill once a day, 30 minutes before the first meal of the day; return to MiraLAX if Linzess does not work and inform for prescription.  Cirrhosis: Continue current management with Dr. Valencia at [facility]; daily Xifaxan.  Recurrent Infections: Follow up with new infectious disease doctor in [place].  Dehydration: Address issue at motility clinic at [facility].  Acid Reflux:  Refills for lansoprazole provided.     Follow-Up Instructions:  Take Linzess as prescribed: one pill once a day, 30 minutes before the first meal of the day.  Return to MiraLAX if Linzess does not work and inform for prescription.  Follow up with Dr. Gary Mcgee at [facility] for gastroparesis management.  Follow up with new infectious disease doctor in [place].  Continue daily Xifaxan for cirrhosis management.  Address dehydration at motility clinic at [facility].  Schedule a follow-up visit if Crohn's symptoms re-emerge.     Additional Notes:  Discuss the possibility of using a lidocaine cocktail for severe stomach pain with the gastroparesis specialist.  With Dr Riddle they are Considering the initiation of CellCept treatment if biopsy results indicate worsening of gastroparesis.  Patient's maternal grandfather had colon cancer; patient has a history of colon polyps. A colonoscopy may be needed in the future.      Patient or patient representative verbalized consent for the use of Ambient Listening during the visit with  LAZARO Webber for chart documentation. 4/21/2025  23:38 EDT    EMR Dragon/Transcription Disclaimer:  This document has been Dictated utilizing Dragon dictation.

## 2025-04-21 NOTE — PATIENT INSTRUCTIONS
Crohn's Disease: No current medication; reconsider treatment options if symptoms re-emerge.  Gastroparesis: Referral to Dr. Gary Mcgee at [facility] for specialized management; discuss lidocaine cocktail for pain management.  Constipation: Provide samples of Linzess 145 mcg and 290 mcg; take one pill once a day, 30 minutes before the first meal of the day; return to MiraLAX if Linzess does not work and inform for prescription.  Cirrhosis: Continue current management with Dr. Valencia at [facility]; daily Xifaxan.  Recurrent Infections: Follow up with new infectious disease doctor in [place].  Dehydration: Address issue at motility clinic at [facility].  Acid Reflux: Refills for lansoprazole provided.     Follow-Up Instructions:  Take Linzess as prescribed: one pill once a day, 30 minutes before the first meal of the day.  Return to MiraLAX if Linzess does not work and inform for prescription.  Follow up with Dr. Gary Mcgee at [facility] for gastroparesis management.  Follow up with new infectious disease doctor in [place].  Continue daily Xifaxan for cirrhosis management.  Address dehydration at motility clinic at [facility].  Schedule a follow-up visit if Crohn's symptoms re-emerge.     Additional Notes:  Discuss the possibility of using a lidocaine cocktail for severe stomach pain with the gastroparesis specialist.  With Dr Riddle they are Considering the initiation of CellCept treatment if biopsy results indicate worsening of gastroparesis.  Patient's maternal grandfather had colon cancer; patient has a history of colon polyps. A colonoscopy may be needed in the future.

## 2025-04-22 ENCOUNTER — SPECIALTY PHARMACY (OUTPATIENT)
Dept: GASTROENTEROLOGY | Facility: CLINIC | Age: 50
End: 2025-04-22
Payer: MEDICARE

## 2025-04-25 ENCOUNTER — HOSPITAL ENCOUNTER (OUTPATIENT)
Facility: HOSPITAL | Age: 50
Discharge: HOME | End: 2025-04-25
Payer: MEDICARE

## 2025-04-25 DIAGNOSIS — R07.9 CHEST PAIN, UNSPECIFIED TYPE: ICD-10-CM

## 2025-04-25 PROCEDURE — A9502 TC99M TETROFOSMIN: HCPCS | Performed by: SPECIALIST

## 2025-04-25 PROCEDURE — 93306 TTE W/DOPPLER COMPLETE: CPT

## 2025-04-25 PROCEDURE — 34310000005 TECHNETIUM TETROFOSMIN KIT: Performed by: SPECIALIST

## 2025-04-25 PROCEDURE — 93017 CV STRESS TEST TRACING ONLY: CPT

## 2025-04-25 PROCEDURE — 78452 HT MUSCLE IMAGE SPECT MULT: CPT

## 2025-04-25 RX ADMIN — TETROFOSMIN 1 DOSE: 1.38 INJECTION, POWDER, LYOPHILIZED, FOR SOLUTION INTRAVENOUS at 12:56

## 2025-04-25 RX ADMIN — TETROFOSMIN 1 DOSE: 1.38 INJECTION, POWDER, LYOPHILIZED, FOR SOLUTION INTRAVENOUS at 09:40

## 2025-04-28 ENCOUNTER — RESULTS FOLLOW-UP (OUTPATIENT)
Dept: CARDIOLOGY | Facility: CLINIC | Age: 50
End: 2025-04-28
Payer: MEDICARE

## 2025-04-28 LAB
AORTIC DIMENSIONLESS INDEX: 0.69 (DI)
ASCENDING AORTA: 3.6 CM
AV MEAN PRESS GRAD SYS DOP V1V2: 5 MMHG
AV VMAX SYS DOP: 148 CM/SEC
BH CV ECHO MEAS - AO MAX PG: 8.8 MMHG
BH CV ECHO MEAS - AO ROOT DIAM: 2.7 CM
BH CV ECHO MEAS - AO V2 VTI: 33.4 CM
BH CV ECHO MEAS - AVA(I,D): 2.9 CM2
BH CV ECHO MEAS - EDV(CUBED): 97.3 ML
BH CV ECHO MEAS - EDV(MOD-SP2): 77 ML
BH CV ECHO MEAS - EDV(MOD-SP4): 89.3 ML
BH CV ECHO MEAS - EF(MOD-SP2): 60.3 %
BH CV ECHO MEAS - EF(MOD-SP4): 59.2 %
BH CV ECHO MEAS - ESV(CUBED): 24.4 ML
BH CV ECHO MEAS - ESV(MOD-SP2): 30.6 ML
BH CV ECHO MEAS - ESV(MOD-SP4): 36.4 ML
BH CV ECHO MEAS - FS: 37 %
BH CV ECHO MEAS - IVS/LVPW: 1.25 CM
BH CV ECHO MEAS - IVSD: 1.5 CM
BH CV ECHO MEAS - LA DIMENSION: 3.9 CM
BH CV ECHO MEAS - LAT PEAK E' VEL: 9.6 CM/SEC
BH CV ECHO MEAS - LV DIASTOLIC VOL/BSA (35-75): 45.7 CM2
BH CV ECHO MEAS - LV MASS(C)D: 243.3 GRAMS
BH CV ECHO MEAS - LV MAX PG: 5.7 MMHG
BH CV ECHO MEAS - LV MEAN PG: 3 MMHG
BH CV ECHO MEAS - LV SYSTOLIC VOL/BSA (12-30): 18.6 CM2
BH CV ECHO MEAS - LV V1 MAX: 119 CM/SEC
BH CV ECHO MEAS - LV V1 VTI: 23.2 CM
BH CV ECHO MEAS - LVIDD: 4.6 CM
BH CV ECHO MEAS - LVIDS: 2.9 CM
BH CV ECHO MEAS - LVOT AREA: 4.2 CM2
BH CV ECHO MEAS - LVOT DIAM: 2.3 CM
BH CV ECHO MEAS - LVPWD: 1.2 CM
BH CV ECHO MEAS - MED PEAK E' VEL: 7.7 CM/SEC
BH CV ECHO MEAS - MV A MAX VEL: 88 CM/SEC
BH CV ECHO MEAS - MV DEC SLOPE: 522 CM/SEC2
BH CV ECHO MEAS - MV DEC TIME: 0.18 SEC
BH CV ECHO MEAS - MV E MAX VEL: 69.7 CM/SEC
BH CV ECHO MEAS - MV E/A: 0.79
BH CV ECHO MEAS - MV MEAN PG: 2 MMHG
BH CV ECHO MEAS - MV P1/2T: 49 MSEC
BH CV ECHO MEAS - MV V2 VTI: 24.1 CM
BH CV ECHO MEAS - MVA(P1/2T): 4.5 CM2
BH CV ECHO MEAS - MVA(VTI): 4 CM2
BH CV ECHO MEAS - RAP SYSTOLE: 3 MMHG
BH CV ECHO MEAS - RVDD: 2.7 CM
BH CV ECHO MEAS - RVSP: 9 MMHG
BH CV ECHO MEAS - SV(LVOT): 96.4 ML
BH CV ECHO MEAS - SV(MOD-SP2): 46.4 ML
BH CV ECHO MEAS - SV(MOD-SP4): 52.9 ML
BH CV ECHO MEAS - SVI(LVOT): 49.3 ML/M2
BH CV ECHO MEAS - SVI(MOD-SP2): 23.7 ML/M2
BH CV ECHO MEAS - SVI(MOD-SP4): 27 ML/M2
BH CV ECHO MEAS - TR MAX PG: 5.5 MMHG
BH CV ECHO MEAS - TR MAX VEL: 117 CM/SEC
BH CV ECHO MEASUREMENTS AVERAGE E/E' RATIO: 8.06
BH CV IMMEDIATE POST TECH DATA BLOOD PRESSURE: NORMAL MMHG
BH CV IMMEDIATE POST TECH DATA HEART RATE: 129 BPM
BH CV IMMEDIATE POST TECH DATA OXYGEN SATS: 96 %
BH CV REST NUCLEAR ISOTOPE DOSE: 8.8 MCI
BH CV SIX MINUTE RECOVERY TECH DATA BLOOD PRESSURE: NORMAL
BH CV SIX MINUTE RECOVERY TECH DATA HEART RATE: 88 BPM
BH CV SIX MINUTE RECOVERY TECH DATA OXYGEN SATURATION: 98 %
BH CV SIX MINUTE RECOVERY TECH DATA SYMPTOMS: NORMAL
BH CV STRESS BP STAGE 1: NORMAL
BH CV STRESS BP STAGE 2: NORMAL
BH CV STRESS BP STAGE 3: NORMAL
BH CV STRESS DURATION MIN STAGE 1: 3
BH CV STRESS DURATION MIN STAGE 2: 3
BH CV STRESS DURATION MIN STAGE 3: 3
BH CV STRESS DURATION SEC STAGE 1: 0
BH CV STRESS DURATION SEC STAGE 2: 0
BH CV STRESS DURATION SEC STAGE 3: 0
BH CV STRESS GRADE STAGE 1: 10
BH CV STRESS GRADE STAGE 2: 12
BH CV STRESS GRADE STAGE 3: 12
BH CV STRESS HR STAGE 1: 110
BH CV STRESS HR STAGE 2: 125
BH CV STRESS HR STAGE 3: 145
BH CV STRESS METS STAGE 1: 5
BH CV STRESS METS STAGE 2: 7.5
BH CV STRESS METS STAGE 3: 10
BH CV STRESS NUCLEAR ISOTOPE DOSE: 32.3 MCI
BH CV STRESS O2 STAGE 1: 96
BH CV STRESS O2 STAGE 2: 95
BH CV STRESS O2 STAGE 3: 96
BH CV STRESS PROTOCOL 1: NORMAL
BH CV STRESS RECOVERY BP: NORMAL MMHG
BH CV STRESS RECOVERY HR: 88 BPM
BH CV STRESS RECOVERY O2: 98 %
BH CV STRESS SPEED STAGE 1: 1.7
BH CV STRESS SPEED STAGE 2: 2.5
BH CV STRESS SPEED STAGE 3: 3.3
BH CV STRESS STAGE 1: 1
BH CV STRESS STAGE 2: 2
BH CV STRESS STAGE 3: 3
BH CV THREE MINUTE POST TECH DATA BLOOD PRESSURE: NORMAL MMHG
BH CV THREE MINUTE POST TECH DATA HEART RATE: 88 BPM
BH CV THREE MINUTE POST TECH DATA OXYGEN SATURATION: 96 %
BH CV XLRA - TDI S': 11.7 CM/SEC
LEFT ATRIUM VOLUME INDEX: 16.8 ML/M2
LV EF BIPLANE MOD: 59.5 %
MAXIMAL PREDICTED HEART RATE: 171 BPM
PERCENT MAX PREDICTED HR: 84.8 %
SPECT HRT GATED+EF W RNC IV: 47 %
STRESS BASELINE BP: NORMAL MMHG
STRESS BASELINE HR: 78 BPM
STRESS O2 SAT REST: 98 %
STRESS PERCENT HR: 100 %
STRESS POST ESTIMATED WORKLOAD: 8.4 METS
STRESS POST EXERCISE DUR MIN: 9 MIN
STRESS POST EXERCISE DUR SEC: 0 SEC
STRESS POST O2 SAT PEAK: 96 %
STRESS POST PEAK BP: NORMAL MMHG
STRESS POST PEAK HR: 145 BPM
STRESS TARGET HR: 145 BPM

## 2025-04-30 ENCOUNTER — PATIENT MESSAGE (OUTPATIENT)
Dept: GASTROENTEROLOGY | Facility: CLINIC | Age: 50
End: 2025-04-30
Payer: MEDICARE

## 2025-04-30 DIAGNOSIS — K58.1 IRRITABLE BOWEL SYNDROME WITH CONSTIPATION: ICD-10-CM

## 2025-05-01 NOTE — TELEPHONE ENCOUNTER
Per pt Linzess 290 mcg is being effective, would like a Rx to be sent to [facility] - [place], [place] - [address] - [phone]  - [phone] FX

## 2025-05-07 ENCOUNTER — LAB (OUTPATIENT)
Dept: LAB | Facility: HOSPITAL | Age: 50
End: 2025-05-07
Payer: MEDICARE

## 2025-05-07 ENCOUNTER — OFFICE VISIT (OUTPATIENT)
Dept: FAMILY MEDICINE CLINIC | Facility: CLINIC | Age: 50
End: 2025-05-07
Payer: MEDICARE

## 2025-05-07 VITALS
WEIGHT: 186 LBS | HEART RATE: 74 BPM | OXYGEN SATURATION: 97 % | SYSTOLIC BLOOD PRESSURE: 134 MMHG | DIASTOLIC BLOOD PRESSURE: 86 MMHG | HEIGHT: 67 IN | BODY MASS INDEX: 29.19 KG/M2

## 2025-05-07 DIAGNOSIS — E66.3 OVERWEIGHT WITH BODY MASS INDEX (BMI) OF 29 TO 29.9 IN ADULT: ICD-10-CM

## 2025-05-07 DIAGNOSIS — Z79.4 TYPE 2 DIABETES MELLITUS WITH DIABETIC AUTONOMIC NEUROPATHY, WITH LONG-TERM CURRENT USE OF INSULIN: ICD-10-CM

## 2025-05-07 DIAGNOSIS — Z00.00 MEDICARE ANNUAL WELLNESS VISIT, SUBSEQUENT: ICD-10-CM

## 2025-05-07 DIAGNOSIS — E06.3 HYPOTHYROIDISM DUE TO HASHIMOTO THYROIDITIS: ICD-10-CM

## 2025-05-07 DIAGNOSIS — G43.919 INTRACTABLE MIGRAINE WITHOUT STATUS MIGRAINOSUS, UNSPECIFIED MIGRAINE TYPE: ICD-10-CM

## 2025-05-07 DIAGNOSIS — E78.2 MIXED HYPERLIPIDEMIA: ICD-10-CM

## 2025-05-07 DIAGNOSIS — K58.1 IRRITABLE BOWEL SYNDROME WITH CONSTIPATION: ICD-10-CM

## 2025-05-07 DIAGNOSIS — Z12.31 ENCOUNTER FOR SCREENING MAMMOGRAM FOR MALIGNANT NEOPLASM OF BREAST: ICD-10-CM

## 2025-05-07 DIAGNOSIS — I10 HYPERTENSION, UNSPECIFIED TYPE: ICD-10-CM

## 2025-05-07 DIAGNOSIS — E11.43 TYPE 2 DIABETES MELLITUS WITH DIABETIC AUTONOMIC NEUROPATHY, WITH LONG-TERM CURRENT USE OF INSULIN: ICD-10-CM

## 2025-05-07 DIAGNOSIS — Z00.00 MEDICARE ANNUAL WELLNESS VISIT, SUBSEQUENT: Primary | ICD-10-CM

## 2025-05-07 DIAGNOSIS — Z76.89 ENCOUNTER TO ESTABLISH CARE: ICD-10-CM

## 2025-05-07 DIAGNOSIS — N95.2 VAGINAL ATROPHY: ICD-10-CM

## 2025-05-07 LAB
ALBUMIN SERPL-MCNC: 4.3 G/DL (ref 3.5–5.2)
ALBUMIN UR-MCNC: <1.2 MG/DL
ALBUMIN/GLOB SERPL: 1.2 G/DL
ALP SERPL-CCNC: 185 U/L (ref 39–117)
ALT SERPL W P-5'-P-CCNC: 81 U/L (ref 1–33)
ANION GAP SERPL CALCULATED.3IONS-SCNC: 12.6 MMOL/L (ref 5–15)
AST SERPL-CCNC: 64 U/L (ref 1–32)
BASOPHILS # BLD AUTO: 0.05 10*3/MM3 (ref 0–0.2)
BASOPHILS NFR BLD AUTO: 0.6 % (ref 0–1.5)
BILIRUB SERPL-MCNC: 0.4 MG/DL (ref 0–1.2)
BUN SERPL-MCNC: 12 MG/DL (ref 6–20)
BUN/CREAT SERPL: 18.2 (ref 7–25)
CALCIUM SPEC-SCNC: 9.8 MG/DL (ref 8.6–10.5)
CHLORIDE SERPL-SCNC: 102 MMOL/L (ref 98–107)
CHOLEST SERPL-MCNC: 189 MG/DL (ref 0–200)
CO2 SERPL-SCNC: 25.4 MMOL/L (ref 22–29)
CREAT SERPL-MCNC: 0.66 MG/DL (ref 0.57–1)
CREAT UR-MCNC: 123.9 MG/DL
DEPRECATED RDW RBC AUTO: 43.7 FL (ref 37–54)
EGFRCR SERPLBLD CKD-EPI 2021: 107.7 ML/MIN/1.73
EOSINOPHIL # BLD AUTO: 0.25 10*3/MM3 (ref 0–0.4)
EOSINOPHIL NFR BLD AUTO: 2.9 % (ref 0.3–6.2)
ERYTHROCYTE [DISTWIDTH] IN BLOOD BY AUTOMATED COUNT: 13.2 % (ref 12.3–15.4)
GLOBULIN UR ELPH-MCNC: 3.6 GM/DL
GLUCOSE SERPL-MCNC: 105 MG/DL (ref 65–99)
HBA1C MFR BLD: 6.8 % (ref 4.8–5.6)
HCT VFR BLD AUTO: 40.4 % (ref 34–46.6)
HDLC SERPL-MCNC: 47 MG/DL (ref 40–60)
HGB BLD-MCNC: 13.7 G/DL (ref 12–15.9)
IMM GRANULOCYTES # BLD AUTO: 0.02 10*3/MM3 (ref 0–0.05)
IMM GRANULOCYTES NFR BLD AUTO: 0.2 % (ref 0–0.5)
LDLC SERPL CALC-MCNC: 116 MG/DL (ref 0–100)
LDLC/HDLC SERPL: 2.39 {RATIO}
LYMPHOCYTES # BLD AUTO: 3.77 10*3/MM3 (ref 0.7–3.1)
LYMPHOCYTES NFR BLD AUTO: 44.1 % (ref 19.6–45.3)
MCH RBC QN AUTO: 30.7 PG (ref 26.6–33)
MCHC RBC AUTO-ENTMCNC: 33.9 G/DL (ref 31.5–35.7)
MCV RBC AUTO: 90.6 FL (ref 79–97)
MICROALBUMIN/CREAT UR: NORMAL MG/G{CREAT}
MONOCYTES # BLD AUTO: 1.26 10*3/MM3 (ref 0.1–0.9)
MONOCYTES NFR BLD AUTO: 14.7 % (ref 5–12)
NEUTROPHILS NFR BLD AUTO: 3.2 10*3/MM3 (ref 1.7–7)
NEUTROPHILS NFR BLD AUTO: 37.5 % (ref 42.7–76)
NRBC BLD AUTO-RTO: 0 /100 WBC (ref 0–0.2)
PLATELET # BLD AUTO: 443 10*3/MM3 (ref 140–450)
PMV BLD AUTO: 11.1 FL (ref 6–12)
POTASSIUM SERPL-SCNC: 3.7 MMOL/L (ref 3.5–5.2)
PROT SERPL-MCNC: 7.9 G/DL (ref 6–8.5)
RBC # BLD AUTO: 4.46 10*6/MM3 (ref 3.77–5.28)
SODIUM SERPL-SCNC: 140 MMOL/L (ref 136–145)
T4 FREE SERPL-MCNC: 1.25 NG/DL (ref 0.92–1.68)
TRIGL SERPL-MCNC: 149 MG/DL (ref 0–150)
TSH SERPL DL<=0.05 MIU/L-ACNC: 1.48 UIU/ML (ref 0.27–4.2)
VLDLC SERPL-MCNC: 26 MG/DL (ref 5–40)
WBC NRBC COR # BLD AUTO: 8.55 10*3/MM3 (ref 3.4–10.8)

## 2025-05-07 PROCEDURE — 82043 UR ALBUMIN QUANTITATIVE: CPT

## 2025-05-07 PROCEDURE — 36415 COLL VENOUS BLD VENIPUNCTURE: CPT

## 2025-05-07 PROCEDURE — 80061 LIPID PANEL: CPT

## 2025-05-07 PROCEDURE — 83036 HEMOGLOBIN GLYCOSYLATED A1C: CPT

## 2025-05-07 PROCEDURE — 80053 COMPREHEN METABOLIC PANEL: CPT

## 2025-05-07 PROCEDURE — 84443 ASSAY THYROID STIM HORMONE: CPT

## 2025-05-07 PROCEDURE — 84439 ASSAY OF FREE THYROXINE: CPT

## 2025-05-07 PROCEDURE — 85025 COMPLETE CBC W/AUTO DIFF WBC: CPT

## 2025-05-07 PROCEDURE — 82570 ASSAY OF URINE CREATININE: CPT

## 2025-05-07 NOTE — TELEPHONE ENCOUNTER
LOV 04-21-25  NOV 04-22-26  Patient stated that her pharmacy has not received the RX for Linzess.  Pharmacy is correct with the refill.    Thank you

## 2025-05-07 NOTE — ASSESSMENT & PLAN NOTE
Hypertension is stable and controlled  Continue current treatment regimen.  Blood pressure will be reassessed in 6 months.    Orders:    Comprehensive Metabolic Panel; Future    Lipid Panel; Future

## 2025-05-07 NOTE — PROGRESS NOTES
Chief Complaint  No chief complaint on file.    SUBJECTIVE  Kayla Gan presents to [facility]    History of Present Illness  Past Medical History:   Diagnosis Date    Abnormal ECG     Adrenal insufficiency     Allergic Unsure    Foods, Ulram, Vancomycin    Anemia     Angina pectoris Unsure    Anxiety     Arthritis     Asthma Unsure    Vasquez esophagus Unsure    Bleeding disorder     Brain concussion     Cholelithiasis Unsure    Chronic constipation As a child    Chronic diarrhea Unsure    Cirrhosis     NO CURRENT S/S    Colon polyp     Crohn's disease     Depression     Diabetes mellitus     Fatty liver Unsure    Gastroparesis     GERD (gastroesophageal reflux disease)     GI (gastrointestinal bleed) 2009    History of MRSA infection     History of transfusion     Hyperlipidemia     Hypertension     Hypothyroidism     Injury of neck Unsure    Whiplash    Interstitial cystitis     Irritable bowel syndrome Unsure    Liver disease     Low back pain     Lumbosacral disc disease     Migraines     MRSA (methicillin resistant Staphylococcus aureus)     NO CURRENT ISSUES    Obesity     Osteopenia     Osteoporosis Unsure    Multiple broken bones, Osteopenia    Pancreatitis     NO CURRENT ISSUES    Pyelonephritis     Shortness of breath 2019    Sinusitis As a child    Sleep apnea     USES CPAP    Ulcerative colitis 2009    Urinary incontinence     Urinary tract infection     Interstitial Cystitis    Vaginal infection       Family History   Problem Relation Age of Onset    Anxiety disorder Mother     Hypothyroidism Mother     Breast cancer Mother     Colon polyps Mother     Arthritis Mother     Cancer Mother         Breast    Depression Mother     Thyroid disease Mother     Heart disease Father     Hypothyroidism Father     Anxiety disorder Father     Depression Father     Hypertension Father     Kidney disease Father     Thyroid disease Father     Anemia Father     Depression Brother      Hypertension Brother     Colon cancer Maternal Grandfather     Alcohol abuse Paternal Grandfather     Heart disease Paternal Grandfather     Malig Hyperthermia Neg Hx     Crohn's disease Neg Hx     Irritable bowel syndrome Neg Hx     Ulcerative colitis Neg Hx       Past Surgical History:   Procedure Laterality Date    ABDOMINAL SURGERY  2019    ABSCESS DRAINAGE  12/24/2019    Fluoroscopically guided abscess drainage, [facility]    ADENOIDECTOMY      BACK SURGERY      L4 L5    BLADDER SURGERY      CARDIAC CATHETERIZATION  2019    CHOLECYSTECTOMY N/A     COLONOSCOPY  04/24/2019    NBIH, 3 mm polyp    CYSTOSCOPY      DILATATION AND CURETTAGE      ENDOMETRIAL ABLATION      ENDOSCOPY N/A 04/24/2019    Normal    ENTEROSCOPY SMALL BOWEL      EPIDURAL BLOCK      ERCP  2019    FLEXIBLE SIGMOIDOSCOPY  Unsure    GASTRIC STIMULATOR IMPLANT SURGERY      IN PLACE NOW, PT DOES NOT HAVE ANY REMOTE FOR THIS DEVICE.    GASTROJEJUNOSTOMY W/ JEJUNOSTOMY TUBE      removed    GASTROSTOMY  2019    No longer have    HYSTERECTOMY      LIVER BIOPSY  2022    LUMBAR LAMINECTOMY Right 01/24/2025    Procedure: MINIMALLY INVASIVE LUMBAR LAMINECTOMY WITH DISCECTOMY, RIGHT APPROACH, LUMBAR THREE-LUMBAR FOUR  OPERATION ON THE LOWER BACK TO SHAVE OFF BONE AND DISC COMPRESSING THE NERVES TO THE LEGS.;  Surgeon: Joe Langley MD;  Location: [facility];  Service: Neurosurgery;  Laterality: Right;    LYMPH NODE BIOPSY      BENIGN    OOPHORECTOMY      OTHER SURGICAL HISTORY      gastric stimulator battery replaced    PANCREATECTOMY  12/2019    TPIAT    PELVIC FLOOR REPAIR      SINUS SURGERY      SPLENECTOMY      TONSILLECTOMY      TOTAL ABDOMINAL HYSTERECTOMY WITH SALPINGO OOPHORECTOMY      UPPER GASTROINTESTINAL ENDOSCOPY  Multiple times    VENOUS ACCESS DEVICE (PORT) INSERTION      REMOVED        Current Outpatient Medications:     carvedilol (COREG) 12.5 MG tablet, Take 1 tablet by mouth 2 (Two) Times a Day With Meals., Disp: 180 tablet,  Rfl: 3    cetirizine (zyrTEC) 10 MG tablet, Take 1 tablet by mouth Daily., Disp: , Rfl:     docusate sodium (COLACE) 100 MG capsule, Take 1 capsule by mouth 2 (Two) Times a Day., Disp: , Rfl:     E-400 180 MG (400 UNIT) capsule capsule, Take 2 capsules by mouth Daily., Disp: , Rfl:     Enulose 10 GM/15ML solution solution (encephalopathy), Take 45 mL by mouth As Needed., Disp: , Rfl:     glucagon (GLUCAGEN) 1 MG injection, Inject 1 mg into the appropriate muscle as directed by prescriber Every 15 (Fifteen) Minutes As Needed for Low Blood Sugar (per Glucommander)., Disp: , Rfl:     hydrocortisone (CORTEF) 5 MG tablet, , Disp: , Rfl:     hydrOXYzine (ATARAX) 50 MG tablet, Take 1 tablet by mouth Every Night., Disp: , Rfl:     Insulin Disposable Pump (Omnipod 5 G6 Pods, Gen 5,) misc, , Disp: , Rfl:     Insulin Lispro (humaLOG) 100 UNIT/ML injection, USE IN insulin pump approximately 100 UNITS DAILY, Disp: , Rfl:     lansoprazole (PREVACID) 30 MG capsule, Take 1 capsule by mouth 2 (Two) Times a Day., Disp: 180 capsule, Rfl: 3    Lantus SoloStar 100 UNIT/ML injection pen, Inject 47 Units under the skin into the appropriate area as directed Daily As Needed (ONLY FOR INSULIN PUMP FAILURE). Only for insulin pump failure, Disp: , Rfl:     levothyroxine (SYNTHROID, LEVOTHROID) 125 MCG tablet, Take 1 tablet by mouth Daily., Disp: , Rfl:     linaclotide (Linzess) 290 MCG capsule capsule, Take 1 capsule by mouth Every Morning Before Breakfast., Disp: 90 capsule, Rfl: 3    NovoLOG 100 UNIT/ML injection, Inject  under the skin into the appropriate area as directed. Type of Insulin: Lispro 100 units/ml Type of Pump:Omnipod Bolus: 1u 5-7g max bolus 2.5units/hr Basal: 6685-2556 1.5u/hr max basal 10units/hr Correction factor: 40mg/dl Insulin to carbohydrate ratio: 7-5 g of carb Next fill date: 10/21/2024  Date of last site change: 10/18/2024 Location of pod: right upper arm  Frequency of refill: 2-3 days Last refill date: 10/18/2024   "Prescriber: Dr. Chelsea Silver Phone number: [phone], Disp: , Rfl:     ondansetron (ZOFRAN) 8 MG tablet, Take 1 tablet by mouth Every 4 (Four) to 6 (Six) Hours As Needed for Nausea or Vomiting., Disp: , Rfl:     ondansetron ODT (ZOFRAN-ODT) 4 MG disintegrating tablet, Take 1 tablet by mouth Every 6 (Six) Hours As Needed for Nausea or Vomiting., Disp: 20 tablet, Rfl: 0    Pancrelipase, Lip-Prot-Amyl, (CREON) 50436-257309 units capsule delayed-release particles capsule, Take 2 capsules by mouth 2 (Two) Times a Day. 3 CAPS TID WITH MEALS AND 2 CAPS BID WITH SNACKS, Disp: , Rfl:     polyethylene glycol (MiraLax) 17 GM/SCOOP powder, Take 17 g by mouth 2 (Two) Times a Day., Disp: 1020 g, Rfl: 5    prochlorperazine (COMPAZINE) 10 MG tablet, Take 1 tablet by mouth Every 8 (Eight) Hours As Needed for Nausea or Vomiting., Disp: 60 tablet, Rfl: 0    promethazine (PHENERGAN) 25 MG tablet, Take 1 tablet by mouth Every 6 (Six) Hours As Needed., Disp: , Rfl:     QUEtiapine (SEROquel) 100 MG tablet, Take 1 tablet by mouth every night at bedtime., Disp: , Rfl:     Qulipta 60 MG tablet, Take 1 tablet by mouth Every Night., Disp: , Rfl:     riFAXIMin (XIFAXAN) 550 MG tablet, Take 1 tablet by mouth Every 12 (Twelve) Hours., Disp: 180 tablet, Rfl: 3    traZODone (DESYREL) 50 MG tablet, Take 1 tablet by mouth Every Night., Disp: , Rfl:     Ubrelvy 100 MG tablet, Take 1 tablet by mouth 1 (One) Time As Needed (Migraine)., Disp: 30 tablet, Rfl: 0    valsartan-hydrochlorothiazide (Diovan HCT) 160-12.5 MG per tablet, Take 1 tablet by mouth Daily., Disp: 90 tablet, Rfl: 5    Current Facility-Administered Medications:     lidocaine (XYLOCAINE) 2 % jelly, , Urethral, Once, Coker, Kaye B, APRN    OBJECTIVE  Vital Signs:   There were no vitals taken for this visit.   Estimated body mass index is 29.08 kg/m² as calculated from the following:    Height as of 4/21/25: 170.2 cm (67\").    Weight as of 4/21/25: 84.2 kg (185 lb " 11.2 oz).     Wt Readings from Last 3 Encounters:   04/21/25 84.2 kg (185 lb 11.2 oz)   04/01/25 83.6 kg (184 lb 6.4 oz)   03/19/25 84 kg (185 lb 3 oz)     BP Readings from Last 3 Encounters:   04/21/25 120/82   04/01/25 163/95   03/20/25 135/88       Physical Exam     Result Review    {[facility] Ambulatory Labs (Optional):34832}    CT Abdomen Pelvis With Contrast  Result Date: 3/20/2025  1.Blood products in the abdomen and pelvis as discussed above with the largest amount in the left upper quadrant adjacent to the stomach. This may be secondary to recent gastric stimulator replacement. 2.Fluid and gas pocket around a gastric stimulator in the right ventral abdominal wall. This may reflect blood and air from recent replacement although infection is not excluded. 3.Status post gastric bypass. There may be some mild inflammatory change now noted in the hepatobiliary limb adjacent to the liver. Patient is status post cholecystectomy, and pneumobilia is unchanged. 4.Evidence of small bowel ileus and probably mild degree of constipation. No bowel obstruction. 5.Hysterectomy, splenectomy, and pancreatectomy. 6.Hepatic steatosis with some scalloping of the hepatic contours that could indicate cirrhosis. NOTIFICATION: Critical Value/emergent results were called by telephone at the time of interpretation on 3/20/2025 12:58 AM EDT to DWAIN PRESTON who verbally acknowledged these results. Electronically Signed: Saturnino Verdugo MD  3/20/2025 1:06 AM EDT  Workstation ID: [id number]    CT Abdomen Pelvis With Contrast  Result Date: 3/2/2025  Impression: 1. No acute CT findings in the abdomen. 2. Extensive surgical changes detailed above stable from prior. Nonspecific eccentric wall thickening in the distal Reuben limb near surgical anastomosis without surrounding inflammation or obstruction. 3. Hepatic steatosis. Morphologic changes of the liver concerning for chronic liver disease. Mild perigastric varices. 4. Mild to moderate  formed colonic stool with fecalized distal small bowel. Findings could reflect slow bowel transit. 5. Other ancillary findings as above. Electronically Signed: Kalyan Otero MD  3/2/2025 4:08 PM EST  Workstation ID: [id number]    XR Chest 1 View  Result Date: 3/2/2025  Impression: No acute cardiopulmonary abnormality is identified. Electronically Signed: Morenita Hyatt  3/2/2025 1:35 PM EST  Workstation ID: [id number]    CT Lumbar Spine Without Contrast  Result Date: 2/1/2025  Impression: Postsurgical changes L3-L4 and L4-L5. Limited evaluation of central canal and neural foramina narrowing on CT. MRI lumbar spine is study of choice. There appears to be moderate central canal narrowing at L4-L5 with moderate left and mild to moderate right neural foraminal narrowing. L3-L4 there is postsurgical change with mild central canal narrowing and mild to moderate bilateral neural foraminal narrowing. Electronically Signed: Rayna Canchola MD  2/1/2025 4:09 PM EST  Workstation ID: [id number]    XR Spine Lumbar 2 or 3 View  Result Date: 2/1/2025  Impression: Evidence for degenerative change at the L4-5 level. This has been noted. Electronically Signed: Deep Finn MD  2/1/2025 1:49 PM EST  Workstation ID: [id number]        The above data has been reviewed by LAZARO Rock 05/07/2025 07:10 EDT.          Patient Care Team:  Justin Daniels APRN as PCP - General (Family Medicine)  Andrea Han MD as Consulting Physician (Gastroenterology)  Janine Morales APRN as Nurse Practitioner (Gastroenterology)    {BMI is >= 25 and <30. (Overweight) The following options were offered after discussion;:[id number]}       ASSESSMENT & PLAN    There are no diagnoses linked to this encounter.     Tobacco Use: Low Risk  (4/21/2025)    Patient History     Smoking Tobacco Use: Never     Smokeless Tobacco Use: Never     Passive Exposure: Never       Follow Up     No follow-ups on file.      Patient was given instructions and counseling  regarding her condition or for health maintenance advice. Please see specific information pulled into the AVS if appropriate.   I have reviewed information obtained and documented by others and I have confirmed the accuracy of this documented note.    LAZARO Rock

## 2025-05-07 NOTE — PROGRESS NOTES
Subjective   The ABCs of the Annual Wellness Visit  Medicare Wellness Visit      Kayla Gan is a 49 y.o. patient who presents for a Medicare Wellness Visit.    The following portions of the patient's history were reviewed and   updated as appropriate: She  has a past medical history of Abnormal ECG, Adrenal insufficiency, Allergic (Unsure), Anemia, Angina pectoris (Unsure), Anxiety, Arthritis, Asthma (Unsure), Vasquez esophagus (Unsure), Bleeding disorder, Brain concussion, Cholelithiasis (Unsure), Chronic constipation (As a child), Chronic diarrhea (Unsure), Cirrhosis, Colon polyp, Crohn's disease, Depression, Diabetes mellitus, Fatty liver (Unsure), Gastroparesis, GERD (gastroesophageal reflux disease), GI (gastrointestinal bleed) (2009), History of MRSA infection, History of transfusion, Hyperlipidemia, Hypertension, Hypothyroidism, Injury of neck (Unsure), Interstitial cystitis, Irritable bowel syndrome (Unsure), Liver disease, Low back pain, Lumbosacral disc disease, Migraines, MRSA (methicillin resistant Staphylococcus aureus), Obesity, Osteopenia, Osteoporosis (Unsure), Pancreatitis, Pyelonephritis, Shortness of breath (2019), Sinusitis (As a child), Sleep apnea, Ulcerative colitis (2009), Urinary incontinence, Urinary tract infection, and Vaginal infection.  She does not have any pertinent problems on file.  She  has a past surgical history that includes Cholecystectomy (N/A); Hysterectomy; Venous Access Device (Port); Back surgery; Abscess drainage (12/24/2019); Dilation and curettage of uterus; Sinus surgery; Pelvic floor repair; Tonsillectomy; Pancreatectomy (12/2019); Esophagogastroduodenoscopy (N/A, 04/24/2019); ERCP (2019); Splenectomy, total; Gastrojejunostomy w/ jejunostomy tube; Small bowel enteroscopy; Colonoscopy (04/24/2019); Gastric stimulator implant surgery; Adenoidectomy; Endometrial ablation; Lymph node biopsy; Epidural block injection; Oophorectomy; Bladder surgery; Cystoscopy; Other  surgical history; Lumbar laminectomy (Right, 01/24/2025); Cardiac catheterization (2019); Upper gastrointestinal endoscopy (Multiple times); Total abdominal hysterectomy w/ bilateral salpingoophorectomy; Gastrostomy (2019); Liver biopsy (2022); Abdominal surgery (2019); and Flexible sigmoidoscopy (Unsure).  Her family history includes Alcohol abuse in her paternal grandfather; Anemia in her father; Anxiety disorder in her father and mother; Arthritis in her mother; Breast cancer in her mother; Cancer in her mother; Colon cancer in her maternal grandfather; Colon polyps in her mother; Depression in her brother, father, and mother; Heart disease in her father and paternal grandfather; Hypertension in her brother and father; Hypothyroidism in her father and mother; Kidney disease in her father; Thyroid disease in her father and mother.  She  reports that she has never smoked. She has never been exposed to tobacco smoke. She has never used smokeless tobacco. She reports that she does not drink alcohol and does not use drugs.  Current Outpatient Medications   Medication Sig Dispense Refill    carvedilol (COREG) 12.5 MG tablet Take 1 tablet by mouth 2 (Two) Times a Day With Meals. 180 tablet 3    cetirizine (zyrTEC) 10 MG tablet Take 1 tablet by mouth Daily.      docusate sodium (COLACE) 100 MG capsule Take 1 capsule by mouth 2 (Two) Times a Day.      E-400 180 MG (400 UNIT) capsule capsule Take 2 capsules by mouth Daily.      Enulose 10 GM/15ML solution solution (encephalopathy) Take 45 mL by mouth As Needed.      glucagon (GLUCAGEN) 1 MG injection Inject 1 mg into the appropriate muscle as directed by prescriber Every 15 (Fifteen) Minutes As Needed for Low Blood Sugar (per Glucommander).      hydrocortisone (CORTEF) 5 MG tablet       hydrOXYzine (ATARAX) 50 MG tablet Take 1 tablet by mouth Every Night.      Insulin Disposable Pump (Omnipod 5 G6 Pods, Gen 5,) misc       Insulin Lispro (humaLOG) 100 UNIT/ML injection USE  IN insulin pump approximately 100 UNITS DAILY      lansoprazole (PREVACID) 30 MG capsule Take 1 capsule by mouth 2 (Two) Times a Day. 180 capsule 3    Lantus SoloStar 100 UNIT/ML injection pen Inject 47 Units under the skin into the appropriate area as directed Daily As Needed (ONLY FOR INSULIN PUMP FAILURE). Only for insulin pump failure      levothyroxine (SYNTHROID, LEVOTHROID) 125 MCG tablet Take 1 tablet by mouth Daily.      linaclotide (Linzess) 290 MCG capsule capsule Take 1 capsule by mouth Every Morning Before Breakfast. 90 capsule 3    ondansetron (ZOFRAN) 8 MG tablet Take 1 tablet by mouth Every 4 (Four) to 6 (Six) Hours As Needed for Nausea or Vomiting.      ondansetron ODT (ZOFRAN-ODT) 4 MG disintegrating tablet Take 1 tablet by mouth Every 6 (Six) Hours As Needed for Nausea or Vomiting. 20 tablet 0    Pancrelipase, Lip-Prot-Amyl, (CREON) 45266-222825 units capsule delayed-release particles capsule Take 2 capsules by mouth 2 (Two) Times a Day. 3 CAPS TID WITH MEALS AND 2 CAPS BID WITH SNACKS      polyethylene glycol (MiraLax) 17 GM/SCOOP powder Take 17 g by mouth 2 (Two) Times a Day. 1020 g 5    prochlorperazine (COMPAZINE) 10 MG tablet Take 1 tablet by mouth Every 8 (Eight) Hours As Needed for Nausea or Vomiting. 60 tablet 0    promethazine (PHENERGAN) 25 MG tablet Take 1 tablet by mouth Every 6 (Six) Hours As Needed.      QUEtiapine (SEROquel) 100 MG tablet Take 1 tablet by mouth every night at bedtime.      Qulipta 60 MG tablet Take 1 tablet by mouth Every Night.      riFAXIMin (XIFAXAN) 550 MG tablet Take 1 tablet by mouth Every 12 (Twelve) Hours. 180 tablet 3    traZODone (DESYREL) 50 MG tablet Take 1 tablet by mouth Every Night.      Ubrelvy 100 MG tablet Take 1 tablet by mouth 1 (One) Time As Needed (Migraine). 30 tablet 0    valsartan-hydrochlorothiazide (Diovan HCT) 160-12.5 MG per tablet Take 1 tablet by mouth Daily. 90 tablet 5    NovoLOG 100 UNIT/ML injection Inject  under the skin into the  appropriate area as directed. Type of Insulin: Lispro 100 units/ml  Type of Pump:Omnipod  Bolus: 1u 5-7g max bolus 2.5units/hr  Basal: 1286-7644 1.5u/hr max basal 10units/hr  Correction factor: 40mg/dl  Insulin to carbohydrate ratio: 7-5 g of carb  Next fill date: 10/21/2024    Date of last site change: 10/18/2024  Location of pod: right upper arm    Frequency of refill: 2-3 days  Last refill date: 10/18/2024    Prescriber: Dr. Chelsea Silver  Phone number: [phone] (Patient not taking: Reported on 5/7/2025)       Current Facility-Administered Medications   Medication Dose Route Frequency Provider Last Rate Last Admin    lidocaine (XYLOCAINE) 2 % jelly   Urethral Once Kaye Coker APRN         Current Outpatient Medications on File Prior to Visit   Medication Sig    carvedilol (COREG) 12.5 MG tablet Take 1 tablet by mouth 2 (Two) Times a Day With Meals.    cetirizine (zyrTEC) 10 MG tablet Take 1 tablet by mouth Daily.    docusate sodium (COLACE) 100 MG capsule Take 1 capsule by mouth 2 (Two) Times a Day.    E-400 180 MG (400 UNIT) capsule capsule Take 2 capsules by mouth Daily.    Enulose 10 GM/15ML solution solution (encephalopathy) Take 45 mL by mouth As Needed.    glucagon (GLUCAGEN) 1 MG injection Inject 1 mg into the appropriate muscle as directed by prescriber Every 15 (Fifteen) Minutes As Needed for Low Blood Sugar (per Glucommander).    hydrocortisone (CORTEF) 5 MG tablet     hydrOXYzine (ATARAX) 50 MG tablet Take 1 tablet by mouth Every Night.    Insulin Disposable Pump (Omnipod 5 G6 Pods, Gen 5,) misc     Insulin Lispro (humaLOG) 100 UNIT/ML injection USE IN insulin pump approximately 100 UNITS DAILY    lansoprazole (PREVACID) 30 MG capsule Take 1 capsule by mouth 2 (Two) Times a Day.    Lantus SoloStar 100 UNIT/ML injection pen Inject 47 Units under the skin into the appropriate area as directed Daily As Needed (ONLY FOR INSULIN PUMP FAILURE). Only for insulin pump failure     levothyroxine (SYNTHROID, LEVOTHROID) 125 MCG tablet Take 1 tablet by mouth Daily.    linaclotide (Linzess) 290 MCG capsule capsule Take 1 capsule by mouth Every Morning Before Breakfast.    ondansetron (ZOFRAN) 8 MG tablet Take 1 tablet by mouth Every 4 (Four) to 6 (Six) Hours As Needed for Nausea or Vomiting.    ondansetron ODT (ZOFRAN-ODT) 4 MG disintegrating tablet Take 1 tablet by mouth Every 6 (Six) Hours As Needed for Nausea or Vomiting.    Pancrelipase, Lip-Prot-Amyl, (CREON) 75865-651170 units capsule delayed-release particles capsule Take 2 capsules by mouth 2 (Two) Times a Day. 3 CAPS TID WITH MEALS AND 2 CAPS BID WITH SNACKS    polyethylene glycol (MiraLax) 17 GM/SCOOP powder Take 17 g by mouth 2 (Two) Times a Day.    prochlorperazine (COMPAZINE) 10 MG tablet Take 1 tablet by mouth Every 8 (Eight) Hours As Needed for Nausea or Vomiting.    promethazine (PHENERGAN) 25 MG tablet Take 1 tablet by mouth Every 6 (Six) Hours As Needed.    QUEtiapine (SEROquel) 100 MG tablet Take 1 tablet by mouth every night at bedtime.    Qulipta 60 MG tablet Take 1 tablet by mouth Every Night.    riFAXIMin (XIFAXAN) 550 MG tablet Take 1 tablet by mouth Every 12 (Twelve) Hours.    traZODone (DESYREL) 50 MG tablet Take 1 tablet by mouth Every Night.    Ubrelvy 100 MG tablet Take 1 tablet by mouth 1 (One) Time As Needed (Migraine).    valsartan-hydrochlorothiazide (Diovan HCT) 160-12.5 MG per tablet Take 1 tablet by mouth Daily.    NovoLOG 100 UNIT/ML injection Inject  under the skin into the appropriate area as directed. Type of Insulin: Lispro 100 units/ml  Type of Pump:Omnipod  Bolus: 1u 5-7g max bolus 2.5units/hr  Basal: 9423-3967 1.5u/hr max basal 10units/hr  Correction factor: 40mg/dl  Insulin to carbohydrate ratio: 7-5 g of carb  Next fill date: 10/21/2024    Date of last site change: 10/18/2024  Location of pod: right upper arm    Frequency of refill: 2-3 days  Last refill date: 10/18/2024    Prescriber: Dr. Chelsea Soriano  MARTA Silver  Phone number: [phone] (Patient not taking: Reported on 5/7/2025)     Current Facility-Administered Medications on File Prior to Visit   Medication    lidocaine (XYLOCAINE) 2 % jelly     She is allergic to parathyroid hormone (recomb), romosozumab, teriparatide, tramadol, nifedipine, and vancomycin..    Compared to one year ago, the patient's physical   health is better.  Compared to one year ago, the patient's mental   health is better.    Recent Hospitalizations:  This patient has had a [facility] admission record on file within the last 365 days.  Current Medical Providers:  Patient Care Team:  Charleen Oconnell APRN as PCP - General (Nurse Practitioner)  Andrea Han MD as Consulting Physician (Gastroenterology)  Janine Morales APRN as Nurse Practitioner (Gastroenterology)    Outpatient Medications Prior to Visit   Medication Sig Dispense Refill    carvedilol (COREG) 12.5 MG tablet Take 1 tablet by mouth 2 (Two) Times a Day With Meals. 180 tablet 3    cetirizine (zyrTEC) 10 MG tablet Take 1 tablet by mouth Daily.      docusate sodium (COLACE) 100 MG capsule Take 1 capsule by mouth 2 (Two) Times a Day.      E-400 180 MG (400 UNIT) capsule capsule Take 2 capsules by mouth Daily.      Enulose 10 GM/15ML solution solution (encephalopathy) Take 45 mL by mouth As Needed.      glucagon (GLUCAGEN) 1 MG injection Inject 1 mg into the appropriate muscle as directed by prescriber Every 15 (Fifteen) Minutes As Needed for Low Blood Sugar (per Glucommander).      hydrocortisone (CORTEF) 5 MG tablet       hydrOXYzine (ATARAX) 50 MG tablet Take 1 tablet by mouth Every Night.      Insulin Disposable Pump (Omnipod 5 G6 Pods, Gen 5,) misc       Insulin Lispro (humaLOG) 100 UNIT/ML injection USE IN insulin pump approximately 100 UNITS DAILY      lansoprazole (PREVACID) 30 MG capsule Take 1 capsule by mouth 2 (Two) Times a Day. 180 capsule 3    Lantus SoloStar 100 UNIT/ML injection pen Inject  47 Units under the skin into the appropriate area as directed Daily As Needed (ONLY FOR INSULIN PUMP FAILURE). Only for insulin pump failure      levothyroxine (SYNTHROID, LEVOTHROID) 125 MCG tablet Take 1 tablet by mouth Daily.      linaclotide (Linzess) 290 MCG capsule capsule Take 1 capsule by mouth Every Morning Before Breakfast. 90 capsule 3    ondansetron (ZOFRAN) 8 MG tablet Take 1 tablet by mouth Every 4 (Four) to 6 (Six) Hours As Needed for Nausea or Vomiting.      ondansetron ODT (ZOFRAN-ODT) 4 MG disintegrating tablet Take 1 tablet by mouth Every 6 (Six) Hours As Needed for Nausea or Vomiting. 20 tablet 0    Pancrelipase, Lip-Prot-Amyl, (CREON) 86219-828942 units capsule delayed-release particles capsule Take 2 capsules by mouth 2 (Two) Times a Day. 3 CAPS TID WITH MEALS AND 2 CAPS BID WITH SNACKS      polyethylene glycol (MiraLax) 17 GM/SCOOP powder Take 17 g by mouth 2 (Two) Times a Day. 1020 g 5    prochlorperazine (COMPAZINE) 10 MG tablet Take 1 tablet by mouth Every 8 (Eight) Hours As Needed for Nausea or Vomiting. 60 tablet 0    promethazine (PHENERGAN) 25 MG tablet Take 1 tablet by mouth Every 6 (Six) Hours As Needed.      QUEtiapine (SEROquel) 100 MG tablet Take 1 tablet by mouth every night at bedtime.      Qulipta 60 MG tablet Take 1 tablet by mouth Every Night.      riFAXIMin (XIFAXAN) 550 MG tablet Take 1 tablet by mouth Every 12 (Twelve) Hours. 180 tablet 3    traZODone (DESYREL) 50 MG tablet Take 1 tablet by mouth Every Night.      Ubrelvy 100 MG tablet Take 1 tablet by mouth 1 (One) Time As Needed (Migraine). 30 tablet 0    valsartan-hydrochlorothiazide (Diovan HCT) 160-12.5 MG per tablet Take 1 tablet by mouth Daily. 90 tablet 5    NovoLOG 100 UNIT/ML injection Inject  under the skin into the appropriate area as directed. Type of Insulin: Lispro 100 units/ml  Type of Pump:Omnipod  Bolus: 1u 5-7g max bolus 2.5units/hr  Basal: 8654-2642 1.5u/hr max basal 10units/hr  Correction factor:  40mg/dl  Insulin to carbohydrate ratio: 7-5 g of carb  Next fill date: 10/21/2024    Date of last site change: 10/18/2024  Location of pod: right upper arm    Frequency of refill: 2-3 days  Last refill date: 10/18/2024    Prescriber: Dr. Chelsea Silver  Phone number: [phone] (Patient not taking: Reported on 5/7/2025)       Facility-Administered Medications Prior to Visit   Medication Dose Route Frequency Provider Last Rate Last Admin    lidocaine (XYLOCAINE) 2 % jelly   Urethral Once Kaye Coker APRN         No opioid medication identified on active medication list. I have reviewed chart for other potential  high risk medication/s and harmful drug interactions in the elderly.      Aspirin is not on active medication list.  Aspirin use is not indicated based on review of current medical condition/s. Risk of harm outweighs potential benefits.  .    Patient Active Problem List   Diagnosis    Abnormal liver function tests    Obesity with body mass index 30 or greater    Type 2 diabetes mellitus with diabetic autonomic (poly)neuropathy    Hypothyroidism    Adrenal insufficiency    Hypertensive disorder    Deep venous thrombosis    Degeneration of lumbar intervertebral disc    Major depressive disorder    Displacement of lumbar intervertebral disc without myelopathy    Gastroparesis    Gastrostomy complication    H/O: hematuria    Hashimoto's thyroiditis    Hyperglycemia    Incomplete defecation    Increased frequency of urination    Intestinal autonomic neuropathy    Small intestinal bacterial overgrowth (SIBO)    Iron deficiency anemia secondary to inadequate dietary iron intake    Irritable bowel syndrome    Long term current use of systemic steroids    Migraine    Lumbosacral spondylosis without myelopathy    Methicillin resistant Staphylococcus aureus infection    Myalgia    Post-operative nausea and vomiting    Nonalcoholic steatohepatitis (WELDON)    Noninfectious gastroenteritis    HI  "(obstructive sleep apnea)    Osteoporosis    Disorder of bladder    Other transplanted organ and tissue status    Pelvic floor relaxation    Pancreas divisum    Postoperative pain    Postpancreatectomy hyperglycemia    Prolapse of anterior vaginal wall    Recurrent urinary tract infection    Female stress incontinence    Ulcerative colitis    Acute cystitis    Admission for fitting and adjustment of vascular catheter    Allergic rhinitis    Anemia    Anxiety and depression    Arthritis    Asthma    Blood coagulation disorder    Chronic fatigue    Chronic interstitial cystitis    Chronic pain disorder    Chronic pancreatitis    Cobalamin deficiency    Complication associated with vascular device    Constipation    Drug-induced osteoporosis    Dysmorphism    Fatty liver    Gastroesophageal reflux disease    Generalized anxiety disorder    Hyperlipidemia    Inflammation of sacroiliac joint    Intolerance of continuous positive airway pressure (CPAP) ventilation    Liver cirrhosis secondary to WELDON    Lumbar spondylosis    Sciatica    Seasonal allergies    Spleen absent    Tear of triangular fibrocartilage    Type 1 diabetes mellitus without complication    Urinary incontinence    Vitamin D deficiency    Portal hypertension    Family history of breast cancer    Dyspnea    Epigastric pain    Influenza B    History of insertion of central venous access port    Fever of unknown origin    Over weight    Sepsis    Spinal stenosis, lumbar region, without neurogenic claudication     Advance Care Planning Advance Directive is on file.  ACP discussion was held with the patient during this visit. Patient has an advance directive in EMR which is still valid.             Objective   Vitals:    05/07/25 0738   BP: 134/86   BP Location: Left arm   Patient Position: Sitting   Cuff Size: Large Adult   Pulse: 74   SpO2: 97%   Weight: 84.4 kg (186 lb)   Height: 170.2 cm (67\")   PainSc: 3    PainLoc: Back       Estimated body mass index " "is 29.13 kg/m² as calculated from the following:    Height as of this encounter: 170.2 cm (67\").    Weight as of this encounter: 84.4 kg (186 lb).               Does the patient have evidence of cognitive impairment? No                                                                                                Health  Risk Assessment    Smoking Status:  Social History     Tobacco Use   Smoking Status Never    Passive exposure: Never   Smokeless Tobacco Never     Alcohol Consumption:  Social History     Substance and Sexual Activity   Alcohol Use Never       Fall Risk Screen  STEADI Fall Risk Assessment was completed, and patient is at MODERATE risk for falls. Assessment completed on:2025    Depression Screening   Little interest or pleasure in doing things? Not at all   Feeling down, depressed, or hopeless? Not at all   PHQ-2 Total Score 0      Health Habits and Functional and Cognitive Screenin/7/2025     7:00 AM   Functional & Cognitive Status   Do you have difficulty preparing food and eating? No   Do you have difficulty bathing yourself, getting dressed or grooming yourself? No   Do you have difficulty using the toilet? No   Do you have difficulty moving around from place to place? No   Do you have trouble with steps or getting out of a bed or a chair? No   Current Diet Other   Dental Exam Not up to date   Eye Exam Not up to date   Exercise (times per week) 3 times per week   Current Exercises Include Walking;House Cleaning   Do you need help using the phone?  No   Are you deaf or do you have serious difficulty hearing?  No   Do you need help to go to places out of walking distance? No   Do you need help shopping? No   Do you need help preparing meals?  No   Do you need help with housework?  No   Do you need help with laundry? No   Do you need help taking your medications? No   Do you need help managing money? No   Do you ever drive or ride in a car without wearing a seat belt? No   Have you felt " unusual stress, anger or loneliness in the last month? No   Who do you live with? Child   If you need help, do you have trouble finding someone available to you? No   Have you been bothered in the last four weeks by sexual problems? No   Do you have difficulty concentrating, remembering or making decisions? No           Age-appropriate Screening Schedule:  Refer to the list below for future screening recommendations based on patient's age, sex and/or medical conditions. Orders for these recommended tests are listed in the plan section. The patient has been provided with a written plan.    Health Maintenance List  Health Maintenance   Topic Date Due    DIABETIC FOOT EXAM  Never done    URINE MICROALBUMIN-CREATININE RATIO (uACR)  Never done    ANNUAL WELLNESS VISIT  09/11/2024    LIPID PANEL  02/06/2025    HEMOGLOBIN A1C  07/09/2025    Hepatitis B (1 of 3 - 19+ 3-dose series) 05/21/2025 (Originally 7/22/1994)    COVID-19 Vaccine (5 - 2024-25 season) 05/21/2025 (Originally 9/1/2024)    TDAP/TD VACCINES (1 - Tdap) 05/21/2025 (Originally 7/22/1994)    Pneumococcal Vaccine 0-49 (1 of 2 - PCV) 06/06/2025 (Originally 7/22/1994)    DIABETIC EYE EXAM  06/25/2025 (Originally 5/10/2024)    INFLUENZA VACCINE  07/01/2025    MAMMOGRAM  10/04/2025    COLORECTAL CANCER SCREENING  04/24/2029    HEPATITIS C SCREENING  Completed                                                                                                                                                CMS Preventative Services Quick Reference  Risk Factors Identified During Encounter  Fall Risk-High or Moderate: Discussed Fall Prevention in the home    The above risks/problems have been discussed with the patient.  Pertinent information has been shared with the patient in the After Visit Summary.  An After Visit Summary and PPPS were made available to the patient.    Follow Up:   Next Medicare Wellness visit to be scheduled in 1 year.         Additional E&M Note  "during same encounter follows:  Patient has additional, significant, and separately identifiable condition(s)/problem(s) that require work above and beyond the Medicare Wellness Visit     Chief Complaint  Establish Care, Annual Exam, and Medicare Wellness-subsequent    Subjective   Patient is a 49-year-old female who presents today to establish care.  Previous PCP was Dr. Justin Daniels.  She is due annual physical exam, to be done in office today. She is seeing specialist who manage most of her conditions. PCP will be managing migraines and insomnia.     Migraines- she is taking daily Qullitpa which has improved her number of migraine days. Previously she was having daily headache that would often progress to migraine. Now she reports 1 migraine every 4-5 months. She also has Ubrelvy for breakthrough.     Insomnia- she is on trazodone and Seroquel nightly for this. Reports she gets 7-9 hours of sleep on a good night. No adverse effects of medication.     MAMMO- order placed today    PAP- pt had a hysterectomy over 15 years ago for non-cancerous reasons. She is starting to have vaginal dryness and pain, will refer to GYN.     HTN/HLD- Pt is being treated by  [facility] for management of this. BP in office is stable today. No complaints with medications.     DM type 2, hypothyroidism, dennis's disease- pt is being treated by [facility] for management. She had pancreas removed 2019. Last endo note from 1/9/25, \"Ms. Gan has continued using Omnipod 5 with DexCom CGMS. Data over last 2 weeks shows a mean glucose of 143 mg/dL +/- 34 mg/dL, median of 136 mg/dL, and a range of 56 to 324 mg/dL. Time in range was 88% and Time > 250 mg/dL was 1%. There was no significant time in hypoglycemia range. Total daily insulin dose was 48.5 Units with 54% basal insulin and 46% bolus insulin. Her insulin pump is currently set at basal rate of 1.5 Units / Hour (when not in auto-mode. The bolus settings are - 1 Unit for each 6 Grams " "of carbohydrate for breakfast and lunch, and 1 Unit for each 5 Grams of Carbohydrate for supper. The sensitivity factor is 40 mg/dL with target glucose at 120 mg/dL. AIT was 3 Hours. She reported some problems with sensor and pods off and on, but were infrequent. She has ongoing problems with gastroparesis and is followed by Dr. Riddle. She has been followed at  for her liver cirrhosis\". Denies any neuropathy of her feet.     She inquires about restarting Ozempic for both glucose control and to help with weight loss. She has been on this in the past and had good results. States GI does not feel it will interfere with her gastroparesis.     WELDON, Cirrhosis, Encephalopathy, Crohn's constipation, gastroparesis. -  Pt is seeing GI and specialist at .     She is established with pain management for back pain, hx of discectomy in January with neurosurgery.     Patient is due labs. Orders placed at today's visit. Discussed with patient that these are fasting labs.         Yen is also being seen today for additional medical problem/s.    Review of Systems   Constitutional:         Trouble losing weight   All other systems reviewed and are negative.             Objective   Vital Signs:  /86 (BP Location: Left arm, Patient Position: Sitting, Cuff Size: Large Adult)   Pulse 74   Ht 170.2 cm (67\")   Wt 84.4 kg (186 lb)   SpO2 97%   BMI 29.13 kg/m²   Physical Exam  Vitals reviewed.   Constitutional:       General: She is not in acute distress.     Appearance: She is not ill-appearing.   HENT:      Head: Normocephalic and atraumatic.   Eyes:      Conjunctiva/sclera: Conjunctivae normal.   Cardiovascular:      Rate and Rhythm: Normal rate and regular rhythm.      Heart sounds: Normal heart sounds.   Pulmonary:      Effort: Pulmonary effort is normal.      Breath sounds: Normal breath sounds.   Musculoskeletal:      Cervical back: Normal range of motion.   Neurological:      Mental Status: She is alert and oriented " to person, place, and time.   Psychiatric:         Mood and Affect: Mood normal.         Behavior: Behavior normal.         Thought Content: Thought content normal.         Judgment: Judgment normal.         The following data was reviewed by: LAZARO Rock on 05/07/2025:  Data reviewed : Consultant notes endo, cardio, GI  Common labs          3/15/2025    04:45 3/15/2025    08:51 3/15/2025    14:25 3/16/2025    04:00 3/19/2025    22:47   Common Labs   Glucose 118    126  115    BUN 11    7  11    Creatinine 0.64    0.67  0.77    Sodium 144    142  134    Potassium 4.1    3.9  3.9    Chloride 109    104  98    Calcium 8.6    8.8  9.3    Albumin    3.6  4.1    Total Bilirubin    0.4  1.4    Alkaline Phosphatase    94  153    AST (SGOT)    17  42    ALT (SGPT)    15  33    WBC 17.92  18.39   12.58  16.58    Hemoglobin 6.8  7.0  8.7  8.0  11.0    Hematocrit 21.2  22.2  27.4  25.6  34.3    Platelets 244  269   254  512           Assessment and Plan Additional age appropriate preventative wellness advice topics were discussed during today's preventative wellness exam(some topics already addressed during AWV portion of the note above):    Physical Activity: Advised cardiovascular activity 150 minutes per week as tolerated. (example brisk walk for 30 minutes, 5 days a week).     Nutrition: Discussed nutrition plan with patient. Information shared in after visit summary. Goal is for a well balanced diet to enhance overall health.     Healthy Weight: Discussed current and goal BMI with patient. Steps to attain this goal discussed. Information shared in after visit summary.     Injury Prevention Discussion:  Information shared in after visit summary.           Medicare annual wellness visit, subsequent  Care gaps addressed  Orders:    Comprehensive Metabolic Panel; Future    CBC & Differential; Future    Lipid Panel; Future    TSH+Free T4; Future    Hemoglobin A1c; Future    Microalbumin / Creatinine Urine Ratio -  Urine, Clean Catch; Future    Mammo Screening Digital Tomosynthesis Bilateral With CAD; Future    Hypothyroidism due to Hashimoto thyroiditis  Continue to see endo for management  Orders:    TSH+Free T4; Future    Intractable migraine without status migrainosus, unspecified migraine type  Headaches are stable.    Plan:  Continue same medication/s without change.     Discussed medication dosage, use, side effects, and goals of treatment in detail.    Discussed monitoring symptoms and use of quick-relief medications and maintenance medication.    General Treatment Goals:   symptom prevention  minimize work absence  minimizing limitation in activity  prevention of exacerbations  decrease use of ER/inpatient care  minimization of adverse effects of treatment    Followup in 6 months         Mixed hyperlipidemia   Lipid abnormalities are stable    Plan:  Continue same medication/s without change.      Discussed medication dosage, use, side effects, and goals of treatment in detail.    Counseled patient on lifestyle modifications to help control hyperlipidemia.     Patient Treatment Goals:   LDL goal is less than 70    Followup in 6 months.    Orders:    Lipid Panel; Future    Hypertension, unspecified type  Hypertension is stable and controlled  Continue current treatment regimen.  Blood pressure will be reassessed in 6 months.    Orders:    Comprehensive Metabolic Panel; Future    Lipid Panel; Future    Type 2 diabetes mellitus with diabetic autonomic neuropathy, with long-term current use of insulin  Diabetes is stable.   Continue current treatment regimen.  Continue to see endo for management  Diabetes will be reassessed in 3 months  Will assess A1c results and can discuss ozempic as requested  Orders:    Hemoglobin A1c; Future    Microalbumin / Creatinine Urine Ratio - Urine, Clean Catch; Future    Encounter for screening mammogram for malignant neoplasm of breast    Orders:    Mammo Screening Digital Tomosynthesis  Bilateral With CAD; Future    Encounter to establish care  Medical hx and medications reviewed with patient  Orders:    Ambulatory Referral to Obstetrics / Gynecology    Vaginal atrophy    Orders:    Ambulatory Referral to Obstetrics / Gynecology    Overweight with body mass index (BMI) of 29 to 29.9 in adult    BMI is >= 25 and <30. (Overweight) The following options were offered after discussion;: exercise counseling/recommendations, nutrition counseling/recommendations, and information on healthy weight added to patient's after visit summary.She inquires about restarting Ozempic as this helped with her weight and glucose control in the past, will assess labs prior to decision to initiate.           Follow Up   Return in about 1 month (around 6/7/2025) for Next scheduled follow up.  Patient was given instructions and counseling regarding her condition or for health maintenance advice. Please see specific information pulled into the AVS if appropriate.

## 2025-05-07 NOTE — TELEPHONE ENCOUNTER
Resent Linzess 290 mcg to pharmacy and contacted pharmacy to confirm.  They have received prescription, it is in queue.  Patient notified via telephone.  Sandra

## 2025-05-07 NOTE — ASSESSMENT & PLAN NOTE
Headaches are stable.    Plan:  Continue same medication/s without change.     Discussed medication dosage, use, side effects, and goals of treatment in detail.    Discussed monitoring symptoms and use of quick-relief medications and maintenance medication.    General Treatment Goals:   symptom prevention  minimize work absence  minimizing limitation in activity  prevention of exacerbations  decrease use of ER/inpatient care  minimization of adverse effects of treatment    Followup in 6 months

## 2025-05-07 NOTE — ASSESSMENT & PLAN NOTE
Diabetes is stable.   Continue current treatment regimen.  Continue to see endo for management  Diabetes will be reassessed in 3 months  Will assess A1c results and can discuss ozempic as requested  Orders:    Hemoglobin A1c; Future    Microalbumin / Creatinine Urine Ratio - Urine, Clean Catch; Future

## 2025-05-09 ENCOUNTER — PATIENT ROUNDING (BHMG ONLY) (OUTPATIENT)
Dept: FAMILY MEDICINE CLINIC | Facility: CLINIC | Age: 50
End: 2025-05-09
Payer: MEDICARE

## 2025-05-09 ENCOUNTER — RESULTS FOLLOW-UP (OUTPATIENT)
Dept: LAB | Facility: HOSPITAL | Age: 50
End: 2025-05-09
Payer: MEDICARE

## 2025-05-09 DIAGNOSIS — E11.65 TYPE 2 DIABETES MELLITUS WITH HYPERGLYCEMIA, WITH LONG-TERM CURRENT USE OF INSULIN: Primary | ICD-10-CM

## 2025-05-09 DIAGNOSIS — I25.119 ATHEROSCLEROSIS OF CORONARY ARTERY OF NATIVE HEART WITH ANGINA PECTORIS, UNSPECIFIED VESSEL OR LESION TYPE: ICD-10-CM

## 2025-05-09 DIAGNOSIS — Z79.4 TYPE 2 DIABETES MELLITUS WITH HYPERGLYCEMIA, WITH LONG-TERM CURRENT USE OF INSULIN: Primary | ICD-10-CM

## 2025-05-09 RX ORDER — SEMAGLUTIDE 0.68 MG/ML
0.25 INJECTION, SOLUTION SUBCUTANEOUS WEEKLY
Qty: 1.5 ML | Refills: 0 | Status: SHIPPED | OUTPATIENT
Start: 2025-05-09 | End: 2025-05-31

## 2025-05-12 NOTE — TELEPHONE ENCOUNTER
Patient informed and voiced understanding of lab results. Patient informed that Ozempic was sent to pharmacy. Advised her to contact us if there is any issues with her insurance covering it.

## 2025-05-28 DIAGNOSIS — K76.82 HEPATIC ENCEPHALOPATHY: Primary | ICD-10-CM

## 2025-06-04 ENCOUNTER — HOSPITAL ENCOUNTER (OUTPATIENT)
Dept: MAMMOGRAPHY | Facility: HOSPITAL | Age: 50
Discharge: HOME | End: 2025-06-04
Payer: MEDICARE

## 2025-06-04 DIAGNOSIS — Z00.00 MEDICARE ANNUAL WELLNESS VISIT, SUBSEQUENT: ICD-10-CM

## 2025-06-04 DIAGNOSIS — Z12.31 ENCOUNTER FOR SCREENING MAMMOGRAM FOR MALIGNANT NEOPLASM OF BREAST: ICD-10-CM

## 2025-06-04 PROCEDURE — 77067 SCR MAMMO BI INCL CAD: CPT

## 2025-06-04 PROCEDURE — 77063 BREAST TOMOSYNTHESIS BI: CPT

## 2025-06-06 ENCOUNTER — OFFICE VISIT (OUTPATIENT)
Dept: FAMILY MEDICINE CLINIC | Facility: CLINIC | Age: 50
End: 2025-06-06
Payer: MEDICARE

## 2025-06-06 VITALS
OXYGEN SATURATION: 99 % | DIASTOLIC BLOOD PRESSURE: 73 MMHG | BODY MASS INDEX: 29.29 KG/M2 | SYSTOLIC BLOOD PRESSURE: 128 MMHG | WEIGHT: 186.6 LBS | HEIGHT: 67 IN | HEART RATE: 70 BPM

## 2025-06-06 DIAGNOSIS — Z79.4 TYPE 2 DIABETES MELLITUS WITH DIABETIC AUTONOMIC NEUROPATHY, WITH LONG-TERM CURRENT USE OF INSULIN: Primary | ICD-10-CM

## 2025-06-06 DIAGNOSIS — H69.91 ETD (EUSTACHIAN TUBE DYSFUNCTION), RIGHT: ICD-10-CM

## 2025-06-06 DIAGNOSIS — H61.22 EXCESSIVE CERUMEN IN EAR CANAL, LEFT: ICD-10-CM

## 2025-06-06 DIAGNOSIS — E11.43 TYPE 2 DIABETES MELLITUS WITH DIABETIC AUTONOMIC NEUROPATHY, WITH LONG-TERM CURRENT USE OF INSULIN: Primary | ICD-10-CM

## 2025-06-06 DIAGNOSIS — E66.3 OVERWEIGHT WITH BODY MASS INDEX (BMI) OF 29 TO 29.9 IN ADULT: ICD-10-CM

## 2025-06-06 RX ORDER — FLUTICASONE PROPIONATE 50 MCG
2 SPRAY, SUSPENSION (ML) NASAL DAILY
COMMUNITY

## 2025-06-06 RX ORDER — SEMAGLUTIDE 0.68 MG/ML
0.5 INJECTION, SOLUTION SUBCUTANEOUS WEEKLY
COMMUNITY
End: 2025-06-06 | Stop reason: SDUPTHER

## 2025-06-06 RX ORDER — SEMAGLUTIDE 0.68 MG/ML
0.5 INJECTION, SOLUTION SUBCUTANEOUS WEEKLY
Qty: 3 ML | Refills: 2 | Status: SHIPPED | OUTPATIENT
Start: 2025-06-06 | End: 2025-08-23

## 2025-06-06 NOTE — PROGRESS NOTES
Chief Complaint  Diabetes    SUBJECTIVE  Kayla Gan presents to [facility]    History of Present Illness  Patient is a 49 year old female who presents today for 1 month follow up on ozempic. She was started to help A1c/glucose control as well as help aid in weight loss. She sees endocrinology in July. She has nt had any issues with the medications besides mild nausea on injection day. She has chronic diarrhea so no major change in that, still does her daily bowel regimen as prescribed by GI. She has noticed appetite/food noise decrease. She is starting the gym today.     She alos c/o right ear fullness/popping mils ache that started yesterday. She takes zyrtec and flonase at home.     Past Medical History:   Diagnosis Date    Abnormal ECG     Adrenal insufficiency     Allergic Unsure    Foods, Ulram, Vancomycin    Anemia     Angina pectoris Unsure    Anxiety     Arthritis     Asthma Unsure    Vasquez esophagus Unsure    Bleeding disorder     Brain concussion     Cholelithiasis Unsure    Chronic constipation As a child    Chronic diarrhea Unsure    Cirrhosis     NO CURRENT S/S    Colon polyp     Crohn's disease     Depression     Diabetes mellitus     Fatty liver Unsure    Gastroparesis     GERD (gastroesophageal reflux disease)     GI (gastrointestinal bleed) 2009    History of MRSA infection     History of transfusion     Hyperlipidemia     Hypertension     Hypothyroidism     Injury of neck Unsure    Whiplash    Interstitial cystitis     Irritable bowel syndrome Unsure    Liver disease     Low back pain     Lumbosacral disc disease     Migraines     MRSA (methicillin resistant Staphylococcus aureus)     NO CURRENT ISSUES    Obesity     Osteopenia     Osteoporosis Unsure    Multiple broken bones, Osteopenia    Pancreatitis     NO CURRENT ISSUES    Pyelonephritis     Shortness of breath 2019    Sinusitis As a child    Sleep apnea     USES CPAP    Ulcerative colitis 2009     Urinary incontinence     Urinary tract infection     Interstitial Cystitis    Vaginal infection       Family History   Problem Relation Age of Onset    Anxiety disorder Mother     Hypothyroidism Mother     Breast cancer Mother     Colon polyps Mother     Arthritis Mother     Cancer Mother         Breast    Depression Mother     Thyroid disease Mother     Heart disease Father     Hypothyroidism Father     Anxiety disorder Father     Depression Father     Hypertension Father     Kidney disease Father     Thyroid disease Father     Anemia Father     Depression Brother     Hypertension Brother     Colon cancer Maternal Grandfather     Alcohol abuse Paternal Grandfather     Heart disease Paternal Grandfather     Malig Hyperthermia Neg Hx     Crohn's disease Neg Hx     Irritable bowel syndrome Neg Hx     Ulcerative colitis Neg Hx       Past Surgical History:   Procedure Laterality Date    ABDOMINAL SURGERY  2019    ABSCESS DRAINAGE  12/24/2019    Fluoroscopically guided abscess drainage, [facility]    ADENOIDECTOMY      BACK SURGERY      L4 L5    BLADDER SURGERY      CARDIAC CATHETERIZATION  2019    CHOLECYSTECTOMY N/A     COLONOSCOPY  04/24/2019    NBIH, 3 mm polyp    CYSTOSCOPY      DILATATION AND CURETTAGE      ENDOMETRIAL ABLATION      ENDOSCOPY N/A 04/24/2019    Normal    ENTEROSCOPY SMALL BOWEL      EPIDURAL BLOCK      ERCP  2019    FLEXIBLE SIGMOIDOSCOPY  Unsure    GASTRIC STIMULATOR IMPLANT SURGERY  03/18/2025    IN PLACE NOW, PT DOES NOT HAVE ANY REMOTE FOR THIS DEVICE.    GASTROJEJUNOSTOMY W/ JEJUNOSTOMY TUBE      removed    GASTROSTOMY  2019    No longer have    HYSTERECTOMY      LIVER BIOPSY  2022    LUMBAR LAMINECTOMY Right 01/24/2025    Procedure: MINIMALLY INVASIVE LUMBAR LAMINECTOMY WITH DISCECTOMY, RIGHT APPROACH, LUMBAR THREE-LUMBAR FOUR  OPERATION ON THE LOWER BACK TO SHAVE OFF BONE AND DISC COMPRESSING THE NERVES TO THE LEGS.;  Surgeon: Joe Langley MD;  Location: [facility] MAIN OR;  Service:  Neurosurgery;  Laterality: Right;    LYMPH NODE BIOPSY      BENIGN    OOPHORECTOMY      OTHER SURGICAL HISTORY      gastric stimulator battery replaced    PANCREATECTOMY  12/2019    TPIAT    PELVIC FLOOR REPAIR      SINUS SURGERY      SPLENECTOMY      TONSILLECTOMY      TOTAL ABDOMINAL HYSTERECTOMY WITH SALPINGO OOPHORECTOMY      UPPER GASTROINTESTINAL ENDOSCOPY  Multiple times    VENOUS ACCESS DEVICE (PORT) INSERTION      REMOVED        Current Outpatient Medications:     carvedilol (COREG) 12.5 MG tablet, Take 1 tablet by mouth 2 (Two) Times a Day With Meals., Disp: 180 tablet, Rfl: 3    cetirizine (zyrTEC) 10 MG tablet, Take 1 tablet by mouth Daily., Disp: , Rfl:     docusate sodium (COLACE) 100 MG capsule, Take 1 capsule by mouth 2 (Two) Times a Day., Disp: , Rfl:     E-400 180 MG (400 UNIT) capsule capsule, Take 2 capsules by mouth Daily., Disp: , Rfl:     Enulose 10 GM/15ML solution solution (encephalopathy), Take 45 mL by mouth As Needed., Disp: , Rfl:     fluticasone (FLONASE) 50 MCG/ACT nasal spray, Administer 2 sprays into the nostril(s) as directed by provider Daily., Disp: , Rfl:     glucagon (GLUCAGEN) 1 MG injection, Inject 1 mg into the appropriate muscle as directed by prescriber Every 15 (Fifteen) Minutes As Needed for Low Blood Sugar (per Glucommander)., Disp: , Rfl:     hydrocortisone (CORTEF) 5 MG tablet, , Disp: , Rfl:     hydrOXYzine (ATARAX) 50 MG tablet, Take 1 tablet by mouth Every Night., Disp: , Rfl:     Insulin Disposable Pump (Omnipod 5 G6 Pods, Gen 5,) misc, , Disp: , Rfl:     Insulin Lispro (humaLOG) 100 UNIT/ML injection, USE IN insulin pump approximately 100 UNITS DAILY, Disp: , Rfl:     lansoprazole (PREVACID) 30 MG capsule, Take 1 capsule by mouth 2 (Two) Times a Day., Disp: 180 capsule, Rfl: 3    Lantus SoloStar 100 UNIT/ML injection pen, Inject 47 Units under the skin into the appropriate area as directed Daily As Needed (ONLY FOR INSULIN PUMP FAILURE). Only for insulin pump  failure, Disp: , Rfl:     levothyroxine (SYNTHROID, LEVOTHROID) 125 MCG tablet, Take 1 tablet by mouth Daily., Disp: , Rfl:     linaclotide (Linzess) 290 MCG capsule capsule, Take 1 capsule by mouth Every Morning Before Breakfast., Disp: 90 capsule, Rfl: 3    NovoLOG 100 UNIT/ML injection, Inject  under the skin into the appropriate area as directed. Type of Insulin: Lispro 100 units/ml Type of Pump:Omnipod Bolus: 1u 5-7g max bolus 2.5units/hr Basal: 8819-9152 1.5u/hr max basal 10units/hr Correction factor: 40mg/dl Insulin to carbohydrate ratio: 7-5 g of carb Next fill date: 10/21/2024  Date of last site change: 10/18/2024 Location of pod: right upper arm  Frequency of refill: 2-3 days Last refill date: 10/18/2024  Prescriber: Dr. Chelsea Silver Phone number: [phone], Disp: , Rfl:     ondansetron (ZOFRAN) 8 MG tablet, Take 1 tablet by mouth Every 4 (Four) to 6 (Six) Hours As Needed for Nausea or Vomiting., Disp: , Rfl:     ondansetron ODT (ZOFRAN-ODT) 4 MG disintegrating tablet, Take 1 tablet by mouth Every 6 (Six) Hours As Needed for Nausea or Vomiting., Disp: 20 tablet, Rfl: 0    Ozempic, 0.25 or 0.5 MG/DOSE, 2 MG/3ML solution pen-injector, Inject 0.5 mg under the skin into the appropriate area as directed 1 (One) Time Per Week for 12 doses., Disp: 3 mL, Rfl: 2    Pancrelipase, Lip-Prot-Amyl, (CREON) 35957-988187 units capsule delayed-release particles capsule, Take 2 capsules by mouth 2 (Two) Times a Day. 3 CAPS TID WITH MEALS AND 2 CAPS BID WITH SNACKS, Disp: , Rfl:     polyethylene glycol (MiraLax) 17 GM/SCOOP powder, Take 17 g by mouth 2 (Two) Times a Day., Disp: 1020 g, Rfl: 5    prochlorperazine (COMPAZINE) 10 MG tablet, Take 1 tablet by mouth Every 8 (Eight) Hours As Needed for Nausea or Vomiting., Disp: 60 tablet, Rfl: 0    promethazine (PHENERGAN) 25 MG tablet, Take 1 tablet by mouth Every 6 (Six) Hours As Needed., Disp: , Rfl:     QUEtiapine (SEROquel) 100 MG tablet, Take 1 tablet by  "mouth every night at bedtime., Disp: , Rfl:     Qulipta 60 MG tablet, Take 1 tablet by mouth Every Night., Disp: , Rfl:     riFAXIMin (XIFAXAN) 550 MG tablet, Take 1 tablet by mouth Every 12 (Twelve) Hours., Disp: 180 tablet, Rfl: 3    Solu-CORTEF 100 MG injection, , Disp: , Rfl:     traZODone (DESYREL) 100 MG tablet, Take 1 tablet by mouth Every Night., Disp: , Rfl:     Ubrelvy 100 MG tablet, Take 1 tablet by mouth 1 (One) Time As Needed (Migraine)., Disp: 30 tablet, Rfl: 0    valsartan-hydrochlorothiazide (Diovan HCT) 160-12.5 MG per tablet, Take 1 tablet by mouth Daily., Disp: 90 tablet, Rfl: 5    OBJECTIVE  Vital Signs:   /73 (BP Location: Left arm, Patient Position: Sitting, Cuff Size: Large Adult)   Pulse 70   Ht 170.2 cm (67.01\")   Wt 84.6 kg (186 lb 9.6 oz)   SpO2 99%   BMI 29.22 kg/m²    Estimated body mass index is 29.22 kg/m² as calculated from the following:    Height as of this encounter: 170.2 cm (67.01\").    Weight as of this encounter: 84.6 kg (186 lb 9.6 oz).     Wt Readings from Last 3 Encounters:   06/06/25 84.6 kg (186 lb 9.6 oz)   05/17/25 82.6 kg (182 lb)   05/07/25 84.4 kg (186 lb)     BP Readings from Last 3 Encounters:   06/06/25 128/73   05/17/25 145/95   05/07/25 134/86       Physical Exam  Vitals reviewed.   Constitutional:       General: She is not in acute distress.     Appearance: She is not ill-appearing.   HENT:      Head: Normocephalic and atraumatic.      Right Ear: A middle ear effusion is present. Tympanic membrane is not injected, erythematous or bulging.      Left Ear: Tympanic membrane, ear canal and external ear normal. There is impacted cerumen.      Ears:      Comments: Left TM visualized post irrigation and normal  Eyes:      Conjunctiva/sclera: Conjunctivae normal.   Cardiovascular:      Rate and Rhythm: Normal rate and regular rhythm.      Heart sounds: Normal heart sounds.   Pulmonary:      Effort: Pulmonary effort is normal.      Breath sounds: Normal breath " sounds.   Musculoskeletal:      Cervical back: Normal range of motion.   Skin:     General: Skin is warm and dry.   Neurological:      Mental Status: She is alert and oriented to person, place, and time.   Psychiatric:         Mood and Affect: Mood normal.         Behavior: Behavior normal.         Thought Content: Thought content normal.         Judgment: Judgment normal.          Result Review    Common labs          3/16/2025    04:00 3/19/2025    22:47 5/7/2025    08:43 5/7/2025    09:13   Common Labs   Glucose 126  115  105     BUN 7  11  12     Creatinine 0.67  0.77  0.66     Sodium 142  134  140     Potassium 3.9  3.9  3.7     Chloride 104  98  102     Calcium 8.8  9.3  9.8     Albumin 3.6  4.1  4.3     Total Bilirubin 0.4  1.4  0.4     Alkaline Phosphatase 94  153  185     AST (SGOT) 17  42  64     ALT (SGPT) 15  33  81     WBC 12.58  16.58  8.55     Hemoglobin 8.0  11.0  13.7     Hematocrit 25.6  34.3  40.4     Platelets 254  512  443     Total Cholesterol   189     Triglycerides   149     HDL Cholesterol   47     LDL Cholesterol    116     Hemoglobin A1C   6.80     Microalbumin, Urine    <1.2        Mammo Screening Digital Tomosynthesis Bilateral With CAD  Result Date: 6/5/2025  No mammographic evidence of malignancy.  Recommend annual screening mammography.  BI-RADS ASSESSMENT: Category 1: Negative  Note: It has been reported that there is approximately a 15% false negative rate in mammography.  Therefore, management of a palpable abnormality should not be deferred because of a negative mammogram.  6/5/2025 8:52 AM by Rayna Canchola MD on Workstation: [id number]      CT Abdomen Pelvis With Contrast  Result Date: 3/20/2025  1.Blood products in the abdomen and pelvis as discussed above with the largest amount in the left upper quadrant adjacent to the stomach. This may be secondary to recent gastric stimulator replacement. 2.Fluid and gas pocket around a gastric stimulator in the right ventral abdominal wall. This  may reflect blood and air from recent replacement although infection is not excluded. 3.Status post gastric bypass. There may be some mild inflammatory change now noted in the hepatobiliary limb adjacent to the liver. Patient is status post cholecystectomy, and pneumobilia is unchanged. 4.Evidence of small bowel ileus and probably mild degree of constipation. No bowel obstruction. 5.Hysterectomy, splenectomy, and pancreatectomy. 6.Hepatic steatosis with some scalloping of the hepatic contours that could indicate cirrhosis. NOTIFICATION: Critical Value/emergent results were called by telephone at the time of interpretation on 3/20/2025 12:58 AM EDT to DWAIN PRESTON who verbally acknowledged these results. Electronically Signed: Saturnino Verdugo MD  3/20/2025 1:06 AM EDT  Workstation ID: [id number]    CT Abdomen Pelvis With Contrast  Result Date: 3/2/2025  Impression: 1. No acute CT findings in the abdomen. 2. Extensive surgical changes detailed above stable from prior. Nonspecific eccentric wall thickening in the distal Reuben limb near surgical anastomosis without surrounding inflammation or obstruction. 3. Hepatic steatosis. Morphologic changes of the liver concerning for chronic liver disease. Mild perigastric varices. 4. Mild to moderate formed colonic stool with fecalized distal small bowel. Findings could reflect slow bowel transit. 5. Other ancillary findings as above. Electronically Signed: Kalyan Otero MD  3/2/2025 4:08 PM EST  Workstation ID: [id number]    XR Chest 1 View  Result Date: 3/2/2025  Impression: No acute cardiopulmonary abnormality is identified. Electronically Signed: Morenita Hyatt  3/2/2025 1:35 PM EST  Workstation ID: [id number]        The above data has been reviewed by LAZARO Rock 06/06/2025 08:28 EDT.    Ear Cerumen Removal    Date/Time: 6/6/2025 9:27 AM    Performed by: Charleen Oconnell APRN  Authorized by: Charleen Oconnell APRN    Anesthesia:  Local Anesthetic: none  Location details:  left ear  Patient tolerance: patient tolerated the procedure well with no immediate complications  Procedure type: instrumentation, irrigation, curette   Sedation:  Patient sedated: no             Patient Care Team:  Charleen Oconnell APRN as PCP - General (Nurse Practitioner)  Andrea Han MD as Consulting Physician (Gastroenterology)  Janine Morales APRN as Nurse Practitioner (Gastroenterology)            ASSESSMENT & PLAN    Diagnoses and all orders for this visit:    1. Type 2 diabetes mellitus with diabetic autonomic neuropathy, with long-term current use of insulin (Primary)  -     Ozempic, 0.25 or 0.5 MG/DOSE, 2 MG/3ML solution pen-injector; Inject 0.5 mg under the skin into the appropriate area as directed 1 (One) Time Per Week for 12 doses.  Dispense: 3 mL; Refill: 2    2. Excessive cerumen in ear canal, left  -     Ear Cerumen Removal    3. ETD (Eustachian tube dysfunction), right  Comments:  continue zyrtec and flonase, states she has at home prednisone she may take, may try 1-2 days of low dose sudafed    4. Overweight with body mass index (BMI) of 29 to 29.9 in adult  Comments:  increase ozempic to 0.5 mg, work on diet and exercise, 3 month follow up         Tobacco Use: Low Risk  (6/6/2025)    Patient History     Smoking Tobacco Use: Never     Smokeless Tobacco Use: Never     Passive Exposure: Never       Follow Up     Return in about 3 months (around 9/6/2025) for Next scheduled follow up.      Patient was given instructions and counseling regarding her condition or for health maintenance advice. Please see specific information pulled into the AVS if appropriate.   I have reviewed information obtained and documented by others and I have confirmed the accuracy of this documented note.    LAZARO Rock

## 2025-07-02 ENCOUNTER — OFFICE VISIT (OUTPATIENT)
Dept: CARDIOLOGY | Facility: CLINIC | Age: 50
End: 2025-07-02
Payer: MEDICARE

## 2025-07-02 VITALS
HEART RATE: 78 BPM | SYSTOLIC BLOOD PRESSURE: 125 MMHG | DIASTOLIC BLOOD PRESSURE: 82 MMHG | BODY MASS INDEX: 29 KG/M2 | HEIGHT: 67 IN | WEIGHT: 184.8 LBS

## 2025-07-02 DIAGNOSIS — R55 POSTURAL DIZZINESS WITH PRESYNCOPE: ICD-10-CM

## 2025-07-02 DIAGNOSIS — I35.1 MILD AORTIC REGURGITATION: Primary | ICD-10-CM

## 2025-07-02 DIAGNOSIS — R42 POSTURAL DIZZINESS WITH PRESYNCOPE: ICD-10-CM

## 2025-07-02 PROBLEM — R50.9 FEVER OF UNKNOWN ORIGIN: Status: RESOLVED | Noted: 2024-06-19 | Resolved: 2025-07-02

## 2025-07-02 PROBLEM — K52.9 NONINFECTIOUS GASTROENTERITIS: Status: RESOLVED | Noted: 2022-04-24 | Resolved: 2025-07-02

## 2025-07-02 PROBLEM — J30.9 ALLERGIC RHINITIS: Status: RESOLVED | Noted: 2019-04-23 | Resolved: 2025-07-02

## 2025-07-02 PROBLEM — K76.82 HEPATIC ENCEPHALOPATHY: Status: ACTIVE | Noted: 2025-07-02

## 2025-07-02 PROBLEM — R73.9 HYPERGLYCEMIA: Status: RESOLVED | Noted: 2021-01-11 | Resolved: 2025-07-02

## 2025-07-02 PROBLEM — E27.40 ADRENAL INSUFFICIENCY: Status: RESOLVED | Noted: 2021-09-25 | Resolved: 2025-07-02

## 2025-07-02 PROBLEM — R79.89 ABNORMAL LIVER FUNCTION TESTS: Status: RESOLVED | Noted: 2019-04-24 | Resolved: 2025-07-02

## 2025-07-02 PROBLEM — M11.9: Status: ACTIVE | Noted: 2025-07-02

## 2025-07-02 PROBLEM — F41.9 ANXIETY AND DEPRESSION: Status: RESOLVED | Noted: 2019-04-23 | Resolved: 2025-07-02

## 2025-07-02 PROBLEM — Z45.2 ADMISSION FOR FITTING AND ADJUSTMENT OF VASCULAR CATHETER: Status: RESOLVED | Noted: 2021-08-31 | Resolved: 2025-07-02

## 2025-07-02 PROBLEM — F32.A ANXIETY AND DEPRESSION: Status: RESOLVED | Noted: 2019-04-23 | Resolved: 2025-07-02

## 2025-07-02 PROBLEM — Z95.828: Status: RESOLVED | Noted: 2024-06-04 | Resolved: 2025-07-02

## 2025-07-02 PROBLEM — Z87.448 H/O: HEMATURIA: Status: RESOLVED | Noted: 2019-07-18 | Resolved: 2025-07-02

## 2025-07-02 PROBLEM — M81.0 OSTEOPOROSIS: Status: RESOLVED | Noted: 2019-04-23 | Resolved: 2025-07-02

## 2025-07-02 PROBLEM — R10.13 EPIGASTRIC PAIN: Status: RESOLVED | Noted: 2024-02-27 | Resolved: 2025-07-02

## 2025-07-02 PROBLEM — M47.816 LUMBAR SPONDYLOSIS: Status: RESOLVED | Noted: 2018-12-12 | Resolved: 2025-07-02

## 2025-07-02 PROBLEM — M79.10 MYALGIA: Status: RESOLVED | Noted: 2022-04-26 | Resolved: 2025-07-02

## 2025-07-02 PROBLEM — J10.1 INFLUENZA B: Status: RESOLVED | Noted: 2024-02-27 | Resolved: 2025-07-02

## 2025-07-02 PROBLEM — A41.9 SEPSIS: Status: RESOLVED | Noted: 2024-10-19 | Resolved: 2025-07-02

## 2025-07-02 PROBLEM — R06.00 DYSPNEA: Status: RESOLVED | Noted: 2024-02-27 | Resolved: 2025-07-02

## 2025-07-02 PROBLEM — E11.43 TYPE 2 DIABETES MELLITUS WITH DIABETIC AUTONOMIC (POLY)NEUROPATHY: Status: RESOLVED | Noted: 2021-09-25 | Resolved: 2025-07-02

## 2025-07-02 PROBLEM — K21.00 GASTRO-ESOPHAGEAL REFLUX DISEASE WITH ESOPHAGITIS: Status: ACTIVE | Noted: 2025-07-02

## 2025-07-02 PROCEDURE — 99214 OFFICE O/P EST MOD 30 MIN: CPT | Performed by: NURSE PRACTITIONER

## 2025-07-02 PROCEDURE — 3074F SYST BP LT 130 MM HG: CPT | Performed by: NURSE PRACTITIONER

## 2025-07-02 PROCEDURE — 1159F MED LIST DOCD IN RCRD: CPT | Performed by: NURSE PRACTITIONER

## 2025-07-02 PROCEDURE — 3079F DIAST BP 80-89 MM HG: CPT | Performed by: NURSE PRACTITIONER

## 2025-07-02 PROCEDURE — 1160F RVW MEDS BY RX/DR IN RCRD: CPT | Performed by: NURSE PRACTITIONER

## 2025-07-02 NOTE — PROGRESS NOTES
Chief Complaint  Follow-up, Hypertension, and Hyperlipidemia    Subjective            History of Present Illness  Kayla Gan is a 49-year-old female patient who presents to the office today for follow-up.    History of Present Illness  The patient presents for a follow-up visit.    She saw Dr. Rodriguez on 04/01/2025, who ordered a stress test and echocardiogram. The stress test showed no significant blockage affecting the blood flow of the heart. The echocardiogram revealed normal heart chamber sizes, ruling out heart failure. However, there was mild thickening of the left ventricular wall, likely due to a history of high blood pressure, and mild aortic valve regurgitation, which is being monitored.    She reports frequent fainting episodes, believed to be related to [place]'s disease. These episodes are often preceded by lightheadedness and occur suddenly, with significant drops in blood pressure. The most recent fainting episode occurred a couple of months ago, and she estimates that such episodes occur three to four times a year. She experienced more episodes last year and at the beginning of this year.    She has been monitoring her blood pressure and heart rate at home and reports improvement since starting new medications prescribed by Dr. Rodriguez. Her current medication regimen includes carvedilol 12.5 mg twice daily and valsartan-hydrochlorothiazide 160-12.5 mg daily.    She also mentions a family history of heart problems and autoimmune diseases, with her father recently diagnosed with autonomic dysfunction.    FAMILY HISTORY  - Father: Autonomic dysfunction, history of heart problems, currently receiving treatment at [facility].        [facility]  Past Medical History:   Diagnosis Date    Abnormal ECG     Adrenal insufficiency     Allergic Unsure    Foods, Ulram, Vancomycin    Anemia     Angina pectoris Unsure    Anxiety     Arthritis     Asthma Unsure    Vasquez esophagus Unsure    Bleeding disorder      Brain concussion     Cholelithiasis Unsure    Chronic constipation As a child    Chronic diarrhea Unsure    Cirrhosis     NO CURRENT S/S    Colon polyp     Crohn's disease     Depression     Diabetes mellitus     Fatty liver Unsure    Gastroparesis     GERD (gastroesophageal reflux disease)     GI (gastrointestinal bleed) 2009    History of insertion of central venous access port 06/04/2024    History of MRSA infection     History of transfusion     Hyperlipidemia     Hypertension     Hypothyroidism     Influenza B 02/27/2024    Injury of neck Unsure    Whiplash    Interstitial cystitis     Irritable bowel syndrome Unsure    Liver disease     Low back pain     Lumbosacral disc disease     Migraines     MRSA (methicillin resistant Staphylococcus aureus)     NO CURRENT ISSUES    Obesity     Osteopenia     Osteoporosis Unsure    Multiple broken bones, Osteopenia    Pancreatitis     NO CURRENT ISSUES    Pyelonephritis     Sepsis 10/19/2024    Shortness of breath 2019    Sinusitis As a child    Sleep apnea     USES CPAP    Ulcerative colitis 2009    Urinary incontinence     Urinary tract infection     Interstitial Cystitis    Vaginal infection          ALLERGY  Allergies   Allergen Reactions    Parathyroid Hormone (Recomb) Other (See Comments) and Unknown - High Severity     Sent patient into kidney failure, heart stopped, in ICU for two days.    Romosozumab Hives     Other reaction(s): Hives    Other reaction(s): Hives   Other reaction(s): Hives    Teriparatide Anaphylaxis and Other (See Comments)     Other reaction(s): Unknown    Tramadol Anaphylaxis and Other (See Comments)     Other reaction(s): Unknown, Unknown    Nifedipine Other (See Comments)     Rapid heart beat        Vancomycin Hives, Itching and Rash     vancomycin-infusion reaction (VIR, formerly Ivett's syndrome): give over longer infusion time          SURGICALHX  Past Surgical History:   Procedure Laterality Date    ABDOMINAL SURGERY  2019    ABSCESS  DRAINAGE  12/24/2019    Fluoroscopically guided abscess drainage, [facility]    ADENOIDECTOMY      BACK SURGERY      L4 L5    BLADDER SURGERY      CARDIAC CATHETERIZATION  2019    CHOLECYSTECTOMY N/A     COLONOSCOPY  04/24/2019    NBIH, 3 mm polyp    CYSTOSCOPY      DILATATION AND CURETTAGE      ENDOMETRIAL ABLATION      ENDOSCOPY N/A 04/24/2019    Normal    ENTEROSCOPY SMALL BOWEL      EPIDURAL BLOCK      ERCP  2019    FLEXIBLE SIGMOIDOSCOPY  Unsure    GASTRIC STIMULATOR IMPLANT SURGERY  03/18/2025    IN PLACE NOW, PT DOES NOT HAVE ANY REMOTE FOR THIS DEVICE.    GASTROJEJUNOSTOMY W/ JEJUNOSTOMY TUBE      removed    GASTROSTOMY  2019    No longer have    HYSTERECTOMY      LIVER BIOPSY  2022    LUMBAR LAMINECTOMY Right 01/24/2025    Procedure: MINIMALLY INVASIVE LUMBAR LAMINECTOMY WITH DISCECTOMY, RIGHT APPROACH, LUMBAR THREE-LUMBAR FOUR  OPERATION ON THE LOWER BACK TO SHAVE OFF BONE AND DISC COMPRESSING THE NERVES TO THE LEGS.;  Surgeon: Joe Langley MD;  Location: [facility] MAIN OR;  Service: Neurosurgery;  Laterality: Right;    LYMPH NODE BIOPSY      BENIGN    OOPHORECTOMY      OTHER SURGICAL HISTORY      gastric stimulator battery replaced    PANCREATECTOMY  12/2019    TPIAT    PELVIC FLOOR REPAIR      SINUS SURGERY      SPLENECTOMY      TONSILLECTOMY      TOTAL ABDOMINAL HYSTERECTOMY WITH SALPINGO OOPHORECTOMY      UPPER GASTROINTESTINAL ENDOSCOPY  Multiple times    VENOUS ACCESS DEVICE (PORT) INSERTION      REMOVED          SOC  Social History     Socioeconomic History    Marital status:    Tobacco Use    Smoking status: Never     Passive exposure: Never    Smokeless tobacco: Never   Vaping Use    Vaping status: Never Used   Substance and Sexual Activity    Alcohol use: Never    Drug use: Never    Sexual activity: Not Currently     Partners: Male     Birth control/protection: Abstinence, Hysterectomy         FAMHX  Family History   Problem Relation Age of Onset    Anxiety disorder Mother      Hypothyroidism Mother     Breast cancer Mother     Colon polyps Mother     Arthritis Mother     Cancer Mother         Breast    Depression Mother     Thyroid disease Mother     Heart disease Father     Hypothyroidism Father     Anxiety disorder Father     Depression Father     Hypertension Father     Kidney disease Father     Thyroid disease Father     Anemia Father     Depression Brother     Hypertension Brother     Colon cancer Maternal Grandfather     Alcohol abuse Paternal Grandfather     Heart disease Paternal Grandfather     Malig Hyperthermia Neg Hx     Crohn's disease Neg Hx     Irritable bowel syndrome Neg Hx     Ulcerative colitis Neg Hx           MEDSIGONLY  Current Outpatient Medications on File Prior to Visit   Medication Sig    carvedilol (COREG) 12.5 MG tablet Take 1 tablet by mouth 2 (Two) Times a Day With Meals.    cetirizine (zyrTEC) 10 MG tablet Take 1 tablet by mouth Daily.    docusate sodium (COLACE) 100 MG capsule Take 1 capsule by mouth 2 (Two) Times a Day.    E-400 180 MG (400 UNIT) capsule capsule Take 2 capsules by mouth Daily.    Enulose 10 GM/15ML solution solution (encephalopathy) Take 45 mL by mouth As Needed.    fluticasone (FLONASE) 50 MCG/ACT nasal spray Administer 2 sprays into the nostril(s) as directed by provider Daily.    glucagon (GLUCAGEN) 1 MG injection Inject 1 mg into the appropriate muscle as directed by prescriber Every 15 (Fifteen) Minutes As Needed for Low Blood Sugar (per Glucommander).    hydrocortisone (CORTEF) 5 MG tablet     hydrOXYzine (ATARAX) 50 MG tablet Take 1 tablet by mouth Every Night.    Insulin Disposable Pump (Omnipod 5 G6 Pods, Gen 5,) misc     Insulin Lispro (humaLOG) 100 UNIT/ML injection USE IN insulin pump approximately 100 UNITS DAILY    lansoprazole (PREVACID) 30 MG capsule Take 1 capsule by mouth 2 (Two) Times a Day.    Lantus SoloStar 100 UNIT/ML injection pen Inject 47 Units under the skin into the appropriate area as directed Daily As Needed  (ONLY FOR INSULIN PUMP FAILURE). Only for insulin pump failure    levothyroxine (SYNTHROID, LEVOTHROID) 125 MCG tablet Take 1 tablet by mouth Daily.    linaclotide (Linzess) 290 MCG capsule capsule Take 1 capsule by mouth Every Morning Before Breakfast.    NovoLOG 100 UNIT/ML injection Inject  under the skin into the appropriate area as directed. Type of Insulin: Lispro 100 units/ml  Type of Pump:Omnipod  Bolus: 1u 5-7g max bolus 2.5units/hr  Basal: 6411-4338 1.5u/hr max basal 10units/hr  Correction factor: 40mg/dl  Insulin to carbohydrate ratio: 7-5 g of carb  Next fill date: 10/21/2024    Date of last site change: 10/18/2024  Location of pod: right upper arm    Frequency of refill: 2-3 days  Last refill date: 10/18/2024    Prescriber: Dr. Chelsea Silver  Phone number: [phone]    ondansetron (ZOFRAN) 8 MG tablet Take 1 tablet by mouth Every 4 (Four) to 6 (Six) Hours As Needed for Nausea or Vomiting.    ondansetron ODT (ZOFRAN-ODT) 4 MG disintegrating tablet Take 1 tablet by mouth Every 6 (Six) Hours As Needed for Nausea or Vomiting.    Ozempic, 0.25 or 0.5 MG/DOSE, 2 MG/3ML solution pen-injector Inject 0.5 mg under the skin into the appropriate area as directed 1 (One) Time Per Week for 12 doses.    Pancrelipase, Lip-Prot-Amyl, (CREON) 70731-672345 units capsule delayed-release particles capsule Take 2 capsules by mouth 2 (Two) Times a Day. 3 CAPS TID WITH MEALS AND 2 CAPS BID WITH SNACKS    polyethylene glycol (MiraLax) 17 GM/SCOOP powder Take 17 g by mouth 2 (Two) Times a Day.    prochlorperazine (COMPAZINE) 10 MG tablet Take 1 tablet by mouth Every 8 (Eight) Hours As Needed for Nausea or Vomiting.    promethazine (PHENERGAN) 25 MG tablet Take 1 tablet by mouth Every 6 (Six) Hours As Needed.    QUEtiapine (SEROquel) 100 MG tablet Take 1 tablet by mouth every night at bedtime.    Qulipta 60 MG tablet Take 1 tablet by mouth Every Night.    riFAXIMin (XIFAXAN) 550 MG tablet Take 1 tablet by mouth  "Every 12 (Twelve) Hours.    Solu-CORTEF 100 MG injection     traZODone (DESYREL) 100 MG tablet Take 1 tablet by mouth Every Night.    Ubrelvy 100 MG tablet Take 1 tablet by mouth 1 (One) Time As Needed (Migraine).    valsartan-hydrochlorothiazide (Diovan HCT) 160-12.5 MG per tablet Take 1 tablet by mouth Daily.     No current facility-administered medications on file prior to visit.       Objective   /82   Pulse 78   Ht 170.2 cm (67.01\")   Wt 83.8 kg (184 lb 12.8 oz)   BMI 28.94 kg/m²       Physical Exam  HENT:      Head: Normocephalic.   Neck:      Vascular: No carotid bruit.   Cardiovascular:      Rate and Rhythm: Normal rate and regular rhythm.      Pulses: Normal pulses.      Heart sounds: Normal heart sounds. No murmur heard.  Pulmonary:      Effort: Pulmonary effort is normal.      Breath sounds: Normal breath sounds.   Musculoskeletal:      Cervical back: Neck supple.      Right lower leg: No edema.      Left lower leg: No edema.   Skin:     General: Skin is dry.   Neurological:      Mental Status: She is alert and oriented to person, place, and time.   Psychiatric:         Behavior: Behavior normal.         Result Review :   The following data was reviewed by: LAZARO Luo on 07/02/2025:  proBNP   Date Value Ref Range Status   03/02/2025 84.5 0.0 - 450.0 pg/mL Final     CMP          3/16/2025    04:00 3/19/2025    22:47 5/7/2025    08:43   CMP   Glucose 126  115  105    BUN 7  11  12    Creatinine 0.67  0.77  0.66    EGFR 107.3  94.7  107.7    Sodium 142  134  140    Potassium 3.9  3.9  3.7    Chloride 104  98  102    Calcium 8.8  9.3  9.8    Total Protein 6.3  8.0  7.9    Albumin 3.6  4.1  4.3    Globulin 2.7  3.9  3.6    Total Bilirubin 0.4  1.4  0.4    Alkaline Phosphatase 94  153  185    AST (SGOT) 17  42  64    ALT (SGPT) 15  33  81    Albumin/Globulin Ratio 1.3  1.1  1.2    BUN/Creatinine Ratio 10.4  14.3  18.2    Anion Gap 9.3  12.6  12.6      CBC w/diff          3/16/2025    04:00 " "3/19/2025    22:47 5/7/2025    08:43   CBC w/Diff   WBC 12.58  16.58  8.55    RBC 2.66  3.67  4.46    Hemoglobin 8.0  11.0  13.7    Hematocrit 25.6  34.3  40.4    MCV 96.2  93.5  90.6    MCH 30.1  30.0  30.7    MCHC 31.3  32.1  33.9    RDW 15.3  15.1  13.2    Platelets 254  512  443    Neutrophil Rel % 41.6  48.5  37.5    Immature Granulocyte Rel % 1.0  1.1  0.2    Lymphocyte Rel % 38.6  28.6  44.1    Monocyte Rel % 13.9  15.9  14.7    Eosinophil Rel % 4.7  5.5  2.9    Basophil Rel % 0.2  0.4  0.6       Lab Results   Component Value Date    TSH 1.480 05/07/2025      Lab Results   Component Value Date    FREET4 1.25 05/07/2025      No results found for: \"DDIMERQUANT\"  Magnesium   Date Value Ref Range Status   03/14/2025 2.1 1.6 - 2.6 mg/dL Final      No results found for: \"DIGOXIN\"   Lab Results   Component Value Date    TROPONINT <6 03/02/2025           Results for orders placed during the hospital encounter of 04/25/25    Adult Transthoracic Echo Complete W/ Cont if Necessary Per Protocol 04/28/2025  9:05 AM    Interpretation Summary    Left ventricular systolic function is normal. Calculated left ventricular EF = 59.5%    Left ventricular wall thickness is consistent with concentric hypertrophy.    Estimated right ventricular systolic pressure from tricuspid regurgitation is normal (<35 mmHg).    Mild aortic regurgitation.           Assessment and Plan    Diagnoses and all orders for this visit:    1. Mild aortic regurgitation (Primary)      Assessment & Plan  1. Chest pain:  - Stress test results normal, no significant blockages affecting blood flow.  - Echocardiogram revealed normal chamber sizes, ruling out heart failure.  - Mild thickening of the left ventricular wall, likely due to hypertension, not affecting overall heart function.  - Aortic valve showed mild regurgitation, currently asymptomatic.  - Blood pressure and heart rate within normal ranges today.  - Blood work from 05/07/2025 indicated normal " kidney function, electrolyte levels, and blood counts.  - Maintain current medication regimen: carvedilol 12.5 mg twice daily and valsartan hydrochlorothiazide 160-12.5 mg daily.  - Schedule repeat echocardiogram in 3 to 5 years if symptom-free.  - Contact office immediately if experiencing severe shortness of breath, lightheadedness, dizziness, or passing out episodes.    2. Syncope:  - Fainting episodes likely related to [place]'s disease, occurring 3 to 4 times a year, sometimes more frequently.  - Episodes often sudden and associated with low blood pressure.  - Order 30-day monitor to assess heart rate trends and determine cardiac component.      Follow-up  - Follow up in 1 year or sooner if necessary.      Follow Up   Return in about 1 year (around 7/2/2026) for Follow up with Dr Rodriguez.    Patient was given instructions and counseling regarding her condition or for health maintenance advice. Please see specific information pulled into the AVS if appropriate.     Kayla Gan  reports that she has never smoked. She has never been exposed to tobacco smoke. She has never used smokeless tobacco.           Patient or patient representative verbalized consent for the use of Ambient Listening during the visit with  LAZARO Luo for chart documentation. 7/2/2025  10:14 EDT    LAZARO Luo  07/02/25  09:38 EDT    Dictated Utilizing Dragon Dictation

## 2025-07-11 ENCOUNTER — PATIENT MESSAGE (OUTPATIENT)
Dept: FAMILY MEDICINE CLINIC | Facility: CLINIC | Age: 50
End: 2025-07-11
Payer: MEDICARE

## 2025-07-11 RX ORDER — PROCHLORPERAZINE MALEATE 10 MG
10 TABLET ORAL EVERY 8 HOURS PRN
Qty: 60 TABLET | Refills: 0 | Status: SHIPPED | OUTPATIENT
Start: 2025-07-11

## 2025-07-15 RX ORDER — PROCHLORPERAZINE MALEATE 10 MG
10 TABLET ORAL EVERY 6 HOURS PRN
Qty: 120 TABLET | Refills: 3 | OUTPATIENT
Start: 2025-07-15

## 2025-07-15 RX ORDER — PROMETHAZINE HYDROCHLORIDE 25 MG/1
TABLET ORAL
Qty: 20 TABLET | Refills: 1 | Status: SHIPPED | OUTPATIENT
Start: 2025-07-15

## 2025-07-15 NOTE — TELEPHONE ENCOUNTER
PROMETHAZINE  LRF unknown  LOV 6/6/2025  F/U 9/10/2025    PROCHLORPERAZINE  Medication was sent in on 7/11/2025

## 2025-08-15 ENCOUNTER — OFFICE VISIT (OUTPATIENT)
Dept: CARDIOLOGY | Facility: CLINIC | Age: 50
End: 2025-08-15
Payer: MEDICARE

## 2025-08-15 VITALS
BODY MASS INDEX: 28.41 KG/M2 | DIASTOLIC BLOOD PRESSURE: 96 MMHG | HEIGHT: 67 IN | SYSTOLIC BLOOD PRESSURE: 137 MMHG | WEIGHT: 181 LBS | HEART RATE: 84 BPM

## 2025-08-15 DIAGNOSIS — I10 HYPERTENSION, ESSENTIAL: ICD-10-CM

## 2025-08-15 DIAGNOSIS — R07.9 CHEST PAIN, UNSPECIFIED TYPE: Primary | ICD-10-CM

## 2025-08-15 PROCEDURE — 3075F SYST BP GE 130 - 139MM HG: CPT | Performed by: SPECIALIST

## 2025-08-15 PROCEDURE — 3080F DIAST BP >= 90 MM HG: CPT | Performed by: SPECIALIST

## 2025-08-15 PROCEDURE — 99214 OFFICE O/P EST MOD 30 MIN: CPT | Performed by: SPECIALIST

## 2025-08-15 RX ORDER — NITROGLYCERIN 0.4 MG/1
0.4 TABLET SUBLINGUAL
OUTPATIENT
Start: 2025-08-15 | End: 2025-08-15

## 2025-08-15 RX ORDER — METOPROLOL TARTRATE 1 MG/ML
5 INJECTION, SOLUTION INTRAVENOUS
OUTPATIENT
Start: 2025-08-15

## 2025-08-15 RX ORDER — METOPROLOL TARTRATE 25 MG/1
100 TABLET, FILM COATED ORAL ONCE
OUTPATIENT
Start: 2025-08-15

## 2025-08-15 RX ORDER — ESCITALOPRAM OXALATE 20 MG/1
TABLET ORAL EVERY 24 HOURS
COMMUNITY
End: 2025-08-16

## 2025-08-15 RX ORDER — SODIUM CHLORIDE 0.9 % (FLUSH) 0.9 %
10 SYRINGE (ML) INJECTION EVERY 12 HOURS SCHEDULED
OUTPATIENT
Start: 2025-08-15

## 2025-08-15 RX ORDER — METOPROLOL TARTRATE 50 MG
50 TABLET ORAL
OUTPATIENT
Start: 2025-08-15

## 2025-08-15 RX ORDER — CARVEDILOL 25 MG/1
25 TABLET ORAL 2 TIMES DAILY WITH MEALS
Qty: 180 TABLET | Refills: 3 | Status: ON HOLD | OUTPATIENT
Start: 2025-08-15

## 2025-08-15 RX ORDER — SODIUM CHLORIDE 9 MG/ML
40 INJECTION, SOLUTION INTRAVENOUS AS NEEDED
OUTPATIENT
Start: 2025-08-15

## 2025-08-15 RX ORDER — NITROGLYCERIN 0.4 MG/1
0.8 TABLET SUBLINGUAL
OUTPATIENT
Start: 2025-08-15

## 2025-08-15 RX ORDER — SODIUM CHLORIDE 0.9 % (FLUSH) 0.9 %
10 SYRINGE (ML) INJECTION AS NEEDED
OUTPATIENT
Start: 2025-08-15

## 2025-08-15 RX ORDER — METOPROLOL TARTRATE 25 MG/1
150 TABLET, FILM COATED ORAL ONCE
OUTPATIENT
Start: 2025-08-15

## 2025-08-15 RX ORDER — METOPROLOL TARTRATE 25 MG/1
50 TABLET, FILM COATED ORAL ONCE
OUTPATIENT
Start: 2025-08-15

## 2025-08-15 RX ORDER — LAMOTRIGINE 100 MG/1
100 TABLET ORAL EVERY 24 HOURS
COMMUNITY
End: 2025-08-16

## 2025-08-15 RX ORDER — OXYCODONE HYDROCHLORIDE 10 MG/1
1 TABLET ORAL
COMMUNITY
Start: 2025-08-01 | End: 2025-08-16

## 2025-08-15 RX ORDER — METOPROLOL TARTRATE 25 MG/1
200 TABLET, FILM COATED ORAL ONCE
OUTPATIENT
Start: 2025-08-15 | End: 2025-08-15

## 2025-08-16 ENCOUNTER — HOSPITAL ENCOUNTER (INPATIENT)
Facility: HOSPITAL | Age: 50
LOS: 6 days | Discharge: HOME | End: 2025-08-22
Attending: EMERGENCY MEDICINE | Admitting: HOSPITALIST
Payer: MEDICARE

## 2025-08-16 ENCOUNTER — APPOINTMENT (OUTPATIENT)
Dept: GENERAL RADIOLOGY | Facility: HOSPITAL | Age: 50
End: 2025-08-16
Payer: MEDICARE

## 2025-08-16 ENCOUNTER — APPOINTMENT (OUTPATIENT)
Dept: CT IMAGING | Facility: HOSPITAL | Age: 50
End: 2025-08-16
Payer: MEDICARE

## 2025-08-16 PROBLEM — E27.40 ADRENAL INSUFFICIENCY: Status: ACTIVE | Noted: 2025-08-16

## 2025-08-16 PROBLEM — E27.2 ADRENAL CRISIS: Status: ACTIVE | Noted: 2025-08-16

## 2025-08-17 ENCOUNTER — APPOINTMENT (OUTPATIENT)
Dept: CT IMAGING | Facility: HOSPITAL | Age: 50
End: 2025-08-17
Payer: MEDICARE

## 2025-08-17 ENCOUNTER — APPOINTMENT (OUTPATIENT)
Dept: GENERAL RADIOLOGY | Facility: HOSPITAL | Age: 50
End: 2025-08-17
Payer: MEDICARE

## 2025-08-18 ENCOUNTER — APPOINTMENT (OUTPATIENT)
Dept: GENERAL RADIOLOGY | Facility: HOSPITAL | Age: 50
End: 2025-08-18
Payer: MEDICARE

## 2025-08-19 ENCOUNTER — APPOINTMENT (OUTPATIENT)
Dept: CT IMAGING | Facility: HOSPITAL | Age: 50
End: 2025-08-19
Payer: MEDICARE

## 2025-08-21 ENCOUNTER — APPOINTMENT (OUTPATIENT)
Dept: CARDIOLOGY | Facility: HOSPITAL | Age: 50
End: 2025-08-21
Payer: MEDICARE

## 2025-08-22 ENCOUNTER — READMISSION MANAGEMENT (OUTPATIENT)
Dept: CALL CENTER | Facility: HOSPITAL | Age: 50
End: 2025-08-22
Payer: MEDICARE

## 2025-08-25 ENCOUNTER — TRANSITIONAL CARE MANAGEMENT TELEPHONE ENCOUNTER (OUTPATIENT)
Dept: CALL CENTER | Facility: HOSPITAL | Age: 50
End: 2025-08-25
Payer: MEDICARE

## 2025-08-26 ENCOUNTER — TELEPHONE (OUTPATIENT)
Dept: FAMILY MEDICINE CLINIC | Facility: CLINIC | Age: 50
End: 2025-08-26

## 2025-08-29 ENCOUNTER — OFFICE VISIT (OUTPATIENT)
Dept: FAMILY MEDICINE CLINIC | Facility: CLINIC | Age: 50
End: 2025-08-29
Payer: MEDICARE

## 2025-08-29 VITALS
WEIGHT: 180 LBS | OXYGEN SATURATION: 99 % | DIASTOLIC BLOOD PRESSURE: 78 MMHG | HEIGHT: 67 IN | HEART RATE: 87 BPM | BODY MASS INDEX: 28.25 KG/M2 | SYSTOLIC BLOOD PRESSURE: 125 MMHG

## 2025-08-29 DIAGNOSIS — Z09 HOSPITAL DISCHARGE FOLLOW-UP: Primary | ICD-10-CM

## 2025-08-29 DIAGNOSIS — E27.2 ADRENAL CRISIS: ICD-10-CM

## 2025-08-29 DIAGNOSIS — J18.9 PNEUMONIA OF LEFT LOWER LOBE DUE TO INFECTIOUS ORGANISM: ICD-10-CM

## 2025-08-29 DIAGNOSIS — D72.828 OTHER ELEVATED WHITE BLOOD CELL (WBC) COUNT: ICD-10-CM

## 2025-08-29 RX ORDER — SEMAGLUTIDE 0.68 MG/ML
0.5 INJECTION, SOLUTION SUBCUTANEOUS
COMMUNITY
Start: 2025-08-26

## 2025-08-29 RX ORDER — DOXYCYCLINE 100 MG/1
100 CAPSULE ORAL 2 TIMES DAILY
Qty: 10 CAPSULE | Refills: 0 | Status: SHIPPED | OUTPATIENT
Start: 2025-08-29

## (undated) DEVICE — SUT VIC 0/0 UR6 27IN DYED J603H

## (undated) DEVICE — SYR LL 3ML 20G 1IN

## (undated) DEVICE — STERILE POLYISOPRENE POWDER-FREE SURGICAL GLOVES WITH EMOLLIENT COATING: Brand: PROTEXIS

## (undated) DEVICE — DRP MICROSCP LECIA W/CLEARLENS 137X381CM

## (undated) DEVICE — SYR LL TP 10ML STRL

## (undated) DEVICE — SLV SCD KN/LEN ADJ EXPRSS BLENDED MD 1P/U

## (undated) DEVICE — Device

## (undated) DEVICE — ELECTRD BLD EXT EDGE 1P COAT 6.5IN STRL

## (undated) DEVICE — ANTIBACTERIAL UNDYED BRAIDED (POLYGLACTIN 910), SYNTHETIC ABSORBABLE SUTURE: Brand: COATED VICRYL

## (undated) DEVICE — THE STERILE LIGHT HANDLE COVER IS USED WITH STERIS SURGICAL LIGHTING AND VISUALIZATION SYSTEMS.

## (undated) DEVICE — GLV SURG BIOGEL LTX PF 7 1/2

## (undated) DEVICE — GAMMEX® NON-LATEX SIZE 7.5, STERILE NEOPRENE POWDER-FREE SURGICAL GLOVE: Brand: GAMMEX

## (undated) DEVICE — LAMINECTOMY CERVICAL DISC-LF: Brand: MEDLINE INDUSTRIES, INC.